# Patient Record
Sex: FEMALE | Race: WHITE | NOT HISPANIC OR LATINO | Employment: OTHER | URBAN - METROPOLITAN AREA
[De-identification: names, ages, dates, MRNs, and addresses within clinical notes are randomized per-mention and may not be internally consistent; named-entity substitution may affect disease eponyms.]

---

## 2017-01-03 ENCOUNTER — TELEPHONE (OUTPATIENT)
Dept: HEMATOLOGY/ONCOLOGY | Facility: CLINIC | Age: 82
End: 2017-01-03

## 2017-01-03 NOTE — TELEPHONE ENCOUNTER
----- Message from Hazel Quezada sent at 1/3/2017  8:58 AM CST -----  Contact: pt   Pt request call from nurse. Pt states that the nurse told her that she has a appt next week and want to verify.     [phone]

## 2017-01-11 ENCOUNTER — TELEPHONE (OUTPATIENT)
Dept: HEMATOLOGY/ONCOLOGY | Facility: CLINIC | Age: 82
End: 2017-01-11

## 2017-01-11 NOTE — TELEPHONE ENCOUNTER
----- Message from Tonya Trevino sent at 1/11/2017 11:01 AM CST -----  Pt at [phone]//states she would like for the nurse to call her to confirm that she has an infusion tomorrow on 1-12-17//please call//thanks/lh

## 2017-01-12 ENCOUNTER — INFUSION (OUTPATIENT)
Dept: INFUSION THERAPY | Facility: HOSPITAL | Age: 82
End: 2017-01-12
Attending: INTERNAL MEDICINE
Payer: MEDICARE

## 2017-01-12 VITALS
RESPIRATION RATE: 18 BRPM | HEART RATE: 75 BPM | DIASTOLIC BLOOD PRESSURE: 65 MMHG | TEMPERATURE: 98 F | SYSTOLIC BLOOD PRESSURE: 138 MMHG

## 2017-01-12 DIAGNOSIS — D50.0 IRON DEFICIENCY ANEMIA DUE TO CHRONIC BLOOD LOSS: Primary | ICD-10-CM

## 2017-01-12 PROCEDURE — 96374 THER/PROPH/DIAG INJ IV PUSH: CPT | Mod: PO

## 2017-01-12 PROCEDURE — 63600175 PHARM REV CODE 636 W HCPCS: Mod: PO | Performed by: INTERNAL MEDICINE

## 2017-01-12 PROCEDURE — 25000003 PHARM REV CODE 250: Mod: PO | Performed by: INTERNAL MEDICINE

## 2017-01-12 RX ORDER — SODIUM CHLORIDE 0.9 % (FLUSH) 0.9 %
10 SYRINGE (ML) INJECTION
Status: DISCONTINUED | OUTPATIENT
Start: 2017-01-12 | End: 2017-01-12 | Stop reason: HOSPADM

## 2017-01-12 RX ADMIN — SODIUM CHLORIDE: 9 INJECTION, SOLUTION INTRAVENOUS at 09:01

## 2017-01-12 RX ADMIN — FERUMOXYTOL 510 MG: 510 INJECTION INTRAVENOUS at 09:01
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
## 2017-01-18 ENCOUNTER — INFUSION (OUTPATIENT)
Dept: INFUSION THERAPY | Facility: HOSPITAL | Age: 82
End: 2017-01-18
Attending: INTERNAL MEDICINE
Payer: MEDICARE

## 2017-01-18 VITALS
SYSTOLIC BLOOD PRESSURE: 175 MMHG | BODY MASS INDEX: 29.13 KG/M2 | RESPIRATION RATE: 20 BRPM | TEMPERATURE: 98 F | OXYGEN SATURATION: 93 % | HEART RATE: 72 BPM | WEIGHT: 158.31 LBS | DIASTOLIC BLOOD PRESSURE: 79 MMHG | HEIGHT: 62 IN

## 2017-01-18 DIAGNOSIS — D50.0 IRON DEFICIENCY ANEMIA DUE TO CHRONIC BLOOD LOSS: Primary | ICD-10-CM

## 2017-01-18 PROCEDURE — 96365 THER/PROPH/DIAG IV INF INIT: CPT | Mod: PO

## 2017-01-18 PROCEDURE — 63600175 PHARM REV CODE 636 W HCPCS: Mod: PO | Performed by: INTERNAL MEDICINE

## 2017-01-18 PROCEDURE — 25000003 PHARM REV CODE 250: Mod: PO | Performed by: INTERNAL MEDICINE

## 2017-01-18 RX ADMIN — FERUMOXYTOL 510 MG: 510 INJECTION INTRAVENOUS at 02:01

## 2017-01-18 RX ADMIN — SODIUM CHLORIDE: 900 INJECTION, SOLUTION INTRAVENOUS at 01:01

## 2017-01-18 NOTE — PATIENT INSTRUCTIONS
[facility]fusion [facility]  [address]  [address] [address]  [phone] phone     [phone] fax  Hours of Operation: Monday- Friday 8:00am- 5:00pm  After hours phone  [phone]  Hematology / Oncology Physicians on call      Dr. Aroldo Alonso    Please call with any concerns regarding your appointment today.FALL PREVENTION   Falls often occur due to slipping, tripping or losing your balance. Here are ways to reduce your risk of falling again.   Was there anything that caused your fall that can be fixed, removed or replaced?   Make your home safe by keeping walkways clear of objects you may trip over.   Use non-slip pads under rugs.   Do not walk in poorly lit areas.   Do not stand on chairs or wobbly ladders.   Use caution when reaching overhead or looking upward. This position can cause a loss of balance.   Be sure your shoes fit properly, have non-slip bottoms and are in good condition.   Be cautious when going up and down stairs, curbs, and when walking on uneven sidewalks.   If your balance is poor, consider using a cane or walker.   If your fall was related to alcohol use, stop or limit alcohol intake.   If your fall was related to use of sleeping medicines, talk to your doctor about this. You may need to reduce your dosage at bedtime if you awaken during the night to go to the bathroom.   To reduce the need for nighttime bathroom trips:   Avoid drinking fluids for several hours before going to bed   Empty your bladder before going to bed   Men can keep a urinal at the bedside   © 8610-7155 [facility], [address] [place] [address], [place] [address]. All rights reserved. This information is not intended as a substitute for professional medical care. Always follow your healthcare professional's instructions.

## 2017-01-18 NOTE — PLAN OF CARE
"Problem: Patient Care Overview  Goal: Plan of Care Review  Outcome: Ongoing (interventions implemented as appropriate)  Pt. Stated,"I'm doing good."      "

## 2017-02-13 DIAGNOSIS — I10 ESSENTIAL HYPERTENSION: ICD-10-CM

## 2017-02-13 RX ORDER — BENAZEPRIL HYDROCHLORIDE 5 MG/1
TABLET ORAL
Qty: 90 TABLET | Refills: 3 | Status: CANCELLED | OUTPATIENT
Start: 2017-02-13

## 2017-02-16 ENCOUNTER — LAB VISIT (OUTPATIENT)
Dept: LAB | Facility: HOSPITAL | Age: 82
End: 2017-02-16
Attending: INTERNAL MEDICINE
Payer: MEDICARE

## 2017-02-16 ENCOUNTER — OFFICE VISIT (OUTPATIENT)
Dept: HEMATOLOGY/ONCOLOGY | Facility: CLINIC | Age: 82
End: 2017-02-16
Payer: MEDICARE

## 2017-02-16 VITALS
OXYGEN SATURATION: 96 % | HEART RATE: 79 BPM | WEIGHT: 155.19 LBS | BODY MASS INDEX: 28.56 KG/M2 | SYSTOLIC BLOOD PRESSURE: 120 MMHG | DIASTOLIC BLOOD PRESSURE: 80 MMHG | RESPIRATION RATE: 18 BRPM | TEMPERATURE: 98 F | HEIGHT: 62 IN

## 2017-02-16 DIAGNOSIS — D63.1 ANEMIA IN CHRONIC KIDNEY DISEASE: ICD-10-CM

## 2017-02-16 DIAGNOSIS — N18.30 CHRONIC RENAL FAILURE, STAGE 3 (MODERATE): ICD-10-CM

## 2017-02-16 DIAGNOSIS — D50.0 IRON DEFICIENCY ANEMIA DUE TO CHRONIC BLOOD LOSS: ICD-10-CM

## 2017-02-16 DIAGNOSIS — D47.2 MONOCLONAL GAMMOPATHY OF UNDETERMINED SIGNIFICANCE: ICD-10-CM

## 2017-02-16 DIAGNOSIS — D63.1 ANEMIA OF CHRONIC RENAL FAILURE, STAGE 3 (MODERATE): ICD-10-CM

## 2017-02-16 DIAGNOSIS — N18.9 ANEMIA IN CHRONIC KIDNEY DISEASE: ICD-10-CM

## 2017-02-16 DIAGNOSIS — D50.0 IRON DEFICIENCY ANEMIA DUE TO CHRONIC BLOOD LOSS: Primary | ICD-10-CM

## 2017-02-16 DIAGNOSIS — N18.30 ANEMIA OF CHRONIC RENAL FAILURE, STAGE 3 (MODERATE): ICD-10-CM

## 2017-02-16 LAB
FERRITIN SERPL-MCNC: 1877 NG/ML
IRON SERPL-MCNC: 36 UG/DL
SATURATED IRON: 12 %
TOTAL IRON BINDING CAPACITY: 296 UG/DL
TRANSFERRIN SERPL-MCNC: 200 MG/DL

## 2017-02-16 PROCEDURE — 99999 PR PBB SHADOW E&M-EST. PATIENT-LVL III: CPT | Mod: PBBFAC,,, | Performed by: INTERNAL MEDICINE

## 2017-02-16 PROCEDURE — 1126F AMNT PAIN NOTED NONE PRSNT: CPT | Mod: S$GLB,,, | Performed by: INTERNAL MEDICINE

## 2017-02-16 PROCEDURE — 99214 OFFICE O/P EST MOD 30 MIN: CPT | Mod: S$GLB,,, | Performed by: INTERNAL MEDICINE

## 2017-02-16 PROCEDURE — 1160F RVW MEDS BY RX/DR IN RCRD: CPT | Mod: S$GLB,,, | Performed by: INTERNAL MEDICINE

## 2017-02-16 PROCEDURE — 99499 UNLISTED E&M SERVICE: CPT | Mod: S$GLB,,, | Performed by: INTERNAL MEDICINE

## 2017-02-16 PROCEDURE — 1157F ADVNC CARE PLAN IN RCRD: CPT | Mod: S$GLB,,, | Performed by: INTERNAL MEDICINE

## 2017-02-16 PROCEDURE — 3079F DIAST BP 80-89 MM HG: CPT | Mod: S$GLB,,, | Performed by: INTERNAL MEDICINE

## 2017-02-16 PROCEDURE — 1159F MED LIST DOCD IN RCRD: CPT | Mod: S$GLB,,, | Performed by: INTERNAL MEDICINE

## 2017-02-16 PROCEDURE — 3074F SYST BP LT 130 MM HG: CPT | Mod: S$GLB,,, | Performed by: INTERNAL MEDICINE

## 2017-02-17 ENCOUNTER — TELEPHONE (OUTPATIENT)
Dept: HEMATOLOGY/ONCOLOGY | Facility: CLINIC | Age: 82
End: 2017-02-17

## 2017-02-17 DIAGNOSIS — D50.0 IRON DEFICIENCY ANEMIA DUE TO CHRONIC BLOOD LOSS: Primary | ICD-10-CM

## 2017-02-17 RX ORDER — SODIUM CHLORIDE 0.9 % (FLUSH) 0.9 %
10 SYRINGE (ML) INJECTION
Status: CANCELLED | OUTPATIENT
Start: 2017-02-17

## 2017-02-17 RX ORDER — HEPARIN 100 UNIT/ML
500 SYRINGE INTRAVENOUS
Status: CANCELLED | OUTPATIENT
Start: 2017-02-17

## 2017-02-17 RX ORDER — HEPARIN 100 UNIT/ML
500 SYRINGE INTRAVENOUS
Status: CANCELLED | OUTPATIENT
Start: 2017-02-25

## 2017-02-17 RX ORDER — SODIUM CHLORIDE 0.9 % (FLUSH) 0.9 %
10 SYRINGE (ML) INJECTION
Status: CANCELLED | OUTPATIENT
Start: 2017-02-25

## 2017-02-17 NOTE — TELEPHONE ENCOUNTER
----- Message from Blas Kendrick MD sent at 2/17/2017  1:07 PM CST -----  She is still iron deficient will need to get some more intravenous iron orders have been written I like to see her on day 1 or 8

## 2017-02-24 ENCOUNTER — INFUSION (OUTPATIENT)
Dept: INFUSION THERAPY | Facility: HOSPITAL | Age: 82
End: 2017-02-24
Attending: INTERNAL MEDICINE
Payer: MEDICARE

## 2017-02-24 ENCOUNTER — OFFICE VISIT (OUTPATIENT)
Dept: HEMATOLOGY/ONCOLOGY | Facility: CLINIC | Age: 82
End: 2017-02-24
Payer: MEDICARE

## 2017-02-24 VITALS
DIASTOLIC BLOOD PRESSURE: 64 MMHG | BODY MASS INDEX: 28.11 KG/M2 | HEART RATE: 77 BPM | HEIGHT: 62 IN | WEIGHT: 152.75 LBS | SYSTOLIC BLOOD PRESSURE: 134 MMHG | OXYGEN SATURATION: 95 % | TEMPERATURE: 98 F

## 2017-02-24 VITALS
WEIGHT: 152.75 LBS | BODY MASS INDEX: 28.11 KG/M2 | HEART RATE: 76 BPM | SYSTOLIC BLOOD PRESSURE: 110 MMHG | HEIGHT: 62 IN | DIASTOLIC BLOOD PRESSURE: 64 MMHG

## 2017-02-24 DIAGNOSIS — N18.30 CHRONIC RENAL FAILURE, STAGE 3 (MODERATE): ICD-10-CM

## 2017-02-24 DIAGNOSIS — D50.0 IRON DEFICIENCY ANEMIA DUE TO CHRONIC BLOOD LOSS: Primary | ICD-10-CM

## 2017-02-24 DIAGNOSIS — N18.30 ANEMIA OF CHRONIC RENAL FAILURE, STAGE 3 (MODERATE): ICD-10-CM

## 2017-02-24 DIAGNOSIS — D63.1 ANEMIA OF CHRONIC RENAL FAILURE, STAGE 3 (MODERATE): ICD-10-CM

## 2017-02-24 PROCEDURE — 99999 PR PBB SHADOW E&M-EST. PATIENT-LVL III: CPT | Mod: PBBFAC,,, | Performed by: INTERNAL MEDICINE

## 2017-02-24 PROCEDURE — 99214 OFFICE O/P EST MOD 30 MIN: CPT | Mod: 25,S$GLB,, | Performed by: INTERNAL MEDICINE

## 2017-02-24 PROCEDURE — 1159F MED LIST DOCD IN RCRD: CPT | Mod: S$GLB,,, | Performed by: INTERNAL MEDICINE

## 2017-02-24 PROCEDURE — 3078F DIAST BP <80 MM HG: CPT | Mod: S$GLB,,, | Performed by: INTERNAL MEDICINE

## 2017-02-24 PROCEDURE — 1157F ADVNC CARE PLAN IN RCRD: CPT | Mod: S$GLB,,, | Performed by: INTERNAL MEDICINE

## 2017-02-24 PROCEDURE — 1160F RVW MEDS BY RX/DR IN RCRD: CPT | Mod: S$GLB,,, | Performed by: INTERNAL MEDICINE

## 2017-02-24 PROCEDURE — 96365 THER/PROPH/DIAG IV INF INIT: CPT | Mod: PO

## 2017-02-24 PROCEDURE — 99499 UNLISTED E&M SERVICE: CPT | Mod: S$GLB,,, | Performed by: INTERNAL MEDICINE

## 2017-02-24 PROCEDURE — 3075F SYST BP GE 130 - 139MM HG: CPT | Mod: S$GLB,,, | Performed by: INTERNAL MEDICINE

## 2017-02-24 PROCEDURE — 25000003 PHARM REV CODE 250: Mod: PO | Performed by: INTERNAL MEDICINE

## 2017-02-24 PROCEDURE — 1126F AMNT PAIN NOTED NONE PRSNT: CPT | Mod: S$GLB,,, | Performed by: INTERNAL MEDICINE

## 2017-02-24 PROCEDURE — 63600175 PHARM REV CODE 636 W HCPCS: Mod: PO | Performed by: INTERNAL MEDICINE

## 2017-02-24 RX ADMIN — SODIUM CHLORIDE: 9 INJECTION, SOLUTION INTRAVENOUS at 09:02

## 2017-02-24 RX ADMIN — FERUMOXYTOL 510 MG: 510 INJECTION INTRAVENOUS at 10:02

## 2017-02-24 NOTE — PROGRESS NOTES
Subjective:       Patient ID: Yovani Pulido is a 84 y.o. female.    Chief Complaint: Results; Anemia; and Chronic Renal Failure    HPI 84-year-old female history of anemia secondary chronic renal failure patient returns patient has recurrent iron deficiency anemia as well with evaluation completed patient recently treated with intravenous iron but still iron depleted    Past Medical History:   Diagnosis Date    Anemia     Arthritis     Rheumatoid    Carotid stenosis     Cataract     COAG (chronic open-angle glaucoma)     Colon polyps     Diabetes mellitus type II     steroid induced    Diverticulosis     GERD (gastroesophageal reflux disease)     hiatal hernia    Glaucoma     Heart murmur     Hyperlipidemia     Hypertension     Hypothyroidism     Stroke     lacunar infarct     Family History   Problem Relation Age of Onset    Cancer Mother      pancreas    Glaucoma Mother     Cancer Father      lung    Cancer Son 61     melanoma metastatic    Heart disease Brother     Diabetes Sister     Stroke Sister     Blindness Sister     Cataracts Sister     Heart disease Brother     Hyperlipidemia Neg Hx     Hypertension Neg Hx     Macular degeneration Neg Hx     Retinal detachment Neg Hx     Strabismus Neg Hx     Thyroid disease Neg Hx     Colon cancer Neg Hx     Stomach cancer Neg Hx      Social History     Social History    Marital status:      Spouse name: N/A    Number of children: 4    Years of education: N/A     Occupational History    Not on file.     Social History Main Topics    Smoking status: Former Smoker     Packs/day: 3.00     Years: 30.00     Quit date: 12/12/1998    Smokeless tobacco: Never Used    Alcohol use No    Drug use: No    Sexual activity: No     Other Topics Concern    Not on file     Social History Narrative    , then remarried.  after 2-3 weeks. They have still been living together for 31 years. He is 95 now. They are no longer  sexually active. Caffeine intake 4 cups coffee daily- though has changed to decaf recently. Does have a living will.      Past Surgical History:   Procedure Laterality Date    anal fissure repair      APPENDECTOMY  1944    BREAST SURGERY  1992 approx    breast biopsies- benign    CAROTID ENDARTERECTOMY  2009    left    CATARACT EXTRACTION      COLONOSCOPY  2012    COLONOSCOPY N/A 12/11/2015    Procedure: COLONOSCOPY;  Surgeon: Gavin Escobedo MD;  Location: [facility];  Service: Endoscopy;  Laterality: N/A;    EYE SURGERY  2004, 2005    bilateral cataracts    HERNIA REPAIR  2001, 2002    abdominal x2, with mesh on second    HYSTERECTOMY  1963    vag hyst    JOINT REPLACEMENT  2008    right knee       Labs:  Lab Results   Component Value Date    WBC 4.55 02/16/2017    HGB 10.7 (L) 02/16/2017    HCT 33.5 (L) 02/16/2017     (H) 02/16/2017     02/16/2017     BMP  Lab Results   Component Value Date     02/16/2017    K 4.6 02/16/2017     02/16/2017    CO2 28 02/16/2017    BUN 22 02/16/2017    CREATININE 1.3 02/16/2017    CALCIUM 9.3 02/16/2017    ANIONGAP 12 02/16/2017    ESTGFRAFRICA 44 (A) 02/16/2017    EGFRNONAA 38 (A) 02/16/2017     Lab Results   Component Value Date    ALT 13 02/16/2017    AST 19 02/16/2017    ALKPHOS 90 02/16/2017    BILITOT 0.4 02/16/2017       Lab Results   Component Value Date    IRON 36 02/16/2017    TIBC 296 02/16/2017    FERRITIN 1877 (H) 02/16/2017     Lab Results   Component Value Date    TKUKZOXZ99 554 09/19/2016     Lab Results   Component Value Date    FOLATE > 40.0 (H) 04/15/2013     Lab Results   Component Value Date    TSH 2.091 06/27/2016         Review of Systems   Constitutional: Positive for activity change and fatigue. Negative for appetite change, chills, diaphoresis, fever and unexpected weight change.   HENT: Negative for congestion, dental problem, drooling, ear discharge, ear pain, facial swelling, hearing loss, mouth sores, nosebleeds,  postnasal drip, rhinorrhea, sinus pressure, sneezing, sore throat, tinnitus, trouble swallowing and voice change.    Eyes: Negative for photophobia, pain, discharge, redness, itching and visual disturbance.   Respiratory: Negative for cough, choking, chest tightness, shortness of breath, wheezing and stridor.    Cardiovascular: Negative for chest pain, palpitations and leg swelling.   Gastrointestinal: Negative for abdominal distention, abdominal pain, anal bleeding, blood in stool, constipation, diarrhea, nausea, rectal pain and vomiting.   Endocrine: Negative for cold intolerance, heat intolerance, polydipsia, polyphagia and polyuria.   Genitourinary: Negative for decreased urine volume, difficulty urinating, dyspareunia, dysuria, enuresis, flank pain, frequency, genital sores, hematuria, menstrual problem, pelvic pain, urgency, vaginal bleeding, vaginal discharge and vaginal pain.   Musculoskeletal: Negative for arthralgias, back pain, gait problem, joint swelling, myalgias, neck pain and neck stiffness.   Skin: Negative for color change, pallor and rash.   Allergic/Immunologic: Negative for environmental allergies, food allergies and immunocompromised state.   Neurological: Positive for weakness. Negative for dizziness, tremors, seizures, syncope, facial asymmetry, speech difficulty, light-headedness, numbness and headaches.   Hematological: Negative for adenopathy. Does not bruise/bleed easily.   Psychiatric/Behavioral: Negative for agitation, behavioral problems, confusion, decreased concentration, dysphoric mood, hallucinations, self-injury, sleep disturbance and suicidal ideas. The patient is not nervous/anxious and is not hyperactive.        Objective:      Physical Exam   Constitutional: She is oriented to person, place, and time. She appears well-developed and well-nourished. No distress.   HENT:   Head: Normocephalic and atraumatic.   Right Ear: External ear normal.   Left Ear: External ear normal.    Nose: Nose normal. Right sinus exhibits no maxillary sinus tenderness and no frontal sinus tenderness. Left sinus exhibits no maxillary sinus tenderness and no frontal sinus tenderness.   Mouth/Throat: Oropharynx is clear and moist. No oropharyngeal exudate.   Eyes: Conjunctivae, EOM and lids are normal. Pupils are equal, round, and reactive to light. Right eye exhibits no discharge. Left eye exhibits no discharge. Right conjunctiva is not injected. Right conjunctiva has no hemorrhage. Left conjunctiva is not injected. Left conjunctiva has no hemorrhage. No scleral icterus.   Neck: Normal range of motion. Neck supple. No JVD present. No tracheal deviation present. No thyromegaly present.   Cardiovascular: Normal rate and regular rhythm.    Pulmonary/Chest: Effort normal. No stridor. No respiratory distress. She exhibits no tenderness.   Abdominal: Soft. She exhibits no distension and no mass. There is no splenomegaly or hepatomegaly. There is no tenderness. There is no rebound.   Musculoskeletal: Normal range of motion. She exhibits no edema or tenderness.   Lymphadenopathy:     She has no cervical adenopathy.     She has no axillary adenopathy.        Right: No supraclavicular adenopathy present.        Left: No supraclavicular adenopathy present.   Neurological: She is alert and oriented to person, place, and time. No cranial nerve deficit. Coordination normal.   Skin: Skin is dry. No rash noted. She is not diaphoretic. No erythema.   Psychiatric: She has a normal mood and affect. Her behavior is normal. Judgment and thought content normal.   Vitals reviewed.          Assessment:       1. Iron deficiency anemia due to chronic blood loss    2. Anemia of chronic renal failure, stage 3 (moderate)    3. Chronic renal failure, stage 3 (moderate)            Plan:     recurrent iron deficiency anemia at this point patient be treated with intravenous iron return in 6-8 weeks with repeat CBC iron status prior to be drawn  at [facility]
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
## 2017-02-24 NOTE — PLAN OF CARE
Problem: Patient Care Overview  Goal: Plan of Care Review  Outcome: Ongoing (interventions implemented as appropriate)  Pt states feeling pretty good today.
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
## 2017-03-03 ENCOUNTER — INFUSION (OUTPATIENT)
Dept: INFUSION THERAPY | Facility: HOSPITAL | Age: 82
End: 2017-03-03
Attending: INTERNAL MEDICINE
Payer: MEDICARE

## 2017-03-03 VITALS
RESPIRATION RATE: 18 BRPM | DIASTOLIC BLOOD PRESSURE: 64 MMHG | WEIGHT: 152 LBS | TEMPERATURE: 96 F | HEIGHT: 62 IN | HEART RATE: 65 BPM | BODY MASS INDEX: 27.97 KG/M2 | SYSTOLIC BLOOD PRESSURE: 132 MMHG

## 2017-03-03 DIAGNOSIS — D50.0 IRON DEFICIENCY ANEMIA DUE TO CHRONIC BLOOD LOSS: Primary | ICD-10-CM

## 2017-03-03 PROCEDURE — 25000003 PHARM REV CODE 250: Mod: PO | Performed by: INTERNAL MEDICINE

## 2017-03-03 PROCEDURE — 63600175 PHARM REV CODE 636 W HCPCS: Mod: PO | Performed by: INTERNAL MEDICINE

## 2017-03-03 PROCEDURE — 96365 THER/PROPH/DIAG IV INF INIT: CPT | Mod: PO

## 2017-03-03 RX ADMIN — FERUMOXYTOL 510 MG: 510 INJECTION INTRAVENOUS at 09:03

## 2017-03-03 RX ADMIN — SODIUM CHLORIDE: 9 INJECTION, SOLUTION INTRAVENOUS at 09:03

## 2017-03-03 NOTE — MR AVS SNAPSHOT
Patient Information     Patient Name Sex     oYvani Pulido Female 1932      Visit Information        Provider Department Dept Phone Center    3/3/2017 10:00 AM [facility] Chemo Infusion [facility] Chemotherapy Infusion [phone] [facility]      Patient Instructions     None      Your Current Medications Are     alcohol swabs PadM    aspirin (ECOTRIN) 81 MG EC tablet    benazepril (LOTENSIN) 5 MG tablet    blood glucose control, high Soln    blood glucose control, normal Soln    blood sugar diagnostic Strp    blood-glucose meter Misc    cyanocobalamin (VITAMIN B-12) 250 MCG tablet    folic acid (FOLVITE) 800 MCG Tab    lancets (LANCETS,THIN) Misc    latanoprost 0.005 % ophthalmic solution    levothyroxine (SYNTHROID) 50 MCG tablet    magnesium 250 mg Tab    metformin (GLUCOPHAGE) 1000 MG tablet    methotrexate 2.5 MG Tab    omeprazole (PRILOSEC) 20 MG capsule    pravastatin (PRAVACHOL) 40 MG tablet    predniSONE (DELTASONE) 1 MG tablet    timolol maleate 0.5% (TIMOPTIC) 0.5 % Drop    tramadol (ULTRAM) 50 mg tablet    trazodone (DESYREL) 100 MG tablet      Facility-Administered Medications     ferumoxytol (FERAHEME) 510 mg in sodium chloride 0.9% 100 mL IVPB    sodium chloride 0.9% 100 mL flush bag      Appointments for Next Year     2017  3:00 PM NON FASTING LAB (10 min.) [facility] [place] [facility]    Arrive at check-in approximately 15 minutes before your scheduled appointment time. Bring all outside medical records and imaging, along with a list of your current medications and insurance card.    2017 10:40 AM ESTABLISHED PATIENT (20 min.) [facility] - Internal Medicine Dorcas Schultz DO    Arrive at check-in approximately 15 minutes before your scheduled appointment time. Bring all outside medical records and imaging, along with a list of your current medications and insurance card.    (off [facility]) 1st floor    2017  1:00 PM ESTABLISHED PATIENT (20 min.) [facility] - Hemotology  Oncology Blas Kendrick MD    Arrive at check-in approximately 15 minutes before your scheduled appointment time. Bring all outside medical records and imaging, along with a list of your current medications and insurance card.    (off [address]) 3rd Floor    5/5/2017 11:20 AM NON FASTING LAB (10 min.) [facility], [place]    Arrive at check-in approximately 15 minutes before your scheduled appointment time. Bring all outside medical records and imaging, along with a list of your current medications and insurance card.    5/10/2017 12:00 PM [facility] (30 min.) [facility]    Arrive at check-in approximately 15 minutes before your scheduled appointment time. Bring all outside medical records and imaging, along with a list of your current medications and insurance card.    (off [address]) 1st floor    5/10/2017  1:15 PM ESTABLISHED PATIENT EXTENDED (15 min.) [facility] - Ophthalmology Neymar Sweeney MD    Arrive at check-in approximately 15 minutes before your scheduled appointment time. Bring all outside medical records and imaging, along with a list of your current medications and insurance card.    (off [address]) 1st floor    5/12/2017 11:20 AM ESTABLISHED PATIENT (20 min.) [facility] Blas Kendrick MD    Arrive at check-in approximately 15 minutes before your scheduled appointment time. Bring all outside medical records and imaging, along with a list of your current medications and insurance card.    (off [address]) 3rd Floor    8/29/2017  1:40 PM NON FASTING LAB (5 min.) [facility]    Arrive at check-in approximately 15 minutes before your scheduled appointment time. Bring all outside medical records and imaging, along with a list of your current medications and insurance card.    (off [address]) 2nd floor    8/29/2017  2:00 PM ESTABLISHED  "PATIENT (30 min.) [facility] - Rheumatology Claudia Chau PA-C    Arrive at check-in approximately 15 minutes before your scheduled appointment time. Bring all outside medical records and imaging, along with a list of your current medications and insurance card.    (off [address]) 2nd Floor         Default Flowsheet Data (last 24 hours)      Amb Complex Vitals Eliezer        03/03/17 1028 03/03/17 0922             Measurements    Weight  68.9 kg (152 lb)       Height  5' 2" (1.575 m)       BSA (Calculated - sq m)  1.74 sq meters       BMI (Calculated)  27.9       /64 (!)  158/77       Temp  (!)  95.7 °F (35.4 °C)       Pulse 65 81       Resp  18       Pain Assessment    Pain Score  Zero               Allergies     Tetanus Vaccines And Toxoid     Other reaction(s): Swelling  Tetanus-horse serum: 30 years ago    Adhes. Band-tape-benzalkonium Rash      Medications You Received from 03/02/2017 1029 to 03/03/2017 1029        Date/Time Order Dose Route Action     03/03/2017 0930 ferumoxytol (FERAHEME) 510 mg in sodium chloride 0.9% 100 mL IVPB 510 mg Intravenous New Bag     03/03/2017 0930 sodium chloride 0.9% 100 mL flush bag   Intravenous New Bag      Current Discharge Medication List     Cannot display discharge medications since this is not an admission.      "

## 2017-03-06 NOTE — TELEPHONE ENCOUNTER
Attempted to call pt to see what medication she needed refilled. No answer, unable to leave message

## 2017-03-06 NOTE — TELEPHONE ENCOUNTER
----- Message from Fannie Murcia sent at 3/6/2017  8:13 AM CST -----  Contact: pt  PT is calling nurse staff regarding a refill medication. Pt call back [phone] thanks    [facility] [phone]

## 2017-03-07 RX ORDER — TRAMADOL HYDROCHLORIDE 50 MG/1
50 TABLET ORAL 3 TIMES DAILY PRN
Qty: 90 TABLET | Refills: 2 | Status: SHIPPED | OUTPATIENT
Start: 2017-03-07 | End: 2017-09-05 | Stop reason: SDUPTHER

## 2017-03-07 NOTE — TELEPHONE ENCOUNTER
----- Message from Che Luis sent at 3/7/2017  3:54 PM CST -----  Contact: patient  Calling concerning RX refill of pain medication. Please call patient ASAP today @ [phone]. Thanks, bill Shrestha [place] [id number] - [place]ENNIFER [place] - [id number] [facility]  [address] [address] [address]  Phone: [phone] Fax: [phone]

## 2017-03-27 ENCOUNTER — PATIENT OUTREACH (OUTPATIENT)
Dept: ADMINISTRATIVE | Facility: HOSPITAL | Age: 82
End: 2017-03-27

## 2017-03-27 NOTE — PROGRESS NOTES
Offered to schedule dexa scan. She declined and said she is just tired of going to drs right now. Encouraged to schedule at a later date.

## 2017-04-03 ENCOUNTER — TELEPHONE (OUTPATIENT)
Dept: INTERNAL MEDICINE | Facility: CLINIC | Age: 82
End: 2017-04-03

## 2017-04-03 NOTE — TELEPHONE ENCOUNTER
----- Message from Rachael Quezada sent at 4/3/2017 11:03 AM CDT -----  Contact: Patient   Patient request a call back at [phone], Regards to her labs.    Thanks  Td

## 2017-04-03 NOTE — TELEPHONE ENCOUNTER
Pt was informed at this time  no lab work is required most recent labs for Dr. Schultz were done on 02/16/17 pt verbalized understanding.

## 2017-04-07 ENCOUNTER — TELEPHONE (OUTPATIENT)
Dept: INTERNAL MEDICINE | Facility: CLINIC | Age: 82
End: 2017-04-07

## 2017-04-07 ENCOUNTER — OFFICE VISIT (OUTPATIENT)
Dept: INTERNAL MEDICINE | Facility: CLINIC | Age: 82
End: 2017-04-07
Payer: MEDICARE

## 2017-04-07 VITALS
DIASTOLIC BLOOD PRESSURE: 76 MMHG | TEMPERATURE: 97 F | SYSTOLIC BLOOD PRESSURE: 102 MMHG | OXYGEN SATURATION: 95 % | HEART RATE: 89 BPM | WEIGHT: 154.13 LBS | BODY MASS INDEX: 28.19 KG/M2

## 2017-04-07 DIAGNOSIS — E78.2 COMBINED HYPERLIPIDEMIA ASSOCIATED WITH TYPE 2 DIABETES MELLITUS: ICD-10-CM

## 2017-04-07 DIAGNOSIS — E11.59 HYPERTENSION ASSOCIATED WITH DIABETES: ICD-10-CM

## 2017-04-07 DIAGNOSIS — N18.30 DIABETES MELLITUS WITH STAGE 3 CHRONIC KIDNEY DISEASE: ICD-10-CM

## 2017-04-07 DIAGNOSIS — E11.69 COMBINED HYPERLIPIDEMIA ASSOCIATED WITH TYPE 2 DIABETES MELLITUS: ICD-10-CM

## 2017-04-07 DIAGNOSIS — I15.2 HYPERTENSION ASSOCIATED WITH DIABETES: ICD-10-CM

## 2017-04-07 DIAGNOSIS — R80.9 DIABETES MELLITUS WITH MICROALBUMINURIA: ICD-10-CM

## 2017-04-07 DIAGNOSIS — E11.29 DIABETES MELLITUS WITH MICROALBUMINURIA: ICD-10-CM

## 2017-04-07 DIAGNOSIS — E11.22 DIABETES MELLITUS WITH STAGE 3 CHRONIC KIDNEY DISEASE: ICD-10-CM

## 2017-04-07 DIAGNOSIS — E03.9 HYPOTHYROIDISM (ACQUIRED): Primary | ICD-10-CM

## 2017-04-07 PROCEDURE — 1160F RVW MEDS BY RX/DR IN RCRD: CPT | Mod: S$GLB,,, | Performed by: INTERNAL MEDICINE

## 2017-04-07 PROCEDURE — 3074F SYST BP LT 130 MM HG: CPT | Mod: S$GLB,,, | Performed by: INTERNAL MEDICINE

## 2017-04-07 PROCEDURE — 99499 UNLISTED E&M SERVICE: CPT | Mod: S$GLB,,, | Performed by: INTERNAL MEDICINE

## 2017-04-07 PROCEDURE — 99999 PR PBB SHADOW E&M-EST. PATIENT-LVL III: CPT | Mod: PBBFAC,,, | Performed by: INTERNAL MEDICINE

## 2017-04-07 PROCEDURE — 1157F ADVNC CARE PLAN IN RCRD: CPT | Mod: S$GLB,,, | Performed by: INTERNAL MEDICINE

## 2017-04-07 PROCEDURE — 1126F AMNT PAIN NOTED NONE PRSNT: CPT | Mod: S$GLB,,, | Performed by: INTERNAL MEDICINE

## 2017-04-07 PROCEDURE — 3078F DIAST BP <80 MM HG: CPT | Mod: S$GLB,,, | Performed by: INTERNAL MEDICINE

## 2017-04-07 PROCEDURE — 99214 OFFICE O/P EST MOD 30 MIN: CPT | Mod: S$GLB,,, | Performed by: INTERNAL MEDICINE

## 2017-04-07 PROCEDURE — 1159F MED LIST DOCD IN RCRD: CPT | Mod: S$GLB,,, | Performed by: INTERNAL MEDICINE

## 2017-04-07 NOTE — MR AVS SNAPSHOT
[facility]  [address] [facility] [address]  Phone: [phone]  Fax: [phone]                  Yovani Pulido   2017 10:40 AM   Office Visit    Description:  Female : 1932   Provider:  Dorcas Schultz DO   Department:  [facility]           Reason for Visit     6mth follow up           Diagnoses this Visit        Comments    Hypothyroidism (acquired)    -  Primary     Diabetes mellitus with microalbuminuria         Diabetes mellitus with stage 3 chronic kidney disease         Essential hypertension         Hyperlipidemia LDL goal <100                To Do List           Future Appointments        Provider Department Dept Phone    5/15/2017 1:00 PM [facility]ELDS, [facility] Ophthalmology [phone]    5/15/2017 1:30 PM Neymar Sweeney MD [facility] Ophthalmology [phone]    5/15/2017 1:45 PM LAB[facility]ATORY, [facility] [phone]    5/15/2017 2:20 PM Blas Kendrick MD [facility] - Hemotology Oncology [phone]    2017 1:40 PM LABORATORY, [facility] [phone]      Goals (5 Years of Data)     None      [facility]     [facility] Nurse Care Line -  Assistance  Unless otherwise directed by your provider, please contact [facility], our nurse care line that is available for  assistance.     Registered nurses in the [facility] provide: appointment scheduling, clinical advisement, health education, and other advisory services.  Call: [phone] (toll free)               Medications           Message regarding Medications     Verify the changes and/or additions to your medication regime listed below are the same as discussed with your clinician today.  If any of these changes or additions are incorrect, please notify your healthcare provider.             Verify that the below list of medications is an accurate representation of the medications  you are currently taking.  If none reported, the list may be blank. If incorrect, please contact your healthcare provider. Carry this list with you in case of emergency.           Current Medications     alcohol swabs PadM Apply 1 each topically 2 (two) times daily.    aspirin (ECOTRIN) 81 MG EC tablet Take 1 tablet (81 mg total) by mouth once daily.    benazepril (LOTENSIN) 5 MG tablet Take 1 tablet (5 mg total) by mouth once daily.    blood glucose control, high Soln by Misc.(Non-Drug; Combo Route) route.    blood glucose control, normal Soln Use as directed.    blood sugar diagnostic Strp Glucose testing daily.    blood-glucose meter Misc Use as directed.    cyanocobalamin (VITAMIN B-12) 250 MCG tablet Take 1 tablet by mouth Daily.    folic acid (FOLVITE) 800 MCG Tab TAKE 1 TABLET TWICE DAILY    lancets (LANCETS,THIN) Misc Twice daily glucose testing.    latanoprost 0.005 % ophthalmic solution INSTILL 1 DROP INTO BOTH EYES EVERY EVENING    levothyroxine (SYNTHROID) 50 MCG tablet TAKE 1 TABLET BEFORE BREAKFAST    magnesium 250 mg Tab Take 1 tablet by mouth Daily.    metformin (GLUCOPHAGE) 1000 MG tablet TAKE 1 TABLET TWICE DAILY WITH MEALS    methotrexate 2.5 MG Tab TAKE 4 TABLETS IN MORNING AND 4 TABLETS AT NIGHT ONE TIME WEEKLY    omeprazole (PRILOSEC) 20 MG capsule TAKE 1 CAPSULE EVERY DAY    pravastatin (PRAVACHOL) 40 MG tablet TAKE 1 TABLET ONE TIME DAILY    predniSONE (DELTASONE) 1 MG tablet TAKE 1 TABLET TWICE DAILY  OR  AS  DIRECTED    timolol maleate 0.5% (TIMOPTIC) 0.5 % Drop INSTILL 1 DROP INTO BOTH EYES EVERY MORNING    tramadol (ULTRAM) 50 mg tablet Take 1 tablet (50 mg total) by mouth 3 (three) times daily as needed.    trazodone (DESYREL) 100 MG tablet Take 1 tablet (100 mg total) by mouth every evening.           Clinical Reference Information           Your Vitals Were     BP Pulse Temp    102/76 (BP Location: Right arm, Patient Position: Sitting, BP Method: Manual) 89 97.3 °F (36.3 °C) (Tympanic)     Weight SpO2 BMI    69.9 kg (154 lb 1.6 oz) 95% 28.19 kg/m2      Blood Pressure          Most Recent Value    BP  102/76      Allergies as of 4/7/2017     Tetanus Vaccines And Toxoid    Adhes. Band-tape-benzalkonium      Immunizations Administered on Date of Encounter - 4/7/2017     None      Orders Placed During Today's Visit     Recurring Lab Work Interval Expires    TSH  Every 6 Months until 6/6/2018 6/6/2018      Language Assistance Services     ATTENTION: Language assistance services are available, free of charge. Please call [phone].      ATENCIÓN: Si habla español, tiene a ricardo disposición servicios gratuitos de asistencia lingüística. Llame al [phone].     CHÚ Ý: N?u b?n nói Ti?ng Vi?t, có các d?ch v? h? tr? ngôn ng? mi?n phí dành cho b?n. G?i s? [phone].         [facility] complies with applicable Federal civil rights laws and does not discriminate on the basis of race, color, national origin, age, disability, or sex.

## 2017-04-07 NOTE — TELEPHONE ENCOUNTER
----- Message from Jessica Gabriel sent at 4/7/2017  1:55 PM CDT -----  Contact: Patient  Patient needs to talk to nurse about her medication, please call her back at [phone]. Thank you

## 2017-04-07 NOTE — PROGRESS NOTES
Subjective:       Patient ID: Yovani Pulido is a 84 y.o. female.    Chief Complaint: 6mth follow up    HPI Comments: Yovani Pulido  84 y.o. White female    Patient presents with:  6mth follow up    HPI: Here today to follow up on chronic conditions.  Hypothyroidism--stable on levothyroxine.                        TSH                      2.091               06/27/2016            Diabetes--stable on metformin. She denies symptoms.                       HGBA1C                   5.4                 02/16/2017            Microalbuminuria--improved. She is not sure if she is taking benazepril. She does not have her medications with her.   CKD III--stable. She denies symptoms.   HTN--stable.   HLD--compliant with pravastatin.                     CHOL                     162                 02/16/2017                HDL                      60                  02/16/2017                 LDLCALC                  79.2                02/16/2017                 TRIG                     114                 02/16/2017                Past Medical History:  Anemia  Carotid stenosis  Cataract  COAG (chronic open-angle glaucoma)  Colon polyps  Diabetes mellitus type II      Comment: steroid induced  Diverticulosis  GERD (gastroesophageal reflux disease)      Comment: hiatal hernia  Glaucoma  Heart murmur  Hyperlipidemia  Hypertension  Hypothyroidism  Rheumatoid arthritis  Stroke      Comment: lacunar infarct    Current Outpatient Prescriptions on File Prior to Visit:  alcohol swabs PadM, Apply 1 each topically 2 (two) times daily., Disp: 200 each, Rfl: 3  aspirin (ECOTRIN) 81 MG EC tablet, Take 1 tablet (81 mg total) by mouth once daily., Disp: 30 tablet, Rfl: 0  benazepril (LOTENSIN) 5 MG tablet, Take 1 tablet (5 mg total) by mouth once daily., Disp: 90 tablet, Rfl: 3  blood glucose control, high Soln, by Misc.(Non-Drug; Combo Route) route., Disp: , Rfl:   blood glucose control, normal Soln, Use as directed., Disp: 1  each, Rfl: 1  blood sugar diagnostic Strp, Glucose testing daily., Disp: 100 strip, Rfl: 3   blood-glucose meter Misc, Use as directed., Disp: 1 each, Rfl: 0  cyanocobalamin (VITAMIN B-12) 250 MCG tablet, Take 1 tablet by mouth Daily., Disp: , Rfl:   folic acid (FOLVITE) 800 MCG Tab, TAKE 1 TABLET TWICE DAILY, Disp: 180 tablet, Rfl: 3  lancets (LANCETS,THIN) Misc, Twice daily glucose testing., Disp: 200 each, Rfl: 3  latanoprost 0.005 % ophthalmic solution, INSTILL 1 DROP INTO BOTH EYES EVERY EVENING, Disp: 3 Bottle, Rfl: 6  levothyroxine (SYNTHROID) 50 MCG tablet, TAKE 1 TABLET BEFORE BREAKFAST, Disp: 90 tablet, Rfl: 1  magnesium 250 mg Tab, Take 1 tablet by mouth Daily., Disp: , Rfl:   metformin (GLUCOPHAGE) 1000 MG tablet, TAKE 1 TABLET TWICE DAILY WITH MEALS, Disp: 180 tablet, Rfl: 1  methotrexate 2.5 MG Tab, TAKE 4 TABLETS IN MORNING AND 4 TABLETS AT NIGHT ONE TIME WEEKLY, Disp: 96 tablet, Rfl: 1  omeprazole (PRILOSEC) 20 MG capsule, TAKE 1 CAPSULE EVERY DAY, Disp: 90 capsule, Rfl: 1  pravastatin (PRAVACHOL) 40 MG tablet, TAKE 1 TABLET ONE TIME DAILY, Disp: 90 tablet, Rfl: 1  predniSONE (DELTASONE) 1 MG tablet, TAKE 1 TABLET TWICE DAILY  OR  AS  DIRECTED, Disp: 180 tablet, Rfl: 3  timolol maleate 0.5% (TIMOPTIC) 0.5 % Drop, INSTILL 1 DROP INTO BOTH EYES EVERY MORNING, Disp: 15 mL, Rfl: 12  tramadol (ULTRAM) 50 mg tablet, Take 1 tablet (50 mg total) by mouth 3 (three) times daily as needed., Disp: 90 tablet, Rfl: 2  trazodone (DESYREL) 100 MG tablet, Take 1 tablet (100 mg total) by mouth every evening., Disp: 90 tablet, Rfl: 1    Allergies:  Review of patient's allergies indicates:   -- Tetanus vaccines and toxoid     --  Other reaction(s): Swelling             Tetanus-horse serum: 30 years ago   -- Adhes. band-tape-benzalkonium -- Rash          Review of Systems   Constitutional: Negative for fever and unexpected weight change.   Respiratory: Negative for shortness of breath.    Cardiovascular: Negative for  chest pain and leg swelling.   Gastrointestinal: Negative for abdominal pain, constipation and diarrhea.   Genitourinary: Negative for dysuria.   Musculoskeletal: Positive for arthralgias.   Neurological: Negative for dizziness and headaches.       Objective:      Physical Exam   Constitutional: She is oriented to person, place, and time. She appears well-developed and well-nourished.   Eyes: Conjunctivae are normal. No scleral icterus.   Neck: No tracheal deviation present. No thyromegaly present.   Cardiovascular: Normal rate and regular rhythm.    Pulmonary/Chest: Effort normal and breath sounds normal. No respiratory distress.   Abdominal: Soft. Bowel sounds are normal.   Lymphadenopathy:     She has no cervical adenopathy.   Neurological: She is alert and oriented to person, place, and time.   Skin: Skin is warm and dry.   Psychiatric: She has a normal mood and affect.   Vitals reviewed.      Assessment:       1. Hypothyroidism (acquired)    2. Diabetes mellitus with microalbuminuria    3. Diabetes mellitus with stage 3 chronic kidney disease    4. Hypertension associated with diabetes    5. Combined hyperlipidemia associated with type 2 diabetes mellitus        Plan:       Yovani was seen today for 6mth follow up.    Diagnoses and all orders for this visit:    Hypothyroidism (acquired)  -     TSH; Standing    Diabetes mellitus with microalbuminuria  -     Continue to monitor  -     She will check her medications at home to see if she is taking benazepril  -     Advised to avoid NSAIDs    Diabetes mellitus with stage 3 chronic kidney disease  -     Stable    Hypertension associated with diabetes  -     Stable    Combined hyperlipidemia associated with type 2 diabetes mellitus  -     Continue pravastatin    RTC annually and as needed

## 2017-04-10 DIAGNOSIS — M06.9 RHEUMATOID ARTHRITIS, INVOLVING UNSPECIFIED SITE, UNSPECIFIED RHEUMATOID FACTOR PRESENCE: ICD-10-CM

## 2017-04-10 RX ORDER — METHOTREXATE 2.5 MG/1
TABLET ORAL
Qty: 96 TABLET | Refills: 1 | Status: SHIPPED | OUTPATIENT
Start: 2017-04-10 | End: 2017-04-13 | Stop reason: SDUPTHER

## 2017-04-12 RX ORDER — OMEPRAZOLE 20 MG/1
CAPSULE, DELAYED RELEASE ORAL
Qty: 90 CAPSULE | Refills: 3 | Status: SHIPPED | OUTPATIENT
Start: 2017-04-12 | End: 2018-04-02 | Stop reason: SDUPTHER

## 2017-04-13 DIAGNOSIS — M06.9 RHEUMATOID ARTHRITIS, INVOLVING UNSPECIFIED SITE, UNSPECIFIED RHEUMATOID FACTOR PRESENCE: ICD-10-CM

## 2017-04-13 DIAGNOSIS — I10 ESSENTIAL HYPERTENSION: ICD-10-CM

## 2017-04-13 RX ORDER — METHOTREXATE 2.5 MG/1
TABLET ORAL
Qty: 96 TABLET | Refills: 1 | Status: SHIPPED | OUTPATIENT
Start: 2017-04-13 | End: 2018-01-13 | Stop reason: SDUPTHER

## 2017-04-13 RX ORDER — METFORMIN HYDROCHLORIDE 1000 MG/1
1000 TABLET ORAL 2 TIMES DAILY WITH MEALS
Qty: 180 TABLET | Refills: 3 | Status: SHIPPED | OUTPATIENT
Start: 2017-04-13 | End: 2018-05-29 | Stop reason: SDUPTHER

## 2017-04-13 RX ORDER — BENAZEPRIL HYDROCHLORIDE 5 MG/1
5 TABLET ORAL DAILY
Qty: 90 TABLET | Refills: 3 | Status: SHIPPED | OUTPATIENT
Start: 2017-04-13 | End: 2018-04-13 | Stop reason: SDUPTHER

## 2017-04-13 NOTE — TELEPHONE ENCOUNTER
----- Message from John Kaufman sent at 4/13/2017  1:01 PM CDT -----  Contact: pt   States she is calling to follow up on req from [facility] on her meds / states to pls fax them to [facility] [phone] and can be reached at [phone]//cristian/yuki     Pt pharm is   [facility] - [place], [place] - [address]  [address]  [facility] [address]  Phone: [phone] Fax: [phone]

## 2017-04-13 NOTE — TELEPHONE ENCOUNTER
----- Message from Lissett Hodge sent at 4/13/2017  1:05 PM CDT -----  Contact: pt  [facility]  Pt needs it ASAP please - she is almost out.  Please call pt at [phone].

## 2017-04-25 ENCOUNTER — PATIENT MESSAGE (OUTPATIENT)
Dept: CARDIOLOGY | Facility: CLINIC | Age: 82
End: 2017-04-25

## 2017-05-15 ENCOUNTER — OFFICE VISIT (OUTPATIENT)
Dept: OPHTHALMOLOGY | Facility: CLINIC | Age: 82
End: 2017-05-15
Payer: MEDICARE

## 2017-05-15 ENCOUNTER — OFFICE VISIT (OUTPATIENT)
Dept: HEMATOLOGY/ONCOLOGY | Facility: CLINIC | Age: 82
End: 2017-05-15
Payer: MEDICARE

## 2017-05-15 ENCOUNTER — LAB VISIT (OUTPATIENT)
Dept: LAB | Facility: HOSPITAL | Age: 82
End: 2017-05-15
Attending: INTERNAL MEDICINE
Payer: MEDICARE

## 2017-05-15 VITALS
OXYGEN SATURATION: 95 % | DIASTOLIC BLOOD PRESSURE: 66 MMHG | WEIGHT: 155.63 LBS | TEMPERATURE: 98 F | SYSTOLIC BLOOD PRESSURE: 133 MMHG | BODY MASS INDEX: 28.47 KG/M2 | HEART RATE: 86 BPM

## 2017-05-15 DIAGNOSIS — H40.1132 PRIMARY OPEN ANGLE GLAUCOMA OF BOTH EYES, MODERATE STAGE: Primary | ICD-10-CM

## 2017-05-15 DIAGNOSIS — N18.30 ANEMIA OF CHRONIC RENAL FAILURE, STAGE 3 (MODERATE): ICD-10-CM

## 2017-05-15 DIAGNOSIS — E03.9 HYPOTHYROIDISM (ACQUIRED): ICD-10-CM

## 2017-05-15 DIAGNOSIS — D47.2 MONOCLONAL GAMMOPATHY OF UNDETERMINED SIGNIFICANCE: ICD-10-CM

## 2017-05-15 DIAGNOSIS — Z96.1 PSEUDOPHAKIA OF BOTH EYES: ICD-10-CM

## 2017-05-15 DIAGNOSIS — N18.30 CHRONIC RENAL FAILURE, STAGE 3 (MODERATE): ICD-10-CM

## 2017-05-15 DIAGNOSIS — D50.0 IRON DEFICIENCY ANEMIA DUE TO CHRONIC BLOOD LOSS: Primary | ICD-10-CM

## 2017-05-15 DIAGNOSIS — D63.1 ANEMIA OF CHRONIC RENAL FAILURE, STAGE 3 (MODERATE): ICD-10-CM

## 2017-05-15 DIAGNOSIS — N18.30 STAGE 3 CHRONIC KIDNEY DISEASE: Chronic | ICD-10-CM

## 2017-05-15 DIAGNOSIS — D50.0 IRON DEFICIENCY ANEMIA DUE TO CHRONIC BLOOD LOSS: ICD-10-CM

## 2017-05-15 LAB
ANION GAP SERPL CALC-SCNC: 9 MMOL/L
BASOPHILS # BLD AUTO: 0.01 K/UL
BASOPHILS NFR BLD: 0.2 %
BUN SERPL-MCNC: 24 MG/DL
CALCIUM SERPL-MCNC: 8.9 MG/DL
CHLORIDE SERPL-SCNC: 100 MMOL/L
CO2 SERPL-SCNC: 28 MMOL/L
CREAT SERPL-MCNC: 1.2 MG/DL
DIFFERENTIAL METHOD: ABNORMAL
EOSINOPHIL # BLD AUTO: 0 K/UL
EOSINOPHIL NFR BLD: 0.6 %
ERYTHROCYTE [DISTWIDTH] IN BLOOD BY AUTOMATED COUNT: 15.4 %
EST. GFR  (AFRICAN AMERICAN): 48 ML/MIN/1.73 M^2
EST. GFR  (NON AFRICAN AMERICAN): 42 ML/MIN/1.73 M^2
GLUCOSE SERPL-MCNC: 110 MG/DL
HCT VFR BLD AUTO: 32.4 %
HGB BLD-MCNC: 10.3 G/DL
LYMPHOCYTES # BLD AUTO: 0.9 K/UL
LYMPHOCYTES NFR BLD: 14.1 %
MCH RBC QN AUTO: 32.4 PG
MCHC RBC AUTO-ENTMCNC: 31.8 %
MCV RBC AUTO: 102 FL
MONOCYTES # BLD AUTO: 0.2 K/UL
MONOCYTES NFR BLD: 3.5 %
NEUTROPHILS # BLD AUTO: 5.1 K/UL
NEUTROPHILS NFR BLD: 81.6 %
PLATELET # BLD AUTO: 261 K/UL
PMV BLD AUTO: 9 FL
POTASSIUM SERPL-SCNC: 4.4 MMOL/L
RBC # BLD AUTO: 3.18 M/UL
SODIUM SERPL-SCNC: 137 MMOL/L
TSH SERPL DL<=0.005 MIU/L-ACNC: 3.93 UIU/ML
WBC # BLD AUTO: 6.24 K/UL

## 2017-05-15 PROCEDURE — 3075F SYST BP GE 130 - 139MM HG: CPT | Mod: S$GLB,,, | Performed by: INTERNAL MEDICINE

## 2017-05-15 PROCEDURE — 1126F AMNT PAIN NOTED NONE PRSNT: CPT | Mod: S$GLB,,, | Performed by: INTERNAL MEDICINE

## 2017-05-15 PROCEDURE — 99999 PR PBB SHADOW E&M-EST. PATIENT-LVL III: CPT | Mod: PBBFAC,,, | Performed by: INTERNAL MEDICINE

## 2017-05-15 PROCEDURE — 86334 IMMUNOFIX E-PHORESIS SERUM: CPT

## 2017-05-15 PROCEDURE — 83520 IMMUNOASSAY QUANT NOS NONAB: CPT

## 2017-05-15 PROCEDURE — 99214 OFFICE O/P EST MOD 30 MIN: CPT | Mod: S$GLB,,, | Performed by: INTERNAL MEDICINE

## 2017-05-15 PROCEDURE — 84443 ASSAY THYROID STIM HORMONE: CPT

## 2017-05-15 PROCEDURE — 92012 INTRM OPH EXAM EST PATIENT: CPT | Mod: S$GLB,,, | Performed by: OPHTHALMOLOGY

## 2017-05-15 PROCEDURE — 3078F DIAST BP <80 MM HG: CPT | Mod: S$GLB,,, | Performed by: INTERNAL MEDICINE

## 2017-05-15 PROCEDURE — 36415 COLL VENOUS BLD VENIPUNCTURE: CPT | Mod: PO

## 2017-05-15 PROCEDURE — 84165 PROTEIN E-PHORESIS SERUM: CPT

## 2017-05-15 PROCEDURE — 83540 ASSAY OF IRON: CPT

## 2017-05-15 PROCEDURE — 82728 ASSAY OF FERRITIN: CPT

## 2017-05-15 PROCEDURE — 99499 UNLISTED E&M SERVICE: CPT | Mod: S$GLB,,, | Performed by: INTERNAL MEDICINE

## 2017-05-15 PROCEDURE — 80048 BASIC METABOLIC PNL TOTAL CA: CPT | Mod: PO

## 2017-05-15 PROCEDURE — 1159F MED LIST DOCD IN RCRD: CPT | Mod: S$GLB,,, | Performed by: INTERNAL MEDICINE

## 2017-05-15 PROCEDURE — 85025 COMPLETE CBC W/AUTO DIFF WBC: CPT | Mod: PO

## 2017-05-15 PROCEDURE — 92083 EXTENDED VISUAL FIELD XM: CPT | Mod: S$GLB,,, | Performed by: OPHTHALMOLOGY

## 2017-05-15 PROCEDURE — 99499 UNLISTED E&M SERVICE: CPT | Mod: S$GLB,,, | Performed by: OPHTHALMOLOGY

## 2017-05-15 PROCEDURE — 84165 PROTEIN E-PHORESIS SERUM: CPT | Mod: 26,,, | Performed by: PATHOLOGY

## 2017-05-15 PROCEDURE — 86334 IMMUNOFIX E-PHORESIS SERUM: CPT | Mod: 26,,, | Performed by: PATHOLOGY

## 2017-05-15 PROCEDURE — 1160F RVW MEDS BY RX/DR IN RCRD: CPT | Mod: S$GLB,,, | Performed by: INTERNAL MEDICINE

## 2017-05-15 PROCEDURE — 99999 PR PBB SHADOW E&M-EST. PATIENT-LVL II: CPT | Mod: PBBFAC,,, | Performed by: OPHTHALMOLOGY

## 2017-05-15 RX ORDER — SODIUM CHLORIDE 0.9 % (FLUSH) 0.9 %
10 SYRINGE (ML) INJECTION
Status: CANCELLED | OUTPATIENT
Start: 2017-05-22

## 2017-05-15 RX ORDER — SODIUM CHLORIDE 0.9 % (FLUSH) 0.9 %
10 SYRINGE (ML) INJECTION
Status: CANCELLED | OUTPATIENT
Start: 2017-05-15

## 2017-05-15 RX ORDER — HEPARIN 100 UNIT/ML
500 SYRINGE INTRAVENOUS
Status: CANCELLED | OUTPATIENT
Start: 2017-05-22

## 2017-05-15 RX ORDER — HEPARIN 100 UNIT/ML
500 SYRINGE INTRAVENOUS
Status: CANCELLED | OUTPATIENT
Start: 2017-05-15

## 2017-05-15 NOTE — PROGRESS NOTES
SUBJECTIVE:   Yovani Pulido is a 84 y.o. female   Uncorrected distance visual acuity was 20/50 +2 in the right eye and 20/50 in the left eye.   Chief Complaint   Patient presents with    Glaucoma        HPI:  HPI     Patient returns for a 7 month iop and  hvf review, patient deferred   dilation today has another appointment. patient denies any vision changes   since last visit.Patient deferred manifest will not wear glasses, but   states some time she misses the timolol and some times she misses the   latanoprost doesn't remember which one she misses on a   weekly basis.          1. Mod COAG (+ Fhx) goal = 19   Splinter Heme OS  Lopez Leader until 2006  2. 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 Yag OS  4. Irregular Astigmatism  5. 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 RA    Timolol qam OU  Latanoprost qhs OU    Prednisone 1 mg po daily               Last edited by LOPEZ Porter on 5/15/2017  1:51 PM.     Assessment /Plan :  1. Primary open angle glaucoma of both eyes, moderate stage Doing well, IOP within acceptable range relative to target IOP and no evidence of progression. Continue current treatment. Reviewed importance of continued compliance with treatment and follow up.     2. Pseudophakia of both eyes stable     Return to clinic in 6 months  or as needed.  With 24-2 HVF, Dilation and SDP's

## 2017-05-15 NOTE — MR AVS SNAPSHOT
[facility]  [address] [facility] [place] [address]  Phone: [phone]  Fax: [phone]                  Yovani Pulido   5/15/2017 1:30 PM   Office Visit    Description:  Female : 1932   Provider:  Neymar Sweeney MD   Department:  [facility] Op[facility]mology           Reason for Visit     Glaucoma           Diagnoses this Visit        Comments    Primary open angle glaucoma of both eyes, moderate stage    -  Primary     Pseudophakia of both eyes                To Do List           Future Appointments        Provider Department Dept Phone    5/15/2017 2:20 PM Blas Kendrick MD [facility] Oncology [phone]    2017 1:40 PM LAB[facility]ATORY, [facility] [phone]    2017 2:00 PM Claudia Chau PA-C [facility] [phone]    2018 10:20 AM Dorcas Schultz DO [facility] [phone]      Goals (5 Years of Data)     None      [facility]     [facility] Nurse Care [facility] -  Assistance  Unless otherwise directed by your provider, please contact [facility], our nurse care line that is available for  assistance.     Registered nurses in the [facility] provide: appointment scheduling, clinical advisement, health education, and other advisory services.  Call: [phone] (toll free)               Medications           Message regarding Medications     Verify the changes and/or additions to your medication regime listed below are the same as discussed with your clinician today.  If any of these changes or additions are incorrect, please notify your healthcare provider.        STOP taking these medications     cyanocobalamin (VITAMIN B-12) 250 MCG tablet Take 1 tablet by mouth Daily.           Verify that the below list of medications is an accurate representation of the medications you are currently taking.  If none reported, the list may be blank. If incorrect, please contact your  healthcare provider. Carry this list with you in case of emergency.           Current Medications     alcohol swabs PadM Apply 1 each topically 2 (two) times daily.    aspirin (ECOTRIN) 81 MG EC tablet Take 1 tablet (81 mg total) by mouth once daily.    benazepril (LOTENSIN) 5 MG tablet Take 1 tablet (5 mg total) by mouth once daily.    blood glucose control, high Soln by Misc.(Non-Drug; Combo Route) route.    blood glucose control, normal Soln Use as directed.    blood sugar diagnostic Strp Glucose testing daily.    blood-glucose meter Misc Use as directed.    folic acid (FOLVITE) 800 MCG Tab TAKE 1 TABLET TWICE DAILY    lancets (LANCETS,THIN) Misc Twice daily glucose testing.    latanoprost 0.005 % ophthalmic solution INSTILL 1 DROP INTO BOTH EYES EVERY EVENING    levothyroxine (SYNTHROID) 50 MCG tablet TAKE 1 TABLET BEFORE BREAKFAST    magnesium 250 mg Tab Take 1 tablet by mouth Daily.    metformin (GLUCOPHAGE) 1000 MG tablet Take 1 tablet (1,000 mg total) by mouth 2 (two) times daily with meals.    methotrexate 2.5 MG Tab TAKE 4 TABLETS IN MORNING AND 4 TABLETS AT NIGHT ONE TIME WEEKLY    omeprazole (PRILOSEC) 20 MG capsule TAKE 1 CAPSULE EVERY DAY    pravastatin (PRAVACHOL) 40 MG tablet TAKE 1 TABLET ONE TIME DAILY    predniSONE (DELTASONE) 1 MG tablet TAKE 1 TABLET TWICE DAILY  OR  AS  DIRECTED    timolol maleate 0.5% (TIMOPTIC) 0.5 % Drop INSTILL 1 DROP INTO BOTH EYES EVERY MORNING    tramadol (ULTRAM) 50 mg tablet Take 1 tablet (50 mg total) by mouth 3 (three) times daily as needed.    trazodone (DESYREL) 100 MG tablet Take 1 tablet (100 mg total) by mouth every evening.           Clinical Reference Information           Allergies as of 5/15/2017     Tetanus Vaccines And Toxoid    Adhes. Band-tape-benzalkonium      Immunizations Administered on Date of Encounter - 5/15/2017     None      Orders Placed During Today's Visit      Normal Orders This Visit    Briggs Visual Field - OU - Extended - Both Eyes        Language Assistance Services     ATTENTION: Language assistance services are available, free of charge. Please call [phone].      ATENCIÓN: Si habla miky, tiene a ricardo disposición servicios gratuitos de asistencia lingüística. Llame al [phone].     CHÚ Ý: N?u b?n nói Ti?ng Vi?t, có các d?ch v? h? tr? ngôn ng? mi?n phí dành cho b?n. G?i s? [phone].         [facility] complies with applicable Federal civil rights laws and does not discriminate on the basis of race, color, national origin, age, disability, or sex.

## 2017-05-15 NOTE — PROGRESS NOTES
Subjective:       Patient ID: Yovani Pulido is a 84 y.o. female.    Chief Complaint: Results and Anemia    HPI 84-year-old female returns for evaluation of anemia secondary to chronic GI blood loss anemia secondary to chronic renal failure returns for review    Past Medical History:   Diagnosis Date    Anemia     Carotid stenosis     Cataract     COAG (chronic open-angle glaucoma)     Colon polyps     Diabetes mellitus type II     steroid induced    Diverticulosis     GERD (gastroesophageal reflux disease)     hiatal hernia    Glaucoma     Heart murmur     Hyperlipidemia     Hypertension     Hypothyroidism     Rheumatoid arthritis     Stroke     lacunar infarct     Family History   Problem Relation Age of Onset    Cancer Mother      pancreas    Glaucoma Mother     Cancer Father      lung    Cancer Son 61     melanoma metastatic    Heart disease Brother     Diabetes Sister     Stroke Sister     Blindness Sister     Cataracts Sister     Heart disease Brother     Hyperlipidemia Neg Hx     Hypertension Neg Hx     Macular degeneration Neg Hx     Retinal detachment Neg Hx     Strabismus Neg Hx     Thyroid disease Neg Hx     Colon cancer Neg Hx     Stomach cancer Neg Hx      Social History     Social History    Marital status:      Spouse name: N/A    Number of children: 4    Years of education: N/A     Occupational History    Not on file.     Social History Main Topics    Smoking status: Former Smoker     Packs/day: 3.00     Years: 30.00     Quit date: 12/12/1998    Smokeless tobacco: Never Used    Alcohol use No    Drug use: No    Sexual activity: No     Other Topics Concern    Not on file     Social History Narrative    , then remarried.  after 2-3 weeks. They have still been living together for 31 years. He is 95 now. They are no longer sexually active. Caffeine intake 4 cups coffee daily- though has changed to decaf recently. Does have a living  will.      Past Surgical History:   Procedure Laterality Date    anal fissure repair      APPENDECTOMY  1944    BREAST SURGERY  1992 approx    breast biopsies- benign    CAROTID ENDARTERECTOMY  2009    left    CATARACT EXTRACTION      COLONOSCOPY  2012    COLONOSCOPY N/A 12/11/2015    Procedure: COLONOSCOPY;  Surgeon: Gavin Escobedo MD;  Location[facility];  Service: Endoscopy;  Laterality: N/A;    EYE SURGERY  2004, 2005    bilateral cataracts    HERNIA REPAIR  2001, 2002    abdominal x2, with mesh on second    HYSTERECTOMY  1963    vag hyst    JOINT REPLACEMENT  2008    right knee       Labs:  Lab Results   Component Value Date    WBC 6.24 05/15/2017    HGB 10.3 (L) 05/15/2017    HCT 32.4 (L) 05/15/2017     (H) 05/15/2017     05/15/2017     BMP  Lab Results   Component Value Date     02/16/2017    K 4.6 02/16/2017     02/16/2017    CO2 28 02/16/2017    BUN 22 02/16/2017    CREATININE 1.3 02/16/2017    CALCIUM 9.3 02/16/2017    ANIONGAP 12 02/16/2017    ESTGFRAFRICA 44 (A) 02/16/2017    EGFRNONAA 38 (A) 02/16/2017     Lab Results   Component Value Date    ALT 13 02/16/2017    AST 19 02/16/2017    ALKPHOS 90 02/16/2017    BILITOT 0.4 02/16/2017       Lab Results   Component Value Date    IRON 36 02/16/2017    TIBC 296 02/16/2017    FERRITIN 1877 (H) 02/16/2017     Lab Results   Component Value Date    RAEMHLOT85 554 09/19/2016     Lab Results   Component Value Date    FOLATE > 40.0 (H) 04/15/2013     Lab Results   Component Value Date    TSH 2.091 06/27/2016         Review of Systems   Constitutional: Positive for fatigue. Negative for activity change, appetite change, chills, diaphoresis, fever and unexpected weight change.   HENT: Positive for dental problem. Negative for congestion, drooling, ear discharge, ear pain, facial swelling, hearing loss, mouth sores, nosebleeds, postnasal drip, rhinorrhea, sinus pressure, sneezing, sore throat, tinnitus, trouble swallowing and voice  change.    Eyes: Negative for photophobia, pain, discharge, redness, itching and visual disturbance.   Respiratory: Negative for cough, choking, chest tightness, shortness of breath, wheezing and stridor.    Cardiovascular: Negative for chest pain, palpitations and leg swelling.   Gastrointestinal: Negative for abdominal distention, abdominal pain, anal bleeding, blood in stool, constipation, diarrhea, nausea, rectal pain and vomiting.   Endocrine: Negative for cold intolerance, heat intolerance, polydipsia, polyphagia and polyuria.   Genitourinary: Negative for decreased urine volume, difficulty urinating, dyspareunia, dysuria, enuresis, flank pain, frequency, genital sores, hematuria, menstrual problem, pelvic pain, urgency, vaginal bleeding, vaginal discharge and vaginal pain.   Musculoskeletal: Positive for gait problem. Negative for arthralgias, back pain, joint swelling, myalgias, neck pain and neck stiffness.   Skin: Negative for color change, pallor and rash.   Allergic/Immunologic: Negative for environmental allergies, food allergies and immunocompromised state.   Neurological: Positive for weakness. Negative for dizziness, tremors, seizures, syncope, facial asymmetry, speech difficulty, light-headedness, numbness and headaches.   Hematological: Negative for adenopathy. Does not bruise/bleed easily.   Psychiatric/Behavioral: Positive for dysphoric mood. Negative for agitation, behavioral problems, confusion, decreased concentration, hallucinations, self-injury, sleep disturbance and suicidal ideas. The patient is nervous/anxious. The patient is not hyperactive.        Objective:      Physical Exam   Constitutional: She is oriented to person, place, and time. She appears well-developed. She has a sickly appearance. She appears ill. She appears distressed.   HENT:   Head: Normocephalic and atraumatic.   Right Ear: External ear normal.   Left Ear: External ear normal.   Nose: Nose normal. Right sinus exhibits  no maxillary sinus tenderness and no frontal sinus tenderness. Left sinus exhibits no maxillary sinus tenderness and no frontal sinus tenderness.   Mouth/Throat: Oropharynx is clear and moist. No oropharyngeal exudate.   Eyes: Conjunctivae, EOM and lids are normal. Pupils are equal, round, and reactive to light. Right eye exhibits no discharge. Left eye exhibits no discharge. Right conjunctiva is not injected. Right conjunctiva has no hemorrhage. Left conjunctiva is not injected. Left conjunctiva has no hemorrhage. No scleral icterus.   Neck: Normal range of motion. Neck supple. No JVD present. No tracheal deviation present. No thyromegaly present.   Cardiovascular: Normal rate, regular rhythm, normal heart sounds and intact distal pulses.    Pulmonary/Chest: Effort normal and breath sounds normal. No stridor. No respiratory distress. She exhibits no tenderness.   Abdominal: Soft. Bowel sounds are normal. She exhibits no distension and no mass. There is no splenomegaly or hepatomegaly. There is no tenderness. There is no rebound.   Musculoskeletal: Normal range of motion. She exhibits no edema or tenderness.   Lymphadenopathy:     She has no cervical adenopathy.     She has no axillary adenopathy.        Right: No supraclavicular adenopathy present.        Left: No supraclavicular adenopathy present.   Neurological: She is alert and oriented to person, place, and time. No cranial nerve deficit. Coordination normal.   Skin: Skin is dry. No rash noted. She is not diaphoretic. No erythema.   Psychiatric: Her behavior is normal. Judgment and thought content normal. Her mood appears anxious. She exhibits a depressed mood.   Vitals reviewed.          Assessment:       1. Iron deficiency anemia due to chronic blood loss    2. Anemia of chronic renal failure, stage 3 (moderate)    3. Stage 3 chronic kidney disease    4. Monoclonal gammopathy of undetermined significance            Plan:     patient's hemoglobin is stable  awaiting results of her remaining labs will contact patient with results to see whether or not additional intravenous iron is warranted return in 3 months CBC iron status

## 2017-05-16 LAB
ALBUMIN SERPL ELPH-MCNC: 3.37 G/DL
ALPHA1 GLOB SERPL ELPH-MCNC: 0.41 G/DL
ALPHA2 GLOB SERPL ELPH-MCNC: 1.1 G/DL
B-GLOBULIN SERPL ELPH-MCNC: 0.78 G/DL
FERRITIN SERPL-MCNC: 2179 NG/ML
GAMMA GLOB SERPL ELPH-MCNC: 1.14 G/DL
IRON SERPL-MCNC: 111 UG/DL
KAPPA LC SER QL IA: 6.37 MG/DL
KAPPA LC/LAMBDA SER IA: 2.07
LAMBDA LC SER QL IA: 3.08 MG/DL
PATHOLOGIST INTERPRETATION SPE: NORMAL
PROT SERPL-MCNC: 6.8 G/DL
SATURATED IRON: 42 %
TOTAL IRON BINDING CAPACITY: 263 UG/DL
TRANSFERRIN SERPL-MCNC: 178 MG/DL

## 2017-05-17 ENCOUNTER — TELEPHONE (OUTPATIENT)
Dept: HEMATOLOGY/ONCOLOGY | Facility: CLINIC | Age: 82
End: 2017-05-17

## 2017-05-17 LAB — INTERPRETATION SERPL IFE-IMP: NORMAL

## 2017-05-17 NOTE — TELEPHONE ENCOUNTER
----- Message from Miguelina Goodson sent at 5/17/2017  4:40 PM CDT -----  5/15 results needed....[phone] ([place])

## 2017-05-18 LAB — PATHOLOGIST INTERPRETATION IFE: NORMAL

## 2017-05-22 ENCOUNTER — TELEPHONE (OUTPATIENT)
Dept: HEMATOLOGY/ONCOLOGY | Facility: CLINIC | Age: 82
End: 2017-05-22

## 2017-05-22 NOTE — TELEPHONE ENCOUNTER
----- Message from Nicol Walter sent at 5/19/2017  2:46 PM CDT -----  Contact: pt  Patient is calling for Test Results. Please call patient at [phone]. Thanks!

## 2017-06-05 ENCOUNTER — OFFICE VISIT (OUTPATIENT)
Dept: URGENT CARE | Facility: CLINIC | Age: 82
End: 2017-06-05
Payer: MEDICARE

## 2017-06-05 VITALS
SYSTOLIC BLOOD PRESSURE: 140 MMHG | WEIGHT: 154.31 LBS | OXYGEN SATURATION: 98 % | TEMPERATURE: 98 F | DIASTOLIC BLOOD PRESSURE: 80 MMHG | HEART RATE: 95 BPM | BODY MASS INDEX: 28.23 KG/M2

## 2017-06-05 DIAGNOSIS — R09.82 PND (POST-NASAL DRIP): Primary | ICD-10-CM

## 2017-06-05 DIAGNOSIS — J02.9 PHARYNGITIS, UNSPECIFIED ETIOLOGY: ICD-10-CM

## 2017-06-05 LAB
CTP QC/QA: YES
S PYO RRNA THROAT QL PROBE: NEGATIVE

## 2017-06-05 PROCEDURE — 99999 PR PBB SHADOW E&M-EST. PATIENT-LVL V: CPT | Mod: PBBFAC,,, | Performed by: NURSE PRACTITIONER

## 2017-06-05 PROCEDURE — 87880 STREP A ASSAY W/OPTIC: CPT | Mod: QW,S$GLB,, | Performed by: NURSE PRACTITIONER

## 2017-06-05 PROCEDURE — 1159F MED LIST DOCD IN RCRD: CPT | Mod: S$GLB,,, | Performed by: NURSE PRACTITIONER

## 2017-06-05 PROCEDURE — 99213 OFFICE O/P EST LOW 20 MIN: CPT | Mod: S$GLB,,, | Performed by: NURSE PRACTITIONER

## 2017-06-05 PROCEDURE — 99499 UNLISTED E&M SERVICE: CPT | Mod: S$GLB,,, | Performed by: NURSE PRACTITIONER

## 2017-06-05 PROCEDURE — 87081 CULTURE SCREEN ONLY: CPT

## 2017-06-05 RX ORDER — FLUTICASONE PROPIONATE 50 MCG
2 SPRAY, SUSPENSION (ML) NASAL DAILY
Qty: 1 BOTTLE | Refills: 0 | Status: SHIPPED | OUTPATIENT
Start: 2017-06-05

## 2017-06-05 RX ORDER — CETIRIZINE HYDROCHLORIDE 10 MG/1
10 TABLET ORAL DAILY
Qty: 30 TABLET | Refills: 0 | Status: SHIPPED | OUTPATIENT
Start: 2017-06-05

## 2017-06-05 NOTE — PROGRESS NOTES
Subjective:       Patient ID: Yovani Pulido is a 84 y.o. female.    Chief Complaint: Sore Throat    Sore Throat    This is a new problem. Episode onset: 3 days. The problem has been gradually worsening. The pain is worse on the right side. There has been no fever. The pain is at a severity of 2/10. The pain is mild. Associated symptoms include congestion and swollen glands. Pertinent negatives include no abdominal pain, coughing, diarrhea, drooling, ear discharge, ear pain, headaches, hoarse voice, plugged ear sensation, neck pain, shortness of breath, stridor, trouble swallowing or vomiting. She has had no exposure to strep or mono. Treatments tried: gargled with warm water mixed with 4 asa, also tried lozenge. The treatment provided mild relief.       BP (!) 140/80 (BP Location: Left arm, Patient Position: Sitting, BP Method: Manual)   Pulse 95   Temp 98 °F (36.7 °C) (Tympanic)   Wt 70 kg (154 lb 5.2 oz)   SpO2 98%   BMI 28.23 kg/m²     Review of Systems   Constitutional: Negative for activity change, appetite change, chills, diaphoresis, fatigue, fever and unexpected weight change.   HENT: Positive for congestion and sore throat. Negative for drooling, ear discharge, ear pain, facial swelling, hearing loss, hoarse voice, mouth sores, nosebleeds, postnasal drip, rhinorrhea, sinus pressure, sneezing, tinnitus, trouble swallowing and voice change.    Respiratory: Negative for cough, shortness of breath and stridor.    Cardiovascular: Negative for chest pain, palpitations and leg swelling.   Gastrointestinal: Negative.  Negative for abdominal pain, diarrhea and vomiting.        Takes omeprazole for gerd, denies reflux or heartburn   Genitourinary: Negative.    Musculoskeletal: Negative.  Negative for neck pain.   Skin: Negative for color change, pallor, rash and wound.   Allergic/Immunologic: Positive for immunocompromised state.   Neurological: Negative.  Negative for dizziness, facial asymmetry and  headaches.   Hematological: Negative for adenopathy. Does not bruise/bleed easily.   Psychiatric/Behavioral: Negative for agitation, behavioral problems and confusion.       Objective:      Physical Exam   Constitutional: She appears well-developed and well-nourished. She is cooperative. No distress.   HENT:   Head: Normocephalic and atraumatic.   Right Ear: Tympanic membrane, external ear and ear canal normal.   Left Ear: Tympanic membrane, external ear and ear canal normal.   Nose: Nose normal. No mucosal edema or rhinorrhea. Right sinus exhibits no maxillary sinus tenderness and no frontal sinus tenderness. Left sinus exhibits no maxillary sinus tenderness and no frontal sinus tenderness.   Mouth/Throat: Uvula is midline and oropharynx is clear and moist. No oropharyngeal exudate, posterior oropharyngeal edema or posterior oropharyngeal erythema.   Frequent throat clearing, cobblestoning to posterior pharynx.    Cardiovascular: Normal rate, regular rhythm and normal heart sounds.    No murmur heard.  Pulmonary/Chest: Effort normal and breath sounds normal. No respiratory distress.   Musculoskeletal: Normal range of motion.   Lymphadenopathy:     She has no cervical adenopathy.   Neurological: She is alert.   Skin: Skin is warm and dry. No rash noted. She is not diaphoretic.   Psychiatric: She has a normal mood and affect. Her behavior is normal. Judgment and thought content normal.   Nursing note and vitals reviewed.      Assessment:       1. PND (post-nasal drip)    2. Pharyngitis, unspecified etiology        Plan:       Yovani was seen today for sore throat.    Diagnoses and all orders for this visit:    PND (post-nasal drip)  -     Strep A culture, throat  -     cetirizine (ZYRTEC) 10 MG tablet; Take 1 tablet (10 mg total) by mouth once daily.  -     fluticasone (FLONASE) 50 mcg/actuation nasal spray; 2 sprays by Each Nare route once daily.  Rest  Fluids  If symptoms worsen or fail to improve with treatment, see  your Primary Care Provider or go to the nearest Emergency Room.    Pharyngitis, unspecified etiology  -     POCT rapid strep A- Negative

## 2017-06-05 NOTE — PATIENT INSTRUCTIONS
Rest  Fluids  Start Flonase Nasal Steroid Spray 2 sprays each nare daily to decrease the sinus inflammation and help dry the post nasal drip.  Start zyrtec daily  Follow up with Primary Care Physician if not improving in 2 weeks

## 2017-06-07 LAB — BACTERIA THROAT CULT: NORMAL

## 2017-06-22 ENCOUNTER — OFFICE VISIT (OUTPATIENT)
Dept: URGENT CARE | Facility: CLINIC | Age: 82
End: 2017-06-22
Payer: MEDICARE

## 2017-06-22 VITALS
OXYGEN SATURATION: 96 % | WEIGHT: 155.88 LBS | BODY MASS INDEX: 27.62 KG/M2 | SYSTOLIC BLOOD PRESSURE: 114 MMHG | DIASTOLIC BLOOD PRESSURE: 66 MMHG | HEART RATE: 89 BPM | TEMPERATURE: 98 F | HEIGHT: 63 IN

## 2017-06-22 DIAGNOSIS — K13.79 ORAL PAIN: Primary | ICD-10-CM

## 2017-06-22 PROCEDURE — 99213 OFFICE O/P EST LOW 20 MIN: CPT | Mod: S$GLB,,, | Performed by: NURSE PRACTITIONER

## 2017-06-22 PROCEDURE — 1126F AMNT PAIN NOTED NONE PRSNT: CPT | Mod: S$GLB,,, | Performed by: NURSE PRACTITIONER

## 2017-06-22 PROCEDURE — 1159F MED LIST DOCD IN RCRD: CPT | Mod: S$GLB,,, | Performed by: NURSE PRACTITIONER

## 2017-06-22 PROCEDURE — 99999 PR PBB SHADOW E&M-EST. PATIENT-LVL V: CPT | Mod: PBBFAC,,, | Performed by: NURSE PRACTITIONER

## 2017-06-22 RX ORDER — AMOXICILLIN 875 MG/1
875 TABLET, FILM COATED ORAL 2 TIMES DAILY
Qty: 20 TABLET | Refills: 0 | Status: SHIPPED | OUTPATIENT
Start: 2017-06-22 | End: 2017-07-02

## 2017-06-23 NOTE — PROGRESS NOTES
Subjective:       Patient ID: Yovani Pulido is a 84 y.o. female.    Chief Complaint: Oral Pain (swollen)    Pt is an 84 year old female to clinic today with complaints of right sided lower jaw pain that began approximately 1 month ago. Pt was seen in  for ST and prescribed flonase and zyrtec with mild improvement. She believes the pain that she thought was ST pain was actually jaw pain. Pt has dentures and has not seen dentist in undetermined amount of time.       Oral Pain    This is a new problem. The current episode started 1 to 4 weeks ago. The problem occurs constantly. The problem has been waxing and waning. The pain is at a severity of 5/10. The pain is mild. Associated symptoms include a fever. Pertinent negatives include no difficulty swallowing, facial pain, oral bleeding, sinus pressure or thermal sensitivity. Treatments tried: aspirin, ultram. The treatment provided mild relief.     Review of Systems   Constitutional: Positive for fever. Negative for chills, diaphoresis and fatigue.   HENT: Negative for congestion, dental problem, ear pain, sinus pressure, sore throat and trouble swallowing.    Eyes: Negative for pain.   Respiratory: Negative for cough, chest tightness, shortness of breath and wheezing.    Cardiovascular: Negative for chest pain and palpitations.   Gastrointestinal: Negative for abdominal pain, diarrhea, nausea and vomiting.   Genitourinary: Negative for dysuria.   Musculoskeletal: Negative for back pain, myalgias and neck pain.   Skin: Negative for rash.   Neurological: Negative for dizziness, light-headedness and headaches.       Objective:      Physical Exam   Constitutional: She is oriented to person, place, and time. She appears well-developed and well-nourished. No distress.   HENT:   Head: Normocephalic.   Right Ear: External ear normal.   Left Ear: External ear normal.   Nose: Nose normal.   Mouth/Throat: Uvula is midline, oropharynx is clear and moist and mucous  membranes are normal.       Pt has small 0.5 cm ulceration of outer gum/lip fold.   Eyes: Pupils are equal, round, and reactive to light.   Neurological: She is alert and oriented to person, place, and time.   Skin: Skin is warm and dry. No rash noted. She is not diaphoretic.   Psychiatric: She has a normal mood and affect. Her speech is normal and behavior is normal.   Nursing note and vitals reviewed.      Assessment:       1. Oral pain        Plan:   Oral pain  -     amoxicillin (AMOXIL) 875 MG tablet; Take 1 tablet (875 mg total) by mouth 2 (two) times daily.  Dispense: 20 tablet; Refill: 0      Recommend f/u dentist/oral sx if pain worsens or fails to improve.    Antibiotic Therapy  Take antibiotics for entire course.  Do not save medications for later, all medications must be taken for full regimen.    Follow prescribed treatment plan as directed.  Stay hydrated and rest.  Report to ER if symptoms worsen.  Follow up with PCP in 2-3 days or sooner if symptoms do not improve.

## 2017-06-23 NOTE — PATIENT INSTRUCTIONS
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 This term describes a group of problems related to the temporomandibular joint (TMJ)and nearby muscles. The TMJ is located where the upper and lower jaws meet. TMD can cause painful and frustrating symptoms. But your health care provider can recommend various pain relief methods as part of your treatment. These may include medicines and certain types of therapy, like massage or gentle exercise.  Using medicines    Medicines may be prescribed to treat TMD. Others may be available over-the-counter. The medicine type and dosage will depend on the problem you have. For your safety, tell your health care provider if you are currently taking any medicines. Also mention any herbs or supplements you are using. Common medicines used to treat TMD include:  · Anti-inflammatories and analgesics. These treat pain, inflammation, osteoarthritis, and rheumatoid arthritis. Anti-inflammatories reduce swelling, heat, redness, and pain. They also help restore function. Analgesics reduce pain. Nonsteroidal anti-inflammatories (NSAIDs) relieve inflammation as well as pain.  · Muscle relaxants. These treat myofascial pain. This is pain that occurs in the soft tissues or muscles around the TMJ. Muscle relaxants help ease muscle tension. This reduces pressure on the TMJ from tight jaw muscles.  · Antidepressants. These can be used to reduce pain or bruxism (teeth grinding). At higher dosages, these medicines are used to treat depression. Given at low dosages, antidepressants help relieve TMD symptoms. They can reduce muscle pain. They also raise the level of serotonin, a body chemical that improves sleep. This in turn can decrease bruxism during the night.  Treating painful muscles  A trigger point is a painful spot in a tight muscle. It is often painful to the touch and may refer pain to other places. Your health care  provider can focus on trigger points using:  · Massage, both inside and outside the mouth. This relaxes muscles and improves circulation.  · Palpation, which is applying pressure to points of the jaw and face with the fingers.  · Cold spray and stretching of the muscles to relax them.  · An anesthetic for pain relief. This may be given as an injection by your dentist.  Treating the joint  Therapy may focus directly on the TMJ. There are different ways to treat the joint:  · A self-care program helps you treat and manage symptoms on your own. Your program may include exercises. It may also include using ice and heat to relieve pain.  · Gentle manipulation reduces pain and restores range of motion. The health care provider uses his or her hands to relax muscles and ligaments around the joint.  · Exercises strengthen muscles in the jaw and face.  · Ultrasound uses sound waves to reduce pain and swelling. It also improves pain and swelling.  Treating inflammation  When the joint is inflamed, movement becomes difficult -- even impossible at times. Your health care provider can help. Treatment may include:  · Rest and gentle exercise to increase range of motion. One common exercise is to apply pressure to the jaw and resist the movement (isometric exercise).  · A gel pack or ice wrapped in a towel applied for 10 to 20 minutes repeated 3 or 4 times a day. This eases swelling and reduces pain.  · Massage and gentle manipulation as described above.  Date Last Reviewed: 7/13/2015  © 0574-5619 [employer]. [address] [place] [address], [place] [address]. All rights reserved. This information is not intended as a substitute for professional medical care. Always follow your healthcare professional's instructions.

## 2017-07-10 ENCOUNTER — TELEPHONE (OUTPATIENT)
Dept: INTERNAL MEDICINE | Facility: CLINIC | Age: 82
End: 2017-07-10

## 2017-07-10 NOTE — TELEPHONE ENCOUNTER
"Pt was scheduled to see Dr. Gibbs today @ 10:40am for "disoriented, confused, and pt fell". S/w daughter in law and she stated that pt has been confused and disoriented since yesterday and she feel this morning. Pt was unable to remember anyone's number to call after she fell. Advised that pt need to go to ER to be evaluated. Daughter in law verbalized understanding.  "

## 2017-07-16 ENCOUNTER — OFFICE VISIT (OUTPATIENT)
Dept: URGENT CARE | Facility: CLINIC | Age: 82
End: 2017-07-16
Payer: MEDICARE

## 2017-07-16 VITALS
HEART RATE: 106 BPM | DIASTOLIC BLOOD PRESSURE: 58 MMHG | HEIGHT: 62 IN | TEMPERATURE: 100 F | WEIGHT: 148 LBS | OXYGEN SATURATION: 96 % | BODY MASS INDEX: 27.23 KG/M2 | SYSTOLIC BLOOD PRESSURE: 106 MMHG

## 2017-07-16 DIAGNOSIS — R50.9 FEVER, UNSPECIFIED FEVER CAUSE: ICD-10-CM

## 2017-07-16 DIAGNOSIS — R30.0 DYSURIA: ICD-10-CM

## 2017-07-16 DIAGNOSIS — R31.9 HEMATURIA: ICD-10-CM

## 2017-07-16 DIAGNOSIS — R10.9 FLANK PAIN: Primary | ICD-10-CM

## 2017-07-16 PROCEDURE — 1159F MED LIST DOCD IN RCRD: CPT | Mod: S$GLB,,, | Performed by: NURSE PRACTITIONER

## 2017-07-16 PROCEDURE — 99999 PR PBB SHADOW E&M-EST. PATIENT-LVL IV: CPT | Mod: PBBFAC,,,

## 2017-07-16 PROCEDURE — 1125F AMNT PAIN NOTED PAIN PRSNT: CPT | Mod: S$GLB,,, | Performed by: NURSE PRACTITIONER

## 2017-07-16 PROCEDURE — 99213 OFFICE O/P EST LOW 20 MIN: CPT | Mod: S$GLB,,, | Performed by: NURSE PRACTITIONER

## 2017-07-18 DIAGNOSIS — E03.9 HYPOTHYROIDISM (ACQUIRED): ICD-10-CM

## 2017-07-19 RX ORDER — LEVOTHYROXINE SODIUM 50 UG/1
TABLET ORAL
Qty: 90 TABLET | Refills: 2 | Status: SHIPPED | OUTPATIENT
Start: 2017-07-19 | End: 2018-04-02 | Stop reason: SDUPTHER

## 2017-07-19 RX ORDER — PRAVASTATIN SODIUM 40 MG/1
TABLET ORAL
Qty: 90 TABLET | Refills: 2 | Status: SHIPPED | OUTPATIENT
Start: 2017-07-19 | End: 2018-04-02 | Stop reason: SDUPTHER

## 2017-07-20 RX ORDER — TIMOLOL MALEATE 5 MG/ML
SOLUTION/ DROPS OPHTHALMIC
Qty: 15 ML | Refills: 12 | Status: SHIPPED | OUTPATIENT
Start: 2017-07-20 | End: 2018-07-24 | Stop reason: SDUPTHER

## 2017-07-21 ENCOUNTER — OFFICE VISIT (OUTPATIENT)
Dept: INTERNAL MEDICINE | Facility: CLINIC | Age: 82
End: 2017-07-21
Payer: MEDICARE

## 2017-07-21 VITALS
BODY MASS INDEX: 27.92 KG/M2 | DIASTOLIC BLOOD PRESSURE: 72 MMHG | WEIGHT: 151.69 LBS | TEMPERATURE: 97 F | HEART RATE: 80 BPM | OXYGEN SATURATION: 97 % | HEIGHT: 62 IN | SYSTOLIC BLOOD PRESSURE: 123 MMHG

## 2017-07-21 DIAGNOSIS — G47.9 DIFFICULTY SLEEPING: ICD-10-CM

## 2017-07-21 DIAGNOSIS — L60.8 TOENAIL DEFORMITY: ICD-10-CM

## 2017-07-21 DIAGNOSIS — I15.2 HYPERTENSION ASSOCIATED WITH DIABETES: Chronic | ICD-10-CM
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
## 2017-07-21 DIAGNOSIS — E11.59 HYPERTENSION ASSOCIATED WITH DIABETES: Chronic | ICD-10-CM

## 2017-07-21 DIAGNOSIS — N18.30 STAGE 3 CHRONIC KIDNEY DISEASE: ICD-10-CM

## 2017-07-21 PROCEDURE — 1126F AMNT PAIN NOTED NONE PRSNT: CPT | Mod: S$GLB,,, | Performed by: INTERNAL MEDICINE

## 2017-07-21 PROCEDURE — 1159F MED LIST DOCD IN RCRD: CPT | Mod: S$GLB,,, | Performed by: INTERNAL MEDICINE

## 2017-07-21 PROCEDURE — 99214 OFFICE O/P EST MOD 30 MIN: CPT | Mod: S$GLB,,, | Performed by: INTERNAL MEDICINE

## 2017-07-21 PROCEDURE — 99999 PR PBB SHADOW E&M-EST. PATIENT-LVL III: CPT | Mod: PBBFAC,,, | Performed by: INTERNAL MEDICINE

## 2017-07-21 PROCEDURE — 99499 UNLISTED E&M SERVICE: CPT | Mod: S$GLB,,, | Performed by: INTERNAL MEDICINE

## 2017-07-24 NOTE — PROGRESS NOTES
Subjective:       Patient ID: Yovani Pulido is a 84 y.o. female.    Chief Complaint: Hospital Follow Up    Yovani Pulido  84 y.o. White female    Patient presents with:  Hospital Follow Up    HPI: Presents to the clinic with her son for a hospital follow up. She was at [facility] twice in the past two weeks. She reports falling. Review of records indicates falls and confusion on her visit on 7/10. She was worked up for a stroke. Her head CT was negative and her echo and carotid ultrasounds were without acute findings. Her aspirin therapy was adjusted. She had been taking it every other day as opposed to every day.   She returned to the hospital on 7/16 and was diagnosed and treated for a UTI. She is doing better.   She denies further falls.   CKD III--stable. GFR on 7/16 was 49 (baseline). She denies symptoms. She is compliant with benazepril.   HTN--stable on benazepril.   Diabetes--stable on metformin.                       HGBA1C                   5.4                 02/16/2017            She has thickened, lengthy toenails and would like to see podiatry to have them trimmed.   She has difficulty sleeping. She has not been taking her trazodone as prescribed.     Past Medical History:  Anemia  Carotid stenosis  Cataract  COAG (chronic open-angle glaucoma)  Colon polyps  Diabetes mellitus type II      Comment: steroid induced  Diverticulosis  GERD (gastroesophageal reflux disease)      Comment: hiatal hernia  Glaucoma  Heart murmur  Hyperlipidemia  Hypertension  Hypothyroidism  Rheumatoid arthritis(714.0)  Stroke      Comment: lacunar infarct    Current Outpatient Prescriptions on File Prior to Visit:  benazepril (LOTENSIN) 5 MG tablet, Take 1 tablet (5 mg total) by mouth once daily., Disp: 90 tablet, Rfl: 3  blood glucose control, high Soln, by Misc.(Non-Drug; Combo Route) route., Disp: , Rfl:   blood glucose control, normal Soln, Use as directed., Disp: 1 each, Rfl: 1  blood sugar diagnostic  Strp, Glucose testing daily., Disp: 100 strip, Rfl: 3   blood-glucose meter Misc, Use as directed., Disp: 1 each, Rfl: 0  fluticasone (FLONASE) 50 mcg/actuation nasal spray, 2 sprays by Each Nare route once daily., Disp: 1 Bottle, Rfl: 0  folic acid (FOLVITE) 800 MCG Tab, TAKE 1 TABLET TWICE DAILY, Disp: 180 tablet, Rfl: 3  lancets (LANCETS,THIN) Misc, Twice daily glucose testing., Disp: 200 each, Rfl: 3  latanoprost 0.005 % ophthalmic solution, INSTILL 1 DROP INTO BOTH EYES EVERY EVENING, Disp: 3 Bottle, Rfl: 6  levothyroxine (SYNTHROID) 50 MCG tablet, TAKE 1 TABLET BEFORE BREAKFAST, Disp: 90 tablet, Rfl: 2  magnesium 250 mg Tab, Take 1 tablet by mouth Daily., Disp: , Rfl:   metformin (GLUCOPHAGE) 1000 MG tablet, Take 1 tablet (1,000 mg total) by mouth 2 (two) times daily with meals., Disp: 180 tablet, Rfl: 3  methotrexate 2.5 MG Tab, TAKE 4 TABLETS IN MORNING AND 4 TABLETS AT NIGHT ONE TIME WEEKLY, Disp: 96 tablet, Rfl: 1  omeprazole (PRILOSEC) 20 MG capsule, TAKE 1 CAPSULE EVERY DAY, Disp: 90 capsule, Rfl: 3  pravastatin (PRAVACHOL) 40 MG tablet, TAKE 1 TABLET ONE TIME DAILY, Disp: 90 tablet, Rfl: 2  predniSONE (DELTASONE) 1 MG tablet, TAKE 1 TABLET TWICE DAILY  OR  AS  DIRECTED, Disp: 180 tablet, Rfl: 3  timolol maleate 0.5% (TIMOPTIC) 0.5 % Drop, INSTILL 1 DROP INTO BOTH EYES EVERY MORNING, Disp: 15 mL, Rfl: 12  tramadol (ULTRAM) 50 mg tablet, Take 1 tablet (50 mg total) by mouth 3 (three) times daily as needed., Disp: 90 tablet, Rfl: 2  trazodone (DESYREL) 100 MG tablet, Take 1 tablet (100 mg total) by mouth every evening., Disp: 90 tablet, Rfl: 1  alcohol swabs PadM, Apply 1 each topically 2 (two) times daily., Disp: 200 each, Rfl: 3  aspirin (ECOTRIN) 81 MG EC tablet, Take 1 tablet (81 mg total) by mouth once daily., Disp: 30 tablet, Rfl: 0  cetirizine (ZYRTEC) 10 MG tablet, Take 1 tablet (10 mg total) by mouth once daily., Disp: 30 tablet, Rfl: 0    Allergies:  Review of patient's allergies indicates:   --  Tetanus vaccines and toxoid     --  Other reaction(s): Swelling             Tetanus-horse serum: 30 years ago   -- Adhes. band-tape-benzalkonium -- Rash        Review of Systems   Constitutional: Negative for fever and unexpected weight change.   Respiratory: Negative for shortness of breath.    Cardiovascular: Negative for chest pain.   Gastrointestinal: Negative for abdominal pain and diarrhea.   Genitourinary: Negative for difficulty urinating and dysuria.   Musculoskeletal: Positive for gait problem.   Neurological: Negative for dizziness and headaches.   Psychiatric/Behavioral: Positive for sleep disturbance. Negative for confusion.       Objective:      Physical Exam   Constitutional: She is oriented to person, place, and time. She appears well-developed and well-nourished. No distress.   Eyes: No scleral icterus.   Neck: No tracheal deviation present.   Cardiovascular: Normal rate, regular rhythm and normal heart sounds.    Pulmonary/Chest: Effort normal and breath sounds normal. No respiratory distress.   Abdominal: Soft. Bowel sounds are normal.   Musculoskeletal: She exhibits no edema.   Neurological: She is alert and oriented to person, place, and time.   Skin: Skin is warm and dry.   Psychiatric: She has a normal mood and affect.   Vitals reviewed.      Assessment:       1. Fall, subsequent encounter    2. Stage 3 chronic kidney disease    3. Hypertension associated with diabetes    4. Toenail deformity    5. Difficulty sleeping        Plan:       Yovani was seen today for hospital follow up.    Diagnoses and all orders for this visit:    Fall, subsequent encounter  -     Fall precautions    Stage 3 chronic kidney disease  -     Stable  -     Continue benazepril  -     Avoid NSAIDs  -     Monitor    Hypertension associated with diabetes  -     Stable on current management    Toenail deformity  -     Ambulatory consult to Podiatry    Difficulty sleeping  -     Advised to resume trazodone    RTC as  previously planned and as needed

## 2017-07-31 NOTE — TELEPHONE ENCOUNTER
----- Message from Edwina Lindsey sent at 7/31/2017  8:16 AM CDT -----  Contact: Pt  REFILLS:   Patient is requesting a medication refill. Pt states she only needs a 10 day supply.   RX name: Folic acid  Strength: 800 mg  Directions: Take one tablet twice a day  Pharmacy name:   [facility] [address] [place], [place] [id number] [place] [address] AT [address] [facility] [address]  [address] [address] [address]  Phone: [phone] Fax: [phone]    Phone number where pt can be reached: [phone] (home)

## 2017-08-01 ENCOUNTER — TELEPHONE (OUTPATIENT)
Dept: RHEUMATOLOGY | Facility: CLINIC | Age: 82
End: 2017-08-01

## 2017-08-01 RX ORDER — FOLIC ACID 0.8 MG
TABLET ORAL
Qty: 180 TABLET | Refills: 3 | Status: SHIPPED | OUTPATIENT
Start: 2017-08-01 | End: 2018-06-15 | Stop reason: SDUPTHER

## 2017-08-01 NOTE — TELEPHONE ENCOUNTER
----- Message from Nicol Walter sent at 8/1/2017 11:56 AM CDT -----  Contact: pt  Please call pt @ [phone] regarding pt Folic Acid 800mg, pt states she call yesterday and left message no one call her back.

## 2017-08-01 NOTE — TELEPHONE ENCOUNTER
Spoke with patient. Folic acid refill was sent to [facility] this morning.

## 2017-08-07 RX ORDER — TRAZODONE HYDROCHLORIDE 100 MG/1
100 TABLET ORAL NIGHTLY
Qty: 90 TABLET | Refills: 1 | Status: SHIPPED | OUTPATIENT
Start: 2017-08-07 | End: 2017-08-08 | Stop reason: SDUPTHER

## 2017-08-08 NOTE — TELEPHONE ENCOUNTER
----- Message from Suzi Jara sent at 8/8/2017  2:23 PM CDT -----  Pt is requesting the nurse contacts the [facility] [phone]  To verify her prescription Trazodone 100 mg.        Please call pt back at [phone]

## 2017-08-10 RX ORDER — TRAZODONE HYDROCHLORIDE 100 MG/1
100 TABLET ORAL NIGHTLY
Qty: 90 TABLET | Refills: 1 | Status: SHIPPED | OUTPATIENT
Start: 2017-08-10 | End: 2018-04-02 | Stop reason: SDUPTHER

## 2017-09-05 ENCOUNTER — LAB VISIT (OUTPATIENT)
Dept: LAB | Facility: HOSPITAL | Age: 82
End: 2017-09-05
Attending: INTERNAL MEDICINE
Payer: MEDICARE

## 2017-09-05 DIAGNOSIS — D50.0 IRON DEFICIENCY ANEMIA DUE TO CHRONIC BLOOD LOSS: ICD-10-CM

## 2017-09-05 LAB
ANION GAP SERPL CALC-SCNC: 13 MMOL/L
BASOPHILS # BLD AUTO: 0.01 K/UL
BASOPHILS NFR BLD: 0.1 %
BUN SERPL-MCNC: 17 MG/DL
CALCIUM SERPL-MCNC: 7.3 MG/DL
CHLORIDE SERPL-SCNC: 104 MMOL/L
CO2 SERPL-SCNC: 25 MMOL/L
CREAT SERPL-MCNC: 1 MG/DL
DIFFERENTIAL METHOD: ABNORMAL
EOSINOPHIL # BLD AUTO: 0.1 K/UL
EOSINOPHIL NFR BLD: 1.4 %
ERYTHROCYTE [DISTWIDTH] IN BLOOD BY AUTOMATED COUNT: 15.7 %
EST. GFR  (AFRICAN AMERICAN): 59.4 ML/MIN/1.73 M^2
EST. GFR  (NON AFRICAN AMERICAN): 51.5 ML/MIN/1.73 M^2
FERRITIN SERPL-MCNC: 1999 NG/ML
GLUCOSE SERPL-MCNC: 174 MG/DL
HCT VFR BLD AUTO: 30.9 %
HGB BLD-MCNC: 9.8 G/DL
LYMPHOCYTES # BLD AUTO: 0.9 K/UL
LYMPHOCYTES NFR BLD: 12.9 %
MCH RBC QN AUTO: 31.6 PG
MCHC RBC AUTO-ENTMCNC: 31.7 G/DL
MCV RBC AUTO: 100 FL
MONOCYTES # BLD AUTO: 0.4 K/UL
MONOCYTES NFR BLD: 6 %
NEUTROPHILS # BLD AUTO: 5.5 K/UL
NEUTROPHILS NFR BLD: 79.3 %
PLATELET # BLD AUTO: 354 K/UL
PMV BLD AUTO: 8.8 FL
POTASSIUM SERPL-SCNC: 3.4 MMOL/L
RBC # BLD AUTO: 3.1 M/UL
SODIUM SERPL-SCNC: 142 MMOL/L
WBC # BLD AUTO: 6.98 K/UL

## 2017-09-05 PROCEDURE — 80048 BASIC METABOLIC PNL TOTAL CA: CPT

## 2017-09-05 PROCEDURE — 82728 ASSAY OF FERRITIN: CPT

## 2017-09-05 PROCEDURE — 85025 COMPLETE CBC W/AUTO DIFF WBC: CPT

## 2017-09-05 PROCEDURE — 83540 ASSAY OF IRON: CPT

## 2017-09-05 PROCEDURE — 36415 COLL VENOUS BLD VENIPUNCTURE: CPT | Mod: PO

## 2017-09-05 RX ORDER — TRAMADOL HYDROCHLORIDE 50 MG/1
TABLET ORAL
Qty: 84 TABLET | Refills: 1 | Status: SHIPPED | OUTPATIENT
Start: 2017-09-05 | End: 2017-09-12

## 2017-09-06 LAB
IRON SERPL-MCNC: 67 UG/DL
SATURATED IRON: 30 %
TOTAL IRON BINDING CAPACITY: 222 UG/DL
TRANSFERRIN SERPL-MCNC: 150 MG/DL

## 2017-09-11 ENCOUNTER — OFFICE VISIT (OUTPATIENT)
Dept: HEMATOLOGY/ONCOLOGY | Facility: CLINIC | Age: 82
End: 2017-09-11
Payer: MEDICARE

## 2017-09-11 VITALS
WEIGHT: 150.38 LBS | HEART RATE: 92 BPM | RESPIRATION RATE: 16 BRPM | BODY MASS INDEX: 27.67 KG/M2 | HEIGHT: 62 IN | SYSTOLIC BLOOD PRESSURE: 122 MMHG | TEMPERATURE: 98 F | OXYGEN SATURATION: 96 % | DIASTOLIC BLOOD PRESSURE: 70 MMHG

## 2017-09-11 DIAGNOSIS — D63.1 ANEMIA OF CHRONIC RENAL FAILURE, STAGE 3 (MODERATE): ICD-10-CM

## 2017-09-11 DIAGNOSIS — N18.30 STAGE 3 CHRONIC KIDNEY DISEASE: Chronic | ICD-10-CM

## 2017-09-11 DIAGNOSIS — D50.0 IRON DEFICIENCY ANEMIA DUE TO CHRONIC BLOOD LOSS: Primary | ICD-10-CM

## 2017-09-11 DIAGNOSIS — N18.30 ANEMIA OF CHRONIC RENAL FAILURE, STAGE 3 (MODERATE): ICD-10-CM

## 2017-09-11 PROCEDURE — 3008F BODY MASS INDEX DOCD: CPT | Mod: S$GLB,,, | Performed by: INTERNAL MEDICINE

## 2017-09-11 PROCEDURE — 99214 OFFICE O/P EST MOD 30 MIN: CPT | Mod: S$GLB,,, | Performed by: INTERNAL MEDICINE

## 2017-09-11 PROCEDURE — 99999 PR PBB SHADOW E&M-EST. PATIENT-LVL III: CPT | Mod: PBBFAC,,, | Performed by: INTERNAL MEDICINE

## 2017-09-11 PROCEDURE — 3074F SYST BP LT 130 MM HG: CPT | Mod: S$GLB,,, | Performed by: INTERNAL MEDICINE

## 2017-09-11 PROCEDURE — 1159F MED LIST DOCD IN RCRD: CPT | Mod: S$GLB,,, | Performed by: INTERNAL MEDICINE

## 2017-09-11 PROCEDURE — 99499 UNLISTED E&M SERVICE: CPT | Mod: S$GLB,,, | Performed by: INTERNAL MEDICINE

## 2017-09-11 PROCEDURE — 1126F AMNT PAIN NOTED NONE PRSNT: CPT | Mod: S$GLB,,, | Performed by: INTERNAL MEDICINE

## 2017-09-11 PROCEDURE — 3078F DIAST BP <80 MM HG: CPT | Mod: S$GLB,,, | Performed by: INTERNAL MEDICINE

## 2017-09-11 NOTE — PROGRESS NOTES
Subjective:       Patient ID: Yovani Pulido is a 85 y.o. female.    Chief Complaint: Anemia and Results    HPI 85-year-old female history of recurrent iron deficiency anemia as well as anemia secondary renal insufficiency returns for evaluation.  Patient is currently displaced from home after cleaning 3 times and living with son    Past Medical History:   Diagnosis Date    Anemia     Carotid stenosis     Cataract     COAG (chronic open-angle glaucoma)     Colon polyps     Diabetes mellitus type II     steroid induced    Diverticulosis     GERD (gastroesophageal reflux disease)     hiatal hernia    Glaucoma     Heart murmur     Hyperlipidemia     Hypertension     Hypothyroidism     Rheumatoid arthritis(714.0)     Stroke     lacunar infarct     Family History   Problem Relation Age of Onset    Cancer Mother      pancreas    Glaucoma Mother     Cancer Father      lung    Cancer Son 61     melanoma metastatic    Heart disease Brother     Diabetes Sister     Stroke Sister     Blindness Sister     Cataracts Sister     Heart disease Brother     Hyperlipidemia Neg Hx     Hypertension Neg Hx     Macular degeneration Neg Hx     Retinal detachment Neg Hx     Strabismus Neg Hx     Thyroid disease Neg Hx     Colon cancer Neg Hx     Stomach cancer Neg Hx      Social History     Social History    Marital status:      Spouse name: N/A    Number of children: 4    Years of education: N/A     Occupational History    Not on file.     Social History Main Topics    Smoking status: Former Smoker     Packs/day: 3.00     Years: 30.00     Quit date: 12/12/1998    Smokeless tobacco: Former User    Alcohol use No    Drug use: No    Sexual activity: No     Other Topics Concern    Not on file     Social History Narrative    , then remarried.  after 2-3 weeks. They have still been living together for 31 years. He is 95 now. They are no longer sexually active. Caffeine intake 4  cups coffee daily- though has changed to decaf recently. Does have a living will.      Past Surgical History:   Procedure Laterality Date    anal fissure repair      APPENDECTOMY  1944    BREAST SURGERY  1992 approx    breast biopsies- benign    CAROTID ENDARTERECTOMY  2009    left    CATARACT EXTRACTION      COLONOSCOPY  2012    COLONOSCOPY N/A 12/11/2015    Procedure: COLONOSCOPY;  Surgeon: Gavin Escobedo MD;  Location: [facility];  Service: Endoscopy;  Laterality: N/A;    EYE SURGERY  2004, 2005    bilateral cataracts    HERNIA REPAIR  2001, 2002    abdominal x2, with mesh on second    HYSTERECTOMY  1963    vag hyst    JOINT REPLACEMENT  2008    right knee       Labs:  Lab Results   Component Value Date    WBC 6.98 09/05/2017    HGB 9.8 (L) 09/05/2017    HCT 30.9 (L) 09/05/2017     (H) 09/05/2017     (H) 09/05/2017     BMP  Lab Results   Component Value Date     09/05/2017    K 3.4 (L) 09/05/2017     09/05/2017    CO2 25 09/05/2017    BUN 17 09/05/2017    CREATININE 1.0 09/05/2017    CALCIUM 7.3 (L) 09/05/2017    ANIONGAP 13 09/05/2017    ESTGFRAFRICA 59.4 (A) 09/05/2017    EGFRNONAA 51.5 (A) 09/05/2017     Lab Results   Component Value Date    ALT 13 02/16/2017    AST 19 02/16/2017    ALKPHOS 90 02/16/2017    BILITOT 0.4 02/16/2017       Lab Results   Component Value Date    IRON 67 09/05/2017    TIBC 222 (L) 09/05/2017    FERRITIN 1,999 (H) 09/05/2017     Lab Results   Component Value Date    LQQHXRPD92 554 09/19/2016     Lab Results   Component Value Date    FOLATE > 40.0 (H) 04/15/2013     Lab Results   Component Value Date    TSH 3.933 05/15/2017         Review of Systems   Constitutional: Negative for activity change, appetite change, chills, diaphoresis, fatigue, fever and unexpected weight change.   HENT: Negative for congestion, dental problem, drooling, ear discharge, ear pain, facial swelling, hearing loss, mouth sores, nosebleeds, postnasal drip, rhinorrhea, sinus  pressure, sneezing, sore throat, tinnitus, trouble swallowing and voice change.    Eyes: Negative for photophobia, pain, discharge, redness, itching and visual disturbance.   Respiratory: Negative for cough, choking, chest tightness, shortness of breath, wheezing and stridor.    Cardiovascular: Negative for chest pain, palpitations and leg swelling.   Gastrointestinal: Negative for abdominal distention, abdominal pain, anal bleeding, blood in stool, constipation, diarrhea, nausea, rectal pain and vomiting.   Endocrine: Negative for cold intolerance, heat intolerance, polydipsia, polyphagia and polyuria.   Genitourinary: Negative for decreased urine volume, difficulty urinating, dyspareunia, dysuria, enuresis, flank pain, frequency, genital sores, hematuria, menstrual problem, pelvic pain, urgency, vaginal bleeding, vaginal discharge and vaginal pain.   Musculoskeletal: Negative for arthralgias, back pain, gait problem, joint swelling, myalgias, neck pain and neck stiffness.   Skin: Negative for color change, pallor and rash.   Allergic/Immunologic: Negative for environmental allergies, food allergies and immunocompromised state.   Neurological: Negative for dizziness, tremors, seizures, syncope, facial asymmetry, speech difficulty, weakness, light-headedness, numbness and headaches.   Hematological: Negative for adenopathy. Does not bruise/bleed easily.   Psychiatric/Behavioral: Positive for dysphoric mood. Negative for agitation, behavioral problems, confusion, decreased concentration, hallucinations, self-injury, sleep disturbance and suicidal ideas. The patient is nervous/anxious. The patient is not hyperactive.        Objective:      Physical Exam   Constitutional: She is oriented to person, place, and time. She appears well-developed and well-nourished. She appears distressed.   HENT:   Head: Normocephalic and atraumatic.   Right Ear: External ear normal.   Left Ear: External ear normal.   Nose: Nose normal.  Right sinus exhibits no maxillary sinus tenderness and no frontal sinus tenderness. Left sinus exhibits no maxillary sinus tenderness and no frontal sinus tenderness.   Mouth/Throat: Oropharynx is clear and moist. No oropharyngeal exudate.   Eyes: Conjunctivae, EOM and lids are normal. Pupils are equal, round, and reactive to light. Right eye exhibits no discharge. Left eye exhibits no discharge. Right conjunctiva is not injected. Right conjunctiva has no hemorrhage. Left conjunctiva is not injected. Left conjunctiva has no hemorrhage. No scleral icterus.   Neck: Normal range of motion. Neck supple. No JVD present. No tracheal deviation present. No thyromegaly present.   Cardiovascular: Normal rate, regular rhythm, normal heart sounds and intact distal pulses.    Pulmonary/Chest: Effort normal and breath sounds normal. No stridor. No respiratory distress. She exhibits no tenderness.   Abdominal: Soft. Bowel sounds are normal. She exhibits no distension and no mass. There is no splenomegaly or hepatomegaly. There is no tenderness. There is no rebound.   Musculoskeletal: Normal range of motion. She exhibits no edema or tenderness.   Lymphadenopathy:     She has no cervical adenopathy.     She has no axillary adenopathy.        Right: No supraclavicular adenopathy present.        Left: No supraclavicular adenopathy present.   Neurological: She is alert and oriented to person, place, and time. No cranial nerve deficit. Coordination normal.   Skin: Skin is dry. No rash noted. She is not diaphoretic. No erythema.   Psychiatric: She has a normal mood and affect. Her behavior is normal. Judgment and thought content normal.   Vitals reviewed.          Assessment:      1. Iron deficiency anemia due to chronic blood loss    2. Stage 3 chronic kidney disease    3. Anemia of chronic renal failure, stage 3 (moderate)           Plan:   Low-grade anemia stable iron repleted renal insufficiency at this point will continue follow-up  repeat CBC in 4 months the patient has increasing fatigue and weakness please contact sooner

## 2017-09-12 ENCOUNTER — OUTPATIENT CASE MANAGEMENT (OUTPATIENT)
Dept: ADMINISTRATIVE | Facility: OTHER | Age: 82
End: 2017-09-12

## 2017-09-12 NOTE — PROGRESS NOTES
Thank you for the referral.  Patient has been assigned to [facility]  for low risk screening for Outpatient Case Management.     Reason for referral: Low risk    Iron deficiency anemia due to chronic blood loss  Stage 3 chronic kidney disease  2 admits to ER at [facility]    Thank you,    Carolee Coley, SSC

## 2017-09-13 ENCOUNTER — OUTPATIENT CASE MANAGEMENT (OUTPATIENT)
Dept: ADMINISTRATIVE | Facility: OTHER | Age: 82
End: 2017-09-13

## 2017-09-13 NOTE — PROGRESS NOTES
This CSW contacted patient regarding referral. Patient lives with son and daughter-in-law. Patient reports she uses a walker to ambulate from time to time. Patient reports son provides transportation to Dr. Trejo. Patient reports [employer] transportation was provided last year, however not available this year. Patient declined [place]  [employer] [facility] resources. Patient denied [employer] benefit information . Patient denied needing assistance with food, shelter, medicine or medical care.   Patient/Caregiver stated there were no needs at this time.

## 2017-09-25 DIAGNOSIS — E11.9 TYPE 2 DIABETES MELLITUS WITHOUT COMPLICATION: ICD-10-CM

## 2017-09-29 ENCOUNTER — TELEPHONE (OUTPATIENT)
Dept: RHEUMATOLOGY | Facility: CLINIC | Age: 82
End: 2017-09-29

## 2017-09-29 NOTE — TELEPHONE ENCOUNTER
Spoke with pt and scheduled apt with Claudia Chau PA-C for 10.2.17 at 12.30 for labs and 1 pm with Claudia. Pt verbalized understanding.

## 2017-09-29 NOTE — TELEPHONE ENCOUNTER
----- Message from Chey Kevin sent at 9/29/2017 10:36 AM CDT -----  Patient cancelled her appointment in August because she was sick.  She would like to reschedule on a Monday because her son brings her and that is his day off.  The next available Monday was November 22 and she wants to know if you can work her in on a Monday sooner than that?   Call her at [phone].                                             [email or web address]

## 2017-10-02 ENCOUNTER — OFFICE VISIT (OUTPATIENT)
Dept: RHEUMATOLOGY | Facility: CLINIC | Age: 82
End: 2017-10-02
Payer: MEDICARE

## 2017-10-02 ENCOUNTER — LAB VISIT (OUTPATIENT)
Dept: LAB | Facility: HOSPITAL | Age: 82
End: 2017-10-02
Attending: INTERNAL MEDICINE
Payer: MEDICARE

## 2017-10-02 VITALS
HEART RATE: 84 BPM | HEIGHT: 63 IN | WEIGHT: 146.19 LBS | BODY MASS INDEX: 25.9 KG/M2 | SYSTOLIC BLOOD PRESSURE: 148 MMHG | DIASTOLIC BLOOD PRESSURE: 71 MMHG

## 2017-10-02 DIAGNOSIS — N18.30 STAGE 3 CHRONIC KIDNEY DISEASE: Chronic | ICD-10-CM

## 2017-10-02 DIAGNOSIS — Z79.52 LONG TERM CURRENT USE OF SYSTEMIC STEROIDS: Chronic | ICD-10-CM

## 2017-10-02 DIAGNOSIS — M05.79 RHEUMATOID ARTHRITIS INVOLVING MULTIPLE SITES WITH POSITIVE RHEUMATOID FACTOR: Primary | ICD-10-CM

## 2017-10-02 DIAGNOSIS — D50.0 IRON DEFICIENCY ANEMIA DUE TO CHRONIC BLOOD LOSS: ICD-10-CM

## 2017-10-02 DIAGNOSIS — D84.9 IMMUNOCOMPROMISED: ICD-10-CM

## 2017-10-02 DIAGNOSIS — M05.79 RHEUMATOID ARTHRITIS INVOLVING MULTIPLE SITES WITH POSITIVE RHEUMATOID FACTOR: ICD-10-CM

## 2017-10-02 DIAGNOSIS — Z51.81 MEDICATION MONITORING ENCOUNTER: ICD-10-CM

## 2017-10-02 LAB
ALBUMIN SERPL BCP-MCNC: 3 G/DL
ALP SERPL-CCNC: 88 U/L
ALT SERPL W/O P-5'-P-CCNC: 9 U/L
ANION GAP SERPL CALC-SCNC: 13 MMOL/L
AST SERPL-CCNC: 15 U/L
BASOPHILS # BLD AUTO: 0.01 K/UL
BASOPHILS NFR BLD: 0.1 %
BILIRUB SERPL-MCNC: 0.4 MG/DL
BUN SERPL-MCNC: 19 MG/DL
CALCIUM SERPL-MCNC: 7.8 MG/DL
CHLORIDE SERPL-SCNC: 103 MMOL/L
CO2 SERPL-SCNC: 27 MMOL/L
CREAT SERPL-MCNC: 1.2 MG/DL
CRP SERPL-MCNC: 23.8 MG/L
DIFFERENTIAL METHOD: ABNORMAL
EOSINOPHIL # BLD AUTO: 0.1 K/UL
EOSINOPHIL NFR BLD: 0.8 %
ERYTHROCYTE [DISTWIDTH] IN BLOOD BY AUTOMATED COUNT: 15.6 %
ERYTHROCYTE [SEDIMENTATION RATE] IN BLOOD BY WESTERGREN METHOD: 40 MM/HR
EST. GFR  (AFRICAN AMERICAN): 48 ML/MIN/1.73 M^2
EST. GFR  (NON AFRICAN AMERICAN): 41 ML/MIN/1.73 M^2
GLUCOSE SERPL-MCNC: 114 MG/DL
HCT VFR BLD AUTO: 33.5 %
HGB BLD-MCNC: 10.6 G/DL
LYMPHOCYTES # BLD AUTO: 1 K/UL
LYMPHOCYTES NFR BLD: 12.4 %
MCH RBC QN AUTO: 32 PG
MCHC RBC AUTO-ENTMCNC: 31.6 G/DL
MCV RBC AUTO: 101 FL
MONOCYTES # BLD AUTO: 0.3 K/UL
MONOCYTES NFR BLD: 3.9 %
NEUTROPHILS # BLD AUTO: 6.3 K/UL
NEUTROPHILS NFR BLD: 82.8 %
PLATELET # BLD AUTO: 311 K/UL
PMV BLD AUTO: 9.1 FL
POTASSIUM SERPL-SCNC: 4.2 MMOL/L
PROT SERPL-MCNC: 6.8 G/DL
RBC # BLD AUTO: 3.31 M/UL
SODIUM SERPL-SCNC: 143 MMOL/L
WBC # BLD AUTO: 7.65 K/UL

## 2017-10-02 PROCEDURE — 86140 C-REACTIVE PROTEIN: CPT

## 2017-10-02 PROCEDURE — 80053 COMPREHEN METABOLIC PANEL: CPT | Mod: PO

## 2017-10-02 PROCEDURE — 99214 OFFICE O/P EST MOD 30 MIN: CPT | Mod: 25,S$GLB,, | Performed by: PHYSICIAN ASSISTANT

## 2017-10-02 PROCEDURE — 99499 UNLISTED E&M SERVICE: CPT | Mod: S$GLB,,, | Performed by: PHYSICIAN ASSISTANT

## 2017-10-02 PROCEDURE — 36415 COLL VENOUS BLD VENIPUNCTURE: CPT | Mod: PO

## 2017-10-02 PROCEDURE — 90662 IIV NO PRSV INCREASED AG IM: CPT | Mod: S$GLB,,, | Performed by: PHYSICIAN ASSISTANT

## 2017-10-02 PROCEDURE — 90471 IMMUNIZATION ADMIN: CPT | Mod: 59,S$GLB,, | Performed by: PHYSICIAN ASSISTANT

## 2017-10-02 PROCEDURE — 85025 COMPLETE CBC W/AUTO DIFF WBC: CPT | Mod: PO

## 2017-10-02 PROCEDURE — 85651 RBC SED RATE NONAUTOMATED: CPT | Mod: PO

## 2017-10-02 PROCEDURE — G0008 ADMIN INFLUENZA VIRUS VAC: HCPCS | Mod: 59,S$GLB,, | Performed by: PHYSICIAN ASSISTANT

## 2017-10-02 PROCEDURE — 99999 PR PBB SHADOW E&M-EST. PATIENT-LVL IV: CPT | Mod: PBBFAC,,, | Performed by: PHYSICIAN ASSISTANT

## 2017-10-02 ASSESSMENT — CLINICAL DISEASE ACTIVITY INDEX (CDAI)
PATIENT_ASSESSMENT: 0
SWOLLEN_JOINTS_COUNT: 0
TENDER_JOINTS_COUNT: 0
PHYSICIAN_ASSESSMENT: 0
TOTAL_SCORE: 0

## 2017-10-02 ASSESSMENT — ROUTINE ASSESSMENT OF PATIENT INDEX DATA (RAPID3): MDHAQ FUNCTION SCORE: .4

## 2017-10-02 NOTE — PROGRESS NOTES
"Subjective:       Patient ID: Yovani Pulido is a 85 y.o. female.    Chief Complaint: Rheumatoid Arthritis and chronic steroids      Yovani is back today for rheumatology follow-up.  She has rheumatoid arthritis. treatment is methotrexate 20 mg once weekly (splitting dose 4 tabs am and 4 tabs pm) and prednisone 2 mg daily.  She has been feeling good lately. Pain today 7/10 leg pain. Comes and goes. Some issues sleeping at night. Uses  Trazodone at night which helps. Will need her  flu vaccine. No infections or fevers. Uses tramadol sparingly not daily.     Chronic anemia. Recently had IV iron. Counts better today.           Review of Systems   Constitutional: Negative for activity change, appetite change, chills, fatigue, fever and unexpected weight change.   HENT: Negative.  Negative for mouth sores and trouble swallowing.         No dry mouth   Eyes: Negative.  Negative for photophobia, pain and redness.        No swollen or red eyes, no dry eye     Respiratory: Negative.  Negative for chest tightness, shortness of breath, wheezing and stridor.    Cardiovascular: Negative.  Negative for chest pain.   Gastrointestinal: Negative.  Negative for abdominal pain, blood in stool, diarrhea, nausea and vomiting.   Genitourinary: Negative.  Negative for dysuria, frequency, hematuria and urgency.   Musculoskeletal: Positive for myalgias. Negative for arthralgias, back pain, gait problem, joint swelling, neck pain and neck stiffness.   Skin: Negative.  Negative for color change, pallor and rash.   Neurological: Negative.  Negative for weakness.   Hematological: Negative for adenopathy.   Psychiatric/Behavioral: Negative for suicidal ideas.           Objective:     BP (!) 148/71   Pulse 84   Ht 5' 3" (1.6 m)   Wt 66.3 kg (146 lb 2.6 oz)   BMI 25.89 kg/m²      Physical Exam   Constitutional: She is oriented to person, place, and time and well-developed, well-nourished, and in no distress. No distress.   HENT:   Head: " Normocephalic and atraumatic.   Right Ear: External ear normal.   Left Ear: External ear normal.   Mouth/Throat: No oropharyngeal exudate.   Eyes: Conjunctivae and EOM are normal. Pupils are equal, round, and reactive to light. No scleral icterus.   Neck: Normal range of motion. Neck supple. No thyromegaly present.   Cardiovascular: Normal rate, regular rhythm and normal heart sounds.    No murmur heard.  Pulmonary/Chest: Effort normal and breath sounds normal. She exhibits no tenderness.   Abdominal: Soft. Bowel sounds are normal.       Right Side Rheumatological Exam     Examination finds the shoulder, elbow, wrist, knee, 1st PIP, 1st MCP, 2nd PIP, 2nd MCP, 3rd PIP, 3rd MCP, 4th PIP, 4th MCP, 5th PIP and 5th MCP normal.    Left Side Rheumatological Exam     Examination finds the shoulder, elbow, wrist, knee, 1st PIP, 1st MCP, 2nd PIP, 2nd MCP, 3rd PIP, 3rd MCP, 4th PIP, 4th MCP, 5th PIP and 5th MCP normal.      Lymphadenopathy:     She has no cervical adenopathy.   Neurological: She is alert and oriented to person, place, and time. She displays normal reflexes. No cranial nerve deficit. She exhibits normal muscle tone. Gait normal.   Skin: Skin is warm and dry. No rash noted.     Musculoskeletal: Normal range of motion. She exhibits no edema or tenderness.                  Recent Results (from the past 168 hour(s))   CBC auto differential    Collection Time: 10/02/17 11:43 AM   Result Value Ref Range    WBC 7.65 3.90 - 12.70 K/uL    RBC 3.31 (L) 4.00 - 5.40 M/uL    Hemoglobin 10.6 (L) 12.0 - 16.0 g/dL    Hematocrit 33.5 (L) 37.0 - 48.5 %     (H) 82 - 98 fL    MCH 32.0 (H) 27.0 - 31.0 pg    MCHC 31.6 (L) 32.0 - 36.0 g/dL    RDW 15.6 (H) 11.5 - 14.5 %    Platelets 311 150 - 350 K/uL    MPV 9.1 (L) 9.2 - 12.9 fL    Gran # 6.3 1.8 - 7.7 K/uL    Lymph # 1.0 1.0 - 4.8 K/uL    Mono # 0.3 0.3 - 1.0 K/uL    Eos # 0.1 0.0 - 0.5 K/uL    Baso # 0.01 0.00 - 0.20 K/uL    Gran% 82.8 (H) 38.0 - 73.0 %    Lymph% 12.4 (L)  18.0 - 48.0 %    Mono% 3.9 (L) 4.0 - 15.0 %    Eosinophil% 0.8 0.0 - 8.0 %    Basophil% 0.1 0.0 - 1.9 %    Differential Method Automated    Comprehensive metabolic panel    Collection Time: 10/02/17 11:43 AM   Result Value Ref Range    Sodium 143 136 - 145 mmol/L    Potassium 4.2 3.5 - 5.1 mmol/L    Chloride 103 95 - 110 mmol/L    CO2 27 23 - 29 mmol/L    Glucose 114 (H) 70 - 110 mg/dL    BUN, Bld 19 8 - 23 mg/dL    Creatinine 1.2 0.5 - 1.4 mg/dL    Calcium 7.8 (L) 8.7 - 10.5 mg/dL    Total Protein 6.8 6.0 - 8.4 g/dL    Albumin 3.0 (L) 3.5 - 5.2 g/dL    Total Bilirubin 0.4 0.1 - 1.0 mg/dL    Alkaline Phosphatase 88 55 - 135 U/L    AST 15 10 - 40 U/L    ALT 9 (L) 10 - 44 U/L    Anion Gap 13 8 - 16 mmol/L    eGFR if African American 48 (A) >60 mL/min/1.73 m^2    eGFR if non African American 41 (A) >60 mL/min/1.73 m^2         dexa 2013 normal     Assessment:       1. Rheumatoid arthritis involving multiple sites with positive rheumatoid factor    2. Immunocompromised    3. Long term current use of systemic steroids    4. Stage 3 chronic kidney disease    5. Iron deficiency anemia due to chronic blood loss          1. RA doing well on  mtx 20 mg once weekly 0 tender/ 0 swollen CDAI- 0, PADDY 0.4    2. Iron def anemia better with recent iron infusion- monitored by heme/onc     3. Long term steroid use dexa 2013 normal    4. Med monitoring and immunocompromised    5. Vaccines up to date except shingles- pt declining zoster vaccine- also needs flu      6. dexa- normal 2013- due for repeat - long term systemic steroid use     7. Insomnia stable on trazodone      Plan:       1. HD flu today, skip mtx this week and next week will cut her dose back to 15 mg weekly  2. continue folic acid   3. Refill her trazodone and continue  4. She needs her dexa scan done next visit   5. tramadol only prn for acute pain, not daily  6. Avoid nsaids with her blood loss and anemia  7. Follow up with dr callejas regarding her anemia, cbc is better   Post IV infusion   8. rtc in 4 month intervals with labs  9. Call with any questions, changes or concerns

## 2017-10-23 ENCOUNTER — OFFICE VISIT (OUTPATIENT)
Dept: OPHTHALMOLOGY | Facility: CLINIC | Age: 82
End: 2017-10-23
Payer: MEDICARE

## 2017-10-23 DIAGNOSIS — Z96.1 PSEUDOPHAKIA: ICD-10-CM

## 2017-10-23 DIAGNOSIS — H40.1132 PRIMARY OPEN ANGLE GLAUCOMA OF BOTH EYES, MODERATE STAGE: Primary | ICD-10-CM

## 2017-10-23 DIAGNOSIS — H57.03 PERSISTENT MIOSIS: ICD-10-CM

## 2017-10-23 PROCEDURE — 66821 AFTER CATARACT LASER SURGERY: CPT | Mod: RT,S$GLB,, | Performed by: OPHTHALMOLOGY

## 2017-10-23 PROCEDURE — 99499 UNLISTED E&M SERVICE: CPT | Mod: S$GLB,,, | Performed by: OPHTHALMOLOGY

## 2017-10-23 PROCEDURE — 99999 PR PBB SHADOW E&M-EST. PATIENT-LVL II: CPT | Mod: PBBFAC,,, | Performed by: OPHTHALMOLOGY

## 2017-10-23 PROCEDURE — 92014 COMPRE OPH EXAM EST PT 1/>: CPT | Mod: 24,57,S$GLB, | Performed by: OPHTHALMOLOGY

## 2017-10-23 PROCEDURE — 92250 FUNDUS PHOTOGRAPHY W/I&R: CPT | Mod: S$GLB,,, | Performed by: OPHTHALMOLOGY

## 2017-10-23 RX ORDER — PREDNISOLONE ACETATE 10 MG/ML
1 SUSPENSION/ DROPS OPHTHALMIC 4 TIMES DAILY
Qty: 1 BOTTLE | Refills: 2 | Status: SHIPPED | OUTPATIENT
Start: 2017-10-23 | End: 2017-10-30

## 2017-10-23 NOTE — PROGRESS NOTES
SUBJECTIVE:   Yovani Pulido is a 85 y.o. female   Uncorrected distance visual acuity was 20/50 in the right eye and 20/40 in the left eye.   Chief Complaint   Patient presents with    Glaucoma     6 mth dilation, sdp. does not want glasses rx        HPI:  HPI     Glaucoma    Additional comments: 6 mth dilation, sdp. does not want glasses rx           Comments   1. Mod COAG (+ Fhx) goal = 19   Splinter Heme OS  Lopez Leader until 2006  2. 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 Yag OS  4. Irregular Astigmatism  5. 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 RA    Timolol qam OU  Latanoprost qhs OU       Last edited by Dodie Esteban MA on 10/23/2017 10:19 AM. (History)        Assessment /Plan :  1. Primary open angle glaucoma of both eyes, moderate stage Doing well, IOP within acceptable range relative to target IOP and no evidence of progression. Continue current treatment. Reviewed importance of continued compliance with treatment and follow up.     2. Pseudophakia stable   3. Persistent miosis Yag Capsulotomy Procedure:    85 y.o. year old patient experiencing a symptomatic decrease in vision OS with inability to perform activities of daily living including reading.    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 Including risks of retinal detachment (1-3%), macular edema, dislocated implant, pain, inflammation elevated intraocular pressure and vision loss. Consent signed.    Medications given:  Apraclonidine gtt  Tetracaine gtt    Laser energy settings:  3.6  mJ / burst  156 bursts    IMPRESSION:  Yag Capsulotomy OS well tolerated of the anterior capsular phimosis    PLAN:  1. Prednisolone 1% QID over 1 week  2. Postoperative precautions discussed  3. RTC 2-3 weeks or prn

## 2017-11-01 ENCOUNTER — TELEPHONE (OUTPATIENT)
Dept: OPHTHALMOLOGY | Facility: CLINIC | Age: 82
End: 2017-11-01

## 2017-11-01 NOTE — TELEPHONE ENCOUNTER
I spoke to the patient and she had a YAG procedure done on her left eye on 10/23/17. She finished the Pred Acetate eye drop yesterday. She states that she is not having any problems seeing, but her left eye is red and occasionally she has a foreign body sensation. Per Dr. Sweeney, the patient can use artificial tears 4 to 6 times a day to see if that helps her symptoms. The patient will call us if her condition gets worse or if this treatment does not help. The patient is not having any eye pain nor is she having any light sensitivity.

## 2017-11-06 ENCOUNTER — OFFICE VISIT (OUTPATIENT)
Dept: OPHTHALMOLOGY | Facility: CLINIC | Age: 82
End: 2017-11-06
Payer: MEDICARE

## 2017-11-06 DIAGNOSIS — H40.1132 PRIMARY OPEN ANGLE GLAUCOMA OF BOTH EYES, MODERATE STAGE: ICD-10-CM

## 2017-11-06 DIAGNOSIS — Z96.1 PSEUDOPHAKIA OF BOTH EYES: Primary | ICD-10-CM

## 2017-11-06 PROCEDURE — 99999 PR PBB SHADOW E&M-EST. PATIENT-LVL II: CPT | Mod: PBBFAC,,, | Performed by: OPHTHALMOLOGY

## 2017-11-06 PROCEDURE — 99024 POSTOP FOLLOW-UP VISIT: CPT | Mod: S$GLB,,, | Performed by: OPHTHALMOLOGY

## 2017-11-06 PROCEDURE — 99499 UNLISTED E&M SERVICE: CPT | Mod: S$GLB,,, | Performed by: OPHTHALMOLOGY

## 2017-11-06 NOTE — PROGRESS NOTES
SUBJECTIVE:   Yovani Pulido is a 85 y.o. female   Uncorrected distance visual acuity was not recorded in the right eye and 20/30 -2 in the left eye.   Chief Complaint   Patient presents with    Post-op Evaluation     2-3 wk s/p yag OS. doing well. mild irritation and redness, using tears        HPI:  HPI     Post-op Evaluation    Additional comments: 2-3 wk s/p yag OS. doing well. mild irritation and   redness, using tears           Comments   Pt states she has been non-compliant with glaucoma drops due to using pred   acetate    1. Mod COAG (+ Fhx) goal = 19   Splinter Heme OS  Lopez Del Valle until 2006  2. PCIOL OD Lopez Del Valle  PCIOL OS CPG 10/06 (w phacomorphic changes)  3. Yag OS 10/23/2017  4. Irregular Astigmatism  5. 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 RA    Timolol qam OU  Latanoprost qhs OU    Prednisone 1 mg po daily       Last edited by Dodie Esteban MA on 11/6/2017 11:06 AM. (History)        Assessment /Plan :  1. Pseudophakia of both eyes S/P yag OS doing well    2. Primary open angle glaucoma of both eyes, moderate stage Doing well, IOP within acceptable range relative to target IOP and no evidence of progression. Continue current treatment. Reviewed importance of continued compliance with treatment and follow up.       Return to clinic in 3-4 months  or as needed.  With IOP Check and GOCT

## 2017-11-21 LAB — LDH SERPL L TO P-CCNC: 127 IU/L (ref 100–194)

## 2017-12-11 ENCOUNTER — OFFICE VISIT (OUTPATIENT)
Dept: UROLOGY | Facility: CLINIC | Age: 82
End: 2017-12-11
Payer: MEDICARE

## 2017-12-11 ENCOUNTER — APPOINTMENT (OUTPATIENT)
Dept: LAB | Facility: HOSPITAL | Age: 82
End: 2017-12-11
Attending: NURSE PRACTITIONER
Payer: MEDICARE

## 2017-12-11 VITALS
TEMPERATURE: 98 F | HEIGHT: 62 IN | SYSTOLIC BLOOD PRESSURE: 122 MMHG | BODY MASS INDEX: 27.67 KG/M2 | WEIGHT: 150.38 LBS | DIASTOLIC BLOOD PRESSURE: 66 MMHG | RESPIRATION RATE: 18 BRPM | HEART RATE: 66 BPM

## 2017-12-11 DIAGNOSIS — R35.0 URINARY FREQUENCY: ICD-10-CM

## 2017-12-11 DIAGNOSIS — R35.1 NOCTURIA: ICD-10-CM

## 2017-12-11 DIAGNOSIS — R32 URINARY INCONTINENCE, UNSPECIFIED TYPE: Primary | ICD-10-CM

## 2017-12-11 LAB
BACTERIA #/AREA URNS HPF: NORMAL /HPF
BILIRUB SERPL-MCNC: ABNORMAL MG/DL
BLOOD URINE, POC: ABNORMAL
COLOR, POC UA: YELLOW
GLUCOSE UR QL STRIP: ABNORMAL
KETONES UR QL STRIP: ABNORMAL
LEUKOCYTE ESTERASE URINE, POC: ABNORMAL
MICROSCOPIC COMMENT: NORMAL
NITRITE, POC UA: ABNORMAL
PH, POC UA: 5
PROTEIN, POC: ABNORMAL
SPECIFIC GRAVITY, POC UA: 1.01
SQUAMOUS #/AREA URNS HPF: 1 /HPF
UROBILINOGEN, POC UA: ABNORMAL
WBC #/AREA URNS HPF: 1 /HPF (ref 0–5)

## 2017-12-11 PROCEDURE — 51798 US URINE CAPACITY MEASURE: CPT | Mod: S$GLB,,, | Performed by: NURSE PRACTITIONER

## 2017-12-11 PROCEDURE — 99999 PR PBB SHADOW E&M-EST. PATIENT-LVL V: CPT | Mod: PBBFAC,,, | Performed by: NURSE PRACTITIONER

## 2017-12-11 PROCEDURE — 81000 URINALYSIS NONAUTO W/SCOPE: CPT

## 2017-12-11 PROCEDURE — 81001 URINALYSIS AUTO W/SCOPE: CPT | Mod: S$GLB,,, | Performed by: NURSE PRACTITIONER

## 2017-12-11 PROCEDURE — 99214 OFFICE O/P EST MOD 30 MIN: CPT | Mod: 25,S$GLB,, | Performed by: NURSE PRACTITIONER

## 2017-12-11 NOTE — PATIENT INSTRUCTIONS
Urinary Incontinence, Female (Adult)  Urinary incontinence means loss of control of the bladder. This problem affects many women, especially as they get older. If you have incontinence, you may be embarrassed to ask for help. But know that this problem can be treated.     Types of Incontinence  There are different types of incontinence. Two of the main types are described here. You can have more than one type.  Stress incontinence: With this type, urine leaks when pressure (stress) is put on the bladder. This may happen when you cough, sneeze, or laugh. Stress incontinence most often occurs because the pelvic floor muscles that support the bladder and urethra are weak. This can happen after pregnancy and vaginal childbirth or a hysterectomy. It can also be due to excess body weight or hormone changes.  Urge incontinence (also called overactive bladder): With this type, a sudden urge to urinate is felt often. This may happen even though there may not be much urine in the bladder. The need to urinate often during the night is common. Urge incontinence most often occurs because of bladder spasms. This may be due to bladder irritation or infection. Damage to bladder nerves or pelvic muscles, constipation, and certain medicines can also lead to urge incontinence.  Treatment of urinary incontinence depends on the cause. Further evaluation is needed to find the type you have. This will likely include an exam and certain tests. Based on the results, you and your healthcare provider can then plan treatment. Until a diagnosis is made, the home care tips below can help relieve symptoms.  Home care  · Do pelvic floor muscle (Kegel) exercises, if they are prescribed. The pelvic floor muscles help support the bladder and urethra. Many women find that their symptoms improve when doing special exercises that strengthen these muscles. To do the exercises:  ¨ Contract the muscles you would use to stop your stream of urine, but do  this when youre not urinating. Hold for 10 seconds, then relax. Repeat 10 to 20 times in a row, at least 3 times a day. Your provider may give you other instructions for how to do the exercises and how often.  · Keep a bladder diary. This helps track how often and how much you urinate over a set period of time. Bring this diary with you to your next visit with the provider. The information can help your provider learn more about your bladder problem.  · Lose weight, if advised to by your provider. Excess weight puts pressure on the bladder. Your provider can help you create a weight-loss plan thats right for you. This may include exercising more and making certain diet changes.  · Avoid foods and drinks that may irritate the bladder. These can include alcohol and caffeinated drinks.  · Quit smoking. Smoking and other tobacco use can lead to chronic cough that strains the pelvic floor muscles. Smoking may also damage the bladder and urethra. Talk with your provider about treatments or methods you can use to quit smoking.  · If drinking large amounts of fluid causes you to have symptoms, you may be advised to limit your fluid intake. You may also be advised to drink most of your fluids during the day and to limit fluids at night.  · If youre worried about urine leakage or accidents, you may wear absorbent pads to catch urine. Change the pads often. This helps reduce discomfort. It may also reduce the risk of skin or bladder infections.  Follow-up care  Follow up with your healthcare provider as directed. If testing was done, youll be told the results as soon as they are ready. It may take some to find the right treatment for your problem. Work closely with your provider to ensure you get the best care for your needs. Your treatment plan may include special therapies or medicines. Certain procedures or surgery may also be options. Be sure to discuss any questions you have with your provider.  When to seek medical  advice  Call the healthcare provider right away if any of these occur:  · Fever of 100.4°F (38°C) or higher, or as directed by your provider  · Bladder pain or fullness  · Abdominal swelling  · Nausea or vomiting  · Back pain  · Weakness, dizziness or fainting  Date Last Reviewed: 7/26/2015 © 2000-2017 [employer]. [address] [place] [address], [place] [address]. All rights reserved. This information is not intended as a substitute for professional medical care. Always follow your healthcare professional's instructions.        Mirabegron extended-release tablets  What is this medicine?  MIRABEGRON (KENNA a BEG rosario) is used to treat overactive bladder. This medicine reduces the amount of bathroom visits. It may also help to control wetting accidents.  How should I use this medicine?  Take this medicine by mouth with a glass of water. Follow the directions on the prescription label. Do not cut, crush or chew this medicine. You can take it with or without food. If it upsets your stomach, take it with food. Take your medicine at regular intervals. Do not take it more often than directed. Do not stop taking except on your doctor's advice.  Talk to your pediatrician regarding the use of this medicine in children. Special care may be needed.  What side effects may I notice from receiving this medicine?  Side effects that you should report to your doctor or health care professional as soon as possible:  · allergic reactions like skin rash, itching or hives, swelling of the face, lips, or tongue  · chest pain or palpitations  · severe or sudden headache  · high blood pressure  · fast, irregular heartbeat  · redness, blistering, peeling or loosening of the skin, including inside the mouth  · signs of infection like fever or chills; cough; sore throat; pain or difficulty passing urine  · trouble passing urine or change in the amount of urine  Side effects that usually do not require medical attention (Report these to  your doctor or health care professional if they continue or are bothersome.):  · constipation  · diarrhea  · dizziness  · dry eyes  · joint pain  · mild headache  · nausea  · runny nose  What may interact with this medicine?  · certain medicines for bladder problems like fesoterodine, oxybutynin, solifenacin, tolterodine  · desipramine  · digoxin  · flecainide  · ketoconazole  · MAOIs like Carbex, Eldepryl, Marplan, Nardil, and Parnate  · metoprolol  · propafenone  · thioridazine  · warfarin  What if I miss a dose?  If you miss a dose, take it as soon as you can. If it is almost time for your next dose, take only that dose. Do not take double or extra doses.  Where should I keep my medicine?  Keep out of the reach of children.  Store at room temperature between 15 and 30 degrees C (59 and 86 degrees F). Throw away any unused medicine after the expiration date.  What should I tell my health care provider before I take this medicine?  They need to know if you have any of these conditions:  · difficulty passing urine  · high blood pressure  · kidney disease  · liver disease  · an unusual or allergic reaction to mirabegron, other medicines, foods, dyes, or preservatives  · pregnant or trying to get pregnant  · breast-feeding  What should I watch for while using this medicine?  It may take 8 weeks to notice the full benefit from this medicine.  You may need to limit your intake tea, coffee, caffeinated sodas, and alcohol. These drinks may make your symptoms worse.  Visit your doctor or health care professional for regular checks on your progress. Check your blood pressure as directed. Ask your doctor or health care professional what your blood pressure should be and when you should contact him or her.  NOTE:This sheet is a summary. It may not cover all possible information. If you have questions about this medicine, talk to your doctor, pharmacist, or health care provider. Copyright© 2017 [employer]

## 2017-12-11 NOTE — PROGRESS NOTES
"[facility] Note  Staff: ASHELY Cummings    Referring provider and please cc:   PCP: Dorcas Schultz    Chief Complaint: Urinary Incontinence    Subjective:        HPI: Yovani Pulido is a 85 y.o. female new patient to Urology clinic presents with new onset urinary frequency, nocturia x three months.  Pt is accompanied today by her Daughter-in law-Mayda.    Pt states today for the last 3 months she has had increased urgency, frequency and leakage (especially during the night).  And in the morning she will "sometimes" get out of bed from sleeping and the urine will just come out without warning.    Medical history includes multiple comorbidities including Rheumatoid arthritis (she takes a daily regimen of steroids), diabetes (she claims under control), crohn's disease, and glaucoma.      Diagnosed with "possible" Crohn's disease-3 years ago with colonoscopy.  Her GI physician at this time is wanting her to repeat a colonoscopy to confirm but she still refuses another scope after three years.    Questions asked pt during office visit today:  DTF: Sometimes, NTF: 3x night  Pt feels that she completely empties her bladder after each urination.  Urgency:Yes, incontinence with urgency? Yes; bothersome Yes;   Incontinence with laughing or straining: Yes;   If yes, how many pads/day? Wears depends 24/7  Feel a bulge?No  G4, P 3, vaginal   Gross Hematuria:  None  History of UTI:  Only recurrent when she was sexually active with her  many years ago.  She is no longer sexually active at this time.    REVIEW OF SYSTEMS:  Review of Systems   Constitutional: Negative for chills, diaphoresis, fever and weight loss.   HENT: Negative for congestion, hearing loss, nosebleeds and sore throat.    Eyes: Negative for blurred vision and pain.        +Glaucoma   Respiratory: Negative for cough and wheezing.         Questionable crohn's disease   Cardiovascular: Negative for chest pain, palpitations " and leg swelling.   Gastrointestinal: Negative for abdominal pain, heartburn, nausea and vomiting.   Genitourinary: Positive for frequency and urgency. Negative for dysuria, flank pain and hematuria.        +Nocturia  +Urinary leakage   Musculoskeletal: Negative for back pain, joint pain, myalgias and neck pain.        Rheumatoid arthritis   Skin: Negative for itching and rash.   Neurological: Negative for dizziness, tremors, sensory change, seizures, loss of consciousness, weakness and headaches.   Endo/Heme/Allergies: Does not bruise/bleed easily.   Psychiatric/Behavioral: Negative for depression and suicidal ideas. The patient is not nervous/anxious.      PMHx:  Past Medical History:   Diagnosis Date    Anemia     Carotid stenosis     Cataract     COAG (chronic open-angle glaucoma)     Colon polyps     Diabetes mellitus type II     steroid induced    Diverticulosis     GERD (gastroesophageal reflux disease)     hiatal hernia    Glaucoma     Heart murmur     Hyperlipidemia     Hypertension     Hypothyroidism     Rheumatoid arthritis(714.0)     Stroke     lacunar infarct     Kidney stones: No    PSHx:  Past Surgical History:   Procedure Laterality Date    anal fissure repair      APPENDECTOMY  1944    BREAST SURGERY  1992 approx    breast biopsies- benign    CAROTID ENDARTERECTOMY  2009    left    CATARACT EXTRACTION      COLONOSCOPY  2012    COLONOSCOPY N/A 12/11/2015    Procedure: COLONOSCOPY;  Surgeon: Gavin Escobedo MD;  Location: [facility];  Service: Endoscopy;  Laterality: N/A;    EYE SURGERY  2004, 2005    bilateral cataracts    HERNIA REPAIR  2001, 2002    abdominal x2, with mesh on second    HYSTERECTOMY  1963    vag hyst    JOINT REPLACEMENT  2008    right knee    yag Left      Fam Hx:   malignancies: No , gyn malignancies: No   kidney stones: No     Social History     Social History    Marital status:      Spouse name: N/A    Number of children: 4    Years of  education: N/A     Social History Main Topics    Smoking status: Former Smoker     Packs/day: 3.00     Years: 30.00     Quit date: 12/12/1998    Smokeless tobacco: Former User    Alcohol use No    Drug use: No    Sexual activity: No     Other Topics Concern    None     Social History Narrative    , then remarried.  after 2-3 weeks. They have still been living together for 31 years. He is 95 now. They are no longer sexually active. Caffeine intake 4 cups coffee daily- though has changed to decaf recently. Does have a living will.      Allergies:  Tetanus vaccines and toxoid and Adhes. band-tape-benzalkonium    Medications: reviewed   Anticoagulation: Yes - Aspirin 81 mg one tablet daily    Objective:     Vitals:    12/11/17 1355   BP: 122/66   Pulse: 66   Resp: 18   Temp: 98 °F (36.7 °C)     Physical Exam   Vitals reviewed.  Constitutional: She is oriented to person, place, and time. She appears well-developed and well-nourished.   HENT:   Head: Normocephalic and atraumatic.   Eyes: Conjunctivae and EOM are normal. Pupils are equal, round, and reactive to light.   Neck: Normal range of motion. Neck supple.   Cardiovascular: Normal rate, regular rhythm, normal heart sounds and intact distal pulses.    Pulmonary/Chest: Effort normal and breath sounds normal.   Abdominal: Soft. Bowel sounds are normal.   Musculoskeletal: Normal range of motion.   Neurological: She is alert and oriented to person, place, and time. She has normal reflexes.   Skin: Skin is warm and dry.     Psychiatric: She has a normal mood and affect. Her behavior is normal. Judgment and thought content normal.     Pelvic exam  External Genitalia: normal hair distribution, no lesions  Urethral meatus: normal without prolapse or caruncle  Urethra: without tenderness or mass  Bladder: without fullness or tenderness  Vagina: normal appearing. No discharge or lesions.  Anus and perineum: appear normal    PVR by bladder scan performed by  me today: 0 mL*    LABS REVIEW:  UA today:   Color:Clear, Yellow  Spec. Grav.  1.010  PH  5.0  Trace of Protein    Cr:   Lab Results   Component Value Date    CREATININE 1.2 10/02/2017     Assessment:       1. Urinary incontinence, unspecified type          Plan:     Myrbetriq 25 mg one tablet daily prescribed to pt today as a trial dose for OAB vs. Urinary incontinence of unknown origin.    In the future pt may need UDS for further testing.    F/u:  Pt and family are requesting to follow-up with [facility] located in [place].  Pt is moving back to [place] on 12/19/17.  (I have forwarded info to nurse to make arrangements for pt at the end of today's office visit)    [facility]: Active    Brittney Hardin, JANEYP-C

## 2017-12-21 ENCOUNTER — TELEPHONE (OUTPATIENT)
Dept: RHEUMATOLOGY | Facility: CLINIC | Age: 82
End: 2017-12-21

## 2017-12-21 DIAGNOSIS — M05.79 RHEUMATOID ARTHRITIS INVOLVING MULTIPLE SITES WITH POSITIVE RHEUMATOID FACTOR: Primary | ICD-10-CM

## 2017-12-21 RX ORDER — TRAMADOL HYDROCHLORIDE 50 MG/1
50 TABLET ORAL EVERY 12 HOURS PRN
Qty: 60 TABLET | Refills: 1 | Status: SHIPPED | OUTPATIENT
Start: 2017-12-21 | End: 2017-12-31

## 2017-12-21 NOTE — TELEPHONE ENCOUNTER
----- Message from Jemima Miguel sent at 12/21/2017  1:23 PM CST -----  Contact: Pt  Please give pt a call at ..[phone] (home) regarding a refill on her pain medication.               [facility] [address] [place] [address] [place] [address] [address]  [address] [address] [address]  Phone: [phone] Fax: [phone]

## 2017-12-21 NOTE — TELEPHONE ENCOUNTER
Patient is requesting  a refill of Tramadol . This is not on her med list but is mention as taking in her last note. Please send to Jessica

## 2017-12-27 RX ORDER — LATANOPROST 50 UG/ML
SOLUTION/ DROPS OPHTHALMIC
Qty: 3 BOTTLE | Refills: 6 | Status: SHIPPED | OUTPATIENT
Start: 2017-12-27

## 2018-01-08 ENCOUNTER — TELEPHONE (OUTPATIENT)
Dept: PULMONOLOGY | Facility: CLINIC | Age: 83
End: 2018-01-08

## 2018-01-08 ENCOUNTER — OFFICE VISIT (OUTPATIENT)
Dept: PULMONOLOGY | Facility: CLINIC | Age: 83
End: 2018-01-08
Payer: MEDICARE

## 2018-01-08 ENCOUNTER — HOSPITAL ENCOUNTER (OUTPATIENT)
Dept: RADIOLOGY | Facility: HOSPITAL | Age: 83
Discharge: HOME | End: 2018-01-08
Attending: NURSE PRACTITIONER
Payer: MEDICARE

## 2018-01-08 ENCOUNTER — OFFICE VISIT (OUTPATIENT)
Dept: HEMATOLOGY/ONCOLOGY | Facility: CLINIC | Age: 83
End: 2018-01-08
Payer: MEDICARE

## 2018-01-08 VITALS
TEMPERATURE: 98 F | DIASTOLIC BLOOD PRESSURE: 72 MMHG | HEIGHT: 62 IN | RESPIRATION RATE: 16 BRPM | HEART RATE: 96 BPM | OXYGEN SATURATION: 92 % | BODY MASS INDEX: 26.69 KG/M2 | SYSTOLIC BLOOD PRESSURE: 150 MMHG | WEIGHT: 145.06 LBS

## 2018-01-08 VITALS
HEIGHT: 62 IN | OXYGEN SATURATION: 90 % | HEART RATE: 90 BPM | DIASTOLIC BLOOD PRESSURE: 66 MMHG | SYSTOLIC BLOOD PRESSURE: 124 MMHG | WEIGHT: 144.63 LBS | BODY MASS INDEX: 26.61 KG/M2 | RESPIRATION RATE: 17 BRPM

## 2018-01-08 DIAGNOSIS — J90 PLEURAL EFFUSION: Primary | ICD-10-CM

## 2018-01-08 DIAGNOSIS — J90 PLEURAL EFFUSION: ICD-10-CM

## 2018-01-08 DIAGNOSIS — R06.02 SOB (SHORTNESS OF BREATH): Primary | ICD-10-CM

## 2018-01-08 DIAGNOSIS — D50.0 IRON DEFICIENCY ANEMIA DUE TO CHRONIC BLOOD LOSS: Primary | ICD-10-CM

## 2018-01-08 DIAGNOSIS — D63.1 ANEMIA OF CHRONIC RENAL FAILURE, STAGE 3 (MODERATE): ICD-10-CM

## 2018-01-08 DIAGNOSIS — N18.30 ANEMIA OF CHRONIC RENAL FAILURE, STAGE 3 (MODERATE): ICD-10-CM

## 2018-01-08 DIAGNOSIS — M05.79 RHEUMATOID ARTHRITIS INVOLVING MULTIPLE SITES WITH POSITIVE RHEUMATOID FACTOR: ICD-10-CM

## 2018-01-08 PROCEDURE — 71046 X-RAY EXAM CHEST 2 VIEWS: CPT | Mod: TC,FY,PO

## 2018-01-08 PROCEDURE — 99214 OFFICE O/P EST MOD 30 MIN: CPT | Mod: S$GLB,,, | Performed by: INTERNAL MEDICINE

## 2018-01-08 PROCEDURE — 71046 X-RAY EXAM CHEST 2 VIEWS: CPT | Mod: 26,,, | Performed by: RADIOLOGY

## 2018-01-08 PROCEDURE — 99999 PR PBB SHADOW E&M-EST. PATIENT-LVL III: CPT | Mod: PBBFAC,,, | Performed by: INTERNAL MEDICINE

## 2018-01-08 PROCEDURE — 99499 UNLISTED E&M SERVICE: CPT | Mod: S$GLB,,, | Performed by: INTERNAL MEDICINE

## 2018-01-08 PROCEDURE — 99204 OFFICE O/P NEW MOD 45 MIN: CPT | Mod: S$GLB,,, | Performed by: NURSE PRACTITIONER

## 2018-01-08 PROCEDURE — 99999 PR PBB SHADOW E&M-EST. PATIENT-LVL IV: CPT | Mod: PBBFAC,,, | Performed by: NURSE PRACTITIONER

## 2018-01-08 NOTE — PROGRESS NOTES
Subjective:      Patient ID: Yovani Pulido is a 85 y.o. female.    Chief Complaint: Hospital Follow Up    Patient presents to the office today after hospital follow-up in November at [facility].  Patient had bilateral pleural effusions noted on CT scan-minimal on right, but moderate to large on left side.  She had thoracentesis with more than 1 L of exudative fluid removed.  She was seen by cardiology and pulmonary.  Cytology was still pending.  Pleural effusions thought to be related to her rheumatoid arthritis or cardiac.  Her breathing has improved.  Her son states she is now walking without her walker.  She feels stronger.    Patient states approximately 7-9 years ago she was hospitalized for CHF exacerbation.  At that time she was told that she has COPD and was discharged with oxygen.  She states upon further evaluation, she was told that she did not have COPD and her oxygen was discontinued.  She is a past smoker.  She quit smoking in 1998.  She smoked up to 3 packs a day.  Recent CT scan of chest without suspicious findings.  Echocardiogram with severe mitral annular calcification with estimated PA systolic pressure 50 mmHg.    Patient Active Problem List:     Gastroesophageal reflux disease with hiatal hernia     Iron deficiency anemia     Long term current use of systemic steroids     Rheumatoid arthritis involving multiple sites with positive rheumatoid factor     Weight loss, non-intentional     Moderate stage chronic open angle glaucoma     Diabetes mellitus type 2 without retinopathy     Pseudophakia     Immunocompromised     Bilateral carotid artery stenosis     History of CEA (carotid endarterectomy)     Hypertension associated with diabetes     Hyperlipidemia LDL goal <100     Monoclonal gammopathy of undetermined significance     Primary open angle glaucoma of both eyes, moderate stage     Stage 3 chronic kidney disease     Anemia of chronic renal failure, stage 3 (moderate)      "Hypothyroidism (acquired)     Diabetes mellitus with microalbuminuria     Persistent miosis            /66   Pulse 90   Resp 17   Ht 5' 2" (1.575 m)   Wt 65.6 kg (144 lb 10 oz)   SpO2 (!) 90%   BMI 26.45 kg/m²   Body mass index is 26.45 kg/m².    Review of Systems   Constitutional: Negative.    HENT: Negative.    Respiratory: Positive for dyspnea on extertion.    Cardiovascular: Negative.    Musculoskeletal: Positive for arthralgias.   Gastrointestinal: Negative.    Neurological: Negative.    Psychiatric/Behavioral: Negative.      Objective:      Physical Exam   Constitutional: She is oriented to person, place, and time. She appears well-developed and well-nourished.   HENT:   Head: Normocephalic and atraumatic.   Neck: Normal range of motion. Neck supple.   Cardiovascular: Normal rate and regular rhythm.    Pulmonary/Chest: Effort normal.   Absent BS to left base   Abdominal: Soft. Bowel sounds are normal.   Musculoskeletal: Normal range of motion. She exhibits no edema.   Neurological: She is alert and oriented to person, place, and time.   Skin: Skin is warm and dry.   Psychiatric: She has a normal mood and affect.     Personal Diagnostic Review  Review of hospital records.        Assessment:       1. SOB (shortness of breath)    2. Pleural effusion        Outpatient Encounter Prescriptions as of 1/8/2018   Medication Sig Dispense Refill    alcohol swabs PadM Apply 1 each topically 2 (two) times daily. 200 each 3    aspirin (ECOTRIN) 81 MG EC tablet Take 1 tablet (81 mg total) by mouth once daily. 30 tablet 0    benazepril (LOTENSIN) 5 MG tablet Take 1 tablet (5 mg total) by mouth once daily. 90 tablet 3    blood glucose control, high Soln by Misc.(Non-Drug; Combo Route) route.      blood glucose control, normal Soln Use as directed. 1 each 1    blood sugar diagnostic Strp Glucose testing daily. 100 strip 3    blood-glucose meter Misc Use as directed. 1 each 0    cetirizine (ZYRTEC) 10 MG " tablet Take 1 tablet (10 mg total) by mouth once daily. 30 tablet 0    fluticasone (FLONASE) 50 mcg/actuation nasal spray 2 sprays by Each Nare route once daily. 1 Bottle 0    folic acid (FOLVITE) 800 MCG Tab TAKE 1 TABLET TWICE DAILY 180 tablet 3    lancets (LANCETS,THIN) Misc Twice daily glucose testing. 200 each 3    latanoprost 0.005 % ophthalmic solution INSTILL 1 DROP INTO BOTH EYES EVERY EVENING 3 Bottle 6    levothyroxine (SYNTHROID) 50 MCG tablet TAKE 1 TABLET BEFORE BREAKFAST 90 tablet 2    magnesium 250 mg Tab Take 1 tablet by mouth Daily.      metformin (GLUCOPHAGE) 1000 MG tablet Take 1 tablet (1,000 mg total) by mouth 2 (two) times daily with meals. 180 tablet 3    methotrexate 2.5 MG Tab TAKE 4 TABLETS IN MORNING AND 4 TABLETS AT NIGHT ONE TIME WEEKLY 96 tablet 1    omeprazole (PRILOSEC) 20 MG capsule TAKE 1 CAPSULE EVERY DAY 90 capsule 3    pravastatin (PRAVACHOL) 40 MG tablet TAKE 1 TABLET ONE TIME DAILY 90 tablet 2    predniSONE (DELTASONE) 1 MG tablet TAKE 1 TABLET TWICE DAILY  OR  AS  DIRECTED 180 tablet 3    timolol maleate 0.5% (TIMOPTIC) 0.5 % Drop INSTILL 1 DROP INTO BOTH EYES EVERY MORNING 15 mL 12    trazodone (DESYREL) 100 MG tablet Take 1 tablet (100 mg total) by mouth every evening. 90 tablet 1     No facility-administered encounter medications on file as of 1/8/2018.      Orders Placed This Encounter   Procedures    Spirometry with/without bronchodilator     Standing Status:   Future     Standing Expiration Date:   1/8/2019    Stress test, pulmonary     Standing Status:   Future     Standing Expiration Date:   1/8/2019     Plan:      CXR. Spirometry, walk.   Patient's symptoms improved since hospital discharge and feels stronger now walking without her walker.   Obtain cytology thoracentesis results.

## 2018-01-08 NOTE — PROGRESS NOTES
Subjective:       Patient ID: Yovani Pulido is a 85 y.o. female.    Chief Complaint: Follow-up; Results; Anemia; and Chronic Renal Failure    HPI 85-year-old female with recurrent iron deficiency anemia patient recently hospitalized at [facility] with idiopathic left pleural effusion cytologically negative felt to be secondary to rheumatoid arthritis is scheduled to see pulmonary in follow-up today    Past Medical History:   Diagnosis Date    Anemia     Carotid stenosis     Cataract     COAG (chronic open-angle glaucoma)     Colon polyps     Diabetes mellitus type II     steroid induced    Diverticulosis     GERD (gastroesophageal reflux disease)     hiatal hernia    Glaucoma     Heart murmur     Hyperlipidemia     Hypertension     Hypothyroidism     Rheumatoid arthritis(714.0)     Stroke     lacunar infarct     Family History   Problem Relation Age of Onset    Cancer Mother      pancreas    Glaucoma Mother     Cancer Father      lung    Cancer Son 61     melanoma metastatic    Heart disease Brother     Diabetes Sister     Stroke Sister     Blindness Sister     Cataracts Sister     Heart disease Brother     Hyperlipidemia Neg Hx     Hypertension Neg Hx     Macular degeneration Neg Hx     Retinal detachment Neg Hx     Strabismus Neg Hx     Thyroid disease Neg Hx     Colon cancer Neg Hx     Stomach cancer Neg Hx      Social History     Social History    Marital status:      Spouse name: N/A    Number of children: 4    Years of education: N/A     Occupational History    Not on file.     Social History Main Topics    Smoking status: Former Smoker     Packs/day: 3.00     Years: 30.00     Quit date: 12/12/1998    Smokeless tobacco: Former User    Alcohol use No    Drug use: No    Sexual activity: No     Other Topics Concern    Not on file     Social History Narrative    , then remarried.  after 2-3 weeks. They have still been living together for  31 years. He is 95 now. They are no longer sexually active. Caffeine intake 4 cups coffee daily- though has changed to decaf recently. Does have a living will.      Past Surgical History:   Procedure Laterality Date    anal fissure repair      APPENDECTOMY  1944    BREAST SURGERY  1992 approx    breast biopsies- benign    CAROTID ENDARTERECTOMY  2009    left    CATARACT EXTRACTION      COLONOSCOPY  2012    COLONOSCOPY N/A 12/11/2015    Procedure: COLONOSCOPY;  Surgeon: Gavin Escobedo MD;  Location: [facility];  Service: Endoscopy;  Laterality: N/A;    EYE SURGERY  2004, 2005    bilateral cataracts    HERNIA REPAIR  2001, 2002    abdominal x2, with mesh on second    HYSTERECTOMY  1963    vag hyst    JOINT REPLACEMENT  2008    right knee    yag Left        Labs:  Lab Results   Component Value Date    WBC 10.53 01/08/2018    HGB 10.5 (L) 01/08/2018    HCT 33.3 (L) 01/08/2018     (H) 01/08/2018     (H) 01/08/2018     BMP  Lab Results   Component Value Date     01/08/2018    K 4.3 01/08/2018     01/08/2018    CO2 29 01/08/2018    BUN 19 01/08/2018    CREATININE 1.0 01/08/2018    CALCIUM 8.1 (L) 01/08/2018    ANIONGAP 9 01/08/2018    ESTGFRAFRICA 59 (A) 01/08/2018    EGFRNONAA 51 (A) 01/08/2018     Lab Results   Component Value Date    ALT 9 (L) 10/02/2017    AST 15 10/02/2017    ALKPHOS 88 10/02/2017    BILITOT 0.4 10/02/2017       Lab Results   Component Value Date    IRON 67 09/05/2017    TIBC 222 (L) 09/05/2017    FERRITIN 1,999 (H) 09/05/2017     Lab Results   Component Value Date    JGOHVXSC88 554 09/19/2016     Lab Results   Component Value Date    FOLATE > 40.0 (H) 04/15/2013     Lab Results   Component Value Date    TSH 3.933 05/15/2017         Review of Systems   Constitutional: Positive for activity change, appetite change, fatigue and unexpected weight change. Negative for chills, diaphoresis and fever.   HENT: Negative for congestion, dental problem, drooling, ear  discharge, ear pain, facial swelling, hearing loss, mouth sores, nosebleeds, postnasal drip, rhinorrhea, sinus pressure, sneezing, sore throat, tinnitus, trouble swallowing and voice change.    Eyes: Negative for photophobia, pain, discharge, redness, itching and visual disturbance.   Respiratory: Negative for cough, choking, chest tightness, shortness of breath, wheezing and stridor.    Cardiovascular: Negative for chest pain, palpitations and leg swelling.   Gastrointestinal: Negative for abdominal distention, abdominal pain, anal bleeding, blood in stool, constipation, diarrhea, nausea, rectal pain and vomiting.   Endocrine: Negative for cold intolerance, heat intolerance, polydipsia, polyphagia and polyuria.   Genitourinary: Negative for decreased urine volume, difficulty urinating, dyspareunia, dysuria, enuresis, flank pain, frequency, genital sores, hematuria, menstrual problem, pelvic pain, urgency, vaginal bleeding, vaginal discharge and vaginal pain.   Musculoskeletal: Negative for arthralgias, back pain, gait problem, joint swelling, myalgias, neck pain and neck stiffness.   Skin: Negative for color change, pallor and rash.   Allergic/Immunologic: Negative for environmental allergies, food allergies and immunocompromised state.   Neurological: Negative for dizziness, tremors, seizures, syncope, facial asymmetry, speech difficulty, weakness, light-headedness, numbness and headaches.   Hematological: Negative for adenopathy. Does not bruise/bleed easily.   Psychiatric/Behavioral: Positive for dysphoric mood. Negative for agitation, behavioral problems, confusion, decreased concentration, hallucinations, self-injury, sleep disturbance and suicidal ideas. The patient is nervous/anxious. The patient is not hyperactive.        Objective:      Physical Exam   Constitutional: She is oriented to person, place, and time. She has a sickly appearance. She appears ill. She appears distressed.   HENT:   Head:  Normocephalic and atraumatic.   Right Ear: External ear normal.   Left Ear: External ear normal.   Nose: Nose normal. Right sinus exhibits no maxillary sinus tenderness and no frontal sinus tenderness. Left sinus exhibits no maxillary sinus tenderness and no frontal sinus tenderness.   Mouth/Throat: Oropharynx is clear and moist. No oropharyngeal exudate.   Eyes: Conjunctivae, EOM and lids are normal. Pupils are equal, round, and reactive to light. Right eye exhibits no discharge. Left eye exhibits no discharge. Right conjunctiva is not injected. Right conjunctiva has no hemorrhage. Left conjunctiva is not injected. Left conjunctiva has no hemorrhage. No scleral icterus.   Neck: Normal range of motion. Neck supple. No JVD present. No tracheal deviation present. No thyromegaly present.   Cardiovascular: Normal rate and regular rhythm.    Pulmonary/Chest: Effort normal. No stridor. No respiratory distress. She exhibits no tenderness.   Decreased breath sounds left base   Abdominal: Soft. Bowel sounds are normal. She exhibits no distension and no mass. There is no splenomegaly or hepatomegaly. There is no tenderness. There is no rebound.   Musculoskeletal: Normal range of motion. She exhibits no edema or tenderness.   Lymphadenopathy:     She has no cervical adenopathy.     She has no axillary adenopathy.        Right: No supraclavicular adenopathy present.        Left: No supraclavicular adenopathy present.   Neurological: She is alert and oriented to person, place, and time. No cranial nerve deficit. Coordination normal.   Skin: Skin is dry. No rash noted. She is not diaphoretic. No erythema.   Psychiatric: Her behavior is normal. Judgment and thought content normal. Her mood appears anxious. She exhibits a depressed mood.   Vitals reviewed.          Assessment:      1. Iron deficiency anemia due to chronic blood loss    2. Anemia of chronic renal failure, stage 3 (moderate)    3. Rheumatoid arthritis involving  multiple sites with positive rheumatoid factor           Plan:   Results of hemoglobin 10.5 iron status pending creatinine clearance unchanged at this point follow-up in 2 months terms of pleural effusion or views of the information demonstrating the pleural effusion cytologically negative for malignancy patient is scheduled to pulmonary for further evaluation see back in 2 months

## 2018-01-13 DIAGNOSIS — M06.9 RHEUMATOID ARTHRITIS, INVOLVING UNSPECIFIED SITE, UNSPECIFIED RHEUMATOID FACTOR PRESENCE: ICD-10-CM

## 2018-01-15 RX ORDER — METHOTREXATE 2.5 MG/1
TABLET ORAL
Qty: 96 TABLET | Refills: 1 | Status: SHIPPED | OUTPATIENT
Start: 2018-01-15 | End: 2018-05-23 | Stop reason: SDUPTHER

## 2018-01-29 ENCOUNTER — PROCEDURE VISIT (OUTPATIENT)
Dept: PULMONOLOGY | Facility: CLINIC | Age: 83
End: 2018-01-29
Payer: MEDICARE

## 2018-01-29 ENCOUNTER — OFFICE VISIT (OUTPATIENT)
Dept: PULMONOLOGY | Facility: CLINIC | Age: 83
End: 2018-01-29
Payer: MEDICARE

## 2018-01-29 VITALS
BODY MASS INDEX: 25.19 KG/M2 | SYSTOLIC BLOOD PRESSURE: 159 MMHG | OXYGEN SATURATION: 94 % | RESPIRATION RATE: 18 BRPM | BODY MASS INDEX: 25.21 KG/M2 | HEART RATE: 102 BPM | HEIGHT: 62 IN | DIASTOLIC BLOOD PRESSURE: 78 MMHG | WEIGHT: 137 LBS | HEIGHT: 62 IN | WEIGHT: 136.88 LBS

## 2018-01-29 DIAGNOSIS — R06.02 SOB (SHORTNESS OF BREATH): ICD-10-CM

## 2018-01-29 DIAGNOSIS — J90 PLEURAL EFFUSION: Primary | ICD-10-CM

## 2018-01-29 DIAGNOSIS — J44.9 CHRONIC OBSTRUCTIVE PULMONARY DISEASE, UNSPECIFIED COPD TYPE: ICD-10-CM

## 2018-01-29 DIAGNOSIS — M05.79 RHEUMATOID ARTHRITIS INVOLVING MULTIPLE SITES WITH POSITIVE RHEUMATOID FACTOR: ICD-10-CM

## 2018-01-29 LAB
POST FEF 25 75: 0.55 L/S (ref 0.45–1.6)
POST FET 100: 11.5 S
POST FEV1 FVC: 67 %
POST FEV1: 1.08 L (ref 1.29–1.83)
POST FIF 50: 1.32 L/S
POST FVC: 1.61 L (ref 1.8–2.45)
POST PEF: 3.47 L/S (ref 3.16–4.76)
PRE FEF 25 75: 0.56 L/S (ref 0.45–1.6)
PRE FET 100: 10.5 S
PRE FEV1 FVC: 67 %
PRE FEV1: 1.04 L (ref 1.29–1.83)
PRE FIF 50: 1.99 L/S
PRE FVC: 1.56 L (ref 1.8–2.45)
PRE PEF: 2.94 L/S (ref 3.16–4.76)
PREDICTED FEV1 FVC: 72.75 % (ref 67.85–77.64)
PREDICTED FEV1: 1.56 L (ref 1.29–1.83)
PREDICTED FVC: 2.13 L (ref 1.8–2.45)
PROVOCATION PROTOCOL: ABNORMAL

## 2018-01-29 PROCEDURE — 94618 PULMONARY STRESS TESTING: CPT | Mod: S$GLB,,, | Performed by: INTERNAL MEDICINE

## 2018-01-29 PROCEDURE — 99214 OFFICE O/P EST MOD 30 MIN: CPT | Mod: 25,S$GLB,, | Performed by: NURSE PRACTITIONER

## 2018-01-29 PROCEDURE — 94060 EVALUATION OF WHEEZING: CPT | Mod: 59,S$GLB,, | Performed by: INTERNAL MEDICINE

## 2018-01-29 PROCEDURE — 99499 UNLISTED E&M SERVICE: CPT | Mod: S$GLB,,, | Performed by: NURSE PRACTITIONER

## 2018-01-29 PROCEDURE — 99999 PR PBB SHADOW E&M-EST. PATIENT-LVL V: CPT | Mod: PBBFAC,,, | Performed by: NURSE PRACTITIONER

## 2018-01-29 NOTE — PROGRESS NOTES
"Subjective:      Patient ID: Yovani Pulido is a 85 y.o. female.    Chief Complaint: Follow-up    Patient presents to the office today for follow up.  Hospital in November at [facility].  Patient had bilateral pleural effusions noted on CT scan-minimal on right, but moderate to large on left side.  She had thoracentesis with more than 1 L of exudative fluid removed.  She was seen by cardiology and pulmonary thought to be related to her rheumatoid arthritis or cardiac.  Her breathing has improved.     She is a past smoker.  She quit smoking in 1998.  She smoked up to 3 packs a day.  Recent CT scan of chest without suspicious findings.  Echocardiogram with severe mitral annular calcification with estimated PA systolic pressure 50 mmHg.       CXR on last visit with small amt of fluid and atelectasis/consolidation.  No cough. No dyspnea.  Followed by Dr. Hallman 2012 for COPD      Patient Active Problem List:     Gastroesophageal reflux disease with hiatal hernia     Iron deficiency anemia     Long term current use of systemic steroids     Rheumatoid arthritis involving multiple sites with positive rheumatoid factor     Weight loss, non-intentional     Moderate stage chronic open angle glaucoma     Diabetes mellitus type 2 without retinopathy     Pseudophakia     Immunocompromised     Bilateral carotid artery stenosis     History of CEA (carotid endarterectomy)     Hypertension associated with diabetes     Hyperlipidemia LDL goal <100     Monoclonal gammopathy of undetermined significance     Primary open angle glaucoma of both eyes, moderate stage     Stage 3 chronic kidney disease     Anemia of chronic renal failure, stage 3 (moderate)     Hypothyroidism (acquired)     Diabetes mellitus with microalbuminuria     Persistent miosis            BP (!) 159/78 (BP Location: Right arm, Patient Position: Sitting, BP Method: Large (Manual))   Pulse 102   Resp 18   Ht 5' 2" (1.575 m)   Wt 62.1 kg (136 lb " 14.5 oz)   SpO2 (!) 94%   BMI 25.04 kg/m²   Body mass index is 25.04 kg/m².    Review of Systems   Constitutional: Negative.    HENT: Negative.    Respiratory: Negative.    Cardiovascular: Negative.    Musculoskeletal: Negative.    Gastrointestinal: Negative.    Neurological: Negative.    Psychiatric/Behavioral: Negative.      Objective:      Physical Exam   Constitutional: She is oriented to person, place, and time. She appears well-developed and well-nourished.   HENT:   Head: Normocephalic and atraumatic.   Neck: Normal range of motion. Neck supple.   Cardiovascular: Normal rate and regular rhythm.    Pulmonary/Chest: Effort normal.   Decreased BS to left base   Abdominal: Soft.   Musculoskeletal: Normal range of motion. She exhibits no edema.   Neurological: She is alert and oriented to person, place, and time.   Skin: Skin is warm and dry.   Psychiatric: She has a normal mood and affect.     Personal Diagnostic Review      Results for orders placed during the hospital encounter of 01/08/18   X-Ray Chest PA And Lateral    Narrative Chest two views.    Findings: Cardiac silhouette is enlarged.  Aorta is mildly tortuous and calcified.  Dense consolidation or atelectasis opacifies the left lung base.  Blunting of the costophrenic sulci bilaterally consistent with small pleural effusions.  Surgical clips and apparent vascular stents overlie the upper chest bilaterally.  Thoracic spondylosis.    Impression  As above.      Electronically signed by: NENA PENA MD  Date:     01/08/18  Time:    10:37    Unable to review film from hospital    Spirometry reviewed. FEV1 69 % of predicted. Improved from 2012    6 minute walk  · Oxygen saturation on room air at rest: 96 %  · Oxygen saturation on room air with exertion: 94 %  · %of predicted distance walked: 98%      Assessment:       1. Pleural effusion    2. Rheumatoid arthritis involving multiple sites with positive rheumatoid factor    3. Chronic obstructive  pulmonary disease, unspecified COPD type        Outpatient Encounter Prescriptions as of 1/29/2018   Medication Sig Dispense Refill    alcohol swabs PadM Apply 1 each topically 2 (two) times daily. 200 each 3    aspirin (ECOTRIN) 81 MG EC tablet Take 1 tablet (81 mg total) by mouth once daily. 30 tablet 0    benazepril (LOTENSIN) 5 MG tablet Take 1 tablet (5 mg total) by mouth once daily. 90 tablet 3    blood glucose control, high Soln by Misc.(Non-Drug; Combo Route) route.      blood glucose control, normal Soln Use as directed. 1 each 1    blood sugar diagnostic Strp Glucose testing daily. 100 strip 3    blood-glucose meter Misc Use as directed. 1 each 0    cetirizine (ZYRTEC) 10 MG tablet Take 1 tablet (10 mg total) by mouth once daily. 30 tablet 0    fluticasone (FLONASE) 50 mcg/actuation nasal spray 2 sprays by Each Nare route once daily. 1 Bottle 0    folic acid (FOLVITE) 800 MCG Tab TAKE 1 TABLET TWICE DAILY 180 tablet 3    lancets (LANCETS,THIN) Misc Twice daily glucose testing. 200 each 3    latanoprost 0.005 % ophthalmic solution INSTILL 1 DROP INTO BOTH EYES EVERY EVENING 3 Bottle 6    levothyroxine (SYNTHROID) 50 MCG tablet TAKE 1 TABLET BEFORE BREAKFAST 90 tablet 2    magnesium 250 mg Tab Take 1 tablet by mouth Daily.      metformin (GLUCOPHAGE) 1000 MG tablet Take 1 tablet (1,000 mg total) by mouth 2 (two) times daily with meals. 180 tablet 3    methotrexate 2.5 MG Tab TAKE 4 TABLETS IN MORNING AND 4 TABLETS AT NIGHT ONE TIME WEEKLY 96 tablet 1    omeprazole (PRILOSEC) 20 MG capsule TAKE 1 CAPSULE EVERY DAY 90 capsule 3    pravastatin (PRAVACHOL) 40 MG tablet TAKE 1 TABLET ONE TIME DAILY 90 tablet 2    predniSONE (DELTASONE) 1 MG tablet TAKE 1 TABLET TWICE DAILY  OR  AS  DIRECTED 180 tablet 3    timolol maleate 0.5% (TIMOPTIC) 0.5 % Drop INSTILL 1 DROP INTO BOTH EYES EVERY MORNING 15 mL 12    trazodone (DESYREL) 100 MG tablet Take 1 tablet (100 mg total) by mouth every evening. 90  tablet 1     No facility-administered encounter medications on file as of 1/29/2018.      Orders Placed This Encounter   Procedures    X-Ray Chest PA And Lateral     Standing Status:   Future     Standing Expiration Date:   1/29/2019     Order Specific Question:   May the Radiologist modify the order per protocol to meet the clinical needs of the patient?     Answer:   Yes    X-Ray Chest Lateral Decubitus Left     Standing Status:   Future     Standing Expiration Date:   1/29/2019     Order Specific Question:   May the Radiologist modify the order per protocol to meet the clinical needs of the patient?     Answer:   Yes     Plan:   Denies SOB. Use incentive spirometer.   Follow up 2 months with cxr and left lateral decubitis.

## 2018-01-29 NOTE — PROCEDURES
"[facility]  Six Minute Walk     SUMMARY     Ordering Provider: Elizabeth Lejeune   Interpreting Provider: Dr Bellamy  Performing nurse/tech/RT: LESLEE Thompson  Diagnosis: Shortness of Breath  Height: 5' 2" (157.5 cm)  Weight: 62.1 kg (137 lb)  BMI (Calculated): 25.1   Patient Race:             Phase Oxygen Assessment Supplemental O2 Heart   Rate Blood Pressure Marissa Dyspnea Scale Rating   Resting 96 % Room Air 94 bpm 159/78 1   Exercise        Minute        1 95 % Room Air 113 bpm     2 97 % Room Air 119 bpm     3 96 % Room Air 124 bpm     4 95 % Room Air 124 bpm     5 95 % Room Air 125 bpm     6  94 % Room Air 130 bpm 161/80 3   Recovery        Minute        1 96 % Room Air 110 bpm     2 97 % Room Air 105 bpm     3 97 % Room Air 104 bpm     4 97 % Room Air 100 bpm 143/83 0     Six Minute Walk Summary  6MWT Status: completed without stopping  Patient Reported: Leg pain     Interpretation:  Did the patient stop or pause?: No         Total Time Walked (Calculated): 360 seconds        Predicted Distance Meters (Calculated): 365.72 meters           Has The Patient Had a Previous Six Minute Walk Test?: No       Previous 6MWT Results  Has The Patient Had a Previous Six Minute Walk Test?: No            Interpretation:  Total distance walked in six minutes is mildly reduced indicating a reduction in overall  functional capacity. There was mild exercise induced hypoxemia to 94%     Oxygen desaturation did not meet criteria for supplemental oxygen prescription.    Clinical correlation suggested.    Alex Hallman MD    Mild exercise-induced hypoxemia described as an arterial oxygen saturation of 93-95% (or 3-4% less than at rest), moderate exercise-induced hypoxemia as 89-93%, and severe exercise induced hypoxemia as < 89% O2 saturation.  Medicare Criteria Comments:   When arterial oxygen saturation is at or below 88% during exercise (severe exercise induced hypoxemia) then the patient falls under Medicare " Group 1 criteria for supplemental oxygen.  Details about Medicare Group Criteria coverage can be found at http://www.cms.hhs.gov/manuals/downloads/

## 2018-03-19 ENCOUNTER — OFFICE VISIT (OUTPATIENT)
Dept: PULMONOLOGY | Facility: CLINIC | Age: 83
End: 2018-03-19
Payer: MEDICARE

## 2018-03-19 ENCOUNTER — HOSPITAL ENCOUNTER (OUTPATIENT)
Dept: RADIOLOGY | Facility: HOSPITAL | Age: 83
Discharge: HOME | End: 2018-03-19
Attending: NURSE PRACTITIONER
Payer: MEDICARE

## 2018-03-19 VITALS
RESPIRATION RATE: 26 BRPM | SYSTOLIC BLOOD PRESSURE: 144 MMHG | DIASTOLIC BLOOD PRESSURE: 60 MMHG | HEIGHT: 61 IN | HEART RATE: 93 BPM | WEIGHT: 135.38 LBS | BODY MASS INDEX: 25.56 KG/M2 | OXYGEN SATURATION: 94 %

## 2018-03-19 DIAGNOSIS — R06.02 SHORTNESS OF BREATH: ICD-10-CM

## 2018-03-19 DIAGNOSIS — J90 PLEURAL EFFUSION ON LEFT: Primary | ICD-10-CM

## 2018-03-19 DIAGNOSIS — J90 PLEURAL EFFUSION: ICD-10-CM

## 2018-03-19 DIAGNOSIS — I50.9 ACUTE DECOMPENSATED HEART FAILURE: ICD-10-CM

## 2018-03-19 DIAGNOSIS — I50.9 CONGESTIVE HEART FAILURE, UNSPECIFIED CONGESTIVE HEART FAILURE CHRONICITY, UNSPECIFIED CONGESTIVE HEART FAILURE TYPE: ICD-10-CM

## 2018-03-19 PROCEDURE — 3078F DIAST BP <80 MM HG: CPT | Mod: CPTII,S$GLB,, | Performed by: INTERNAL MEDICINE

## 2018-03-19 PROCEDURE — 71046 X-RAY EXAM CHEST 2 VIEWS: CPT | Mod: TC,FY,PO

## 2018-03-19 PROCEDURE — 99499 UNLISTED E&M SERVICE: CPT | Mod: S$GLB,,, | Performed by: INTERNAL MEDICINE

## 2018-03-19 PROCEDURE — 3077F SYST BP >= 140 MM HG: CPT | Mod: CPTII,S$GLB,, | Performed by: INTERNAL MEDICINE

## 2018-03-19 PROCEDURE — 99999 PR PBB SHADOW E&M-EST. PATIENT-LVL V: CPT | Mod: PBBFAC,,, | Performed by: INTERNAL MEDICINE

## 2018-03-19 PROCEDURE — 99215 OFFICE O/P EST HI 40 MIN: CPT | Mod: S$GLB,,, | Performed by: INTERNAL MEDICINE

## 2018-03-19 PROCEDURE — 71046 X-RAY EXAM CHEST 2 VIEWS: CPT | Mod: 26,,, | Performed by: RADIOLOGY

## 2018-03-19 NOTE — PROGRESS NOTES
Subjective:       Patient ID: Yovani Pulido is a 85 y.o. female.    Chief Complaint: She       Pleural Effusion    HPI     She   presents for evaluation and treatment of pleural effusion  after being discharged from the hospital  4  months ago. Patient had thoracentesis performed at [facility] in 11/2017 - results not known.  Since discharge symptoms have unchanged course since that time. Patient denies fever or chills: no cough  Or sputum. Symptoms are aggravated by  activity. Symptoms improve with rest.  Respiratory: positive for dyspnea on exertion; Cardiovascular: no chest pain or palpitations.  Patient currently is not on home oxygen therapy..    history of weight loss and Chron's disease      NO MEDICAL RECORDS FROM THE HOSPITAL ARE AVAILABLE    Patient presents to the office today for follow up.  Hospital in November at [facility].  Patient had bilateral pleural effusions noted on CT scan-minimal on right, but moderate to large on left side.  She had thoracentesis with more than 1 L of exudative fluid removed.  She was seen by cardiology and pulmonary thought to be related to her rheumatoid arthritis or cardiac.  Her breathing has improved.      She is a past smoker.  She quit smoking in 1998.  She smoked up to 3 packs a day.  Recent CT scan of chest without suspicious findings.  Echocardiogram with severe mitral annular calcification with estimated PA systolic pressure 50 mmHg.     CXR on last visit with small amt of fluid and atelectasis/consolidation.  No cough. No dyspnea.  Followed by Dr. Hallman 2012 for COPD    Past Medical History:   Diagnosis Date    Anemia     Carotid stenosis     Cataract     COAG (chronic open-angle glaucoma)     Colon polyps     Diabetes mellitus type II     steroid induced    Diverticulosis     GERD (gastroesophageal reflux disease)     hiatal hernia    Glaucoma     Heart murmur     Hyperlipidemia     Hypertension     Hypothyroidism      Rheumatoid arthritis(714.0)     Stroke     lacunar infarct     Past Surgical History:   Procedure Laterality Date    anal fissure repair      APPENDECTOMY  1944    BREAST SURGERY  1992 approx    breast biopsies- benign    CAROTID ENDARTERECTOMY  2009    left    CATARACT EXTRACTION      COLONOSCOPY  2012    COLONOSCOPY N/A 12/11/2015    Procedure: COLONOSCOPY;  Surgeon: Gavin Escobedo MD;  Location: [facility] ENDO;  Service: Endoscopy;  Laterality: N/A;    EYE SURGERY  2004, 2005    bilateral cataracts    HERNIA REPAIR  2001, 2002    abdominal x2, with mesh on second    HYSTERECTOMY  1963    vag hyst    JOINT REPLACEMENT  2008    right knee    yag Left      Social History     Social History    Marital status:      Spouse name: N/A    Number of children: 4    Years of education: N/A     Occupational History    Not on file.     Social History Main Topics    Smoking status: Former Smoker     Packs/day: 3.00     Years: 50.00     Start date: 1/1/1948     Quit date: 12/12/1998    Smokeless tobacco: Former User    Alcohol use No    Drug use: No    Sexual activity: No     Other Topics Concern    Not on file     Social History Narrative    , then remarried.  after 2-3 weeks. They have still been living together for 31 years. He is 95 now. They are no longer sexually active. Caffeine intake 4 cups coffee daily- though has changed to decaf recently. Does have a living will.      Review of Systems   Constitutional: Positive for fatigue and weakness.   Respiratory: Positive for shortness of breath, dyspnea on extertion and Paroxysmal Nocturnal Dyspnea.    Cardiovascular: Positive for leg swelling.   Musculoskeletal: Positive for arthralgias.       Objective:      Physical Exam   Constitutional: She is oriented to person, place, and time. She appears well-developed and well-nourished.   HENT:   Head: Normocephalic and atraumatic.   Eyes: Conjunctivae are normal. Pupils are equal, round,  and reactive to light.   Neck: Neck supple. No JVD present. No tracheal deviation present. No thyromegaly present.   Cardiovascular: Normal rate.  An irregularly irregular rhythm present.   Murmur heard.   Systolic murmur is present with a grade of 2/6   Pulmonary/Chest: Effort normal. No respiratory distress. She has decreased breath sounds in the left middle field and the left lower field. She has no wheezes. She has rales in the left lower field. She exhibits no tenderness.   Abdominal: Soft. Bowel sounds are normal.   Musculoskeletal: Normal range of motion. She exhibits edema.   Lymphadenopathy:     She has no cervical adenopathy.   Neurological: She is alert and oriented to person, place, and time.   Skin: Skin is warm and dry.   Nursing note and vitals reviewed.    Personal Diagnostic Review  Chest x-ray: left pleural effusion , cardiomegaly    No flowsheet data found.      Assessment:       1. Pleural effusion on left    2. Acute decompensated heart failure    3. Shortness of breath    4. Congestive heart failure, unspecified congestive heart failure chronicity, unspecified congestive heart failure type        Outpatient Encounter Prescriptions as of 3/19/2018   Medication Sig Dispense Refill    alcohol swabs PadM Apply 1 each topically 2 (two) times daily. 200 each 3    benazepril (LOTENSIN) 5 MG tablet Take 1 tablet (5 mg total) by mouth once daily. 90 tablet 3    blood glucose control, high Soln by Misc.(Non-Drug; Combo Route) route.      blood glucose control, normal Soln Use as directed. 1 each 1    blood sugar diagnostic Strp Glucose testing daily. 100 strip 3    blood-glucose meter Misc Use as directed. 1 each 0    cetirizine (ZYRTEC) 10 MG tablet Take 1 tablet (10 mg total) by mouth once daily. 30 tablet 0    fluticasone (FLONASE) 50 mcg/actuation nasal spray 2 sprays by Each Nare route once daily. 1 Bottle 0    folic acid (FOLVITE) 800 MCG Tab TAKE 1 TABLET TWICE DAILY 180 tablet 3     lancets (LANCETS,THIN) Misc Twice daily glucose testing. 200 each 3    latanoprost 0.005 % ophthalmic solution INSTILL 1 DROP INTO BOTH EYES EVERY EVENING 3 Bottle 6    levothyroxine (SYNTHROID) 50 MCG tablet TAKE 1 TABLET BEFORE BREAKFAST 90 tablet 2    magnesium 250 mg Tab Take 1 tablet by mouth Daily.      metformin (GLUCOPHAGE) 1000 MG tablet Take 1 tablet (1,000 mg total) by mouth 2 (two) times daily with meals. 180 tablet 3    methotrexate 2.5 MG Tab TAKE 4 TABLETS IN MORNING AND 4 TABLETS AT NIGHT ONE TIME WEEKLY 96 tablet 1    omeprazole (PRILOSEC) 20 MG capsule TAKE 1 CAPSULE EVERY DAY 90 capsule 3    pravastatin (PRAVACHOL) 40 MG tablet TAKE 1 TABLET ONE TIME DAILY 90 tablet 2    predniSONE (DELTASONE) 1 MG tablet TAKE 1 TABLET TWICE DAILY  OR  AS  DIRECTED 180 tablet 3    timolol maleate 0.5% (TIMOPTIC) 0.5 % Drop INSTILL 1 DROP INTO BOTH EYES EVERY MORNING 15 mL 12    trazodone (DESYREL) 100 MG tablet Take 1 tablet (100 mg total) by mouth every evening. 90 tablet 1    [DISCONTINUED] aspirin (ECOTRIN) 81 MG EC tablet Take 1 tablet (81 mg total) by mouth once daily. 30 tablet 0     No facility-administered encounter medications on file as of 3/19/2018.      Orders Placed This Encounter   Procedures    CT Chest Without Contrast     Standing Status:   Future     Standing Expiration Date:   3/19/2019     Order Specific Question:   May the Radiologist modify the order per protocol to meet the clinical needs of the patient?     Answer:   Yes    2D echo with color flow doppler     Use contrast for PAH     Standing Status:   Future     Standing Expiration Date:   9/19/2019    Case request GI: THORACENTESIS - Outpatient     Order Specific Question:   Pre-op Diagnosis     Answer:   Pleural effusion [839128]     Order Specific Question:   CPT Code:     Answer:   [place] THORACEN W/IMAG GUIDANCE [84950]     Order Specific Question:   Add on case?     Answer:   Yes [1]     Order Specific Question:   Requested  Time     Answer:   1:00 PM     Order Specific Question:   Case Referring Provider     Answer:   TANNER BANERJEE [[id number]]     Order Specific Question:   Medical Necessity:     Answer:   Medically Urgent [101]     Plan:       Requested Prescriptions      No prescriptions requested or ordered in this encounter     Pleural effusion on left  -     CT Chest Without Contrast; Future; Expected date: 03/19/2018  -     Case request GI: THORACENTESIS - Outpatient    Acute decompensated heart failure    Shortness of breath    Congestive heart failure, unspecified congestive heart failure chronicity, unspecified congestive heart failure type  -     2D echo with color flow doppler; Future; Expected date: 03/20/2018           Follow-up in about 11 days (around 3/30/2018).    MEDICAL DECISION MAKING: Moderate to high complexity.  Overall, the multiple problems listed are of moderate to high severity that may impact quality of life and activities of daily living. Side effects of medications, treatment plan as well as options and alternatives reviewed and discussed with patient. There was counseling of patient concerning these issues.    Total time spent in face to face counseling and coordination of care - 60  minutes over 50% of time was used in discussion of prognosis, risks, benefits of treatment, instructions and compliance with regimen . Discussion with other physicians or health care providers (DME, NP, pharmacy, respiratory therapy) occurred.

## 2018-03-19 NOTE — PATIENT INSTRUCTIONS
PRE-THORACENTESIS INSTRUCTIONS   *Arrive at  11:00  am / pm (2 hours before your scheduled procedure time). This arrival time will allow us time to prepare you for your procedure   on (day) Friday at(time) 1:00 pm on(date) 3/23/2018. Check in at Endoscopy Lab desk on the 5th Floor of [facility] [address] ([id number] [place] [address] , [place] [address]). You DO NOT check in on the first floor for this procedure.   Please read the following instructions carefully before your appointment.   ALL PATIENTS:   Plan to be at the hospital for approximately 4-6 hours.   You must have a responsible person who can drive you to the hospital, stay while the procedure is being done, assume responsibility for your care at discharge, and drive you home. If not, your exam will be canceled.   Please leave all valuables at home, including jewelry. You will need to bring your s license and medical insurance card.   Also, you will be responsible for any co-payment at time of registration.   You will be sedated for the procedure. Due to the sedation you will not be able to operate a vehicle or sign any legal documentation for 24 hours after exam.   Wear loose and comfortable clothing and wear socks if you are cold natured.   Bring all medications (in original containers) that you are currently taking, or a complete list.   To prepare for this test, you MAY NOT have anything to eat or drink after midnight, not even water, unless you take any necessary medications as listed in # 1 below and then a sip of water with those medications is allowed.   1 - If you take BLOOD PRESSURE, HEART, SEIZURE or PSYCHIATRIC MEDICATIONS in the morning, please take them the morning of your procedure.   Please take these medications as soon as you awaken with a small sip of water ... please make sure they are taken before you leave to come to the hospital for your  procedure.   On the day before your procedure, take all of your regular scheduled medications except for the blood thinning medications discussed below.   2 - If you are DIABETIC:   Check blood sugar levels on the morning of the exam and/or as needed if you feel hypoglycemic (low blood sugar).   DO NOT TAKE any diabetic medications (including insulin) the morning of the exam.   If your blood sugar goes below 70, you may drink 4 ounces of juice, soda or eat a piece of hard candy. Wait 15 minutes then recheck your blood sugar. If it isn't going up, you may drink another 4 ounces and contact your Primary Care doctor or our office. Please do not have any liquids within 2 hours of your arrival time.   3 - STOP BLOOD THINNING MEDICATION, Aspirin, Ibuprofen, Naproxen, etc as listed below:   Coumadin, Plavix, Aspirin, NSAIDs (nonsteroidal anti-inflammatory drugs)  and fish oil.   If on Coumadin or Plavix, notify the prescribing physician that it is being temporarily discontinued for procedure.   Coumadin must be stopped 3 days prior to exam.   Plavix, Aspirin and NSAIDs (see above) must be stopped 7 DAYS PRIOR TO EXAM.   If you are on a blood thinner not listed, please contact your physician about stop times. Such as Aggrenox, Brilinta, Effient, Pradaxa, Xaralto, etc.   Avoid Smoking for 24 hours prior to the test!   Endoscopy Pre-Procedure Nursing Call Line [phone]   For Insurance or Financial obligations, call [phone]   [facility] Endoscopy Unit Nursing Line [phone]             Discharge Instructions for Thoracentesis  Thoracentesis is a procedure that removes extra fluid (pleural effusion) from the pleural space. This space is between the outside surface of the lungs (pleura) and the chest wall. The procedure may be done to take a sample of the fluid for testing to help find the cause. Or it may be done to drain the extra fluid if you are having trouble breathing.  Home care  · You may have some pain after  the procedure. Your doctor can prescribe or recommend pain medicine for you to take at home, if needed. Take these exactly as directed. If you stopped taking other medicines before the procedure, ask your doctor when you can start them again.  · Take it easy for 48 hours after the procedure. Don't do anything active until your doctor says its OK.  · Don't do strenuous activities, such as lifting, until your doctor says its OK.  · You will have a small bandage over the puncture site. You may remove the bandage in 24 hours.  · Check the puncture site for the signs of infection listed below.  Follow-up  Make a follow-up appointment with your doctor as directed. During your follow-up visit, your doctor will check your healing. Be sure to let your doctor know how you are feeling.  When to call your doctor  Call your doctor right away if you have any of the following:  · Coughing up blood  · Chest pain. If chest pain suddenly gets worse, it may be an emergency.  · Shortness of breath  · Fever of 100.4°F (38°C) or higher, or as directed by your healthcare provider  · Pain that doesn't get better after taking pain medicine  · Signs of infection at the puncture site. These include increased pain, redness, swelling, or warmth.  · Fluid draining from the puncture site   Date Last Reviewed: 10/1/2016  © 4711-2581 [employer]. [address] [place] [address]. All rights reserved. This information is not intended as a substitute for professional medical care. Always follow your healthcare professional's instructions.        Percutaneous Diagnosis of Chest, Lung Problems  Youve been told you need a percutaneous procedure to diagnose a problem in your chest or lung. This procedure lets the doctor to remove tissue or fluid (biopsy) from the chest or lung. For these procedures, the skin of the chest is numbed. Then a needle is passed through the skin.     With FNA, a thin needle is used to take a tissue  sample from a mass.         Thoracentesis is used to remove fluid from the pleural space.      Fine needle aspiration  Fine needle aspiration (FNA) is a procedure used for taking a tissue sample from a mass. First, a CT scan is done. This helps the doctor locate the mass. Then, a thin needle is inserted through the skin of the chest into the mass. Another CT scan is taken to ensure the needle is placed properly. Once the needle is in place, a small amount of tissue is drawn (aspirated) into the needle. The tissue sample is then sent for testing.   Thoracentesis  Thoracentesis is used to drain abnormal buildup of fluid between the lungs and chest wall (the pleural space). For the procedure, a needle is put through the skin of the chest into the pleural space. Fluid is then drawn into the needle and later tested for cancer and other problems.   Preparing for the procedure  Before your procedure, do the following:  · Follow your doctors instructions about eating and drinking.  · Tell your doctor about the medicines or herbal supplements you take. You may need to stop taking certain medicines before the procedure, especially aspirin, warfarin, or other blood thinners.  · Talk with your doctor about any allergies and health problems you may have.  · Tell your doctor if you are or could be pregnant.  During the procedure  You receive local anesthesia (numbing medicine) to keep you from feeling pain. The area where the needle goes in is numbed. If your doctor uses ultrasound to guide the needle, you will also have cool gel placed on your skin in the area to help the ultrasound probe move around. Medicine to help you relax (sedation) may also be given through an intravenous (IV) line.  After the procedure  You may have some pain after the procedure. You will be given medicine to help ease any pain. You can go home after you recover from anesthesia, usually the same day as the procedure. If you received sedation, an adult  family member or friend will need to drive you home. If a tube was placed in your chest to drain fluid and was left in, you will stay in the hospital for at least 1 day or more. Your doctor will tell you more.   Risks and complications  · Bleeding  · Infection  · Injury to other structures in the chest  · Collapsed lung       When to call your healthcare provider  · Coughing up blood  · Shortness of breath  · Chest pain  · Fever of 100.4ºF or higher   Date Last Reviewed: 2/1/2017  © 1206-9283 [employer]. [address] [place] [address], [place] [address]. All rights reserved. This information is not intended as a substitute for professional medical care. Always follow your healthcare professional's instructions.

## 2018-03-23 ENCOUNTER — HOSPITAL ENCOUNTER (OUTPATIENT)
Facility: HOSPITAL | Age: 83
Discharge: HOME | End: 2018-03-23
Attending: INTERNAL MEDICINE | Admitting: INTERNAL MEDICINE
Payer: MEDICARE

## 2018-03-23 ENCOUNTER — SURGERY (OUTPATIENT)
Age: 83
End: 2018-03-23

## 2018-03-23 VITALS
SYSTOLIC BLOOD PRESSURE: 151 MMHG | HEART RATE: 82 BPM | WEIGHT: 131 LBS | OXYGEN SATURATION: 97 % | HEIGHT: 61 IN | DIASTOLIC BLOOD PRESSURE: 72 MMHG | RESPIRATION RATE: 18 BRPM | BODY MASS INDEX: 24.73 KG/M2 | TEMPERATURE: 98 F

## 2018-03-23 DIAGNOSIS — J90 PLEURAL EFFUSION ON LEFT: ICD-10-CM

## 2018-03-23 LAB
APPEARANCE FLD: NORMAL
BODY FLD TYPE: NORMAL
COLOR FLD: NORMAL
EOSINOPHIL NFR FLD MANUAL: 2 %
GLUCOSE FLD-MCNC: 97 MG/DL
LYMPHOCYTES NFR FLD MANUAL: 50 %
MONOS+MACROS NFR FLD MANUAL: 10 %
NEUTROPHILS NFR FLD MANUAL: 38 %
PROT FLD-MCNC: 3.1 G/DL
SPECIMEN SOURCE: NORMAL
SPECIMEN SOURCE: NORMAL
WBC # FLD: 315 /CU MM

## 2018-03-23 PROCEDURE — 63600175 PHARM REV CODE 636 W HCPCS: Performed by: INTERNAL MEDICINE

## 2018-03-23 PROCEDURE — 32555 ASPIRATE PLEURA W/ IMAGING: CPT | Performed by: INTERNAL MEDICINE

## 2018-03-23 PROCEDURE — 25000003 PHARM REV CODE 250: Performed by: INTERNAL MEDICINE

## 2018-03-23 PROCEDURE — 87015 SPECIMEN INFECT AGNT CONCNTJ: CPT

## 2018-03-23 PROCEDURE — 87116 MYCOBACTERIA CULTURE: CPT

## 2018-03-23 PROCEDURE — 82945 GLUCOSE OTHER FLUID: CPT

## 2018-03-23 PROCEDURE — 84157 ASSAY OF PROTEIN OTHER: CPT

## 2018-03-23 PROCEDURE — 32555 ASPIRATE PLEURA W/ IMAGING: CPT | Mod: LT,,, | Performed by: INTERNAL MEDICINE

## 2018-03-23 PROCEDURE — 88305 TISSUE EXAM BY PATHOLOGIST: CPT | Performed by: PATHOLOGY

## 2018-03-23 PROCEDURE — 88112 CYTOPATH CELL ENHANCE TECH: CPT | Mod: 26,,, | Performed by: PATHOLOGY

## 2018-03-23 PROCEDURE — 89051 BODY FLUID CELL COUNT: CPT

## 2018-03-23 PROCEDURE — 87102 FUNGUS ISOLATION CULTURE: CPT

## 2018-03-23 PROCEDURE — 87205 SMEAR GRAM STAIN: CPT

## 2018-03-23 PROCEDURE — 88305 TISSUE EXAM BY PATHOLOGIST: CPT | Mod: 26,,, | Performed by: PATHOLOGY

## 2018-03-23 PROCEDURE — 87070 CULTURE OTHR SPECIMN AEROBIC: CPT

## 2018-03-23 PROCEDURE — 87206 SMEAR FLUORESCENT/ACID STAI: CPT

## 2018-03-23 RX ORDER — NAPROXEN SODIUM 220 MG/1
81 TABLET, FILM COATED ORAL DAILY
Status: DISCONTINUED | OUTPATIENT
Start: 2018-03-23 | End: 2018-03-23 | Stop reason: HOSPADM

## 2018-03-23 RX ORDER — MIDAZOLAM HYDROCHLORIDE 1 MG/ML
INJECTION INTRAMUSCULAR; INTRAVENOUS
Status: COMPLETED | OUTPATIENT
Start: 2018-03-23 | End: 2018-03-23

## 2018-03-23 RX ADMIN — MIDAZOLAM HYDROCHLORIDE 1 MG: 1 INJECTION, SOLUTION INTRAMUSCULAR; INTRAVENOUS at 01:03

## 2018-03-23 RX ADMIN — ASPIRIN 81 MG CHEWABLE TABLET 81 MG: 81 TABLET CHEWABLE at 02:03

## 2018-03-23 NOTE — H&P (VIEW-ONLY)
Subjective:       Patient ID: Yovani Pulido is a 85 y.o. female.    Chief Complaint: She       Pleural Effusion    HPI     She   presents for evaluation and treatment of pleural effusion  after being discharged from the hospital  4  months ago. Patient had thoracentesis performed at [facility] in 11/2017 - results not known.  Since discharge symptoms have unchanged course since that time. Patient denies fever or chills: no cough  Or sputum. Symptoms are aggravated by  activity. Symptoms improve with rest.  Respiratory: positive for dyspnea on exertion; Cardiovascular: no chest pain or palpitations.  Patient currently is not on home oxygen therapy..    history of weight loss and Chron's disease      NO MEDICAL RECORDS FROM THE HOSPITAL ARE AVAILABLE    Patient presents to the office today for follow up.  Hospital in November at [facility].  Patient had bilateral pleural effusions noted on CT scan-minimal on right, but moderate to large on left side.  She had thoracentesis with more than 1 L of exudative fluid removed.  She was seen by cardiology and pulmonary thought to be related to her rheumatoid arthritis or cardiac.  Her breathing has improved.      She is a past smoker.  She quit smoking in 1998.  She smoked up to 3 packs a day.  Recent CT scan of chest without suspicious findings.  Echocardiogram with severe mitral annular calcification with estimated PA systolic pressure 50 mmHg.     CXR on last visit with small amt of fluid and atelectasis/consolidation.  No cough. No dyspnea.  Followed by Dr. Hallman 2012 for COPD    Past Medical History:   Diagnosis Date    Anemia     Carotid stenosis     Cataract     COAG (chronic open-angle glaucoma)     Colon polyps     Diabetes mellitus type II     steroid induced    Diverticulosis     GERD (gastroesophageal reflux disease)     hiatal hernia    Glaucoma     Heart murmur     Hyperlipidemia     Hypertension     Hypothyroidism      Rheumatoid arthritis(714.0)     Stroke     lacunar infarct     Past Surgical History:   Procedure Laterality Date    anal fissure repair      APPENDECTOMY  1944    BREAST SURGERY  1992 approx    breast biopsies- benign    CAROTID ENDARTERECTOMY  2009    left    CATARACT EXTRACTION      COLONOSCOPY  2012    COLONOSCOPY N/A 12/11/2015    Procedure: COLONOSCOPY;  Surgeon: Gavin Escobedo MD;  [facility] ENDO;  Service: Endoscopy;  Laterality: N/A;    EYE SURGERY  2004, 2005    bilateral cataracts    HERNIA REPAIR  2001, 2002    abdominal x2, with mesh on second    HYSTERECTOMY  1963    vag hyst    JOINT REPLACEMENT  2008    right knee    yag Left      Social History     Social History    Marital status:      Spouse name: N/A    Number of children: 4    Years of education: N/A     Occupational History    Not on file.     Social History Main Topics    Smoking status: Former Smoker     Packs/day: 3.00     Years: 50.00     Start date: 1/1/1948     Quit date: 12/12/1998    Smokeless tobacco: Former User    Alcohol use No    Drug use: No    Sexual activity: No     Other Topics Concern    Not on file     Social History Narrative    , then remarried.  after 2-3 weeks. They have still been living together for 31 years. He is 95 now. They are no longer sexually active. Caffeine intake 4 cups coffee daily- though has changed to decaf recently. Does have a living will.      Review of Systems   Constitutional: Positive for fatigue and weakness.   Respiratory: Positive for shortness of breath, dyspnea on extertion and Paroxysmal Nocturnal Dyspnea.    Cardiovascular: Positive for leg swelling.   Musculoskeletal: Positive for arthralgias.       Objective:      Physical Exam   Constitutional: She is oriented to person, place, and time. She appears well-developed and well-nourished.   HENT:   Head: Normocephalic and atraumatic.   Eyes: Conjunctivae are normal. Pupils are equal, round,  and reactive to light.   Neck: Neck supple. No JVD present. No tracheal deviation present. No thyromegaly present.   Cardiovascular: Normal rate.  An irregularly irregular rhythm present.   Murmur heard.   Systolic murmur is present with a grade of 2/6   Pulmonary/Chest: Effort normal. No respiratory distress. She has decreased breath sounds in the left middle field and the left lower field. She has no wheezes. She has rales in the left lower field. She exhibits no tenderness.   Abdominal: Soft. Bowel sounds are normal.   Musculoskeletal: Normal range of motion. She exhibits edema.   Lymphadenopathy:     She has no cervical adenopathy.   Neurological: She is alert and oriented to person, place, and time.   Skin: Skin is warm and dry.   Nursing note and vitals reviewed.    Personal Diagnostic Review  Chest x-ray: left pleural effusion , cardiomegaly    No flowsheet data found.      Assessment:       1. Pleural effusion on left    2. Acute decompensated heart failure    3. Shortness of breath    4. Congestive heart failure, unspecified congestive heart failure chronicity, unspecified congestive heart failure type        Outpatient Encounter Prescriptions as of 3/19/2018   Medication Sig Dispense Refill    alcohol swabs PadM Apply 1 each topically 2 (two) times daily. 200 each 3    benazepril (LOTENSIN) 5 MG tablet Take 1 tablet (5 mg total) by mouth once daily. 90 tablet 3    blood glucose control, high Soln by Misc.(Non-Drug; Combo Route) route.      blood glucose control, normal Soln Use as directed. 1 each 1    blood sugar diagnostic Strp Glucose testing daily. 100 strip 3    blood-glucose meter Misc Use as directed. 1 each 0    cetirizine (ZYRTEC) 10 MG tablet Take 1 tablet (10 mg total) by mouth once daily. 30 tablet 0    fluticasone (FLONASE) 50 mcg/actuation nasal spray 2 sprays by Each Nare route once daily. 1 Bottle 0    folic acid (FOLVITE) 800 MCG Tab TAKE 1 TABLET TWICE DAILY 180 tablet 3     lancets (LANCETS,THIN) Misc Twice daily glucose testing. 200 each 3    latanoprost 0.005 % ophthalmic solution INSTILL 1 DROP INTO BOTH EYES EVERY EVENING 3 Bottle 6    levothyroxine (SYNTHROID) 50 MCG tablet TAKE 1 TABLET BEFORE BREAKFAST 90 tablet 2    magnesium 250 mg Tab Take 1 tablet by mouth Daily.      metformin (GLUCOPHAGE) 1000 MG tablet Take 1 tablet (1,000 mg total) by mouth 2 (two) times daily with meals. 180 tablet 3    methotrexate 2.5 MG Tab TAKE 4 TABLETS IN MORNING AND 4 TABLETS AT NIGHT ONE TIME WEEKLY 96 tablet 1    omeprazole (PRILOSEC) 20 MG capsule TAKE 1 CAPSULE EVERY DAY 90 capsule 3    pravastatin (PRAVACHOL) 40 MG tablet TAKE 1 TABLET ONE TIME DAILY 90 tablet 2    predniSONE (DELTASONE) 1 MG tablet TAKE 1 TABLET TWICE DAILY  OR  AS  DIRECTED 180 tablet 3    timolol maleate 0.5% (TIMOPTIC) 0.5 % Drop INSTILL 1 DROP INTO BOTH EYES EVERY MORNING 15 mL 12    trazodone (DESYREL) 100 MG tablet Take 1 tablet (100 mg total) by mouth every evening. 90 tablet 1    [DISCONTINUED] aspirin (ECOTRIN) 81 MG EC tablet Take 1 tablet (81 mg total) by mouth once daily. 30 tablet 0     No facility-administered encounter medications on file as of 3/19/2018.      Orders Placed This Encounter   Procedures    CT Chest Without Contrast     Standing Status:   Future     Standing Expiration Date:   3/19/2019     Order Specific Question:   May the Radiologist modify the order per protocol to meet the clinical needs of the patient?     Answer:   Yes    2D echo with color flow doppler     Use contrast for PAH     Standing Status:   Future     Standing Expiration Date:   9/19/2019    Case request GI: THORACENTESIS - Outpatient     Order Specific Question:   Pre-op Diagnosis     Answer:   Pleural effusion [450742]     Order Specific Question:   CPT Code:     Answer:   SD THORACEN W/IMAG GUIDANCE [72538]     Order Specific Question:   Add on case?     Answer:   Yes [1]     Order Specific Question:   Requested  Time     Answer:   1:00 PM     Order Specific Question:   Case Referring Provider     Answer:   TANNER BANERJEE [[id number]]     Order Specific Question:   Medical Necessity:     Answer:   Medically Urgent [101]     Plan:       Requested Prescriptions      No prescriptions requested or ordered in this encounter     Pleural effusion on left  -     CT Chest Without Contrast; Future; Expected date: 03/19/2018  -     Case request GI: THORACENTESIS - Outpatient    Acute decompensated heart failure    Shortness of breath    Congestive heart failure, unspecified congestive heart failure chronicity, unspecified congestive heart failure type  -     2D echo with color flow doppler; Future; Expected date: 03/20/2018           Follow-up in about 11 days (around 3/30/2018).    MEDICAL DECISION MAKING: Moderate to high complexity.  Overall, the multiple problems listed are of moderate to high severity that may impact quality of life and activities of daily living. Side effects of medications, treatment plan as well as options and alternatives reviewed and discussed with patient. There was counseling of patient concerning these issues.    Total time spent in face to face counseling and coordination of care - 60  minutes over 50% of time was used in discussion of prognosis, risks, benefits of treatment, instructions and compliance with regimen . Discussion with other physicians or health care providers (DME, NP, pharmacy, respiratory therapy) occurred.

## 2018-03-23 NOTE — PROGRESS NOTES
Progress Note  History reviewed.  Questions concerning procedure asked and answered.  Patient examined .  Radiographic images reviewed  Plan for anesthesia reviewed with anesthesia staff.  Risks, benefits of bronchoscopy discussed in detail with patient. Possible complications of procedure including but not limited to collapse of lung, bleeding into lung, respiratory failure and side effects of anesthesia discussed in detail. Alternatives to procedure discussed. Questions asked and answered.  Consent form signed.     I certify that I was physically present in the Endoscopy unit awaiting start of procedure and assembly of team members at this time.      Alex Hallman MD  Pulmonary / Critical Care Medicine

## 2018-03-23 NOTE — INTERVAL H&P NOTE
The patient has been examined and the H&P has been reviewed:    I concur with the findings and no changes have occurred since H&P was written.    Anesthesia/Surgery risks, benefits and alternative options discussed and understood by patient/family.          Active Hospital Problems    Diagnosis  POA    Pleural effusion on left [J90]  Yes      Resolved Hospital Problems    Diagnosis Date Resolved POA   No resolved problems to display.

## 2018-03-23 NOTE — PROCEDURES
"Yovani Pulido is a 85 y.o. female patient.    Temp: 98.2 °F (36.8 °C) (18 1207)  Pulse: 90 (18 1346)  Resp: 18 (18 134)  BP: (!) 158/72 (18 1346)  SpO2: 95 % (18 1346)  Weight: 59.4 kg (131 lb) (18 1207)  Height: 5' 1" (154.9 cm) (18 1207)  Mallampati Scale: Class II  ASA Classification: Class 2    Thoracentesis  Date/Time: 3/23/2018 2:03 PM  Location procedure was performed: [facility] ENDO INTRA-OP  Performed by: TANNER BANERJEE  Authorized by: TANNER BANERJEE   Assisting provider: TANNER BANERJEE  Pre-operative diagnosis: Persistent left pleural effusion  Post-operative diagnosis: same  Consent Done: Yes  Consent: Written consent obtained.  Risks and benefits: risks, benefits and alternatives were discussed  Consent given by: patient  Patient understanding: patient states understanding of the procedure being performed  Patient consent: the patient's understanding of the procedure matches consent given  Procedure consent: procedure consent matches procedure scheduled  Relevant documents: relevant documents present and verified  Test results: test results available and properly labeled  Site marked: the operative site was marked  Imaging studies: imaging studies available  Required items: required blood products, implants, devices, and special equipment available  Patient identity confirmed: , verbally with patient and name  Time out: Immediately prior to procedure a "time out" was called to verify the correct patient, procedure, equipment, support staff and site/side marked as required.  Procedure purpose: diagnostic  Indications: pleural effusion  Preparation: Patient was prepped and draped in the usual sterile fashion.  Local anesthesia used: yes  Anesthesia: local infiltration    Anesthesia:  Local anesthesia used: yes  Local Anesthetic: lidocaine 1% without epinephrine  Anesthetic total: 9 mL  Patient sedated: yes  Sedation type: anxiolysis  (See MAR for exact " dosages of medications).  Sedatives: midazolam  Sedation start date/time: 3/23/2018 1:30 PM  Sedation end date/time: 3/23/2018 2:04 PM  Vitals: Vital signs were monitored during sedation.  Description of findings: 300 cc of blood tinged fluid removed   Preparation: skin prepped with ChloraPrep  Patient position: sitting  Ultrasound guidance: yes  Location: left posterior  Intercostal space: 9th  Puncture method: over-the-needle catheter  Needle size: 18  Catheter size: 18 gauge  Number of attempts: 1  Drainage amount: 300 ml  Drainage characteristics: serosanguinous  Patient tolerance: Patient tolerated the procedure well with no immediate complications  Chest x-ray performed: yes  Chest x-ray interpreted by me.  Chest x-ray findings: pleural effusion  Technical procedures used: ultrasound  Significant surgical tasks conducted by the assistant(s): none  Complications: No  Estimated blood loss (mL): 2  Specimens: Yes  Implants: No  Comments: While at the patient's bedside ultrasound was used to localize the greatest extent of pleural fluid. The pleural fluid characteristic were ckear. The pleural fluid was 1.5 cm from the skin surface and the depth of the pleural fluid was measured -estimated volume of pleural fluid is 300 cc .  The site was marked with a washable marker.             Alex Hallman  3/23/2018

## 2018-03-23 NOTE — DISCHARGE SUMMARY
[facility] -   Pulmonology  Discharge Summary      Patient Name: Yovani Pulido  MRN: [id number]  Admission Date: 3/23/2018  Hospital Length of Stay: 0 days  Discharge Date and Time:  03/23/2018 2:14 PM  Attending Physician: Alex Hallman MD   Discharging Provider: Alex Hallman MD  Primary Care Provider: Dorcas Schultz DO    HPI: 84 y/o with chronic left pleural effusion    Procedure(s) (LRB):  THORACENTESIS - Outpatient (Right)    Indwelling Lines/Drains at Time of Discharge:   Lines/Drains/Airways          No matching active lines, drains, or airways          Hospital Course: 300 cc blood tinged fluid removed from the left chest        Significant Labs:  All pertinent labs within the past 24 hours have been reviewed.    Significant Imaging:  I have reviewed all pertinent imaging results/findings within the past 24 hours.    Pending Diagnostic Studies:     Procedure Component Value Units Date/Time    Glucose, Peritoneal, Pleural Fluid or CRISTOFER Drainage, In-House Thoracentesis Fluid [[id number]] Collected:  03/23/18 1320    Order Status:  Sent Lab Status:  In process Updated:  03/23/18 1320    Specimen:  Body Fluid     Protein, Peritoneal, Pleural Fluid or CRISTOFER Drainage, In-House Thoracentesis Fluid [[id number]] Collected:  03/23/18 1144    Order Status:  Sent Lab Status:  In process Updated:  03/23/18 1144    Specimen:  Body Fluid     US Chest Mediastinum [[id number]]     Order Status:  Sent Lab Status:  No result     X-Ray Chest AP Portable [[id number]]     Order Status:  Sent Lab Status:  No result     X-Ray Chest AP Portable [[id number]]     Order Status:  Sent Lab Status:  No result         Final Active Diagnoses:    Diagnosis Date Noted POA    Pleural effusion on left [J90] 03/19/2018 Yes      Problems Resolved During this Admission:    Diagnosis Date Noted Date Resolved POA       Discharged Condition: good    Disposition:     Follow Up:  Follow-up Information     Alex Hallman MD In 1 week.     Specialty:  Pulmonary Disease  Why:  review results of thoracnetesis  Contact information:  [address]  [place] [address] [phone]                 Patient Instructions:   No discharge procedures on file.  Medications:  Reconciled Home Medications:   Current Discharge Medication List      CONTINUE these medications which have NOT CHANGED    Details   benazepril (LOTENSIN) 5 MG tablet Take 1 tablet (5 mg total) by mouth once daily.  Qty: 90 tablet, Refills: 3    Associated Diagnoses: Essential hypertension      folic acid (FOLVITE) 800 MCG Tab TAKE 1 TABLET TWICE DAILY  Qty: 180 tablet, Refills: 3      levothyroxine (SYNTHROID) 50 MCG tablet TAKE 1 TABLET BEFORE BREAKFAST  Qty: 90 tablet, Refills: 2    Associated Diagnoses: Hypothyroidism (acquired)      magnesium 250 mg Tab Take 1 tablet by mouth Daily.      metformin (GLUCOPHAGE) 1000 MG tablet Take 1 tablet (1,000 mg total) by mouth 2 (two) times daily with meals.  Qty: 180 tablet, Refills: 3      methotrexate 2.5 MG Tab TAKE 4 TABLETS IN MORNING AND 4 TABLETS AT NIGHT ONE TIME WEEKLY  Qty: 96 tablet, Refills: 1    Associated Diagnoses: Rheumatoid arthritis, involving unspecified site, unspecified rheumatoid factor presence      omeprazole (PRILOSEC) 20 MG capsule TAKE 1 CAPSULE EVERY DAY  Qty: 90 capsule, Refills: 3      pravastatin (PRAVACHOL) 40 MG tablet TAKE 1 TABLET ONE TIME DAILY  Qty: 90 tablet, Refills: 2      predniSONE (DELTASONE) 1 MG tablet TAKE 1 TABLET TWICE DAILY  OR  AS  DIRECTED  Qty: 180 tablet, Refills: 3      trazodone (DESYREL) 100 MG tablet Take 1 tablet (100 mg total) by mouth every evening.  Qty: 90 tablet, Refills: 1      alcohol swabs PadM Apply 1 each topically 2 (two) times daily.  Qty: 200 each, Refills: 3      blood glucose control, high Soln by Misc.(Non-Drug; Combo Route) route.      blood glucose control, normal Soln Use as directed.  Qty: 1 each, Refills: 1      blood sugar diagnostic Strp Glucose testing  daily.  Qty: 100 strip, Refills: 3    Associated Diagnoses: Diabetes mellitus with microalbuminuria      blood-glucose meter Misc Use as directed.  Qty: 1 each, Refills: 0    Associated Diagnoses: Diabetes mellitus with microalbuminuria      cetirizine (ZYRTEC) 10 MG tablet Take 1 tablet (10 mg total) by mouth once daily.  Qty: 30 tablet, Refills: 0    Associated Diagnoses: PND (post-nasal drip)      fluticasone (FLONASE) 50 mcg/actuation nasal spray 2 sprays by Each Nare route once daily.  Qty: 1 Bottle, Refills: 0    Associated Diagnoses: PND (post-nasal drip)      lancets (LANCETS,THIN) Misc Twice daily glucose testing.  Qty: 200 each, Refills: 3      latanoprost 0.005 % ophthalmic solution INSTILL 1 DROP INTO BOTH EYES EVERY EVENING  Qty: 3 Bottle, Refills: 6      timolol maleate 0.5% (TIMOPTIC) 0.5 % Drop INSTILL 1 DROP INTO BOTH EYES EVERY MORNING  Qty: 15 mL, Refills: 12             Alex Hallman MD  Pulmonology  [facility] -

## 2018-03-26 LAB — PATH INTERP FLD-IMP: NORMAL

## 2018-03-27 LAB
BACTERIA FLD AEROBE CULT: NO GROWTH
GRAM STN SPEC: NORMAL
GRAM STN SPEC: NORMAL

## 2018-04-02 ENCOUNTER — OFFICE VISIT (OUTPATIENT)
Dept: PULMONOLOGY | Facility: CLINIC | Age: 83
End: 2018-04-02
Payer: MEDICARE

## 2018-04-02 VITALS
WEIGHT: 135.38 LBS | BODY MASS INDEX: 25.56 KG/M2 | HEIGHT: 61 IN | DIASTOLIC BLOOD PRESSURE: 62 MMHG | SYSTOLIC BLOOD PRESSURE: 120 MMHG | HEART RATE: 85 BPM | OXYGEN SATURATION: 96 % | RESPIRATION RATE: 18 BRPM

## 2018-04-02 DIAGNOSIS — I50.32 DIASTOLIC CHF, CHRONIC: ICD-10-CM

## 2018-04-02 DIAGNOSIS — E03.9 HYPOTHYROIDISM (ACQUIRED): ICD-10-CM

## 2018-04-02 DIAGNOSIS — R60.9 EDEMA, UNSPECIFIED TYPE: ICD-10-CM

## 2018-04-02 DIAGNOSIS — J90 PLEURAL EFFUSION ON LEFT: Primary | ICD-10-CM

## 2018-04-02 PROCEDURE — 99215 OFFICE O/P EST HI 40 MIN: CPT | Mod: S$GLB,,, | Performed by: INTERNAL MEDICINE

## 2018-04-02 PROCEDURE — 99499 UNLISTED E&M SERVICE: CPT | Mod: S$GLB,,, | Performed by: INTERNAL MEDICINE

## 2018-04-02 PROCEDURE — 3074F SYST BP LT 130 MM HG: CPT | Mod: CPTII,S$GLB,, | Performed by: INTERNAL MEDICINE

## 2018-04-02 PROCEDURE — 3078F DIAST BP <80 MM HG: CPT | Mod: CPTII,S$GLB,, | Performed by: INTERNAL MEDICINE

## 2018-04-02 PROCEDURE — 99999 PR PBB SHADOW E&M-EST. PATIENT-LVL IV: CPT | Mod: PBBFAC,,, | Performed by: INTERNAL MEDICINE

## 2018-04-02 RX ORDER — FUROSEMIDE 20 MG/1
20 TABLET ORAL
Qty: 36 TABLET | Refills: 3 | Status: SHIPPED | OUTPATIENT
Start: 2018-04-02 | End: 2019-04-02

## 2018-04-02 NOTE — PROGRESS NOTES
Subjective:       Patient ID: Yovani Pulido is a 85 y.o. female.    Chief Complaint:   She   presents for evaluation and treatment of pleural effusion with thoracentesis after being discharged from the hospital  1  week ago. Since discharge symptoms have gradually improving course since that time. Patient denies SOB. Symptoms are aggravated by activity. Symptoms improve with rewst.  Respiratory: positive for dyspnea on exertion; Cardiovascular: no chest pain or palpitations.  Patient currently is not on home oxygen therapy..  MEDICAL RECORDS FROM THE HOSPITAL REVIEWED:    Pleural Effusion and Shortness of Breath    HPI  Past Medical History:   Diagnosis Date    Anemia     Carotid stenosis     Cataract     COAG (chronic open-angle glaucoma)     Colon polyps     Diabetes mellitus type II     steroid induced    Diverticulosis     GERD (gastroesophageal reflux disease)     hiatal hernia    Glaucoma     Heart murmur     Hyperlipidemia     Hypertension     Hypothyroidism     Rheumatoid arthritis(714.0)     Stroke     lacunar infarct     Past Surgical History:   Procedure Laterality Date    anal fissure repair      APPENDECTOMY  1944    BREAST SURGERY  1992 approx    breast biopsies- benign    CAROTID ENDARTERECTOMY  2009    left    CATARACT EXTRACTION      COLONOSCOPY  2012    COLONOSCOPY N/A 12/11/2015    Procedure: COLONOSCOPY;  Surgeon: Gavin Escobedo MD;  Location: [facility];  Service: Endoscopy;  Laterality: N/A;    EYE SURGERY  2004, 2005    bilateral cataracts    HERNIA REPAIR  2001, 2002    abdominal x2, with mesh on second    HYSTERECTOMY  1963    vag hyst    JOINT REPLACEMENT  2008    right knee    yag Left      Social History     Social History    Marital status:      Spouse name: N/A    Number of children: 4    Years of education: N/A     Occupational History    Not on file.     Social History Main Topics    Smoking status: Former Smoker     Packs/day: 3.00      Years: 50.00     Start date: 1/1/1948     Quit date: 12/12/1998    Smokeless tobacco: Former User    Alcohol use No    Drug use: No    Sexual activity: No     Other Topics Concern    Not on file     Social History Narrative    , then remarried.  after 2-3 weeks. They have still been living together for 31 years. He is 95 now. They are no longer sexually active. Caffeine intake 4 cups coffee daily- though has changed to decaf recently. Does have a living will.      Review of Systems   Constitutional: Positive for fatigue. Negative for fever.   HENT: Positive for postnasal drip, rhinorrhea and congestion.    Eyes: Negative for redness and itching.   Respiratory: Positive for cough, sputum production, shortness of breath, dyspnea on extertion, use of rescue inhaler and Paroxysmal Nocturnal Dyspnea.    Cardiovascular: Negative for chest pain, palpitations and leg swelling.   Genitourinary: Negative for difficulty urinating and hematuria.   Endocrine: Negative for cold intolerance and heat intolerance.    Skin: Negative for rash.   Gastrointestinal: Negative for nausea and abdominal pain.   Neurological: Negative for dizziness, syncope, weakness and light-headedness.   Hematological: Negative for adenopathy. Does not bruise/bleed easily.   Psychiatric/Behavioral: Negative for sleep disturbance. The patient is not nervous/anxious.        Objective:      Physical Exam   Constitutional: She is oriented to person, place, and time. She appears well-developed and well-nourished.   HENT:   Head: Normocephalic and atraumatic.   Mouth/Throat: No oropharyngeal exudate.   Eyes: Conjunctivae are normal. Pupils are equal, round, and reactive to light.   Neck: Neck supple. No JVD present. No tracheal deviation present. No thyromegaly present.   Cardiovascular: Normal rate.  An irregularly irregular rhythm present.   Murmur heard.   Systolic murmur is present with a grade of 2/6   Pulmonary/Chest: Effort normal. No  respiratory distress. She has decreased breath sounds. She has no wheezes. She has no rhonchi. She has rales in the right lower field and the left lower field. She exhibits no tenderness.   Abdominal: Soft. Bowel sounds are normal.   Musculoskeletal: Normal range of motion. She exhibits no edema.   Lymphadenopathy:     She has no cervical adenopathy.   Neurological: She is alert and oriented to person, place, and time.   Skin: Skin is warm and dry.   Nursing note and vitals reviewed.    Personal Diagnostic Review  Chest x-ray: small left pleural effusion     Review of results of thoracentesis and pleural fluid analysis:  Cytology reports, microbiology reports and histopathology reports reviewed with patient. Results of gram stains, AFB stains and fungal stains and cultures discussed.  Differential diagnosis and implications of reports reviewed with patient and family members. Further diagnostic studies outlined and treatment plan reviewed.      .[email or web address]  No flowsheet data found.        Assessment:       1. Pleural effusion on left - chronic    2. Diastolic CHF, chronic    3. Edema, unspecified type        Outpatient Encounter Prescriptions as of 4/2/2018   Medication Sig Dispense Refill    alcohol swabs PadM Apply 1 each topically 2 (two) times daily. 200 each 3    benazepril (LOTENSIN) 5 MG tablet Take 1 tablet (5 mg total) by mouth once daily. 90 tablet 3    blood glucose control, high Soln by Misc.(Non-Drug; Combo Route) route.      blood glucose control, normal Soln Use as directed. 1 each 1    blood sugar diagnostic Strp Glucose testing daily. 100 strip 3    blood-glucose meter Misc Use as directed. 1 each 0    cetirizine (ZYRTEC) 10 MG tablet Take 1 tablet (10 mg total) by mouth once daily. 30 tablet 0    fluticasone (FLONASE) 50 mcg/actuation nasal spray 2 sprays by Each Nare route once daily. 1 Bottle 0    folic acid (FOLVITE) 800 MCG Tab TAKE 1 TABLET TWICE DAILY 180 tablet 3    lancets  (LANCETS,THIN) Misc Twice daily glucose testing. 200 each 3    latanoprost 0.005 % ophthalmic solution INSTILL 1 DROP INTO BOTH EYES EVERY EVENING 3 Bottle 6    levothyroxine (SYNTHROID) 50 MCG tablet TAKE 1 TABLET BEFORE BREAKFAST 90 tablet 2    magnesium 250 mg Tab Take 1 tablet by mouth Daily.      metformin (GLUCOPHAGE) 1000 MG tablet Take 1 tablet (1,000 mg total) by mouth 2 (two) times daily with meals. 180 tablet 3    methotrexate 2.5 MG Tab TAKE 4 TABLETS IN MORNING AND 4 TABLETS AT NIGHT ONE TIME WEEKLY 96 tablet 1    omeprazole (PRILOSEC) 20 MG capsule TAKE 1 CAPSULE EVERY DAY 90 capsule 3    pravastatin (PRAVACHOL) 40 MG tablet TAKE 1 TABLET ONE TIME DAILY 90 tablet 2    predniSONE (DELTASONE) 1 MG tablet TAKE 1 TABLET TWICE DAILY  OR  AS  DIRECTED 180 tablet 3    timolol maleate 0.5% (TIMOPTIC) 0.5 % Drop INSTILL 1 DROP INTO BOTH EYES EVERY MORNING 15 mL 12    trazodone (DESYREL) 100 MG tablet Take 1 tablet (100 mg total) by mouth every evening. 90 tablet 1    furosemide (LASIX) 20 MG tablet Take 1 tablet (20 mg total) by mouth every Mon, Wed, Fri. 36 tablet 3     No facility-administered encounter medications on file as of 4/2/2018.      No orders of the defined types were placed in this encounter.    Plan:       Requested Prescriptions     Signed Prescriptions Disp Refills    furosemide (LASIX) 20 MG tablet 36 tablet 3     Sig: Take 1 tablet (20 mg total) by mouth every Mon, Wed, Fri.     Pleural effusion on left - chronic    Diastolic CHF, chronic    Edema, unspecified type  -     furosemide (LASIX) 20 MG tablet; Take 1 tablet (20 mg total) by mouth every Mon, Wed, Fri.  Dispense: 36 tablet; Refill: 3           Follow-up if symptoms worsen or fail to improve.    Review of medical records from hospital. Medical records from hospital were reviewed during office visit - these included but were not limited to review of radiographic studies and /or radiologists reports, laboratory studies,  discharge summaries, procedure notes, consultations and other transcribed notes.    MEDICAL DECISION MAKING: Moderate to high complexity.  Overall, the multiple problems listed are of moderate to high severity that may impact quality of life and activities of daily living. Side effects of medications, treatment plan as well as options and alternatives reviewed and discussed with patient. There was counseling of patient concerning these issues.    Total time spent in face to face counseling and coordination of care - 40 minutes over 50% of time was used in discussion of prognosis, risks, benefits of treatment, instructions and compliance with regimen . Discussion with other physicians or health care providers (homehealth, durable medical equipment providers).

## 2018-04-02 NOTE — LETTER
April 2, 2018      Dorcas Schultz DO  [address] [place] [address] [address] [id number]           [facility]  [address] [place] [address] [address] [id number]  Phone: [phone]  Fax: [phone]          Patient: Yovani Pulido   MR Number: [id number]   YOB: 1932   Date of Visit: 4/2/2018       Dear No ref. provider found:    Thank you for referring Yovani Pulido to me for evaluation. Attached you will find relevant portions of my assessment and plan of care.    If you have questions, please do not hesitate to call me. I look forward to following Yovani Pulido along with you.    Sincerely,    Alex Hallman MD    Enclosure  CC:  No Recipients    IIf you would like to receive this communication electronically, please contact [email or web address] or [phone] to request [employer] Link access.    [employer] Link is a tool which provides read-only access to select patient information with whom you have a relationship. Its easy to use and provides real time access to review your patients record including encounter summaries, notes, results, and demographic information.    If you feel you have received this communication in error or would no longer like to receive these types of communications, please e-mail [email or web address]

## 2018-04-02 NOTE — PATIENT INSTRUCTIONS
Sleep with right side up or flat  Furosemide tablets  What is this medicine?  FUROSEMIDE (xenia [place] se mide) is a diuretic. It helps you make more urine and to lose salt and excess water from your body. This medicine is used to treat high blood pressure, and edema or swelling from heart, kidney, or liver disease.  How should I use this medicine?  Take this medicine by mouth with a glass of water. Follow the directions on the prescription label. You may take this medicine with or without food. If it upsets your stomach, take it with food or milk. Do not take your medicine more often than directed. Remember that you will need to pass more urine after taking this medicine. Do not take your medicine at a time of day that will cause you problems. Do not take at bedtime.  Talk to your pediatrician regarding the use of this medicine in children. While this drug may be prescribed for selected conditions, precautions do apply.  What side effects may I notice from receiving this medicine?  Side effects that you should report to your doctor or health care professional as soon as possible:  · blood in urine or stools  · dry mouth  · fever or chills  · hearing loss or ringing in the ears  · irregular heartbeat  · muscle pain or weakness, cramps  · skin rash  · stomach upset, pain, or nausea  · tingling or numbness in the hands or feet  · unusually weak or tired  · vomiting or diarrhea  · yellowing of the eyes or skin  Side effects that usually do not require medical attention (report to your doctor or health care professional if they continue or are bothersome):  · headache  · loss of appetite  · unusual bleeding or bruising  What may interact with this medicine?  · aspirin and aspirin-like medicines  · certain antibiotics  · chloral hydrate  · cisplatin  · cyclosporine  · digoxin  · diuretics  · laxatives  · lithium  · medicines for blood pressure  · medicines that relax muscles for surgery  · methotrexate  · NSAIDs, medicines  for pain and inflammation like ibuprofen, naproxen, or indomethacin  · phenytoin  · steroid medicines like prednisone or cortisone  · sucralfate  · thyroid hormones  What if I miss a dose?  If you miss a dose, take it as soon as you can. If it is almost time for your next dose, take only that dose. Do not take double or extra doses.  Where should I keep my medicine?  Keep out of the reach of children.  Store at room temperature between 15 and 30 degrees C (59 and 86 degrees F). Protect from light. Throw away any unused medicine after the expiration date.  What should I tell my health care provider before I take this medicine?  They need to know if you have any of these conditions:  · abnormal blood electrolytes  · diarrhea or vomiting  · gout  · heart disease  · kidney disease, small amounts of urine, or difficulty passing urine  · liver disease  · thyroid disease  · an unusual or allergic reaction to furosemide, sulfa drugs, other medicines, foods, dyes, or preservatives  · pregnant or trying to get pregnant  · breast-feeding  What should I watch for while using this medicine?  Visit your doctor or health care professional for regular checks on your progress. Check your blood pressure regularly. Ask your doctor or health care professional what your blood pressure should be, and when you should contact him or her. If you are a diabetic, check your blood sugar as directed.  You may need to be on a special diet while taking this medicine. Check with your doctor. Also, ask how many glasses of fluid you need to drink a day. You must not get dehydrated.  You may get drowsy or dizzy. Do not drive, use machinery, or do anything that needs mental alertness until you know how this drug affects you. Do not stand or sit up quickly, especially if you are an older patient. This reduces the risk of dizzy or fainting spells. Alcohol can make you more drowsy and dizzy. Avoid alcoholic drinks.  This medicine can make you more  sensitive to the sun. Keep out of the sun. If you cannot avoid being in the sun, wear protective clothing and use sunscreen. Do not use sun lamps or tanning beds/booths.  NOTE:This sheet is a summary. It may not cover all possible information. If you have questions about this medicine, talk to your doctor, pharmacist, or health care provider. Copyright© 2017 [employer]

## 2018-04-03 RX ORDER — LEVOTHYROXINE SODIUM 50 UG/1
TABLET ORAL
Qty: 90 TABLET | Refills: 0 | Status: SHIPPED | OUTPATIENT
Start: 2018-04-03 | End: 2018-07-31 | Stop reason: SDUPTHER

## 2018-04-03 RX ORDER — PRAVASTATIN SODIUM 40 MG/1
TABLET ORAL
Qty: 90 TABLET | Refills: 1 | Status: SHIPPED | OUTPATIENT
Start: 2018-04-03 | End: 2018-08-10 | Stop reason: SDUPTHER

## 2018-04-03 RX ORDER — TRAZODONE HYDROCHLORIDE 100 MG/1
TABLET ORAL
Qty: 90 TABLET | Refills: 1 | Status: SHIPPED | OUTPATIENT
Start: 2018-04-03 | End: 2018-08-10 | Stop reason: SDUPTHER

## 2018-04-03 RX ORDER — OMEPRAZOLE 20 MG/1
CAPSULE, DELAYED RELEASE ORAL
Qty: 90 CAPSULE | Refills: 1 | Status: SHIPPED | OUTPATIENT
Start: 2018-04-03 | End: 2018-10-12 | Stop reason: SDUPTHER

## 2018-04-10 ENCOUNTER — TELEPHONE (OUTPATIENT)
Dept: INTERNAL MEDICINE | Facility: CLINIC | Age: 83
End: 2018-04-10

## 2018-04-10 NOTE — TELEPHONE ENCOUNTER
----- Message from Dorcas Schultz DO sent at 4/10/2018 11:58 AM CDT -----  Contact: pt  I need to know why patient needs a urine test. Always give me a reason...  ----- Message -----  From: Mirlande Crump MA  Sent: 4/9/2018   2:02 PM  To: Dorcas Schultz DO        ----- Message -----  From: Maria Antonia Hannon  Sent: 4/9/2018  12:23 PM  To: Antoine Toscano Staff    States she has lab work on Friday and she would like to do a urine specimen. Please call pt at [phone]. Thank you

## 2018-04-10 NOTE — TELEPHONE ENCOUNTER
I spoke with patient, she said she just wants it done because she haven't had it done in a while, you might want it done and she doesn't want to come back to do it. She also stated that the doctors want you to have it done once a year.

## 2018-04-13 ENCOUNTER — LAB VISIT (OUTPATIENT)
Dept: LAB | Facility: HOSPITAL | Age: 83
End: 2018-04-13
Attending: INTERNAL MEDICINE
Payer: MEDICARE

## 2018-04-13 ENCOUNTER — OFFICE VISIT (OUTPATIENT)
Dept: INTERNAL MEDICINE | Facility: CLINIC | Age: 83
End: 2018-04-13
Payer: MEDICARE

## 2018-04-13 VITALS
OXYGEN SATURATION: 98 % | TEMPERATURE: 98 F | SYSTOLIC BLOOD PRESSURE: 130 MMHG | WEIGHT: 132.94 LBS | HEART RATE: 87 BPM | HEIGHT: 61 IN | BODY MASS INDEX: 25.1 KG/M2 | DIASTOLIC BLOOD PRESSURE: 52 MMHG

## 2018-04-13 DIAGNOSIS — M05.79 RHEUMATOID ARTHRITIS INVOLVING MULTIPLE SITES WITH POSITIVE RHEUMATOID FACTOR: ICD-10-CM

## 2018-04-13 DIAGNOSIS — I15.2 HYPERTENSION ASSOCIATED WITH DIABETES: Primary | ICD-10-CM

## 2018-04-13 DIAGNOSIS — E03.9 HYPOTHYROIDISM (ACQUIRED): ICD-10-CM

## 2018-04-13 DIAGNOSIS — E78.2 COMBINED HYPERLIPIDEMIA ASSOCIATED WITH TYPE 2 DIABETES MELLITUS: ICD-10-CM

## 2018-04-13 DIAGNOSIS — E11.22 CONTROLLED TYPE 2 DIABETES MELLITUS WITH STAGE 3 CHRONIC KIDNEY DISEASE, WITHOUT LONG-TERM CURRENT USE OF INSULIN: ICD-10-CM

## 2018-04-13 DIAGNOSIS — N18.30 CONTROLLED TYPE 2 DIABETES MELLITUS WITH STAGE 3 CHRONIC KIDNEY DISEASE, WITHOUT LONG-TERM CURRENT USE OF INSULIN: ICD-10-CM

## 2018-04-13 DIAGNOSIS — I51.89 DIASTOLIC DYSFUNCTION: ICD-10-CM

## 2018-04-13 DIAGNOSIS — N18.30 ANEMIA OF CHRONIC RENAL FAILURE, STAGE 3 (MODERATE): ICD-10-CM

## 2018-04-13 DIAGNOSIS — D50.0 IRON DEFICIENCY ANEMIA DUE TO CHRONIC BLOOD LOSS: ICD-10-CM

## 2018-04-13 DIAGNOSIS — D63.1 ANEMIA OF CHRONIC RENAL FAILURE, STAGE 3 (MODERATE): ICD-10-CM

## 2018-04-13 DIAGNOSIS — E11.59 HYPERTENSION ASSOCIATED WITH DIABETES: ICD-10-CM

## 2018-04-13 DIAGNOSIS — E11.59 HYPERTENSION ASSOCIATED WITH DIABETES: Primary | ICD-10-CM

## 2018-04-13 DIAGNOSIS — I15.2 HYPERTENSION ASSOCIATED WITH DIABETES: ICD-10-CM

## 2018-04-13 DIAGNOSIS — E11.69 COMBINED HYPERLIPIDEMIA ASSOCIATED WITH TYPE 2 DIABETES MELLITUS: ICD-10-CM

## 2018-04-13 LAB
ALBUMIN SERPL BCP-MCNC: 3.2 G/DL
ALBUMIN SERPL BCP-MCNC: 3.2 G/DL
ALP SERPL-CCNC: 88 U/L
ALP SERPL-CCNC: 88 U/L
ALT SERPL W/O P-5'-P-CCNC: 10 U/L
ALT SERPL W/O P-5'-P-CCNC: 10 U/L
ANION GAP SERPL CALC-SCNC: 11 MMOL/L
ANION GAP SERPL CALC-SCNC: 12 MMOL/L
AST SERPL-CCNC: 15 U/L
AST SERPL-CCNC: 15 U/L
BASOPHILS # BLD AUTO: 0.02 K/UL
BASOPHILS NFR BLD: 0.3 %
BILIRUB DIRECT SERPL-MCNC: 0.2 MG/DL
BILIRUB SERPL-MCNC: 0.3 MG/DL
BILIRUB SERPL-MCNC: 0.3 MG/DL
BUN SERPL-MCNC: 27 MG/DL
BUN SERPL-MCNC: 28 MG/DL
CALCIUM SERPL-MCNC: 8.5 MG/DL
CALCIUM SERPL-MCNC: 8.6 MG/DL
CHLORIDE SERPL-SCNC: 101 MMOL/L
CHLORIDE SERPL-SCNC: 102 MMOL/L
CO2 SERPL-SCNC: 29 MMOL/L
CO2 SERPL-SCNC: 31 MMOL/L
CREAT SERPL-MCNC: 1.2 MG/DL
CREAT SERPL-MCNC: 1.2 MG/DL
DIFFERENTIAL METHOD: ABNORMAL
EOSINOPHIL # BLD AUTO: 0.1 K/UL
EOSINOPHIL NFR BLD: 1.2 %
ERYTHROCYTE [DISTWIDTH] IN BLOOD BY AUTOMATED COUNT: 16.5 %
EST. GFR  (AFRICAN AMERICAN): 48 ML/MIN/1.73 M^2
EST. GFR  (AFRICAN AMERICAN): 48 ML/MIN/1.73 M^2
EST. GFR  (NON AFRICAN AMERICAN): 41 ML/MIN/1.73 M^2
EST. GFR  (NON AFRICAN AMERICAN): 41 ML/MIN/1.73 M^2
ESTIMATED AVG GLUCOSE: 100 MG/DL
FERRITIN SERPL-MCNC: 2225 NG/ML
GLUCOSE SERPL-MCNC: 122 MG/DL
GLUCOSE SERPL-MCNC: 122 MG/DL
HBA1C MFR BLD HPLC: 5.1 %
HCT VFR BLD AUTO: 34.4 %
HGB BLD-MCNC: 10.7 G/DL
IRON SERPL-MCNC: 32 UG/DL
LYMPHOCYTES # BLD AUTO: 1 K/UL
LYMPHOCYTES NFR BLD: 12.9 %
MCH RBC QN AUTO: 31.4 PG
MCHC RBC AUTO-ENTMCNC: 31.1 G/DL
MCV RBC AUTO: 101 FL
MONOCYTES # BLD AUTO: 0.9 K/UL
MONOCYTES NFR BLD: 11.5 %
NEUTROPHILS # BLD AUTO: 5.8 K/UL
NEUTROPHILS NFR BLD: 74.1 %
PLATELET # BLD AUTO: 347 K/UL
PMV BLD AUTO: 8.5 FL
POTASSIUM SERPL-SCNC: 4.6 MMOL/L
POTASSIUM SERPL-SCNC: 4.6 MMOL/L
PROT SERPL-MCNC: 7.4 G/DL
PROT SERPL-MCNC: 7.4 G/DL
RBC # BLD AUTO: 3.41 M/UL
SATURATED IRON: 11 %
SODIUM SERPL-SCNC: 143 MMOL/L
SODIUM SERPL-SCNC: 143 MMOL/L
TOTAL IRON BINDING CAPACITY: 281 UG/DL
TRANSFERRIN SERPL-MCNC: 190 MG/DL
TSH SERPL DL<=0.005 MIU/L-ACNC: 3.82 UIU/ML
WBC # BLD AUTO: 7.77 K/UL

## 2018-04-13 PROCEDURE — 99999 PR PBB SHADOW E&M-EST. PATIENT-LVL III: CPT | Mod: PBBFAC,,, | Performed by: INTERNAL MEDICINE

## 2018-04-13 PROCEDURE — 80061 LIPID PANEL: CPT

## 2018-04-13 PROCEDURE — 36415 COLL VENOUS BLD VENIPUNCTURE: CPT

## 2018-04-13 PROCEDURE — 3075F SYST BP GE 130 - 139MM HG: CPT | Mod: CPTII,S$GLB,, | Performed by: INTERNAL MEDICINE

## 2018-04-13 PROCEDURE — 80048 BASIC METABOLIC PNL TOTAL CA: CPT

## 2018-04-13 PROCEDURE — 83540 ASSAY OF IRON: CPT

## 2018-04-13 PROCEDURE — 85025 COMPLETE CBC W/AUTO DIFF WBC: CPT

## 2018-04-13 PROCEDURE — 82728 ASSAY OF FERRITIN: CPT

## 2018-04-13 PROCEDURE — 80053 COMPREHEN METABOLIC PANEL: CPT

## 2018-04-13 PROCEDURE — 3078F DIAST BP <80 MM HG: CPT | Mod: CPTII,S$GLB,, | Performed by: INTERNAL MEDICINE

## 2018-04-13 PROCEDURE — 84443 ASSAY THYROID STIM HORMONE: CPT

## 2018-04-13 PROCEDURE — 99499 UNLISTED E&M SERVICE: CPT | Mod: S$GLB,,, | Performed by: INTERNAL MEDICINE

## 2018-04-13 PROCEDURE — 99214 OFFICE O/P EST MOD 30 MIN: CPT | Mod: S$GLB,,, | Performed by: INTERNAL MEDICINE

## 2018-04-13 PROCEDURE — 83036 HEMOGLOBIN GLYCOSYLATED A1C: CPT

## 2018-04-13 RX ORDER — BENAZEPRIL HYDROCHLORIDE 5 MG/1
5 TABLET ORAL DAILY
Qty: 90 TABLET | Refills: 3 | Status: SHIPPED | OUTPATIENT
Start: 2018-04-13

## 2018-04-13 NOTE — PROGRESS NOTES
Subjective:       Patient ID: Yovani Pulido is a 85 y.o. female.    Chief Complaint: Annual Exam    Yovani Pulido  85 y.o. White female     Patient presents with:  Annual Exam    HPI: Here today for an annual physical.   HTN--stable on benazepril. She needs refills.  Diabetes--compliant with metformin. She denies symptoms.                  HGBA1C                   5.4                 02/16/2017            CKD III--asymptomatic. She is compliant with benazepril.   HLD--compliant with pravastatin.                      CHOL                     162                 02/16/2017                 HDL                      60                  02/16/2017                 LDLCALC                  79.2                02/16/2017                 TRIG                     114                 02/16/2017            Diastolic dysfunction--she recently had a pleural effusion, which was drained. She is now taking furosemide. She denies symptoms.   Hypothyroidism--compliant with levothyroxine. She denies symptoms.                        TSH                      3.933               05/15/2017                  Foot Exam due on 10/24/2017  Hemoglobin A1c due on 01/11/2018  Lipid Panel due on 07/11/2018  Eye Exam due on 10/23/2018  DEXA SCAN due on 04/07/2020  TETANUS VACCINE due on 10/24/2026  Zoster Vaccine Completed  Pneumococcal (65+) Completed  Influenza Vaccine Completed  Colonoscopy Completed    Past Medical History:  Anemia  Carotid stenosis  Cataract  COAG (chronic open-angle glaucoma)  Colon polyps  Diabetes mellitus type II      Comment: steroid induced  Diverticulosis  GERD (gastroesophageal reflux disease)      Comment: hiatal hernia  Glaucoma  Heart murmur  Hyperlipidemia  Hypertension  Hypothyroidism  Rheumatoid arthritis(714.0)  Stroke      Comment: lacunar infarct    Past Surgical History:  Anal fissure repair  1944: APPENDECTOMY  1992 approx: BREAST SURGERY      Comment: breast biopsies- benign  2009: CAROTID  ENDARTERECTOMY      Comment: left  CATARACT EXTRACTION  2012: COLONOSCOPY  12/11/2015: COLONOSCOPY N/A      Comment: Procedure: COLONOSCOPY;  Surgeon: Gavin Escobedo MD;  Location: [facility];  Service:                Endoscopy;  Laterality: N/A;  2004, 2005: EYE SURGERY      Comment: bilateral cataracts  2001, 2002: HERNIA REPAIR      Comment: abdominal x2, with mesh on second  1963: HYSTERECTOMY      Comment: vag hyst  2008: JOINT REPLACEMENT      Comment: right knee  yag Left    Review of patient's family history indicates:    Cancer                         Mother                      Comment: pancreas    Glaucoma                       Mother                    Cancer                         Father                      Comment: lung    Cancer                         Son                         Comment: melanoma metastatic    Heart disease                  Brother                   Diabetes                       Sister                    Stroke                         Sister                    Blindness                      Sister                    Cataracts                      Sister                    Heart disease                  Brother                   Hyperlipidemia                 Neg Hx                    Hypertension                   Neg Hx                    Macular degeneration           Neg Hx                    Retinal detachment             Neg Hx                    Strabismus                     Neg Hx                    Thyroid disease                Neg Hx                    Colon cancer                   Neg Hx                    Stomach cancer                 Neg Hx                      Current Outpatient Prescriptions on File Prior to Visit:  alcohol swabs PadM, Apply 1 each topically 2 (two) times daily., Disp: 200 each, Rfl: 3  blood glucose control, high Soln, by Misc.(Non-Drug; Combo Route) route., Disp: , Rfl:   blood glucose control, normal Soln, Use as directed., Disp:  1 each, Rfl: 1  blood sugar diagnostic Strp, Glucose testing daily., Disp: 100 strip, Rfl: 3   blood-glucose meter Misc, Use as directed., Disp: 1 each, Rfl: 0  cetirizine (ZYRTEC) 10 MG tablet, Take 1 tablet (10 mg total) by mouth once daily., Disp: 30 tablet, Rfl: 0  fluticasone (FLONASE) 50 mcg/actuation nasal spray, 2 sprays by Each Nare route once daily., Disp: 1 Bottle, Rfl: 0  folic acid (FOLVITE) 800 MCG Tab, TAKE 1 TABLET TWICE DAILY, Disp: 180 tablet, Rfl: 3  furosemide (LASIX) 20 MG tablet, Take 1 tablet (20 mg total) by mouth every Mon, Wed, Fri., Disp: 36 tablet, Rfl: 3  lancets (LANCETS,THIN) Misc, Twice daily glucose testing., Disp: 200 each, Rfl: 3  latanoprost 0.005 % ophthalmic solution, INSTILL 1 DROP INTO BOTH EYES EVERY EVENING, Disp: 3 Bottle, Rfl: 6  levothyroxine (SYNTHROID) 50 MCG tablet, TAKE 1 TABLET BEFORE BREAKFAST, Disp: 90 tablet, Rfl: 0  magnesium 250 mg Tab, Take 1 tablet by mouth Daily., Disp: , Rfl:   metformin (GLUCOPHAGE) 1000 MG tablet, Take 1 tablet (1,000 mg total) by mouth 2 (two) times daily with meals., Disp: 180 tablet, Rfl: 3  methotrexate 2.5 MG Tab, TAKE 4 TABLETS IN MORNING AND 4 TABLETS AT NIGHT ONE TIME WEEKLY, Disp: 96 tablet, Rfl: 1  omeprazole (PRILOSEC) 20 MG capsule, TAKE 1 CAPSULE EVERY DAY, Disp: 90 capsule, Rfl: 1  pravastatin (PRAVACHOL) 40 MG tablet, TAKE 1 TABLET EVERY DAY, Disp: 90 tablet, Rfl: 1  predniSONE (DELTASONE) 1 MG tablet, TAKE 1 TABLET TWICE DAILY  OR  AS  DIRECTED, Disp: 180 tablet, Rfl: 3  timolol maleate 0.5% (TIMOPTIC) 0.5 % Drop, INSTILL 1 DROP INTO BOTH EYES EVERY MORNING, Disp: 15 mL, Rfl: 12  traZODone (DESYREL) 100 MG tablet, TAKE 1 TABLET EVERY EVENING, Disp: 90 tablet, Rfl: 1  benazepril (LOTENSIN) 5 MG tablet, Take 1 tablet (5 mg total) by mouth once daily., Disp: 90 tablet, Rfl: 3    Allergies:  Review of patient's allergies indicates:   -- Tetanus vaccines and toxoid     --  Other reaction(s): Swelling             Tetanus-horse  serum: 30 years ago   -- Adhes. band-tape-benzalkonium -- Rash                Review of Systems   Constitutional: Negative for fever and unexpected weight change.   Respiratory: Negative for cough and shortness of breath.    Cardiovascular: Negative for chest pain and leg swelling.   Gastrointestinal: Negative for abdominal pain.   Genitourinary: Negative for difficulty urinating.   Neurological: Negative for dizziness, numbness and headaches.       Objective:      Physical Exam   Constitutional: She is oriented to person, place, and time. She appears well-developed and well-nourished. No distress.   Eyes: No scleral icterus.   Cardiovascular: Normal rate, regular rhythm, normal heart sounds and intact distal pulses.    Pulses:       Dorsalis pedis pulses are 2+ on the right side, and 2+ on the left side.   Pulmonary/Chest: Effort normal and breath sounds normal. No respiratory distress. She has no wheezes. She has no rales.   Abdominal: Soft. Bowel sounds are normal.   Musculoskeletal: She exhibits no edema.        Right foot: There is no deformity.        Left foot: There is no deformity.   Feet:   Right Foot:   Protective Sensation: 4 sites tested. 4 sites sensed.   Skin Integrity: Positive for callus. Negative for ulcer.   Left Foot:   Protective Sensation: 4 sites tested. 4 sites sensed.   Skin Integrity: Positive for callus.   Neurological: She is alert and oriented to person, place, and time.   Skin: Skin is warm and dry.   Psychiatric: She has a normal mood and affect.   Vitals reviewed.      Assessment:       1. Hypertension associated with diabetes    2. Controlled type 2 diabetes mellitus with stage 3 chronic kidney disease, without long-term current use of insulin    3. Combined hyperlipidemia associated with type 2 diabetes mellitus    4. Diastolic dysfunction    5. Hypothyroidism (acquired)        Plan:       Yovani was seen today for annual exam.    Diagnoses and all orders for this  visit:    Hypertension associated with diabetes  -     Comprehensive metabolic panel; Standing  -     Hemoglobin A1c; Standing  -     Lipid panel; Standing  -     benazepril (LOTENSIN) 5 MG tablet; Take 1 tablet (5 mg total) by mouth once daily.    Controlled type 2 diabetes mellitus with stage 3 chronic kidney disease, without long-term current use of insulin  -     Comprehensive metabolic panel; Standing  -     Hemoglobin A1c; Standing  -     Lipid panel; Standing  -     benazepril (LOTENSIN) 5 MG tablet; Take 1 tablet (5 mg total) by mouth once daily.    Combined hyperlipidemia associated with type 2 diabetes mellitus  -     Comprehensive metabolic panel; Standing  -     Hemoglobin A1c; Standing  -     Lipid panel; Standing  -     Hepatic function panel; Future    Diastolic dysfunction  -     Continue furosemide    Hypothyroidism (acquired)  -     TSH; Standing    Schedule labs.     RTC annually and as needed.

## 2018-04-14 DIAGNOSIS — D50.0 IRON DEFICIENCY ANEMIA DUE TO CHRONIC BLOOD LOSS: Primary | ICD-10-CM

## 2018-04-16 ENCOUNTER — LAB VISIT (OUTPATIENT)
Dept: LAB | Facility: HOSPITAL | Age: 83
End: 2018-04-16
Attending: INTERNAL MEDICINE
Payer: MEDICARE

## 2018-04-16 DIAGNOSIS — E11.22 CONTROLLED TYPE 2 DIABETES MELLITUS WITH STAGE 3 CHRONIC KIDNEY DISEASE, WITHOUT LONG-TERM CURRENT USE OF INSULIN: ICD-10-CM

## 2018-04-16 DIAGNOSIS — N18.30 CONTROLLED TYPE 2 DIABETES MELLITUS WITH STAGE 3 CHRONIC KIDNEY DISEASE, WITHOUT LONG-TERM CURRENT USE OF INSULIN: ICD-10-CM

## 2018-04-16 DIAGNOSIS — E03.9 HYPOTHYROIDISM (ACQUIRED): ICD-10-CM

## 2018-04-16 DIAGNOSIS — E11.59 HYPERTENSION ASSOCIATED WITH DIABETES: ICD-10-CM

## 2018-04-16 DIAGNOSIS — E11.69 COMBINED HYPERLIPIDEMIA ASSOCIATED WITH TYPE 2 DIABETES MELLITUS: ICD-10-CM

## 2018-04-16 DIAGNOSIS — I15.2 HYPERTENSION ASSOCIATED WITH DIABETES: ICD-10-CM

## 2018-04-16 DIAGNOSIS — E78.2 COMBINED HYPERLIPIDEMIA ASSOCIATED WITH TYPE 2 DIABETES MELLITUS: ICD-10-CM

## 2018-04-16 LAB
ALBUMIN SERPL BCP-MCNC: 2.9 G/DL
ALP SERPL-CCNC: 91 U/L
ALT SERPL W/O P-5'-P-CCNC: 12 U/L
ANION GAP SERPL CALC-SCNC: 14 MMOL/L
AST SERPL-CCNC: 18 U/L
BILIRUB SERPL-MCNC: 0.4 MG/DL
BUN SERPL-MCNC: 28 MG/DL
CALCIUM SERPL-MCNC: 8.2 MG/DL
CHLORIDE SERPL-SCNC: 104 MMOL/L
CHOLEST SERPL-MCNC: 128 MG/DL
CHOLEST/HDLC SERPL: 2.3 {RATIO}
CO2 SERPL-SCNC: 27 MMOL/L
CREAT SERPL-MCNC: 1.2 MG/DL
EST. GFR  (AFRICAN AMERICAN): 47.6 ML/MIN/1.73 M^2
EST. GFR  (NON AFRICAN AMERICAN): 41.3 ML/MIN/1.73 M^2
GLUCOSE SERPL-MCNC: 134 MG/DL
HDLC SERPL-MCNC: 56 MG/DL
HDLC SERPL: 43.8 %
LDLC SERPL CALC-MCNC: 51.4 MG/DL
NONHDLC SERPL-MCNC: 72 MG/DL
POTASSIUM SERPL-SCNC: 4.4 MMOL/L
PROT SERPL-MCNC: 7.1 G/DL
SODIUM SERPL-SCNC: 145 MMOL/L
TRIGL SERPL-MCNC: 103 MG/DL
TSH SERPL DL<=0.005 MIU/L-ACNC: 3.19 UIU/ML

## 2018-04-16 PROCEDURE — 80053 COMPREHEN METABOLIC PANEL: CPT

## 2018-04-16 PROCEDURE — 36415 COLL VENOUS BLD VENIPUNCTURE: CPT | Mod: PO

## 2018-04-16 PROCEDURE — 84443 ASSAY THYROID STIM HORMONE: CPT

## 2018-04-16 PROCEDURE — 83036 HEMOGLOBIN GLYCOSYLATED A1C: CPT

## 2018-04-16 PROCEDURE — 80061 LIPID PANEL: CPT

## 2018-04-17 LAB
ESTIMATED AVG GLUCOSE: 108 MG/DL
HBA1C MFR BLD HPLC: 5.4 %

## 2018-04-23 ENCOUNTER — LAB VISIT (OUTPATIENT)
Dept: LAB | Facility: HOSPITAL | Age: 83
End: 2018-04-23
Attending: INTERNAL MEDICINE
Payer: MEDICARE

## 2018-04-23 DIAGNOSIS — D50.0 IRON DEFICIENCY ANEMIA DUE TO CHRONIC BLOOD LOSS: ICD-10-CM

## 2018-04-23 LAB
BASOPHILS # BLD AUTO: 0.01 K/UL
BASOPHILS NFR BLD: 0.2 %
DIFFERENTIAL METHOD: ABNORMAL
EOSINOPHIL # BLD AUTO: 0.1 K/UL
EOSINOPHIL NFR BLD: 1.1 %
ERYTHROCYTE [DISTWIDTH] IN BLOOD BY AUTOMATED COUNT: 16.6 %
FERRITIN SERPL-MCNC: 1831 NG/ML
HCT VFR BLD AUTO: 33 %
HGB BLD-MCNC: 9.9 G/DL
IMM GRANULOCYTES # BLD AUTO: 0.01 K/UL
IMM GRANULOCYTES NFR BLD AUTO: 0.2 %
IRON SERPL-MCNC: 75 UG/DL
LYMPHOCYTES # BLD AUTO: 0.7 K/UL
LYMPHOCYTES NFR BLD: 10.5 %
MCH RBC QN AUTO: 31.3 PG
MCHC RBC AUTO-ENTMCNC: 30 G/DL
MCV RBC AUTO: 104 FL
MONOCYTES # BLD AUTO: 0.3 K/UL
MONOCYTES NFR BLD: 4 %
NEUTROPHILS # BLD AUTO: 5.5 K/UL
NEUTROPHILS NFR BLD: 84 %
NRBC BLD-RTO: 0 /100 WBC
PLATELET # BLD AUTO: 398 K/UL
PMV BLD AUTO: 9.3 FL
RBC # BLD AUTO: 3.16 M/UL
SATURATED IRON: 29 %
TOTAL IRON BINDING CAPACITY: 256 UG/DL
TRANSFERRIN SERPL-MCNC: 173 MG/DL
WBC # BLD AUTO: 6.57 K/UL

## 2018-04-23 PROCEDURE — 36415 COLL VENOUS BLD VENIPUNCTURE: CPT | Mod: PO

## 2018-04-23 PROCEDURE — 82728 ASSAY OF FERRITIN: CPT

## 2018-04-23 PROCEDURE — 85025 COMPLETE CBC W/AUTO DIFF WBC: CPT

## 2018-04-23 PROCEDURE — 83540 ASSAY OF IRON: CPT

## 2018-04-24 LAB — FUNGUS SPEC CULT: NORMAL

## 2018-04-30 ENCOUNTER — OFFICE VISIT (OUTPATIENT)
Dept: HEMATOLOGY/ONCOLOGY | Facility: CLINIC | Age: 83
End: 2018-04-30
Payer: MEDICARE

## 2018-04-30 ENCOUNTER — CLINICAL SUPPORT (OUTPATIENT)
Dept: CARDIOLOGY | Facility: CLINIC | Age: 83
End: 2018-04-30
Attending: INTERNAL MEDICINE
Payer: MEDICARE

## 2018-04-30 VITALS
SYSTOLIC BLOOD PRESSURE: 124 MMHG | BODY MASS INDEX: 24.92 KG/M2 | HEART RATE: 74 BPM | TEMPERATURE: 98 F | RESPIRATION RATE: 18 BRPM | WEIGHT: 132 LBS | DIASTOLIC BLOOD PRESSURE: 78 MMHG | HEIGHT: 61 IN | OXYGEN SATURATION: 98 %

## 2018-04-30 DIAGNOSIS — D47.2 MONOCLONAL GAMMOPATHY OF UNDETERMINED SIGNIFICANCE: ICD-10-CM

## 2018-04-30 DIAGNOSIS — D63.1 ANEMIA OF CHRONIC RENAL FAILURE, STAGE 3 (MODERATE): Primary | ICD-10-CM

## 2018-04-30 DIAGNOSIS — N18.30 ANEMIA OF CHRONIC RENAL FAILURE, STAGE 3 (MODERATE): Primary | ICD-10-CM

## 2018-04-30 DIAGNOSIS — N18.30 STAGE 3 CHRONIC KIDNEY DISEASE: ICD-10-CM

## 2018-04-30 DIAGNOSIS — I50.9 CONGESTIVE HEART FAILURE: ICD-10-CM

## 2018-04-30 DIAGNOSIS — D50.0 IRON DEFICIENCY ANEMIA DUE TO CHRONIC BLOOD LOSS: ICD-10-CM

## 2018-04-30 LAB
ESTIMATED PA SYSTOLIC PRESSURE: 34.81
RETIRED EF AND QEF - SEE NOTES: 60 (ref 55–65)
TRICUSPID VALVE REGURGITATION: NORMAL

## 2018-04-30 PROCEDURE — 99999 PR PBB SHADOW E&M-EST. PATIENT-LVL III: CPT | Mod: PBBFAC,,, | Performed by: INTERNAL MEDICINE

## 2018-04-30 PROCEDURE — 93306 TTE W/DOPPLER COMPLETE: CPT | Mod: S$GLB,,, | Performed by: NUCLEAR MEDICINE

## 2018-04-30 PROCEDURE — 3078F DIAST BP <80 MM HG: CPT | Mod: CPTII,S$GLB,, | Performed by: INTERNAL MEDICINE

## 2018-04-30 PROCEDURE — 99499 UNLISTED E&M SERVICE: CPT | Mod: S$GLB,,, | Performed by: INTERNAL MEDICINE

## 2018-04-30 PROCEDURE — 99214 OFFICE O/P EST MOD 30 MIN: CPT | Mod: S$GLB,,, | Performed by: INTERNAL MEDICINE

## 2018-04-30 PROCEDURE — 3074F SYST BP LT 130 MM HG: CPT | Mod: CPTII,S$GLB,, | Performed by: INTERNAL MEDICINE

## 2018-04-30 NOTE — PROGRESS NOTES
Subjective:       Patient ID: Yovani Pulido is a 85 y.o. female.    Chief Complaint: Follow-up; Results; Chronic Renal Failure; and Anemia    HPI 85-year-old female history of anemia secondary to chronic renal failure as well as recurrent iron deficiency anemia returns for review    Past Medical History:   Diagnosis Date    Anemia     Carotid stenosis     Cataract     COAG (chronic open-angle glaucoma)     Colon polyps     Diabetes mellitus type II     steroid induced    Diverticulosis     GERD (gastroesophageal reflux disease)     hiatal hernia    Glaucoma     Heart murmur     Hyperlipidemia     Hypertension     Hypothyroidism     Rheumatoid arthritis(714.0)     Stroke     lacunar infarct     Family History   Problem Relation Age of Onset    Cancer Mother      pancreas    Glaucoma Mother     Cancer Father      lung    Cancer Son 61     melanoma metastatic    Heart disease Brother     Diabetes Sister     Stroke Sister     Blindness Sister     Cataracts Sister     Heart disease Brother     Hyperlipidemia Neg Hx     Hypertension Neg Hx     Macular degeneration Neg Hx     Retinal detachment Neg Hx     Strabismus Neg Hx     Thyroid disease Neg Hx     Colon cancer Neg Hx     Stomach cancer Neg Hx      Social History     Social History    Marital status:      Spouse name: N/A    Number of children: 4    Years of education: N/A     Occupational History    Not on file.     Social History Main Topics    Smoking status: Former Smoker     Packs/day: 3.00     Years: 50.00     Start date: 1/1/1948     Quit date: 12/12/1998    Smokeless tobacco: Former User    Alcohol use No    Drug use: No    Sexual activity: No     Other Topics Concern    Not on file     Social History Narrative    , then remarried.  after 2-3 weeks. They have still been living together for 31 years. He is 95 now. They are no longer sexually active. Caffeine intake 4 cups coffee daily-  though has changed to decaf recently. Does have a living will.      Past Surgical History:   Procedure Laterality Date    anal fissure repair      APPENDECTOMY  1944    BREAST SURGERY  1992 approx    breast biopsies- benign    CAROTID ENDARTERECTOMY  2009    left    CATARACT EXTRACTION      COLONOSCOPY  2012    COLONOSCOPY N/A 12/11/2015    Procedure: COLONOSCOPY;  Surgeon: Gavin Escobedo MD;  Location: [facility];  Service: Endoscopy;  Laterality: N/A;    EYE SURGERY  2004, 2005    bilateral cataracts    HERNIA REPAIR  2001, 2002    abdominal x2, with mesh on second    HYSTERECTOMY  1963    vag hyst    JOINT REPLACEMENT  2008    right knee    yag Left        Labs:  Lab Results   Component Value Date    WBC 6.57 04/23/2018    HGB 9.9 (L) 04/23/2018    HCT 33.0 (L) 04/23/2018     (H) 04/23/2018     (H) 04/23/2018     BMP  Lab Results   Component Value Date     04/16/2018    K 4.4 04/16/2018     04/16/2018    CO2 27 04/16/2018    BUN 28 (H) 04/16/2018    CREATININE 1.2 04/16/2018    CALCIUM 8.2 (L) 04/16/2018    ANIONGAP 14 04/16/2018    ESTGFRAFRICA 47.6 (A) 04/16/2018    EGFRNONAA 41.3 (A) 04/16/2018     Lab Results   Component Value Date    ALT 12 04/16/2018    AST 18 04/16/2018    ALKPHOS 91 04/16/2018    BILITOT 0.4 04/16/2018       Lab Results   Component Value Date    IRON 75 04/23/2018    TIBC 256 04/23/2018    FERRITIN 1,831 (H) 04/23/2018     Lab Results   Component Value Date    BILUNNPK27 554 09/19/2016     Lab Results   Component Value Date    FOLATE > 40.0 (H) 04/15/2013     Lab Results   Component Value Date    TSH 3.187 04/16/2018         Review of Systems   Constitutional: Positive for activity change. Negative for appetite change, chills, diaphoresis, fatigue, fever and unexpected weight change.   HENT: Negative for congestion, dental problem, drooling, ear discharge, ear pain, facial swelling, hearing loss, mouth sores, nosebleeds, postnasal drip, rhinorrhea,  sinus pressure, sneezing, sore throat, tinnitus, trouble swallowing and voice change.    Eyes: Negative for photophobia, pain, discharge, redness, itching and visual disturbance.   Respiratory: Negative for cough, choking, chest tightness, shortness of breath, wheezing and stridor.    Cardiovascular: Negative for chest pain, palpitations and leg swelling.   Gastrointestinal: Negative for abdominal distention, abdominal pain, anal bleeding, blood in stool, constipation, diarrhea, nausea, rectal pain and vomiting.   Endocrine: Negative for cold intolerance, heat intolerance, polydipsia, polyphagia and polyuria.   Genitourinary: Negative for decreased urine volume, difficulty urinating, dyspareunia, dysuria, enuresis, flank pain, frequency, genital sores, hematuria, menstrual problem, pelvic pain, urgency, vaginal bleeding, vaginal discharge and vaginal pain.   Musculoskeletal: Negative for arthralgias, back pain, gait problem, joint swelling, myalgias, neck pain and neck stiffness.   Skin: Negative for color change, pallor and rash.   Allergic/Immunologic: Negative for environmental allergies, food allergies and immunocompromised state.   Neurological: Negative for dizziness, tremors, seizures, syncope, facial asymmetry, speech difficulty, weakness, light-headedness, numbness and headaches.   Hematological: Negative for adenopathy. Does not bruise/bleed easily.   Psychiatric/Behavioral: Positive for dysphoric mood. Negative for agitation, behavioral problems, confusion, decreased concentration, hallucinations, self-injury, sleep disturbance and suicidal ideas. The patient is nervous/anxious. The patient is not hyperactive.        Objective:      Physical Exam   Constitutional: She is oriented to person, place, and time. She appears well-developed and well-nourished. She appears distressed.   HENT:   Head: Normocephalic and atraumatic.   Right Ear: External ear normal.   Left Ear: External ear normal.   Nose: Nose  normal. Right sinus exhibits no maxillary sinus tenderness and no frontal sinus tenderness. Left sinus exhibits no maxillary sinus tenderness and no frontal sinus tenderness.   Mouth/Throat: Oropharynx is clear and moist. No oropharyngeal exudate.   Eyes: Conjunctivae, EOM and lids are normal. Pupils are equal, round, and reactive to light. Right eye exhibits no discharge. Left eye exhibits no discharge. Right conjunctiva is not injected. Right conjunctiva has no hemorrhage. Left conjunctiva is not injected. Left conjunctiva has no hemorrhage. No scleral icterus.   Neck: Normal range of motion. Neck supple. No JVD present. No tracheal deviation present. No thyromegaly present.   Cardiovascular: Normal rate and regular rhythm.    Pulmonary/Chest: Effort normal. No stridor. No respiratory distress. She exhibits no tenderness.   Abdominal: Soft. She exhibits no distension and no mass. There is no splenomegaly or hepatomegaly. There is no tenderness. There is no rebound.   Musculoskeletal: Normal range of motion. She exhibits no edema or tenderness.   Lymphadenopathy:     She has no cervical adenopathy.     She has no axillary adenopathy.        Right: No supraclavicular adenopathy present.        Left: No supraclavicular adenopathy present.   Neurological: She is alert and oriented to person, place, and time. No cranial nerve deficit. Coordination normal.   Skin: Skin is dry. No rash noted. She is not diaphoretic. No erythema.   Psychiatric: She has a normal mood and affect. Her behavior is normal. Judgment and thought content normal.   Vitals reviewed.          Assessment:      1. Anemia of chronic renal failure, stage 3 (moderate)    2. Iron deficiency anemia due to chronic blood loss    3. Stage 3 chronic kidney disease    4. Monoclonal gammopathy of undetermined significance           Plan:   Patient's hemoglobin is relatively stable creatinine clearances stable over 3 years iron repleted recommend patient be seen  by renal medicine to establish stability of renal function may be of benefit for erythropoietin at some point in the future

## 2018-05-04 ENCOUNTER — TELEPHONE (OUTPATIENT)
Dept: PULMONOLOGY | Facility: CLINIC | Age: 83
End: 2018-05-04

## 2018-05-04 NOTE — TELEPHONE ENCOUNTER
----- Message from Alex Hallman MD sent at 5/3/2018 10:12 PM CDT -----  Please call patient and schedule a clinic appointment to discuss test results

## 2018-05-08 ENCOUNTER — TELEPHONE (OUTPATIENT)
Dept: NEPHROLOGY | Facility: CLINIC | Age: 83
End: 2018-05-08

## 2018-05-08 NOTE — TELEPHONE ENCOUNTER
----- Message from Sandrine Frey LPN sent at 5/8/2018 11:04 AM CDT -----  This pt has a referral from 4-30-18. Can someone please call the pt to schedule with one of the staff doctors?  Thank you  ----- Message -----  From: Tonya Trevino  Sent: 5/8/2018  10:21 AM  To: Arold[facility]ARR[facility] Staff    Pt at [phone]//states she saw Dr last Monday//he suggested she see a kidney dr//has questions//please call//thanks/ll

## 2018-05-14 ENCOUNTER — PROCEDURE VISIT (OUTPATIENT)
Dept: PULMONOLOGY | Facility: CLINIC | Age: 83
End: 2018-05-14
Payer: MEDICARE

## 2018-05-14 ENCOUNTER — OFFICE VISIT (OUTPATIENT)
Dept: PULMONOLOGY | Facility: CLINIC | Age: 83
End: 2018-05-14
Payer: MEDICARE

## 2018-05-14 VITALS
HEIGHT: 61 IN | HEART RATE: 90 BPM | RESPIRATION RATE: 17 BRPM | SYSTOLIC BLOOD PRESSURE: 120 MMHG | WEIGHT: 136 LBS | DIASTOLIC BLOOD PRESSURE: 70 MMHG | OXYGEN SATURATION: 95 % | BODY MASS INDEX: 25.68 KG/M2

## 2018-05-14 DIAGNOSIS — G47.34 NOCTURNAL HYPOXEMIA: Primary | ICD-10-CM

## 2018-05-14 DIAGNOSIS — M05.79 RHEUMATOID ARTHRITIS INVOLVING MULTIPLE SITES WITH POSITIVE RHEUMATOID FACTOR: ICD-10-CM

## 2018-05-14 DIAGNOSIS — I27.21 PULMONARY ARTERY HYPERTENSION ASSOCIATED WITH CONNECTIVE TISSUE DISEASE: ICD-10-CM

## 2018-05-14 DIAGNOSIS — R63.4 WEIGHT LOSS, NON-INTENTIONAL: ICD-10-CM

## 2018-05-14 DIAGNOSIS — M35.9 PULMONARY ARTERY HYPERTENSION ASSOCIATED WITH CONNECTIVE TISSUE DISEASE: ICD-10-CM

## 2018-05-14 DIAGNOSIS — G47.34 NOCTURNAL HYPOXEMIA: ICD-10-CM

## 2018-05-14 DIAGNOSIS — R06.02 SOB (SHORTNESS OF BREATH): ICD-10-CM

## 2018-05-14 DIAGNOSIS — J44.9 CHRONIC OBSTRUCTIVE PULMONARY DISEASE, UNSPECIFIED COPD TYPE: ICD-10-CM

## 2018-05-14 DIAGNOSIS — J90 PLEURAL EFFUSION ON LEFT: ICD-10-CM

## 2018-05-14 PROCEDURE — 99499 UNLISTED E&M SERVICE: CPT | Mod: S$GLB,,, | Performed by: INTERNAL MEDICINE

## 2018-05-14 PROCEDURE — 99215 OFFICE O/P EST HI 40 MIN: CPT | Mod: 25,S$GLB,, | Performed by: INTERNAL MEDICINE

## 2018-05-14 PROCEDURE — 3074F SYST BP LT 130 MM HG: CPT | Mod: CPTII,S$GLB,, | Performed by: INTERNAL MEDICINE

## 2018-05-14 PROCEDURE — 3078F DIAST BP <80 MM HG: CPT | Mod: CPTII,S$GLB,, | Performed by: INTERNAL MEDICINE

## 2018-05-14 PROCEDURE — 94762 N-INVAS EAR/PLS OXIMTRY CONT: CPT | Mod: S$GLB,,, | Performed by: INTERNAL MEDICINE

## 2018-05-14 PROCEDURE — 99999 PR PBB SHADOW E&M-EST. PATIENT-LVL V: CPT | Mod: PBBFAC,,, | Performed by: INTERNAL MEDICINE

## 2018-05-14 NOTE — PROGRESS NOTES
Subjective:       Patient ID: Yovani Pulido is a 85 y.o. female.     Chief Complaint: She       pleural effusion on left    HPI     No energy, feels weak    Sleep Apnea  She presents for a sleep evaluation. She complains of tossing and turning, decreased memory, decreased concentration, excessive daytime sleepiness, falling asleep while reading, watching television, awakening in the middle of the night because of leg cramps, urination, difficulty falling asleep once awakened, feels sleepy during the day.  Symptoms began 10 years ago, gradually worsening since that time.  She goes to sleep at 9 weekdays and  weekends. She awakens 4:30 weekdays and  weekends. She falls asleep in 10 minutes.  Collar size na. She denies snorting. Previous evaluation and treatment has included none.    Takes sleeping pill at night - awakens multiple times  Aound 4:30 , broken sleep throughout night        Past Medical History:   Diagnosis Date    Anemia     Carotid stenosis     Cataract     COAG (chronic open-angle glaucoma)     Colon polyps     Diabetes mellitus type II     steroid induced    Diverticulosis     GERD (gastroesophageal reflux disease)     hiatal hernia    Glaucoma     Heart murmur     Hyperlipidemia     Hypertension     Hypothyroidism     Rheumatoid arthritis(714.0)     Stroke     lacunar infarct     Past Surgical History:   Procedure Laterality Date    anal fissure repair      APPENDECTOMY  1944    BREAST SURGERY  1992 approx    breast biopsies- benign    CAROTID ENDARTERECTOMY  2009    left    CATARACT EXTRACTION      COLONOSCOPY  2012    COLONOSCOPY N/A 12/11/2015    Procedure: COLONOSCOPY;  Surgeon: Gavin Escobedo MD;  Location: [facility];  Service: Endoscopy;  Laterality: N/A;    EYE SURGERY  2004, 2005    bilateral cataracts    HERNIA REPAIR  2001, 2002    abdominal x2, with mesh on second    HYSTERECTOMY  1963    vag hyst    JOINT REPLACEMENT  2008    right knee    yag Left       Social History     Social History    Marital status:      Spouse name: N/A    Number of children: 4    Years of education: N/A     Occupational History    Not on file.     Social History Main Topics    Smoking status: Former Smoker     Packs/day: 3.00     Years: 50.00     Start date: 1/1/1948     Quit date: 12/12/1998    Smokeless tobacco: Former User    Alcohol use No    Drug use: No    Sexual activity: No     Other Topics Concern    Not on file     Social History Narrative    , then remarried.  after 2-3 weeks. They have still been living together for 31 years. He is 89 now. They are no longer sexually active. Caffeine intake 4 cups coffee daily- though has changed to decaf recently. Does have a living will.      Review of Systems   Constitutional: Positive for fatigue.   Respiratory: Positive for apnea, shortness of breath, orthopnea and asthma nighttime symptoms.    Psychiatric/Behavioral: Positive for sleep disturbance.   All other systems reviewed and are negative.      Objective:      Physical Exam   Constitutional: She is oriented to person, place, and time. She appears well-developed and well-nourished.   HENT:   Head: Normocephalic and atraumatic.   Eyes: Conjunctivae are normal. Pupils are equal, round, and reactive to light.   Neck: Neck supple. No JVD present. No tracheal deviation present. No thyromegaly present.   Cardiovascular: Normal rate, regular rhythm and normal heart sounds.    Pulmonary/Chest: Effort normal. No respiratory distress. She has decreased breath sounds. She has no wheezes. She has rales. She exhibits no tenderness.   Abdominal: Soft. Bowel sounds are normal.   Musculoskeletal: Normal range of motion. She exhibits no edema.   Lymphadenopathy:     She has no cervical adenopathy.   Neurological: She is alert and oriented to person, place, and time.   Skin: Skin is warm and dry.   Nursing note and vitals reviewed.    Personal Diagnostic Review  Chest  x-ray: pleural effusion on the left is smaller  Office Spirometry Results:zNA      CONCLUSIONS ECHOCARDIOGRAPHY    1 - Moderate left atrial enlargement.     2 - Concentric hypertrophy.     3 - No wall motion abnormalities.     4 - Normal left ventricular systolic function (EF 60-65%).     5 - Normal right ventricular systolic function .     6 - The estimated PA systolic pressure is 35 mmHg.     7 - Mild tricuspid regurgitation.   This document has been electronically    SIGNED BY: Serge Barrios MD On: 04/30/2018 14:26    OVERNIGHT O2 SAT:  INTERPRETATION:  Conditions of Test: Room air stable outpatient.  Interpretation of results of overnight oxygen saturation study. This was a technically  adequate study. Overnight oxygen saturation study is abnormal with O2 saturation less than 88%.   Time with SpO2<88: 0:05:36, 0.9% of the study period. Lowest oxygen saturation recorded was 83%.  Clinical correlation suggested.  Alex Hallman MD        No flowsheet data found.  Pulmonary Studies Review 5/14/2018   SpO2 95   Ordering Provider -   Interpreting Provider -   Performing nurse/tech/RT -   Diagnosis -   Height 61.000   Weight 2176.38   BMI (Calculated) 25.8   Predicted Distance 221.46   Patient Race -   6MWT Status -   Patient Reported -   Was O2 used? -   6MW Distance walked (feet) -   Distance walked (meters) -   Did patient stop? -   Type of assistive device(s) used? -   Is extra documentation required for this patient? -   Oxygen Saturation -   Supplemental Oxygen -   Heart Rate -   Blood Pressure -   Marissa Dyspnea Rating  -   Oxygen Saturation -   Supplemental Oxygen -   Heart Rate -   Blood Pressure -   Marissa Dyspnea Rating  -   Recovery Time (seconds) -   Oxygen Saturation -   Supplemental Oxygen -   Heart Rate -   Blood Pressure -   Marissa Dyspnea Rating  -   Is procedure ready for interpretation? -   Did the patient stop or pause? -   Total Time Walked (Calculated) -   Predicted Distance Meters (Calculated)  361.25   Has The Patient Had a Previous Six Minute Walk Test? -   Oxygen Qualification? -         Assessment:       1. Nocturnal hypoxemia    2. Pleural effusion on left    3. Rheumatoid arthritis involving multiple sites with positive rheumatoid factor    4. Pulmonary artery hypertension associated with connective tissue disease    5. Weight loss, non-intentional    6. SOB (shortness of breath)    7. Chronic obstructive pulmonary disease, unspecified COPD type        Outpatient Encounter Prescriptions as of 5/14/2018   Medication Sig Dispense Refill    alcohol swabs PadM Apply 1 each topically 2 (two) times daily. 200 each 3    benazepril (LOTENSIN) 5 MG tablet Take 1 tablet (5 mg total) by mouth once daily. 90 tablet 3    blood glucose control, high Soln by Misc.(Non-Drug; Combo Route) route.      blood glucose control, normal Soln Use as directed. 1 each 1    blood sugar diagnostic Strp Glucose testing daily. 100 strip 3    blood-glucose meter Misc Use as directed. 1 each 0    cetirizine (ZYRTEC) 10 MG tablet Take 1 tablet (10 mg total) by mouth once daily. 30 tablet 0    fluticasone (FLONASE) 50 mcg/actuation nasal spray 2 sprays by Each Nare route once daily. 1 Bottle 0    folic acid (FOLVITE) 800 MCG Tab TAKE 1 TABLET TWICE DAILY 180 tablet 3    furosemide (LASIX) 20 MG tablet Take 1 tablet (20 mg total) by mouth every Mon, Wed, Fri. 36 tablet 3    lancets (LANCETS,THIN) Misc Twice daily glucose testing. 200 each 3    latanoprost 0.005 % ophthalmic solution INSTILL 1 DROP INTO BOTH EYES EVERY EVENING 3 Bottle 6    levothyroxine (SYNTHROID) 50 MCG tablet TAKE 1 TABLET BEFORE BREAKFAST 90 tablet 0    magnesium 250 mg Tab Take 1 tablet by mouth Daily.      metformin (GLUCOPHAGE) 1000 MG tablet Take 1 tablet (1,000 mg total) by mouth 2 (two) times daily with meals. 180 tablet 3    methotrexate 2.5 MG Tab TAKE 4 TABLETS IN MORNING AND 4 TABLETS AT NIGHT ONE TIME WEEKLY 96 tablet 1    omeprazole  "(PRILOSEC) 20 MG capsule TAKE 1 CAPSULE EVERY DAY 90 capsule 1    pravastatin (PRAVACHOL) 40 MG tablet TAKE 1 TABLET EVERY DAY 90 tablet 1    predniSONE (DELTASONE) 1 MG tablet TAKE 1 TABLET TWICE DAILY  OR  AS  DIRECTED 180 tablet 3    timolol maleate 0.5% (TIMOPTIC) 0.5 % Drop INSTILL 1 DROP INTO BOTH EYES EVERY MORNING 15 mL 12    traZODone (DESYREL) 100 MG tablet TAKE 1 TABLET EVERY EVENING 90 tablet 1     No facility-administered encounter medications on file as of 5/14/2018.      Orders Placed This Encounter   Procedures    OXYGEN FOR HOME USE     Order Specific Question:   Liter Flow     Answer:   2     Order Specific Question:   Duration     Answer:   With sleep     Order Specific Question:   Qualifying SpO2:     Answer:   83%     Order Specific Question:   Testing done at:     Answer:   Sleep     Order Specific Question:   Route     Answer:   nasal cannula     Order Specific Question:   Portable mode:     Answer:   pulse dose acceptable     Order Specific Question:   Device     Answer:   home concentrator     Order Specific Question:   Length of need (in months):     Answer:   99 mos     Order Specific Question:   Patient condition with qualifying saturation     Answer:   COPD     Order Specific Question:   Height:     Answer:   5' 1" (1.549 m)     Order Specific Question:   Weight:     Answer:   61.7 kg (136 lb 0.4 oz)     Order Specific Question:   Alternative treatment measures have been tried or considered and deemed clinically ineffective.     Answer:   Yes    X-Ray Chest 4 Or More View     Standing Status:   Future     Standing Expiration Date:   5/15/2019     Order Specific Question:   Reason for Exam:     Answer:   asbestosis    Complete PFT with bronchodilator     Standing Status:   Future     Standing Expiration Date:   11/14/2019    PULSE OXIMETRY OVERNIGHT     Standing Status:   Future     Number of Occurrences:   1     Standing Expiration Date:   5/14/2019     Order Specific Question:   " Reason for Test?     Answer:   Daytime Drowsiness     Comments:   Room Air     Order Specific Question:   Symptoms:     Answer:   Daytime Fatigue     Comments:   Nocturnal hypoxemia     Plan:       Requested Prescriptions      No prescriptions requested or ordered in this encounter     Nocturnal hypoxemia  -     PULSE OXIMETRY OVERNIGHT; Future  -     X-Ray Chest 4 Or More View; Future; Expected date: 05/14/2018  -     Complete PFT with bronchodilator; Future; Expected date: 05/14/2018  -     OXYGEN FOR HOME USE    Pleural effusion on left    Rheumatoid arthritis involving multiple sites with positive rheumatoid factor    Pulmonary artery hypertension associated with connective tissue disease  -     OXYGEN FOR HOME USE    Weight loss, non-intentional    SOB (shortness of breath)  -     X-Ray Chest 4 Or More View; Future; Expected date: 05/14/2018  -     Complete PFT with bronchodilator; Future; Expected date: 05/14/2018    Chronic obstructive pulmonary disease, unspecified COPD type  -     OXYGEN FOR HOME USE           Follow-up in about 1 year (around 5/14/2019) for Review PFT and CXR, Overnight O2 Sat ASAP.    MEDICAL DECISION MAKING: Moderate to high complexity.  Overall, the multiple problems listed are of moderate to high severity that may impact quality of life and activities of daily living. Side effects of medications, treatment plan as well as options and alternatives reviewed and discussed with patient. There was counseling of patient concerning these issues.    Total time spent in face to face counseling and coordination of care - 40  minutes over 50% of time was used in discussion of prognosis, risks, benefits of treatment, instructions and compliance with regimen . Discussion with other physicians or health care providers (DME, NP, pharmacy, respiratory therapy) occurred.

## 2018-05-15 NOTE — PROCEDURES
[facility]  [address] * [place] [address]  Telephone: [phone]  Test date: 18 Start: 18 20:23:53 Yovani Pulido  Doctor: Dr Hallman End: 05/15/18 07:03:29 [id number]  Oximetry: Summary Report  Comments: Room air  Recording time: 10:39:36 Highest pulse: 111 Highest SpO2: 98%  Excluded samplin:11:04 Lowest pulse: 75 Lowest SpO2: 83%  Total valid sampling: 10:28:32 Mean pulse: 86 Mean SpO2: 92.2%  1 S.D.: 4.4 1 S.D.: 1.9  Time with SpO2<90: 0:38:24, 6.1%  Time with SpO2<80: 0:00:00, 0.0%  Time with SpO2<70: 0:00:00, 0.0%  Time with SpO2<60: 0:00:00, 0.0%  Time with SpO2<88: 0:05:36, 0.9%  Time with SpO2 =>90: 9:50:08, 93.9%  Time with SpO2=>80 & <90: 0:38:24, 6.1%  Time with SpO2=>70 & <80: 0:00:00, 0.0%  Time with SpO2=>60 & <70: 0:00:00, 0.0%  The longest continuous time with saturation <=88 was 00:01:36, which started at  05/15/18 03:56:53.  A desaturation event was defined as a decrease of saturation by 4 or more.  One event was excluded due to artifact.  There were 8 desaturation events over 3 minutes duration.  There were 24 desaturation events of less than 3 minutes duration during which:  The mean high was 93.1%. The mean low was 88.4%.  The number of these events that were:  > 0 & <10 seconds: 3 > 0 seconds: 24  =>10 & <20 seconds: 2 =>10 seconds: 21  =>20 & <30 seconds: 1 =>20 seconds: 19  =>30 & <40 seconds: 1 =>30 seconds: 18  =>40 & <50 seconds: 6 =>40 seconds: 17  =>50 & <60 seconds: 0 =>50 seconds: 11  =>60 seconds: 11 =>60 seconds: 11  The mean length of desaturation events that were >=10 sec & <=3 mins was: 72.2 sec.  Desaturation event index (events >=10 sec per sampled hour): 2.0  Desaturation event index (events >= 0 sec per sampled hour): 2.3  © 2379-3586 [employer] Oximetry version Standard [id number].  Oximeter: [employer] 920M Plus memory, 4 second resolution.    Overnight Oxygen Saturation Study:  NTERPRETATION:  Conditions of Test:  Room air stable outpatient.  Interpretation of results of overnight oxygen saturation study. This was a technically  adequate study. Overnight oxygen saturation study is abnormal with O2 saturation less than 88%.   Time with SpO2<88: 0:05:36, 0.9% of the study period. Lowest oxygen saturation recorded was 83%.  Clinical correlation suggested.  Alex Hallman MD      [employer] Criteria Comments:   Oximetry test results suggest the patient does  fall under Medicare Group 1 Criteria.   (Arterial oxygen saturation at or below 88% for at least 5 minutes taken during sleep)    Details about Medicare Group Criteria coverage can be found at http://www.cms.hhs.gov/manuals/downloads/

## 2018-05-18 ENCOUNTER — OFFICE VISIT (OUTPATIENT)
Dept: NEPHROLOGY | Facility: CLINIC | Age: 83
End: 2018-05-18
Payer: MEDICARE

## 2018-05-18 VITALS
WEIGHT: 137.13 LBS | SYSTOLIC BLOOD PRESSURE: 134 MMHG | HEART RATE: 76 BPM | DIASTOLIC BLOOD PRESSURE: 64 MMHG | HEIGHT: 61 IN | BODY MASS INDEX: 25.89 KG/M2

## 2018-05-18 DIAGNOSIS — N18.30 ANEMIA OF CHRONIC RENAL FAILURE, STAGE 3 (MODERATE): ICD-10-CM

## 2018-05-18 DIAGNOSIS — N18.31 CHRONIC KIDNEY DISEASE (CKD) STAGE G3A/A1, MODERATELY DECREASED GLOMERULAR FILTRATION RATE (GFR) BETWEEN 45-59 ML/MIN/1.73 SQUARE METER AND ALBUMINURIA CREATININE RATIO LESS THAN 30 MG/G: Primary | ICD-10-CM

## 2018-05-18 DIAGNOSIS — D63.1 ANEMIA OF CHRONIC RENAL FAILURE, STAGE 3 (MODERATE): ICD-10-CM

## 2018-05-18 PROCEDURE — 3078F DIAST BP <80 MM HG: CPT | Mod: CPTII,S$GLB,, | Performed by: INTERNAL MEDICINE

## 2018-05-18 PROCEDURE — 99999 PR PBB SHADOW E&M-EST. PATIENT-LVL IV: CPT | Mod: PBBFAC,,, | Performed by: INTERNAL MEDICINE

## 2018-05-18 PROCEDURE — 3075F SYST BP GE 130 - 139MM HG: CPT | Mod: CPTII,S$GLB,, | Performed by: INTERNAL MEDICINE

## 2018-05-18 PROCEDURE — 99499 UNLISTED E&M SERVICE: CPT | Mod: S$GLB,,, | Performed by: INTERNAL MEDICINE

## 2018-05-18 PROCEDURE — 99203 OFFICE O/P NEW LOW 30 MIN: CPT | Mod: S$GLB,,, | Performed by: INTERNAL MEDICINE

## 2018-05-18 NOTE — PATIENT INSTRUCTIONS
one.  Chronic kidney disease stage 3:  Differential diagnosis include hypertension, type 2 diabetes, amyloidosis from rheumatoid arthritis, and dizziness property.  Since the patient has no recent history of proteinuria.  Do not need kidney biopsy at this time.  Check cystatin C for accurate GFR.  Screen for hyperparathyroidism and vitamin-D deficiency.  Patient continues to follow with Dr. Kendrick in the Hematology Division for anemia.  We will screen for renal artery stenosis.  Patient already had serum protein electrophoresis with immunofixation checked by Dr. Kendrick.    A void any nonsteroidals and IV dye.  Continue current medications.  Her hemoglobin A1c is running less than 5.5.  Consider stopping or lowering metformin at this time since she has > 15 ib weight loss in last one year ( Crohn's ?)   But the decision would be per primary care physician at this point.    Avoid nonsteroidals; no Advil or Motrin; okay to take Tylenol for pain; if needs stronger pain medications please let us know

## 2018-05-18 NOTE — PROGRESS NOTES
Subjective:       Patient ID: Yovani Pulido is a 85 y.o. White female who presents for new evaluation of Chronic Kidney Disease and Anemia    HPI     Patient is an 85-year-old female with longstanding history of rheumatoid arthritis with multiple medications.  Patient has had complications of anemia under care of Dr. Kendrick.  Patient has been followed by Rheumatology for her rheumatoid arthritis.  Patient has had chronic kidney disease stage 3 for many years.  Her creatinine has been fluctuating between 1.2 and 1.5 in the last 10 years.  Never had any proteinuria.    Patient's past medical history is also significant for type 2 diabetes maintained on metformin, IV with arthritis requiring steroids and methotrexate.  Also has history of carotid artery stenosis status post surgery causing  Old lacunar infarct: s/p graft ( necklace) to restore circulation in the right arm ( Dr. Blevins)     May 2018 patient seen for consultation for chronic kidney disease  Review of Systems   Constitutional: Positive for activity change, fatigue and unexpected weight change. Negative for appetite change, chills, diaphoresis and fever.   HENT: Negative for congestion, dental problem, drooling, postnasal drip, rhinorrhea and voice change.    Eyes: Negative for discharge.   Respiratory: Negative for apnea, cough, choking, chest tightness, shortness of breath, wheezing and stridor.    Cardiovascular: Negative for chest pain, palpitations and leg swelling.   Gastrointestinal: Negative for abdominal distention, blood in stool, constipation, diarrhea, nausea, rectal pain and vomiting.   Endocrine: Negative for cold intolerance, heat intolerance, polydipsia and polyuria.   Genitourinary: Negative for decreased urine volume, difficulty urinating, dysuria, enuresis, flank pain, frequency, hematuria and urgency.   Musculoskeletal: Negative for arthralgias, back pain, gait problem and joint swelling.   Skin: Negative for rash.  "  Allergic/Immunologic: Negative for food allergies and immunocompromised state.   Neurological: Negative for dizziness, tremors, syncope, numbness and headaches.   Hematological: Does not bruise/bleed easily.   Psychiatric/Behavioral: Negative for agitation, behavioral problems and self-injury. The patient is not nervous/anxious and is not hyperactive.    All other systems reviewed and are negative.      Objective:   /64   Pulse 76   Ht 5' 1" (1.549 m)   Wt 62.2 kg (137 lb 2 oz)   BMI 25.91 kg/m²      Physical Exam   Constitutional: She is oriented to person, place, and time. No distress.   HENT:   Head: Normocephalic and atraumatic.   Nose: Nose normal.   Eyes: Conjunctivae and EOM are normal. Pupils are equal, round, and reactive to light.   Neck: Normal range of motion. No JVD present. No tracheal deviation present. No thyromegaly present.   Cardiovascular: Normal rate, regular rhythm, normal heart sounds and intact distal pulses.  Exam reveals no gallop and no friction rub.    No murmur heard.  Necklace graft +B/T   Pulmonary/Chest: Effort normal and breath sounds normal. No respiratory distress. She has no wheezes. She has no rales. She exhibits no tenderness.   Left lung with low BS    Abdominal: Soft. Bowel sounds are normal. She exhibits no distension and no mass. There is no tenderness. No hernia.   Musculoskeletal: Normal range of motion. She exhibits no edema, tenderness or deformity.   Neurological: She is alert and oriented to person, place, and time. She has normal reflexes. She displays normal reflexes. No cranial nerve deficit. She exhibits normal muscle tone. Coordination normal.   Skin: Skin is warm. Capillary refill takes more than 3 seconds. She is not diaphoretic. No erythema. There is pallor.   Psychiatric: She has a normal mood and affect. Her behavior is normal. Judgment and thought content normal.   Nursing note and vitals reviewed.        Lab Results   Component Value Date    " CREATININE 1.2 04/16/2018    BUN 28 (H) 04/16/2018     04/16/2018    K 4.4 04/16/2018     04/16/2018    CO2 27 04/16/2018     Lab Results   Component Value Date    WBC 6.57 04/23/2018    HGB 9.9 (L) 04/23/2018    HCT 33.0 (L) 04/23/2018     (H) 04/23/2018     (H) 04/23/2018     Lab Results   Component Value Date    PTH 49 11/17/2008    CALCIUM 8.2 (L) 04/16/2018    PHOS 4.0 03/23/2012     @RESUFAST(URICACID)    Assessment:    )    1. Chronic kidney disease (CKD) stage G3a/A1, moderately decreased glomerular filtration rate (GFR) between 45-59 mL/min/1.73 square meter and albuminuria creatinine ratio less than 30 mg/g    2. Anemia of chronic renal failure, stage 3 (moderate)        Plan:         one.  Chronic kidney disease stage 3:  Differential diagnosis include hypertension, type 2 diabetes, amyloidosis from rheumatoid arthritis, and dizziness property.  Since the patient has no recent history of proteinuria.  Do not need kidney biopsy at this time.  Check cystatin C for accurate GFR.  Screen for hyperparathyroidism and vitamin-D deficiency.  Patient continues to follow with Dr. Kendrick in the Hematology Division for anemia.  We will screen for renal artery stenosis.  Patient already had serum protein electrophoresis with immunofixation checked by Dr. Kendrick.    A void any nonsteroidals and IV dye.  Continue current medications.  Her hemoglobin A1c is running less than 5.5.  Consider stopping or lowering metformin at this time since she has > 15 ib weight loss in last one year ( Crohn's ?)   But the decision would be per primary care physician at this point.      Follow-up 3 months

## 2018-05-18 NOTE — LETTER
May 18, 2018      Blas Kendrick MD  [address] [place] [address]           [facility] - Nephrology  [address] [place] [address]  Phone: [phone]  Fax: [phone]          Patient: Yovani Pulido   MR Number: [id number]   YOB: 1932   Date of Visit: 5/18/2018       Dear Dr. Blas Kendrick:    Thank you for referring Yovani Pulido to me for evaluation. Attached you will find relevant portions of my assessment and plan of care.    If you have questions, please do not hesitate to call me. I look forward to following Yovani Pulido along with you.    Sincerely,    Ann Marie Meyer MD    Enclosure  CC:  No Recipients    If you would like to receive this communication electronically, please contact [email or web address] or [phone] to request more information on [employer] Link access.    For providers and/or their staff who would like to refer a patient to [facility], please contact us through our one-stop-shop provider referral line, [facility] [place], [facility], at [phone].    If you feel you have received this communication in error or would no longer like to receive these types of communications, please e-mail [email or web address]

## 2018-05-23 DIAGNOSIS — M06.9 RHEUMATOID ARTHRITIS, INVOLVING UNSPECIFIED SITE, UNSPECIFIED RHEUMATOID FACTOR PRESENCE: ICD-10-CM

## 2018-05-24 RX ORDER — METHOTREXATE 2.5 MG/1
TABLET ORAL
Qty: 96 TABLET | Refills: 1 | Status: SHIPPED | OUTPATIENT
Start: 2018-05-24 | End: 2018-10-03 | Stop reason: SDUPTHER

## 2018-05-25 ENCOUNTER — TELEPHONE (OUTPATIENT)
Dept: PULMONOLOGY | Facility: CLINIC | Age: 83
End: 2018-05-25

## 2018-05-25 NOTE — TELEPHONE ENCOUNTER
Patient recently prescribed Oxygen she complains of nose bleed and wants to stop using oxygen. Patient informed that is a normal response to beginning oxygen therapy. Patient states that she will try again but if she continues to have nose bleeds she will stop therapy

## 2018-05-26 LAB
ACID FAST MOD KINY STN SPEC: NORMAL
MYCOBACTERIUM SPEC QL CULT: NORMAL

## 2018-05-30 RX ORDER — METFORMIN HYDROCHLORIDE 1000 MG/1
TABLET ORAL
Qty: 180 TABLET | Refills: 3 | Status: SHIPPED | OUTPATIENT
Start: 2018-05-30

## 2018-06-05 RX ORDER — PREDNISONE 1 MG/1
TABLET ORAL
Qty: 180 TABLET | Refills: 3 | Status: ON HOLD | OUTPATIENT
Start: 2018-06-05 | End: 2019-04-04 | Stop reason: HOSPADM

## 2018-06-05 NOTE — TELEPHONE ENCOUNTER
Patient requesting refill on Tramadol not on current medication list. Last office visit 10/2/2017 patient instructed to take tramadol prn.

## 2018-06-05 NOTE — TELEPHONE ENCOUNTER
----- Message from Jessica Mchugh sent at 6/5/2018  1:29 PM CDT -----  Contact: Mich Medellin  Checking on status of refill request for Tramadol and Prednisone, please call her back at [phone]. Thank you

## 2018-06-06 RX ORDER — TRAMADOL HYDROCHLORIDE 50 MG/1
50 TABLET ORAL EVERY 12 HOURS PRN
Qty: 60 TABLET | Refills: 1 | Status: SHIPPED | OUTPATIENT
Start: 2018-06-06 | End: 2018-06-16

## 2018-06-06 NOTE — TELEPHONE ENCOUNTER
----- Message from Annabel Schultz sent at 6/6/2018 10:20 AM CDT -----  Contact: Pt   Pt request call back from nurse. Pt states she has a question. .[phone] (home)

## 2018-06-06 NOTE — TELEPHONE ENCOUNTER
Pt requesting refill on tramadol   RX sent 12/21/2017 for 60 tabs with one refill-fell off due to end date

## 2018-06-19 RX ORDER — FOLIC ACID 0.8 MG
TABLET ORAL
Qty: 180 TABLET | Refills: 3 | Status: SHIPPED | OUTPATIENT
Start: 2018-06-19

## 2018-06-20 ENCOUNTER — TELEPHONE (OUTPATIENT)
Dept: PULMONOLOGY | Facility: CLINIC | Age: 83
End: 2018-06-20

## 2018-06-20 DIAGNOSIS — G47.34 NOCTURNAL HYPOXEMIA: Primary | ICD-10-CM

## 2018-06-20 NOTE — TELEPHONE ENCOUNTER
Call back patient and tell her she still needs oxygen at night.I would recommend that she continue it

## 2018-06-20 NOTE — TELEPHONE ENCOUNTER
----- Message from Humble Ware sent at 6/20/2018 10:39 AM CDT -----  Contact: Pt  Pt stated she's requesting a callback rg an oxygent tank that is currently in her home, will elaborate...[phone].

## 2018-06-20 NOTE — LETTER
June 20, 2018    Yovani Pulido  [address]  [place] [address]       [facility]  [address]  [place] [address]  Phone: [phone]  Fax: [phone] Dear Ms. Pulido:    Rx to discontinue oxygen  printed , signed , faxed to [facility] and mailed to patient. For information call   [facility] for CPAP/Oxygen/Nebulizer supplies.  Customer Service: [phone]  Call: [phone]  Fax: [phone]  Billing Inquiries: [phone] or [phone]    If you have any questions or concerns, please don't hesitate to call.    Sincerely,        Alex Hallman MD

## 2018-06-20 NOTE — TELEPHONE ENCOUNTER
Pt contacted and informed that order to discontinue oxygen dugan been submitted.    Pt verbalized understanding.

## 2018-07-24 RX ORDER — TIMOLOL MALEATE 5 MG/ML
SOLUTION/ DROPS OPHTHALMIC
Qty: 15 ML | Refills: 12 | Status: SHIPPED | OUTPATIENT
Start: 2018-07-24

## 2018-07-27 ENCOUNTER — TELEPHONE (OUTPATIENT)
Dept: RADIOLOGY | Facility: HOSPITAL | Age: 83
End: 2018-07-27

## 2018-07-30 ENCOUNTER — HOSPITAL ENCOUNTER (OUTPATIENT)
Dept: RADIOLOGY | Facility: HOSPITAL | Age: 83
Discharge: HOME | End: 2018-07-30
Attending: INTERNAL MEDICINE
Payer: MEDICARE

## 2018-07-30 DIAGNOSIS — N18.31 CHRONIC KIDNEY DISEASE (CKD) STAGE G3A/A1, MODERATELY DECREASED GLOMERULAR FILTRATION RATE (GFR) BETWEEN 45-59 ML/MIN/1.73 SQUARE METER AND ALBUMINURIA CREATININE RATIO LESS THAN 30 MG/G: ICD-10-CM

## 2018-07-30 DIAGNOSIS — N18.30 ANEMIA OF CHRONIC RENAL FAILURE, STAGE 3 (MODERATE): ICD-10-CM

## 2018-07-30 DIAGNOSIS — D63.1 ANEMIA OF CHRONIC RENAL FAILURE, STAGE 3 (MODERATE): ICD-10-CM

## 2018-07-30 PROCEDURE — 93975 VASCULAR STUDY: CPT | Mod: 26,,, | Performed by: RADIOLOGY

## 2018-07-30 PROCEDURE — 76770 US EXAM ABDO BACK WALL COMP: CPT | Mod: TC,59,PO

## 2018-07-30 PROCEDURE — 76770 US EXAM ABDO BACK WALL COMP: CPT | Mod: 26,XS,, | Performed by: RADIOLOGY

## 2018-07-31 DIAGNOSIS — E03.9 HYPOTHYROIDISM (ACQUIRED): ICD-10-CM

## 2018-08-02 RX ORDER — LEVOTHYROXINE SODIUM 50 UG/1
TABLET ORAL
Qty: 90 TABLET | Refills: 2 | Status: SHIPPED | OUTPATIENT
Start: 2018-08-02

## 2018-08-06 ENCOUNTER — OFFICE VISIT (OUTPATIENT)
Dept: NEPHROLOGY | Facility: CLINIC | Age: 83
End: 2018-08-06
Payer: MEDICARE

## 2018-08-06 VITALS
SYSTOLIC BLOOD PRESSURE: 128 MMHG | DIASTOLIC BLOOD PRESSURE: 68 MMHG | WEIGHT: 135.81 LBS | BODY MASS INDEX: 24.99 KG/M2 | HEART RATE: 82 BPM | HEIGHT: 62 IN

## 2018-08-06 DIAGNOSIS — D63.1 ANEMIA OF CHRONIC RENAL FAILURE, STAGE 3 (MODERATE): ICD-10-CM

## 2018-08-06 DIAGNOSIS — N18.30 ANEMIA OF CHRONIC RENAL FAILURE, STAGE 3 (MODERATE): ICD-10-CM

## 2018-08-06 DIAGNOSIS — N18.31 CHRONIC KIDNEY DISEASE (CKD) STAGE G3A/A1, MODERATELY DECREASED GLOMERULAR FILTRATION RATE (GFR) BETWEEN 45-59 ML/MIN/1.73 SQUARE METER AND ALBUMINURIA CREATININE RATIO LESS THAN 30 MG/G: Primary | ICD-10-CM

## 2018-08-06 PROCEDURE — 99999 PR PBB SHADOW E&M-EST. PATIENT-LVL III: CPT | Mod: PBBFAC,,, | Performed by: INTERNAL MEDICINE

## 2018-08-06 PROCEDURE — 99213 OFFICE O/P EST LOW 20 MIN: CPT | Mod: S$GLB,,, | Performed by: INTERNAL MEDICINE

## 2018-08-06 PROCEDURE — 3078F DIAST BP <80 MM HG: CPT | Mod: CPTII,S$GLB,, | Performed by: INTERNAL MEDICINE

## 2018-08-06 PROCEDURE — 3074F SYST BP LT 130 MM HG: CPT | Mod: CPTII,S$GLB,, | Performed by: INTERNAL MEDICINE

## 2018-08-06 NOTE — PATIENT INSTRUCTIONS
Chronic kidney disease stage 3: with  hypertension, type 2 diabetes,  rheumatoid arthritis,  .  Since the patient has no recent history of proteinuria.  Do not need kidney biopsy at this time.    A void any nonsteroidals and IV dye.  Continue current medications.  Her hemoglobin A1c is running less than 5.5.  Cystatin C shows GFR 31%.  Kidney function has been stable in the last 1 year.  No heavy proteinuria.  Vitamin D level is normal at 30.  Anemia is stable followed by Dr. Kendrick in Hematology.  Excellent prognosis long-term.      Follow-up 6 months

## 2018-08-06 NOTE — PROGRESS NOTES
Subjective:       Patient ID: Yovani Pulido is a 85 y.o. White female who presents for new evaluation of Chronic Kidney Disease and Follow-up    HPI     Patient is an 85-year-old female with longstanding history of rheumatoid arthritis with multiple medications.  Patient has had complications of anemia under care of Dr. Kendrick.  Patient has been followed by Rheumatology for her rheumatoid arthritis.  Patient has had chronic kidney disease stage 3 for many years.  Her creatinine has been fluctuating between 1.2 and 1.5 in the last 10 years.  Never had any proteinuria.    Patient's past medical history is also significant for type 2 diabetes maintained on metformin, IV with arthritis requiring steroids and methotrexate.  Also has history of carotid artery stenosis status post surgery causing  Old lacunar infarct: s/p graft ( necklace) to restore circulation in the right arm ( Dr. Blevins)     May 2018 patient seen for consultation for chronic kidney disease  Review of Systems   Constitutional: Positive for activity change, fatigue and unexpected weight change. Negative for appetite change, chills, diaphoresis and fever.   HENT: Negative for congestion, dental problem, drooling, postnasal drip, rhinorrhea and voice change.    Eyes: Negative for discharge.   Respiratory: Negative for apnea, cough, choking, chest tightness, shortness of breath, wheezing and stridor.    Cardiovascular: Negative for chest pain, palpitations and leg swelling.   Gastrointestinal: Negative for abdominal distention, blood in stool, constipation, diarrhea, nausea, rectal pain and vomiting.   Endocrine: Negative for cold intolerance, heat intolerance, polydipsia and polyuria.   Genitourinary: Negative for decreased urine volume, difficulty urinating, dysuria, enuresis, flank pain, frequency, hematuria and urgency.   Musculoskeletal: Negative for arthralgias, back pain, gait problem and joint swelling.   Skin: Negative for rash.  "  Allergic/Immunologic: Negative for food allergies and immunocompromised state.   Neurological: Negative for dizziness, tremors, syncope, numbness and headaches.   Hematological: Does not bruise/bleed easily.   Psychiatric/Behavioral: Negative for agitation, behavioral problems and self-injury. The patient is not nervous/anxious and is not hyperactive.    All other systems reviewed and are negative.      Objective:   /68   Pulse 82   Ht 5' 2" (1.575 m)   Wt 61.6 kg (135 lb 12.9 oz)   BMI 24.84 kg/m²      Physical Exam   Constitutional: She is oriented to person, place, and time. No distress.   HENT:   Head: Normocephalic and atraumatic.   Nose: Nose normal.   Eyes: Conjunctivae and EOM are normal. Pupils are equal, round, and reactive to light.   Neck: Normal range of motion. No JVD present. No tracheal deviation present. No thyromegaly present.   Cardiovascular: Normal rate, regular rhythm and intact distal pulses.  Exam reveals no gallop and no friction rub.    Murmur heard.  Necklace graft +B/T  ESM    Pulmonary/Chest: Effort normal and breath sounds normal. No respiratory distress. She has no wheezes. She has no rales. She exhibits no tenderness.   Left lung with low BS    Abdominal: Soft. Bowel sounds are normal. She exhibits no distension and no mass. There is no tenderness. No hernia.   Musculoskeletal: Normal range of motion. She exhibits no edema, tenderness or deformity.   Neurological: She is alert and oriented to person, place, and time. She has normal reflexes. She displays normal reflexes. No cranial nerve deficit. She exhibits normal muscle tone. Coordination normal.   Skin: Skin is warm. Capillary refill takes more than 3 seconds. She is not diaphoretic. No erythema. There is pallor.   Psychiatric: She has a normal mood and affect. Her behavior is normal. Judgment and thought content normal.   Nursing note and vitals reviewed.        Lab Results   Component Value Date    CREATININE 1.2 " 07/30/2018    BUN 22 07/30/2018     07/30/2018    K 4.2 07/30/2018     07/30/2018    CO2 27 07/30/2018     Lab Results   Component Value Date    WBC 7.81 07/30/2018    HGB 9.9 (L) 07/30/2018    HCT 32.7 (L) 07/30/2018     (H) 07/30/2018     (H) 07/30/2018     Lab Results   Component Value Date    PTH 49 11/17/2008    CALCIUM 8.7 07/30/2018    PHOS 4.4 07/30/2018     @RESUFAST(URICACID)    Assessment:    )    1. Chronic kidney disease (CKD) stage G3a/A1, moderately decreased glomerular filtration rate (GFR) between 45-59 mL/min/1.73 square meter and albuminuria creatinine ratio less than 30 mg/g    2. Anemia of chronic renal failure, stage 3 (moderate)        Plan:         Chronic kidney disease stage 3: with  hypertension, type 2 diabetes,  rheumatoid arthritis,  .  Since the patient has no recent history of proteinuria.  Do not need kidney biopsy at this time.    A void any nonsteroidals and IV dye.  Continue current medications.  Her hemoglobin A1c is running less than 5.5.  Cystatin C shows GFR 31%.  Kidney function has been stable in the last 1 year.  No heavy proteinuria.  Vitamin D level is normal at 30.  Anemia is stable followed by Dr. Kendrick in Hematology.  Excellent prognosis long-term.      Follow-up 6 months

## 2018-08-10 ENCOUNTER — OFFICE VISIT (OUTPATIENT)
Dept: OPHTHALMOLOGY | Facility: CLINIC | Age: 83
End: 2018-08-10
Payer: MEDICARE

## 2018-08-10 DIAGNOSIS — H40.1132 PRIMARY OPEN ANGLE GLAUCOMA OF BOTH EYES, MODERATE STAGE: ICD-10-CM

## 2018-08-10 DIAGNOSIS — Z96.1 PSEUDOPHAKIA OF BOTH EYES: Primary | ICD-10-CM

## 2018-08-10 PROCEDURE — 92133 CPTRZD OPH DX IMG PST SGM ON: CPT | Mod: S$GLB,,, | Performed by: OPHTHALMOLOGY

## 2018-08-10 PROCEDURE — 92012 INTRM OPH EXAM EST PATIENT: CPT | Mod: S$GLB,,, | Performed by: OPHTHALMOLOGY

## 2018-08-10 PROCEDURE — 99499 UNLISTED E&M SERVICE: CPT | Mod: HCNC,S$GLB,, | Performed by: OPHTHALMOLOGY

## 2018-08-10 PROCEDURE — 99999 PR PBB SHADOW E&M-EST. PATIENT-LVL II: CPT | Mod: PBBFAC,,, | Performed by: OPHTHALMOLOGY

## 2018-08-10 RX ORDER — TRAZODONE HYDROCHLORIDE 100 MG/1
TABLET ORAL
Qty: 90 TABLET | Refills: 1 | Status: SHIPPED | OUTPATIENT
Start: 2018-08-10 | End: 2019-02-25 | Stop reason: SDUPTHER

## 2018-08-10 RX ORDER — PRAVASTATIN SODIUM 40 MG/1
TABLET ORAL
Qty: 90 TABLET | Refills: 1 | Status: SHIPPED | OUTPATIENT
Start: 2018-08-10

## 2018-08-10 NOTE — PROGRESS NOTES
SUBJECTIVE:   Yovani Pulido is a 85 y.o. female   Uncorrected distance visual acuity was 20/50 in the right eye and 20/40 in the left eye.   Chief Complaint   Patient presents with    Glaucoma     here for 4 month iop check with GOCT  no complaints         HPI:  HPI     Glaucoma    Additional comments: here for 4 month iop check with GOCT  no complaints              Comments   1. Mod COAG (+ Fhx) goal = 19   Splinter Heme OS  Lopez Leader until 2006  2. 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 Yag OS 10/23/2017  4. Irregular Astigmatism  5. 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 RA    Timolol qam OU  Latanoprost qhs OU    Prednisone 1 mg po daily       Last edited by Nayely Schultz MA on 8/10/2018 10:24 AM. (History)        Assessment /Plan :  1. Pseudophakia of both eyes    2. Primary open angle glaucoma of both eyes, moderate stage Doing well, IOP within acceptable range relative to target IOP and no evidence of progression. Continue current treatment. Reviewed importance of continued compliance with treatment and follow up.  Return to clinic in 3 months  or as needed.  With IOP Check, 24-2 HVF, Dilation and SDP's

## 2018-08-13 ENCOUNTER — OFFICE VISIT (OUTPATIENT)
Dept: RHEUMATOLOGY | Facility: CLINIC | Age: 83
End: 2018-08-13
Payer: MEDICARE

## 2018-08-13 ENCOUNTER — TELEPHONE (OUTPATIENT)
Dept: RHEUMATOLOGY | Facility: CLINIC | Age: 83
End: 2018-08-13

## 2018-08-13 VITALS
WEIGHT: 136 LBS | DIASTOLIC BLOOD PRESSURE: 59 MMHG | HEART RATE: 77 BPM | BODY MASS INDEX: 25.03 KG/M2 | HEIGHT: 62 IN | SYSTOLIC BLOOD PRESSURE: 104 MMHG

## 2018-08-13 DIAGNOSIS — D84.9 IMMUNOCOMPROMISED: ICD-10-CM

## 2018-08-13 DIAGNOSIS — N18.30 STAGE 3 CHRONIC KIDNEY DISEASE: Chronic | ICD-10-CM

## 2018-08-13 DIAGNOSIS — M05.79 RHEUMATOID ARTHRITIS INVOLVING MULTIPLE SITES WITH POSITIVE RHEUMATOID FACTOR: Primary | ICD-10-CM

## 2018-08-13 DIAGNOSIS — Z79.52 LONG TERM CURRENT USE OF SYSTEMIC STEROIDS: Chronic | ICD-10-CM

## 2018-08-13 PROCEDURE — 99214 OFFICE O/P EST MOD 30 MIN: CPT | Mod: S$GLB,,, | Performed by: PHYSICIAN ASSISTANT

## 2018-08-13 PROCEDURE — 3074F SYST BP LT 130 MM HG: CPT | Mod: CPTII,S$GLB,, | Performed by: PHYSICIAN ASSISTANT

## 2018-08-13 PROCEDURE — 99999 PR PBB SHADOW E&M-EST. PATIENT-LVL V: CPT | Mod: PBBFAC,,, | Performed by: PHYSICIAN ASSISTANT

## 2018-08-13 PROCEDURE — 99499 UNLISTED E&M SERVICE: CPT | Mod: S$GLB,,, | Performed by: PHYSICIAN ASSISTANT

## 2018-08-13 PROCEDURE — 3078F DIAST BP <80 MM HG: CPT | Mod: CPTII,S$GLB,, | Performed by: PHYSICIAN ASSISTANT

## 2018-08-13 RX ORDER — TRAMADOL HYDROCHLORIDE 50 MG/1
50 TABLET ORAL EVERY 12 HOURS PRN
Qty: 45 TABLET | Refills: 0 | Status: SHIPPED | OUTPATIENT
Start: 2018-08-13 | End: 2018-08-23

## 2018-08-13 ASSESSMENT — CLINICAL DISEASE ACTIVITY INDEX (CDAI)
PATIENT_ASSESSMENT: 0
TOTAL_SCORE: 0
SWOLLEN_JOINTS_COUNT: 0
PHYSICIAN_ASSESSMENT: 0
TENDER_JOINTS_COUNT: 0

## 2018-08-13 ASSESSMENT — ROUTINE ASSESSMENT OF PATIENT INDEX DATA (RAPID3): MDHAQ FUNCTION SCORE: .4

## 2018-08-13 NOTE — PROGRESS NOTES
"Subjective:       Patient ID: Yovani Pulido is a 85 y.o. female.    Chief Complaint: Rheumatoid Arthritis      Yovani is back today for rheumatology follow-up.  She has rheumatoid arthritis. treatment is methotrexate 15 mg once weekly (splitting dose 3 tabs am and 3 tabs pm) and prednisone 2 mg daily.  Folic acid is 2 mg/d.  She has been feeling good lately. Pain today 0/10 no RA exacerbations. At time her left knee gives her trouble. Will use topical cream, use her cane for a few days. Uses trazodone at night which helps. Will need her  flu vaccine in the fall. No infections or fevers. Uses tramadol sparingly not daily. Needs refill. Cannot take nsaids due to ckd.     Chronic anemia. Recently had IV iron. Counts better today.           Review of Systems   Constitutional: Negative for activity change, appetite change, chills, fatigue, fever and unexpected weight change.   HENT: Negative.  Negative for mouth sores and trouble swallowing.         No dry mouth   Eyes: Negative.  Negative for photophobia, pain and redness.        No swollen or red eyes, no dry eye     Respiratory: Negative.  Negative for chest tightness, shortness of breath, wheezing and stridor.    Cardiovascular: Negative.  Negative for chest pain.   Gastrointestinal: Negative.  Negative for abdominal pain, blood in stool, diarrhea, nausea and vomiting.   Genitourinary: Negative.  Negative for dysuria, frequency, hematuria and urgency.   Musculoskeletal: Negative for arthralgias, back pain, gait problem, joint swelling, myalgias, neck pain and neck stiffness.   Skin: Negative.  Negative for color change, pallor and rash.   Neurological: Negative.  Negative for weakness.   Hematological: Negative for adenopathy.   Psychiatric/Behavioral: Negative for suicidal ideas.           Objective:     BP (!) 104/59   Pulse 77   Ht 5' 2" (1.575 m)   Wt 61.7 kg (136 lb 0.4 oz)   BMI 24.88 kg/m²      Physical Exam   Constitutional: She is oriented to person, " place, and time and well-developed, well-nourished, and in no distress. No distress.   HENT:   Head: Normocephalic and atraumatic.   Right Ear: External ear normal.   Left Ear: External ear normal.   Mouth/Throat: No oropharyngeal exudate.   Eyes: Conjunctivae and EOM are normal. Pupils are equal, round, and reactive to light. No scleral icterus.   Neck: Normal range of motion. Neck supple. No thyromegaly present.   Cardiovascular: Normal rate, regular rhythm and normal heart sounds.    No murmur heard.  Pulmonary/Chest: Effort normal and breath sounds normal. She exhibits no tenderness.   Abdominal: Soft. Bowel sounds are normal.       Right Side Rheumatological Exam     Examination finds the shoulder, elbow, wrist, knee, 1st PIP, 1st MCP, 2nd PIP, 2nd MCP, 3rd PIP, 3rd MCP, 4th PIP, 4th MCP, 5th PIP and 5th MCP normal.    Left Side Rheumatological Exam     Examination finds the shoulder, elbow, wrist, knee, 1st PIP, 1st MCP, 2nd PIP, 2nd MCP, 3rd PIP, 3rd MCP, 4th PIP, 4th MCP, 5th PIP and 5th MCP normal.      Lymphadenopathy:     She has no cervical adenopathy.   Neurological: She is alert and oriented to person, place, and time. She displays normal reflexes. No cranial nerve deficit. She exhibits normal muscle tone. Gait normal.   Skin: Skin is warm and dry. No rash noted.     Musculoskeletal: Normal range of motion. She exhibits no edema or tenderness.                    Results for MARIA R DHILLON (MRN [id number]) as of 8/13/2018 09:24   Ref. Range 7/30/2018 09:44   WBC Latest Ref Range: 3.90 - 12.70 K/uL 7.81   RBC Latest Ref Range: 4.00 - 5.40 M/uL 3.16 (L)   Hemoglobin Latest Ref Range: 12.0 - 16.0 g/dL 9.9 (L)   Hematocrit Latest Ref Range: 37.0 - 48.5 % 32.7 (L)   MCV Latest Ref Range: 82 - 98 fL 104 (H)   MCH Latest Ref Range: 27.0 - 31.0 pg 31.3 (H)   MCHC Latest Ref Range: 32.0 - 36.0 g/dL 30.3 (L)   RDW Latest Ref Range: 11.5 - 14.5 % 15.7 (H)   Platelets Latest Ref Range: 150 - 350 K/uL 401 (H)    MPV Latest Ref Range: 9.2 - 12.9 fL 9.3   Gran% Latest Ref Range: 38.0 - 73.0 % 79.4 (H)   Gran # (ANC) Latest Ref Range: 1.8 - 7.7 K/uL 6.2   Immature Granulocytes Latest Ref Range: 0.0 - 0.5 % 0.4   Immature Grans (Abs) Latest Ref Range: 0.00 - 0.04 K/uL 0.03   Lymph% Latest Ref Range: 18.0 - 48.0 % 12.8 (L)   Lymph # Latest Ref Range: 1.0 - 4.8 K/uL 1.0   Mono% Latest Ref Range: 4.0 - 15.0 % 5.4   Mono # Latest Ref Range: 0.3 - 1.0 K/uL 0.4   Eosinophil% Latest Ref Range: 0.0 - 8.0 % 1.7   Eos # Latest Ref Range: 0.0 - 0.5 K/uL 0.1   Basophil% Latest Ref Range: 0.0 - 1.9 % 0.3   Baso # Latest Ref Range: 0.00 - 0.20 K/uL 0.02   nRBC Latest Ref Range: 0 /100 WBC 0   Sodium Latest Ref Range: 136 - 145 mmol/L 144   Potassium Latest Ref Range: 3.5 - 5.1 mmol/L 4.2   Chloride Latest Ref Range: 95 - 110 mmol/L 102   CO2 Latest Ref Range: 23 - 29 mmol/L 27   Anion Gap Latest Ref Range: 8 - 16 mmol/L 15   BUN, Bld Latest Ref Range: 8 - 23 mg/dL 22   Creatinine Latest Ref Range: 0.5 - 1.4 mg/dL 1.2   eGFR if non African American Latest Ref Range: >60 mL/min/1.73 m^2 41.3 (A)   eGFR if African American Latest Ref Range: >60 mL/min/1.73 m^2 47.6 (A)   Glucose Latest Ref Range: 70 - 110 mg/dL 103   Calcium Latest Ref Range: 8.7 - 10.5 mg/dL 8.7   Phosphorus Latest Ref Range: 2.7 - 4.5 mg/dL 4.4   Albumin Latest Ref Range: 3.5 - 5.2 g/dL 2.9 (L)   Vit D, 25-Hydroxy Latest Ref Range: 30 - 96 ng/mL 30         dexa 2013 normal     Assessment:       1. Rheumatoid arthritis involving multiple sites with positive rheumatoid factor    2. Long term current use of systemic steroids    3. Stage 3 chronic kidney disease    4. Immunocompromised          1. RA doing well on  mtx 15 mg once weekly 0 tender/ 0 swollen CDAI- 0, PADDY 0.4    2. Iron def anemia better with prior  iron infusion- monitored by heme/onc     3. Long term steroid use dexa 2013 normal- needs repeat dexa     4. Med monitoring and immunocompromised    5. Vaccines up to  date except shingles- pt declining zoster vaccine- flu  Vaccine in the fall     6. dexa- normal 2013- due for repeat - long term systemic steroid use     7. Insomnia stable on trazodone      Plan:       1. Keep her mtx at 15 mg weekly (split dose), will try to wean down to prednisone 1 mg/d and see how she does, at next visit will try off if she does ok  2. continue folic acid 2 mg/d  3. Refill her trazodone and continue  4. She needs her dexa scan, will set up with  next visit   5. tramadol only prn for acute pain, not daily, refilled today   6. Avoid nsaids with her blood loss and anemia- ckd also   7. Follow up with dr callejas regarding her anemia, and nephrology for CKD monitoring  8. rtc in 4 month intervals with labs (reg 4)  9. Call with any questions, changes or concerns

## 2018-08-13 NOTE — TELEPHONE ENCOUNTER
Spoke with Ok Ashok regarding her RX for Tramadol, I spoke with the pharmacist at [facility] he stated that the rx is ready.

## 2018-08-13 NOTE — TELEPHONE ENCOUNTER
----- Message from Ariane Barahona sent at 8/13/2018  1:49 PM CDT -----  Contact: pt  She's calling in regards to Rx being filled ,pls call pt back at [phone] (home)

## 2018-08-24 ENCOUNTER — PES CALL (OUTPATIENT)
Dept: ADMINISTRATIVE | Facility: CLINIC | Age: 83
End: 2018-08-24

## 2018-08-24 ENCOUNTER — LAB VISIT (OUTPATIENT)
Dept: LAB | Facility: HOSPITAL | Age: 83
End: 2018-08-24
Attending: INTERNAL MEDICINE
Payer: MEDICARE

## 2018-08-24 DIAGNOSIS — D63.1 ANEMIA OF CHRONIC RENAL FAILURE, STAGE 3 (MODERATE): ICD-10-CM

## 2018-08-24 DIAGNOSIS — D50.0 IRON DEFICIENCY ANEMIA DUE TO CHRONIC BLOOD LOSS: ICD-10-CM

## 2018-08-24 DIAGNOSIS — D47.2 MONOCLONAL GAMMOPATHY OF UNDETERMINED SIGNIFICANCE: ICD-10-CM

## 2018-08-24 DIAGNOSIS — N18.30 ANEMIA OF CHRONIC RENAL FAILURE, STAGE 3 (MODERATE): ICD-10-CM

## 2018-08-24 DIAGNOSIS — N18.30 STAGE 3 CHRONIC KIDNEY DISEASE: ICD-10-CM

## 2018-08-24 LAB
ANION GAP SERPL CALC-SCNC: 11 MMOL/L
BASOPHILS # BLD AUTO: 0.02 K/UL
BASOPHILS NFR BLD: 0.1 %
BUN SERPL-MCNC: 26 MG/DL
CALCIUM SERPL-MCNC: 8.3 MG/DL
CHLORIDE SERPL-SCNC: 98 MMOL/L
CO2 SERPL-SCNC: 28 MMOL/L
CREAT SERPL-MCNC: 1.5 MG/DL
DIFFERENTIAL METHOD: ABNORMAL
EOSINOPHIL # BLD AUTO: 0.1 K/UL
EOSINOPHIL NFR BLD: 0.4 %
ERYTHROCYTE [DISTWIDTH] IN BLOOD BY AUTOMATED COUNT: 15.6 %
EST. GFR  (AFRICAN AMERICAN): 36.4 ML/MIN/1.73 M^2
EST. GFR  (NON AFRICAN AMERICAN): 31.5 ML/MIN/1.73 M^2
FERRITIN SERPL-MCNC: 2313 NG/ML
GLUCOSE SERPL-MCNC: 94 MG/DL
HCT VFR BLD AUTO: 33.8 %
HGB BLD-MCNC: 10.2 G/DL
IMM GRANULOCYTES # BLD AUTO: 0.07 K/UL
IMM GRANULOCYTES NFR BLD AUTO: 0.5 %
IRON SERPL-MCNC: 23 UG/DL
LYMPHOCYTES # BLD AUTO: 1.4 K/UL
LYMPHOCYTES NFR BLD: 9.8 %
MCH RBC QN AUTO: 31.5 PG
MCHC RBC AUTO-ENTMCNC: 30.2 G/DL
MCV RBC AUTO: 104 FL
MONOCYTES # BLD AUTO: 0.9 K/UL
MONOCYTES NFR BLD: 6.7 %
NEUTROPHILS # BLD AUTO: 11.5 K/UL
NEUTROPHILS NFR BLD: 82.5 %
NRBC BLD-RTO: 0 /100 WBC
PLATELET # BLD AUTO: 494 K/UL
PMV BLD AUTO: 9.5 FL
POTASSIUM SERPL-SCNC: 4.5 MMOL/L
RBC # BLD AUTO: 3.24 M/UL
SATURATED IRON: 10 %
SODIUM SERPL-SCNC: 137 MMOL/L
TOTAL IRON BINDING CAPACITY: 240 UG/DL
TRANSFERRIN SERPL-MCNC: 162 MG/DL
WBC # BLD AUTO: 13.89 K/UL

## 2018-08-24 PROCEDURE — 80048 BASIC METABOLIC PNL TOTAL CA: CPT

## 2018-08-24 PROCEDURE — 84165 PROTEIN E-PHORESIS SERUM: CPT | Mod: 26,,, | Performed by: PATHOLOGY

## 2018-08-24 PROCEDURE — 82728 ASSAY OF FERRITIN: CPT

## 2018-08-24 PROCEDURE — 84165 PROTEIN E-PHORESIS SERUM: CPT

## 2018-08-24 PROCEDURE — 36415 COLL VENOUS BLD VENIPUNCTURE: CPT | Mod: PO

## 2018-08-24 PROCEDURE — 83520 IMMUNOASSAY QUANT NOS NONAB: CPT

## 2018-08-24 PROCEDURE — 85025 COMPLETE CBC W/AUTO DIFF WBC: CPT

## 2018-08-24 PROCEDURE — 83540 ASSAY OF IRON: CPT

## 2018-08-27 LAB
ALBUMIN SERPL ELPH-MCNC: 2.85 G/DL
ALPHA1 GLOB SERPL ELPH-MCNC: 0.48 G/DL
ALPHA2 GLOB SERPL ELPH-MCNC: 1.19 G/DL
B-GLOBULIN SERPL ELPH-MCNC: 0.81 G/DL
GAMMA GLOB SERPL ELPH-MCNC: 1.27 G/DL
KAPPA LC SER QL IA: 9.75 MG/DL
KAPPA LC/LAMBDA SER IA: 2.58
LAMBDA LC SER QL IA: 3.78 MG/DL
PATHOLOGIST INTERPRETATION SPE: NORMAL
PROT SERPL-MCNC: 6.6 G/DL

## 2018-09-06 ENCOUNTER — TELEPHONE (OUTPATIENT)
Dept: HEMATOLOGY/ONCOLOGY | Facility: CLINIC | Age: 83
End: 2018-09-06

## 2018-09-06 ENCOUNTER — OFFICE VISIT (OUTPATIENT)
Dept: HEMATOLOGY/ONCOLOGY | Facility: CLINIC | Age: 83
End: 2018-09-06
Payer: MEDICARE

## 2018-09-06 VITALS
HEIGHT: 63 IN | OXYGEN SATURATION: 95 % | SYSTOLIC BLOOD PRESSURE: 120 MMHG | BODY MASS INDEX: 24.22 KG/M2 | TEMPERATURE: 98 F | WEIGHT: 136.69 LBS | RESPIRATION RATE: 18 BRPM | DIASTOLIC BLOOD PRESSURE: 70 MMHG | HEART RATE: 66 BPM

## 2018-09-06 DIAGNOSIS — D50.0 IRON DEFICIENCY ANEMIA DUE TO CHRONIC BLOOD LOSS: Primary | ICD-10-CM

## 2018-09-06 PROCEDURE — 1101F PT FALLS ASSESS-DOCD LE1/YR: CPT | Mod: CPTII,,, | Performed by: INTERNAL MEDICINE

## 2018-09-06 PROCEDURE — 99999 PR PBB SHADOW E&M-EST. PATIENT-LVL III: CPT | Mod: PBBFAC,,, | Performed by: INTERNAL MEDICINE

## 2018-09-06 PROCEDURE — 99214 OFFICE O/P EST MOD 30 MIN: CPT | Mod: S$PBB,,, | Performed by: INTERNAL MEDICINE

## 2018-09-06 PROCEDURE — 99213 OFFICE O/P EST LOW 20 MIN: CPT | Mod: PBBFAC,PO | Performed by: INTERNAL MEDICINE

## 2018-09-06 RX ORDER — HEPARIN 100 UNIT/ML
500 SYRINGE INTRAVENOUS
Status: CANCELLED | OUTPATIENT
Start: 2018-09-18

## 2018-09-06 RX ORDER — SODIUM CHLORIDE 0.9 % (FLUSH) 0.9 %
10 SYRINGE (ML) INJECTION
Status: CANCELLED | OUTPATIENT
Start: 2018-09-06

## 2018-09-06 RX ORDER — HEPARIN 100 UNIT/ML
500 SYRINGE INTRAVENOUS
Status: CANCELLED | OUTPATIENT
Start: 2018-09-06

## 2018-09-06 RX ORDER — SODIUM CHLORIDE 0.9 % (FLUSH) 0.9 %
10 SYRINGE (ML) INJECTION
Status: CANCELLED | OUTPATIENT
Start: 2018-09-18

## 2018-09-06 NOTE — TELEPHONE ENCOUNTER
----- Message from Blas Kendrick MD sent at 9/5/2018  5:14 PM CDT -----  Contact: Pt  Iron def worse need to see  ----- Message -----  From: Akua Moseley LPN  Sent: 9/5/2018   4:38 PM  To: Blas Kendrick MD        ----- Message -----  From: Magdalena Zuniga  Sent: 9/5/2018   4:26 PM  To: Aroldo BARRON Staff    Pt called and stated she was calling to get her blood test results. She can be reached at [phone].    Thanks,  TF

## 2018-09-06 NOTE — PROGRESS NOTES
Subjective:       Patient ID: Yovani Pulido is a 86 y.o. female.    Chief Complaint: Follow-up; Results; Anemia; and Chronic Renal Failure    HPI  86-year-old female history of anemia secondary to  Chronic GI blood loss extensive imaging dating back to 2015 including video capsule endoscopy.  Recurrent episodes.  MGUS noted as well follow-up with me for decreased saturated iron    Past Medical History:   Diagnosis Date    Anemia     Carotid stenosis     Cataract     COAG (chronic open-angle glaucoma)     Colon polyps     Diabetes mellitus type II     steroid induced    Diverticulosis     GERD (gastroesophageal reflux disease)     hiatal hernia    Glaucoma     Heart murmur     Hyperlipidemia     Hypertension     Hypothyroidism     Rheumatoid arthritis(714.0)     Stroke     lacunar infarct     Family History   Problem Relation Age of Onset    Cancer Mother         pancreas    Glaucoma Mother     Cancer Father         lung    Cancer Son 61        melanoma metastatic    Heart disease Brother     Diabetes Sister     Stroke Sister     Blindness Sister     Cataracts Sister     Heart disease Brother     Hyperlipidemia Neg Hx     Hypertension Neg Hx     Macular degeneration Neg Hx     Retinal detachment Neg Hx     Strabismus Neg Hx     Thyroid disease Neg Hx     Colon cancer Neg Hx     Stomach cancer Neg Hx      Social History     Socioeconomic History    Marital status:      Spouse name: Not on file    Number of children: 4    Years of education: Not on file    Highest education level: Not on file   Social Needs    Financial resource strain: Not on file    Food insecurity - worry: Not on file    Food insecurity - inability: Not on file    Transportation needs - medical: Not on file    Transportation needs - non-medical: Not on file   Occupational History    Not on file   Tobacco Use    Smoking status: Former Smoker     Packs/day: 3.00     Years: 50.00     Pack years:  150.00     Start date: 1948     Last attempt to quit: 1998     Years since quittin.7    Smokeless tobacco: Former User   Substance and Sexual Activity    Alcohol use: No     Alcohol/week: 0.0 oz    Drug use: No    Sexual activity: No   Other Topics Concern    Not on file   Social History Narrative    , then remarried.  after 2-3 weeks. They have still been living together for 31 years. He is 95 now. They are no longer sexually active. Caffeine intake 4 cups coffee daily- though has changed to decaf recently. Does have a living will.      Past Surgical History:   Procedure Laterality Date    anal fissure repair      APPENDECTOMY      BREAST SURGERY   approx    breast biopsies- benign    CAROTID ENDARTERECTOMY  2009    left    CATARACT EXTRACTION      COLONOSCOPY  2012    EYE SURGERY  ,     bilateral cataracts    HERNIA REPAIR  ,     abdominal x2, with mesh on second    HYSTERECTOMY      vag hyst    JOINT REPLACEMENT  2008    right knee    yag Left        Labs:  Lab Results   Component Value Date    WBC 13.89 (H) 2018    HGB 10.2 (L) 2018    HCT 33.8 (L) 2018     (H) 2018     (H) 2018     BMP  Lab Results   Component Value Date     2018    K 4.5 2018    CL 98 2018    CO2 28 2018    BUN 26 (H) 2018    CREATININE 1.5 (H) 2018    CALCIUM 8.3 (L) 2018    ANIONGAP 11 2018    ESTGFRAFRICA 36.4 (A) 2018    EGFRNONAA 31.5 (A) 2018     Lab Results   Component Value Date    ALT 12 2018    AST 18 2018    ALKPHOS 91 2018    BILITOT 0.4 2018       Lab Results   Component Value Date    IRON 23 (L) 2018    TIBC 240 (L) 2018    FERRITIN 2,313 (H) 2018     Lab Results   Component Value Date    [id number] 554 2016     Lab Results   Component Value Date    FOLATE > 40.0 (H) 04/15/2013     Lab Results    Component Value Date    TSH 3.187 04/16/2018         Review of Systems   Constitutional: Positive for activity change, appetite change and fatigue. Negative for chills, diaphoresis, fever and unexpected weight change.   HENT: Negative for congestion, dental problem, drooling, ear discharge, ear pain, facial swelling, hearing loss, mouth sores, nosebleeds, postnasal drip, rhinorrhea, sinus pressure, sneezing, sore throat, tinnitus, trouble swallowing and voice change.    Eyes: Negative for photophobia, pain, discharge, redness, itching and visual disturbance.   Respiratory: Negative for cough, choking, chest tightness, shortness of breath, wheezing and stridor.    Cardiovascular: Negative for chest pain, palpitations and leg swelling.   Gastrointestinal: Negative for abdominal distention, abdominal pain, anal bleeding, blood in stool, constipation, diarrhea, nausea, rectal pain and vomiting.   Endocrine: Negative for cold intolerance, heat intolerance, polydipsia, polyphagia and polyuria.   Genitourinary: Negative for decreased urine volume, difficulty urinating, dyspareunia, dysuria, enuresis, flank pain, frequency, genital sores, hematuria, menstrual problem, pelvic pain, urgency, vaginal bleeding, vaginal discharge and vaginal pain.   Musculoskeletal: Positive for arthralgias, gait problem and myalgias. Negative for back pain, joint swelling, neck pain and neck stiffness.   Skin: Negative for color change, pallor and rash.   Allergic/Immunologic: Negative for environmental allergies, food allergies and immunocompromised state.   Neurological: Positive for weakness. Negative for dizziness, tremors, seizures, syncope, facial asymmetry, speech difficulty, light-headedness, numbness and headaches.   Hematological: Negative for adenopathy. Does not bruise/bleed easily.   Psychiatric/Behavioral: Positive for dysphoric mood. Negative for agitation, behavioral problems, confusion, decreased concentration,  hallucinations, self-injury, sleep disturbance and suicidal ideas. The patient is nervous/anxious. The patient is not hyperactive.        Objective:      Physical Exam   Constitutional: She is oriented to person, place, and time. She appears well-developed and well-nourished. She appears distressed.   HENT:   Head: Normocephalic and atraumatic.   Right Ear: External ear normal.   Left Ear: External ear normal.   Nose: Nose normal. Right sinus exhibits no maxillary sinus tenderness and no frontal sinus tenderness. Left sinus exhibits no maxillary sinus tenderness and no frontal sinus tenderness.   Mouth/Throat: Oropharynx is clear and moist. No oropharyngeal exudate.   Eyes: Conjunctivae, EOM and lids are normal. Pupils are equal, round, and reactive to light. Right eye exhibits no discharge. Left eye exhibits no discharge. Right conjunctiva is not injected. Right conjunctiva has no hemorrhage. Left conjunctiva is not injected. Left conjunctiva has no hemorrhage. No scleral icterus.   Neck: Normal range of motion. Neck supple. No JVD present. No tracheal deviation present. No thyromegaly present.   Cardiovascular: Normal rate and regular rhythm.   Pulmonary/Chest: Effort normal. No stridor. No respiratory distress. She exhibits no tenderness.   Abdominal: Soft. She exhibits no distension and no mass. There is no splenomegaly or hepatomegaly. There is no tenderness. There is no rebound.   Musculoskeletal: Normal range of motion. She exhibits no edema or tenderness.   Lymphadenopathy:     She has no cervical adenopathy.     She has no axillary adenopathy.        Right: No supraclavicular adenopathy present.        Left: No supraclavicular adenopathy present.   Neurological: She is alert and oriented to person, place, and time. No cranial nerve deficit. Coordination normal.   Skin: Skin is dry. No rash noted. She is not diaphoretic. No erythema.   Psychiatric: She has a normal mood and affect. Her behavior is normal.  Judgment and thought content normal.   Vitals reviewed.          Assessment:      1. Iron deficiency anemia due to chronic blood loss           Plan:      recurrent iron deficiency anemia documented iron deficiency treat with intravenous iron return 4 months CBC iron status prior results of MGUS stable discussed implications with her and her granddaughter who is expecting her 1st child in December 2018.        Blas Kendrick Jr, MD FACP

## 2018-09-06 NOTE — TELEPHONE ENCOUNTER
Spoke with patient, offered to come in to see Dr. Kendrick this afternoon for 100 to discuss lab results. Patient said she would call back that she needed to check on a ride.

## 2018-09-06 NOTE — TELEPHONE ENCOUNTER
----- Message from John Kaufman sent at 9/6/2018  9:09 AM CDT -----  Contact: Pt   States she's calling to let the nurse missy that she will be here today at 1 with  and can be reached at [phone]//thanks/dbw

## 2018-09-17 ENCOUNTER — NURSE TRIAGE (OUTPATIENT)
Dept: ADMINISTRATIVE | Facility: CLINIC | Age: 83
End: 2018-09-17

## 2018-09-17 ENCOUNTER — INFUSION (OUTPATIENT)
Dept: INFUSION THERAPY | Facility: HOSPITAL | Age: 83
End: 2018-09-17
Attending: INTERNAL MEDICINE
Payer: MEDICARE

## 2018-09-17 VITALS
DIASTOLIC BLOOD PRESSURE: 58 MMHG | TEMPERATURE: 98 F | OXYGEN SATURATION: 94 % | SYSTOLIC BLOOD PRESSURE: 115 MMHG | RESPIRATION RATE: 18 BRPM | HEART RATE: 82 BPM

## 2018-09-17 DIAGNOSIS — D50.0 IRON DEFICIENCY ANEMIA DUE TO CHRONIC BLOOD LOSS: Primary | ICD-10-CM

## 2018-09-17 PROCEDURE — 25000003 PHARM REV CODE 250: Mod: PO | Performed by: INTERNAL MEDICINE

## 2018-09-17 PROCEDURE — 63600175 PHARM REV CODE 636 W HCPCS: Mod: JG,PO | Performed by: INTERNAL MEDICINE

## 2018-09-17 PROCEDURE — 96365 THER/PROPH/DIAG IV INF INIT: CPT | Mod: PO

## 2018-09-17 RX ADMIN — FERUMOXYTOL 510 MG: 510 INJECTION INTRAVENOUS at 01:09

## 2018-09-17 NOTE — PLAN OF CARE
Problem: Anemia (Adult)  Intervention: Facilitate Safe Activity  Assisted pt with ambulation and reviewed medication side effects

## 2018-09-17 NOTE — PLAN OF CARE
"Problem: Patient Care Overview  Goal: Plan of Care Review  Outcome: Ongoing (interventions implemented as appropriate)  Pt states, " I feel tired and weak"      "

## 2018-09-17 NOTE — TELEPHONE ENCOUNTER
Reason for Disposition   [1] SEVERE abdominal pain AND [2] age > 60    Protocols used:  DIARRHEA-A-AH    Pt states that since being home from her iron infusion she has had diarrhea and felt weak. Pt reports some abdominal pain as well. Currently rating pain 8.10. Advised ER   done

## 2018-09-17 NOTE — PATIENT INSTRUCTIONS
.  [facility]  [address]  [address] [address]  [phone] phone     [phone] fax  Hours of Operation: Monday- Friday 8:00am- 5:00pm  After hours phone  [phone]  Hematology / Oncology Physicians on call      Dr. Aroldo Medina                        Please call with any concerns regarding your appointment today.      .FALL PREVENTION   Falls often occur due to slipping, tripping or losing your balance. Here are ways to reduce your risk of falling again.   Was there anything that caused your fall that can be fixed, removed or replaced?   Make your home safe by keeping walkways clear of objects you may trip over.   Use non-slip pads under rugs.   Do not walk in poorly lit areas.   Do not stand on chairs or wobbly ladders.   Use caution when reaching overhead or looking upward. This position can cause a loss of balance.   Be sure your shoes fit properly, have non-slip bottoms and are in good condition.   Be cautious when going up and down stairs, curbs, and when walking on uneven sidewalks.   If your balance is poor, consider using a cane or walker.   If your fall was related to alcohol use, stop or limit alcohol intake.   If your fall was related to use of sleeping medicines, talk to your doctor about this. You may need to reduce your dosage at bedtime if you awaken during the night to go to the bathroom.   To reduce the need for nighttime bathroom trips:   Avoid drinking fluids for several hours before going to bed   Empty your bladder before going to bed   Men can keep a urinal at the bedside   © 0186-2462 [employer], [address] [place] [address]. All rights reserved. This information is not intended as a substitute for professional medical care. Always follow your healthcare professional's instructions.

## 2018-09-18 ENCOUNTER — TELEPHONE (OUTPATIENT)
Dept: INFUSION THERAPY | Facility: HOSPITAL | Age: 83
End: 2018-09-18

## 2018-09-18 NOTE — TELEPHONE ENCOUNTER
Patient had feraheme yesterday.  She said she has had IV iron in the past.  When she went home yesterday she had fever 101, chills, diarrhea and vomiting.  She does not have any symptoms today.  She is due for her #2 feraheme on Monday.  Do you think these symptoms were caused by her infusion?

## 2018-09-24 ENCOUNTER — OFFICE VISIT (OUTPATIENT)
Dept: HEMATOLOGY/ONCOLOGY | Facility: CLINIC | Age: 83
End: 2018-09-24
Payer: MEDICARE

## 2018-09-24 ENCOUNTER — INFUSION (OUTPATIENT)
Dept: INFUSION THERAPY | Facility: HOSPITAL | Age: 83
End: 2018-09-24
Attending: INTERNAL MEDICINE
Payer: MEDICARE

## 2018-09-24 VITALS
HEIGHT: 62 IN | SYSTOLIC BLOOD PRESSURE: 104 MMHG | WEIGHT: 137.38 LBS | RESPIRATION RATE: 18 BRPM | OXYGEN SATURATION: 90 % | HEART RATE: 87 BPM | TEMPERATURE: 98 F | BODY MASS INDEX: 25.28 KG/M2 | DIASTOLIC BLOOD PRESSURE: 80 MMHG

## 2018-09-24 VITALS
HEART RATE: 81 BPM | RESPIRATION RATE: 18 BRPM | SYSTOLIC BLOOD PRESSURE: 117 MMHG | DIASTOLIC BLOOD PRESSURE: 59 MMHG | OXYGEN SATURATION: 93 %

## 2018-09-24 DIAGNOSIS — D50.0 IRON DEFICIENCY ANEMIA DUE TO CHRONIC BLOOD LOSS: Primary | ICD-10-CM

## 2018-09-24 PROCEDURE — 99999 PR PBB SHADOW E&M-EST. PATIENT-LVL III: CPT | Mod: PBBFAC,,, | Performed by: INTERNAL MEDICINE

## 2018-09-24 PROCEDURE — 63600175 PHARM REV CODE 636 W HCPCS: Mod: JG,PO | Performed by: INTERNAL MEDICINE

## 2018-09-24 PROCEDURE — 99213 OFFICE O/P EST LOW 20 MIN: CPT | Mod: PBBFAC,PO,25 | Performed by: INTERNAL MEDICINE

## 2018-09-24 PROCEDURE — 1101F PT FALLS ASSESS-DOCD LE1/YR: CPT | Mod: CPTII,,, | Performed by: INTERNAL MEDICINE

## 2018-09-24 PROCEDURE — 99213 OFFICE O/P EST LOW 20 MIN: CPT | Mod: S$PBB,,, | Performed by: INTERNAL MEDICINE

## 2018-09-24 PROCEDURE — 96365 THER/PROPH/DIAG IV INF INIT: CPT | Mod: PO

## 2018-09-24 PROCEDURE — 25000003 PHARM REV CODE 250: Mod: PO | Performed by: INTERNAL MEDICINE

## 2018-09-24 RX ADMIN — FERUMOXYTOL 510 MG: 510 INJECTION INTRAVENOUS at 10:09

## 2018-09-24 NOTE — PLAN OF CARE
"Problem: Patient Care Overview  Goal: Plan of Care Review  Outcome: Ongoing (interventions implemented as appropriate)  Pt states, " I feel tired and weak and didn't sleep much last night"      "

## 2018-09-24 NOTE — PLAN OF CARE
Problem: Anemia (Adult)  Intervention: Facilitate Safe Activity  Pt uses walker to assist in ambulation

## 2018-09-24 NOTE — PATIENT INSTRUCTIONS
.  [facility]  [address]  [address] [address]  [phone] phone     [phone] fax  Hours of Operation: Monday- Friday 8:00am- 5:00pm  After hours phone  [phone]  Hematology / Oncology Physicians on call      Dr. Aroldo Medina                        Please call with any concerns regarding your appointment today.      .FALL PREVENTION   Falls often occur due to slipping, tripping or losing your balance. Here are ways to reduce your risk of falling again.   Was there anything that caused your fall that can be fixed, removed or replaced?   Make your home safe by keeping walkways clear of objects you may trip over.   Use non-slip pads under rugs.   Do not walk in poorly lit areas.   Do not stand on chairs or wobbly ladders.   Use caution when reaching overhead or looking upward. This position can cause a loss of balance.   Be sure your shoes fit properly, have non-slip bottoms and are in good condition.   Be cautious when going up and down stairs, curbs, and when walking on uneven sidewalks.   If your balance is poor, consider using a cane or walker.   If your fall was related to alcohol use, stop or limit alcohol intake.   If your fall was related to use of sleeping medicines, talk to your doctor about this. You may need to reduce your dosage at bedtime if you awaken during the night to go to the bathroom.   To reduce the need for nighttime bathroom trips:   Avoid drinking fluids for several hours before going to bed   Empty your bladder before going to bed   Men can keep a urinal at the bedside   © 3342-1142 Evonne Jaquez, [address] [place] [address]. All rights reserved. This information is not intended as a substitute for professional medical care. Always follow your healthcare professional's instructions.

## 2018-09-24 NOTE — PROGRESS NOTES
Subjective:       Patient ID: Yovani Pulido is a 86 y.o. female.    Chief Complaint: Follow-up; Results; and Anemia    HPI 86-year-old female history of recurrent iron deficiency anemia was scheduled for intravenous Feraheme states that she had a reaction which she left 8 at a fast [employer] chain developed abdominal pain nausea and fever patient reports back today to see whether not related Feraheme    Past Medical History:   Diagnosis Date    Anemia     Carotid stenosis     Cataract     COAG (chronic open-angle glaucoma)     Colon polyps     Diabetes mellitus type II     steroid induced    Diverticulosis     GERD (gastroesophageal reflux disease)     hiatal hernia    Glaucoma     Heart murmur     Hyperlipidemia     Hypertension     Hypothyroidism     Rheumatoid arthritis(714.0)     Stroke     lacunar infarct     Family History   Problem Relation Age of Onset    Cancer Mother         pancreas    Glaucoma Mother     Cancer Father         lung    Cancer Son 61        melanoma metastatic    Heart disease Brother     Diabetes Sister     Stroke Sister     Blindness Sister     Cataracts Sister     Heart disease Brother     Hyperlipidemia Neg Hx     Hypertension Neg Hx     Macular degeneration Neg Hx     Retinal detachment Neg Hx     Strabismus Neg Hx     Thyroid disease Neg Hx     Colon cancer Neg Hx     Stomach cancer Neg Hx      Social History     Socioeconomic History    Marital status:      Spouse name: Not on file    Number of children: 4    Years of education: Not on file    Highest education level: Not on file   Social Needs    Financial resource strain: Not on file    Food insecurity - worry: Not on file    Food insecurity - inability: Not on file    Transportation needs - medical: Not on file    Transportation needs - non-medical: Not on file   Occupational History    Not on file   Tobacco Use    Smoking status: Former Smoker     Packs/day: 3.00     Years:  50.00     Pack years: 150.00     Start date: 1948     Last attempt to quit: 1998     Years since quittin.7    Smokeless tobacco: Former User   Substance and Sexual Activity    Alcohol use: No     Alcohol/week: 0.0 oz    Drug use: No    Sexual activity: No   Other Topics Concern    Not on file   Social History Narrative    , then remarried.  after 2-3 weeks. They have still been living together for 31 years. He is 95 now. They are no longer sexually active. Caffeine intake 4 cups coffee daily- though has changed to decaf recently. Does have a living will.      Past Surgical History:   Procedure Laterality Date    anal fissure repair      APPENDECTOMY      BREAST SURGERY   approx    breast biopsies- benign    CAROTID ENDARTERECTOMY  2009    left    CATARACT EXTRACTION      COLONOSCOPY      COLONOSCOPY N/A 2015    Procedure: COLONOSCOPY;  Surgeon: Gavin Escobedo MD;  Location: [facility];  Service: Endoscopy;  Laterality: N/A;    COLONOSCOPY N/A 2015    [facility]    ESOPHAGOGASTRODUODENOSCOPY (EGD) N/A 2015    [facility] at [facility]    EYE SURGERY  ,     bilateral cataracts    HERNIA REPAIR  ,     abdominal x2, with mesh on second    HYSTERECTOMY      vag hyst    JOINT REPLACEMENT      right knee    THORACENTESIS - Outpatient Right 3/23/2018    Performed by Alex Hallman MD at [facility] ENDO    yag Left        Labs:  Lab Results   Component Value Date    WBC 13.89 (H) 2018    HGB 10.2 (L) 2018    HCT 33.8 (L) 2018     (H) 2018     (H) 2018     BMP  Lab Results   Component Value Date     2018    K 4.5 2018    CL 98 2018    CO2 28 2018    BUN 26 (H) 2018    CREATININE 1.5 (H) 2018    CALCIUM 8.3 (L) 2018    ANIONGAP 11 2018    ESTGFRAFRICA 36.4 (A) 2018    EGFRNONAA  31.5 (A) 08/24/2018     Lab Results   Component Value Date    ALT 12 04/16/2018    AST 18 04/16/2018    ALKPHOS 91 04/16/2018    BILITOT 0.4 04/16/2018       Lab Results   Component Value Date    IRON 23 (L) 08/24/2018    TIBC 240 (L) 08/24/2018    FERRITIN 2,313 (H) 08/24/2018     Lab Results   Component Value Date    [id number] 554 09/19/2016     Lab Results   Component Value Date    FOLATE > 40.0 (H) 04/15/2013     Lab Results   Component Value Date    TSH 3.187 04/16/2018         Review of Systems   Constitutional: Negative for activity change, appetite change, chills, diaphoresis, fatigue, fever and unexpected weight change.   HENT: Negative for congestion, dental problem, drooling, ear discharge, ear pain, facial swelling, hearing loss, mouth sores, nosebleeds, postnasal drip, rhinorrhea, sinus pressure, sneezing, sore throat, tinnitus, trouble swallowing and voice change.    Eyes: Negative for photophobia, pain, discharge, redness, itching and visual disturbance.   Respiratory: Negative for cough, choking, chest tightness, shortness of breath, wheezing and stridor.    Cardiovascular: Negative for chest pain, palpitations and leg swelling.   Gastrointestinal: Negative for abdominal distention, abdominal pain, anal bleeding, blood in stool, constipation, diarrhea, nausea, rectal pain and vomiting.   Endocrine: Negative for cold intolerance, heat intolerance, polydipsia, polyphagia and polyuria.   Genitourinary: Negative for decreased urine volume, difficulty urinating, dyspareunia, dysuria, enuresis, flank pain, frequency, genital sores, hematuria, menstrual problem, pelvic pain, urgency, vaginal bleeding, vaginal discharge and vaginal pain.   Musculoskeletal: Negative for arthralgias, back pain, gait problem, joint swelling, myalgias, neck pain and neck stiffness.   Skin: Negative for color change, pallor and rash.   Allergic/Immunologic: Negative for environmental allergies, food allergies and  immunocompromised state.   Neurological: Negative for dizziness, tremors, seizures, syncope, facial asymmetry, speech difficulty, weakness, light-headedness, numbness and headaches.   Hematological: Negative for adenopathy. Does not bruise/bleed easily.   Psychiatric/Behavioral: Positive for dysphoric mood. Negative for agitation, behavioral problems, confusion, decreased concentration, hallucinations, self-injury, sleep disturbance and suicidal ideas. The patient is nervous/anxious. The patient is not hyperactive.        Objective:      Physical Exam   Constitutional: She is oriented to person, place, and time. She appears well-developed and well-nourished. She has a sickly appearance. She appears ill. She appears distressed.   HENT:   Head: Normocephalic and atraumatic.   Right Ear: External ear normal.   Left Ear: External ear normal.   Nose: Nose normal. Right sinus exhibits no maxillary sinus tenderness and no frontal sinus tenderness. Left sinus exhibits no maxillary sinus tenderness and no frontal sinus tenderness.   Mouth/Throat: Oropharynx is clear and moist. No oropharyngeal exudate.   Eyes: Conjunctivae, EOM and lids are normal. Pupils are equal, round, and reactive to light. Right eye exhibits no discharge. Left eye exhibits no discharge. Right conjunctiva is not injected. Right conjunctiva has no hemorrhage. Left conjunctiva is not injected. Left conjunctiva has no hemorrhage. No scleral icterus.   Neck: Normal range of motion. Neck supple. No JVD present. No tracheal deviation present. No thyromegaly present.   Cardiovascular: Normal rate and regular rhythm.   Pulmonary/Chest: Effort normal. No stridor. No respiratory distress. She exhibits no tenderness.   Abdominal: Soft. She exhibits no distension and no mass. There is no splenomegaly or hepatomegaly. There is no tenderness. There is no rebound.   Musculoskeletal: Normal range of motion. She exhibits no edema or tenderness.   Lymphadenopathy:      She has no cervical adenopathy.     She has no axillary adenopathy.        Right: No supraclavicular adenopathy present.        Left: No supraclavicular adenopathy present.   Neurological: She is alert and oriented to person, place, and time. No cranial nerve deficit. Coordination normal.   Skin: Skin is dry. No rash noted. She is not diaphoretic. No erythema.   Psychiatric: Her behavior is normal. Judgment and thought content normal. Her mood appears anxious. She exhibits a depressed mood.   Vitals reviewed.          Assessment:      Recurrent iron deficiency anemia      Plan:     Reviewed history with her at this point do not feel this is related to allergic reaction ovarian proceed with intravenous Feraheme today and follow-up as detailed        Blas Kendrick Jr, MD FACP

## 2018-10-03 DIAGNOSIS — M06.9 RHEUMATOID ARTHRITIS, INVOLVING UNSPECIFIED SITE, UNSPECIFIED RHEUMATOID FACTOR PRESENCE: ICD-10-CM

## 2018-10-03 RX ORDER — METHOTREXATE 2.5 MG/1
TABLET ORAL
Qty: 96 TABLET | Refills: 1 | Status: SHIPPED | OUTPATIENT
Start: 2018-10-03 | End: 2019-02-08 | Stop reason: SDUPTHER

## 2018-10-15 RX ORDER — OMEPRAZOLE 20 MG/1
20 CAPSULE, DELAYED RELEASE ORAL DAILY
Qty: 90 CAPSULE | Refills: 1 | Status: SHIPPED | OUTPATIENT
Start: 2018-10-15

## 2018-11-05 ENCOUNTER — OFFICE VISIT (OUTPATIENT)
Dept: URGENT CARE | Facility: CLINIC | Age: 83
End: 2018-11-05
Payer: MEDICARE

## 2018-11-05 ENCOUNTER — HOSPITAL ENCOUNTER (OUTPATIENT)
Dept: RADIOLOGY | Facility: HOSPITAL | Age: 83
Discharge: HOME | End: 2018-11-05
Attending: NURSE PRACTITIONER
Payer: MEDICARE

## 2018-11-05 VITALS
DIASTOLIC BLOOD PRESSURE: 60 MMHG | HEART RATE: 92 BPM | OXYGEN SATURATION: 96 % | WEIGHT: 134.94 LBS | BODY MASS INDEX: 24.68 KG/M2 | TEMPERATURE: 98 F | SYSTOLIC BLOOD PRESSURE: 110 MMHG

## 2018-11-05 DIAGNOSIS — J22 BACTERIAL LOWER RESPIRATORY INFECTION: Primary | ICD-10-CM

## 2018-11-05 DIAGNOSIS — R05.9 COUGH: ICD-10-CM

## 2018-11-05 DIAGNOSIS — B96.89 BACTERIAL LOWER RESPIRATORY INFECTION: Primary | ICD-10-CM

## 2018-11-05 PROCEDURE — 71046 X-RAY EXAM CHEST 2 VIEWS: CPT | Mod: TC,FY,HCNC,PO

## 2018-11-05 PROCEDURE — 1101F PT FALLS ASSESS-DOCD LE1/YR: CPT | Mod: CPTII,HCNC,S$GLB, | Performed by: NURSE PRACTITIONER

## 2018-11-05 PROCEDURE — 99214 OFFICE O/P EST MOD 30 MIN: CPT | Mod: HCNC,S$GLB,, | Performed by: NURSE PRACTITIONER

## 2018-11-05 PROCEDURE — 99999 PR PBB SHADOW E&M-EST. PATIENT-LVL V: CPT | Mod: PBBFAC,HCNC,, | Performed by: NURSE PRACTITIONER

## 2018-11-05 PROCEDURE — 71046 X-RAY EXAM CHEST 2 VIEWS: CPT | Mod: 26,HCNC,, | Performed by: RADIOLOGY

## 2018-11-05 RX ORDER — DOXYCYCLINE 100 MG/1
100 CAPSULE ORAL EVERY 12 HOURS
Qty: 20 CAPSULE | Refills: 0 | Status: SHIPPED | OUTPATIENT
Start: 2018-11-05 | End: 2018-11-15

## 2018-11-05 RX ORDER — CODEINE PHOSPHATE AND GUAIFENESIN 10; 100 MG/5ML; MG/5ML
5 SOLUTION ORAL EVERY 6 HOURS PRN
Qty: 180 ML | Refills: 0 | Status: SHIPPED | OUTPATIENT
Start: 2018-11-05 | End: 2018-11-15

## 2018-11-05 NOTE — PROGRESS NOTES
Subjective:      Patient ID: Yovani Pulido is a 86 y.o. female.    Chief Complaint: Cough    Cough   This is a new problem. The current episode started 1 to 4 weeks ago (9 days). The problem has been gradually worsening. The problem occurs constantly. The cough is productive of sputum. Associated symptoms include chills, a fever (x 4 days had subjective fever) and sweats (2 nights ago woke up with night sweats). Pertinent negatives include no chest pain, nasal congestion, postnasal drip, sore throat, shortness of breath or wheezing. The symptoms are aggravated by lying down. Risk factors for lung disease include smoking/tobacco exposure (former smoker). She has tried rest for the symptoms. The treatment provided no relief.     Review of Systems   Constitutional: Positive for chills, fatigue and fever (x 4 days had subjective fever).   HENT: Negative.  Negative for postnasal drip and sore throat.    Respiratory: Positive for cough. Negative for shortness of breath and wheezing.    Cardiovascular: Negative.  Negative for chest pain.   Gastrointestinal: Negative.    Musculoskeletal: Negative.    Skin: Negative.    Neurological: Negative.    Hematological: Negative.        Objective:   /60 (BP Location: Left arm, Patient Position: Sitting, BP Method: Small (Manual))   Pulse 92   Temp 97.5 °F (36.4 °C) (Tympanic)   Wt 61.2 kg (134 lb 14.7 oz)   SpO2 96%   BMI 24.68 kg/m²   Physical Exam   Constitutional: She is oriented to person, place, and time. She appears well-developed and well-nourished. No distress.   HENT:   Head: Normocephalic and atraumatic.   Nose: Nose normal.   Mouth/Throat: Oropharynx is clear and moist.   Eyes: Conjunctivae are normal.   Neck: Normal range of motion. Neck supple.   Cardiovascular: Normal rate and regular rhythm.   Murmur heard.  Pulmonary/Chest: Effort normal. No accessory muscle usage. No tachypnea. No respiratory distress. She has decreased breath sounds in the left  lower field. She has no wheezes. She has no rhonchi. She has no rales.   Lymphadenopathy:     She has no cervical adenopathy.   Neurological: She is alert and oriented to person, place, and time.   Skin: Skin is warm and dry. She is not diaphoretic.   Nursing note and vitals reviewed.    Assessment:      1. Bacterial lower respiratory infection       Plan:   Bacterial lower respiratory infection  -     X-Ray Chest PA And Lateral; Future; Expected date: 11/05/2018  -     doxycycline (VIBRAMYCIN) 100 MG Cap; Take 1 capsule (100 mg total) by mouth every 12 (twelve) hours. for 10 days  Dispense: 20 capsule; Refill: 0  -     guaifenesin-codeine 100-10 mg/5 ml (TUSSI-ORGANIDIN NR)  mg/5 mL syrup; Take 5 mLs by mouth every 6 (six) hours as needed for Cough.  Dispense: 180 mL; Refill: 0    No obvious pneumonia on chest X-ray, however, bilateral pleural effusions (this has been present in the past). Will go ahead and treat for possible bacterial etiology due to chills and subjective fever.  Instructions, follow up, and supportive care as per AVS.  Follow up with PCP if not improving in the next 48-72 hours, sooner for any new or worsening symptoms.

## 2018-11-05 NOTE — PATIENT INSTRUCTIONS
Bronchitis, Antibiotic Treatment (Adult)    Bronchitis is an infection of the air passages (bronchial tubes) in your lungs. It often occurs when you have a cold. This illness is contagious during the first few days and is spread through the air by coughing and sneezing, or by direct contact (touching the sick person and then touching your own eyes, nose, or mouth).  Symptoms of bronchitis include cough with mucus (phlegm) and low-grade fever. Bronchitis usually lasts 7 to 14 days. Mild cases can be treated with simple home remedies. More severe infection is treated with an antibiotic.  Home care  Follow these guidelines when caring for yourself at home:  · If your symptoms are severe, rest at home for the first 2 to 3 days. When you go back to your usual activities, don't let yourself get too tired.  · Do not smoke. Also avoid being exposed to secondhand smoke.  · You may use over-the-counter medicines to control fever or pain, unless another medicine was prescribed. (Note: If you have chronic liver or kidney disease or have ever had a stomach ulcer or gastrointestinal bleeding, talk with your healthcare provider before using these medicines. Also talk to your provider if you are taking medicine to prevent blood clots.) Aspirin should never be given to anyone younger than 18 years of age who is ill with a viral infection or fever. It may cause severe liver or brain damage.  · Your appetite may be poor, so a light diet is fine. Avoid dehydration by drinking 6 to 8 glasses of fluids per day (such as water, soft drinks, sports drinks, juices, tea, or soup). Extra fluids will help loosen secretions in the nose and lungs.  · Over-the-counter cough, cold, and sore-throat medicines will not shorten the length of the illness, but they may be helpful to reduce symptoms. (Note: Do not use decongestants if you have high blood pressure.)  · Finish all antibiotic medicine. Do this even if you are feeling better after only a  few days.  Follow-up care  Follow up with your healthcare provider, or as advised. If you had an X-ray or ECG (electrocardiogram), a specialist will review it. You will be notified of any new findings that may affect your care.  Note: If you are age 65 or older, or if you have a chronic lung disease or condition that affects your immune system, or you smoke, talk to your healthcare provider about having pneumococcal vaccinations and a yearly influenza vaccination (flu shot).  When to seek medical advice  Call your healthcare provider right away if any of these occur:  · Fever of 100.4°F (38°C) or higher  · Coughing up increased amounts of colored sputum  · Weakness, drowsiness, headache, facial pain, ear pain, or a stiff neck  Call 911, or get immediate medical care  Contact emergency services right away if any of these occur.  · Coughing up blood  · Worsening weakness, drowsiness, headache, or stiff neck  · Trouble breathing, wheezing, or pain with breathing  Date Last Reviewed: 9/13/2015  © 9827-4841 [employer] [address] [place] [address], [place] [address]. All rights reserved. This information is not intended as a substitute for professional medical care. Always follow your healthcare professional's instructions.

## 2018-11-20 ENCOUNTER — TELEPHONE (OUTPATIENT)
Dept: RHEUMATOLOGY | Facility: CLINIC | Age: 83
End: 2018-11-20

## 2018-11-20 NOTE — TELEPHONE ENCOUNTER
Spoke with patient . Labs and appointment with Dr. Muniz scheduled for 1-7-19. Patient declined Dexa appointment . Stated that is is not needed.

## 2018-11-20 NOTE — TELEPHONE ENCOUNTER
----- Message from Lisa No sent at 11/20/2018 10:45 AM CST -----  Contact: Pt  Pt returning phone call; phone hung up    ...[phone] (home)

## 2018-11-26 DIAGNOSIS — M89.9 DISORDER OF BONE AND CARTILAGE: Primary | ICD-10-CM

## 2018-11-26 DIAGNOSIS — M94.9 DISORDER OF BONE AND CARTILAGE: Primary | ICD-10-CM

## 2019-01-07 ENCOUNTER — TELEPHONE (OUTPATIENT)
Dept: RHEUMATOLOGY | Facility: CLINIC | Age: 84
End: 2019-01-07

## 2019-01-07 ENCOUNTER — OFFICE VISIT (OUTPATIENT)
Dept: RHEUMATOLOGY | Facility: CLINIC | Age: 84
End: 2019-01-07
Payer: MEDICARE

## 2019-01-07 ENCOUNTER — OFFICE VISIT (OUTPATIENT)
Dept: OPHTHALMOLOGY | Facility: CLINIC | Age: 84
End: 2019-01-07
Payer: MEDICARE

## 2019-01-07 ENCOUNTER — LAB VISIT (OUTPATIENT)
Dept: LAB | Facility: HOSPITAL | Age: 84
End: 2019-01-07
Attending: INTERNAL MEDICINE
Payer: MEDICARE

## 2019-01-07 VITALS
WEIGHT: 137.81 LBS | HEART RATE: 82 BPM | HEIGHT: 62 IN | DIASTOLIC BLOOD PRESSURE: 82 MMHG | BODY MASS INDEX: 25.36 KG/M2 | SYSTOLIC BLOOD PRESSURE: 144 MMHG

## 2019-01-07 DIAGNOSIS — Z51.81 MEDICATION MONITORING ENCOUNTER: ICD-10-CM

## 2019-01-07 DIAGNOSIS — Z96.1 PSEUDOPHAKIA OF BOTH EYES: ICD-10-CM

## 2019-01-07 DIAGNOSIS — N18.30 STAGE 3 CHRONIC KIDNEY DISEASE: Chronic | ICD-10-CM

## 2019-01-07 DIAGNOSIS — M89.9 DISORDER OF BONE AND CARTILAGE: ICD-10-CM

## 2019-01-07 DIAGNOSIS — Z79.52 LONG TERM CURRENT USE OF SYSTEMIC STEROIDS: Chronic | ICD-10-CM

## 2019-01-07 DIAGNOSIS — D63.1 ANEMIA OF CHRONIC RENAL FAILURE, STAGE 3 (MODERATE): ICD-10-CM

## 2019-01-07 DIAGNOSIS — M70.61 TROCHANTERIC BURSITIS OF BOTH HIPS: ICD-10-CM

## 2019-01-07 DIAGNOSIS — M05.79 RHEUMATOID ARTHRITIS INVOLVING MULTIPLE SITES WITH POSITIVE RHEUMATOID FACTOR: Primary | ICD-10-CM

## 2019-01-07 DIAGNOSIS — M05.79 RHEUMATOID ARTHRITIS INVOLVING MULTIPLE SITES WITH POSITIVE RHEUMATOID FACTOR: ICD-10-CM

## 2019-01-07 DIAGNOSIS — D47.2 MONOCLONAL GAMMOPATHY OF UNDETERMINED SIGNIFICANCE: ICD-10-CM

## 2019-01-07 DIAGNOSIS — M70.62 TROCHANTERIC BURSITIS OF BOTH HIPS: ICD-10-CM

## 2019-01-07 DIAGNOSIS — E11.9 DIABETES MELLITUS TYPE 2 WITHOUT RETINOPATHY: ICD-10-CM

## 2019-01-07 DIAGNOSIS — H40.1131 PRIMARY OPEN ANGLE GLAUCOMA OF BOTH EYES, MILD STAGE: Primary | ICD-10-CM

## 2019-01-07 DIAGNOSIS — D84.9 IMMUNOCOMPROMISED: ICD-10-CM

## 2019-01-07 DIAGNOSIS — N18.30 ANEMIA OF CHRONIC RENAL FAILURE, STAGE 3 (MODERATE): ICD-10-CM

## 2019-01-07 DIAGNOSIS — M94.9 DISORDER OF BONE AND CARTILAGE: ICD-10-CM

## 2019-01-07 LAB
ALBUMIN SERPL BCP-MCNC: 2.8 G/DL
ALP SERPL-CCNC: 98 U/L
ALT SERPL W/O P-5'-P-CCNC: 12 U/L
ANION GAP SERPL CALC-SCNC: 10 MMOL/L
AST SERPL-CCNC: 17 U/L
BASOPHILS # BLD AUTO: 0.01 K/UL
BASOPHILS NFR BLD: 0.1 %
BILIRUB SERPL-MCNC: 0.5 MG/DL
BUN SERPL-MCNC: 17 MG/DL
CALCIUM SERPL-MCNC: 8 MG/DL
CHLORIDE SERPL-SCNC: 99 MMOL/L
CO2 SERPL-SCNC: 31 MMOL/L
CREAT SERPL-MCNC: 1.1 MG/DL
CRP SERPL-MCNC: 103.3 MG/L
DIFFERENTIAL METHOD: ABNORMAL
EOSINOPHIL # BLD AUTO: 0.1 K/UL
EOSINOPHIL NFR BLD: 0.5 %
ERYTHROCYTE [DISTWIDTH] IN BLOOD BY AUTOMATED COUNT: 16.7 %
ERYTHROCYTE [SEDIMENTATION RATE] IN BLOOD BY WESTERGREN METHOD: 63 MM/HR
EST. GFR  (AFRICAN AMERICAN): 53 ML/MIN/1.73 M^2
EST. GFR  (NON AFRICAN AMERICAN): 46 ML/MIN/1.73 M^2
GLUCOSE SERPL-MCNC: 136 MG/DL
HCT VFR BLD AUTO: 33.2 %
HGB BLD-MCNC: 10.6 G/DL
LYMPHOCYTES # BLD AUTO: 1 K/UL
LYMPHOCYTES NFR BLD: 9.7 %
MCH RBC QN AUTO: 32.5 PG
MCHC RBC AUTO-ENTMCNC: 31.9 G/DL
MCV RBC AUTO: 102 FL
MONOCYTES # BLD AUTO: 0.2 K/UL
MONOCYTES NFR BLD: 2.2 %
NEUTROPHILS # BLD AUTO: 9.1 K/UL
NEUTROPHILS NFR BLD: 87.5 %
PLATELET # BLD AUTO: 377 K/UL
PMV BLD AUTO: 8.8 FL
POTASSIUM SERPL-SCNC: 4.1 MMOL/L
PROT SERPL-MCNC: 7.2 G/DL
RBC # BLD AUTO: 3.26 M/UL
SODIUM SERPL-SCNC: 140 MMOL/L
WBC # BLD AUTO: 10.37 K/UL

## 2019-01-07 PROCEDURE — 85025 COMPLETE CBC W/AUTO DIFF WBC: CPT | Mod: HCNC,PO

## 2019-01-07 PROCEDURE — 92250 FUNDUS PHOTOGRAPHY W/I&R: CPT | Mod: HCNC,S$GLB,, | Performed by: OPHTHALMOLOGY

## 2019-01-07 PROCEDURE — 99999 PR PBB SHADOW E&M-EST. PATIENT-LVL III: CPT | Mod: PBBFAC,HCNC,, | Performed by: INTERNAL MEDICINE

## 2019-01-07 PROCEDURE — 1101F PR PT FALLS ASSESS DOC 0-1 FALLS W/OUT INJ PAST YR: ICD-10-PCS | Mod: CPTII,HCNC,S$GLB, | Performed by: INTERNAL MEDICINE

## 2019-01-07 PROCEDURE — 92083 HUMPHREY VISUAL FIELD - OU - BOTH EYES: ICD-10-PCS | Mod: HCNC,S$GLB,, | Performed by: OPHTHALMOLOGY

## 2019-01-07 PROCEDURE — 99499 RISK ADDL DX/OHS AUDIT: ICD-10-PCS | Mod: S$GLB,,, | Performed by: INTERNAL MEDICINE

## 2019-01-07 PROCEDURE — 99499 UNLISTED E&M SERVICE: CPT | Mod: S$GLB,,, | Performed by: OPHTHALMOLOGY

## 2019-01-07 PROCEDURE — 99214 PR OFFICE/OUTPT VISIT, EST, LEVL IV, 30-39 MIN: ICD-10-PCS | Mod: HCNC,S$GLB,, | Performed by: INTERNAL MEDICINE

## 2019-01-07 PROCEDURE — 99499 RISK ADDL DX/OHS AUDIT: ICD-10-PCS | Mod: S$GLB,,, | Performed by: OPHTHALMOLOGY

## 2019-01-07 PROCEDURE — 92250 COLOR FUNDUS PHOTOGRAPHY - OU - BOTH EYES: ICD-10-PCS | Mod: HCNC,S$GLB,, | Performed by: OPHTHALMOLOGY

## 2019-01-07 PROCEDURE — 86140 C-REACTIVE PROTEIN: CPT | Mod: HCNC

## 2019-01-07 PROCEDURE — 99999 PR PBB SHADOW E&M-EST. PATIENT-LVL III: ICD-10-PCS | Mod: PBBFAC,HCNC,, | Performed by: INTERNAL MEDICINE

## 2019-01-07 PROCEDURE — 85651 RBC SED RATE NONAUTOMATED: CPT | Mod: HCNC,PO

## 2019-01-07 PROCEDURE — 1101F PT FALLS ASSESS-DOCD LE1/YR: CPT | Mod: CPTII,HCNC,S$GLB, | Performed by: INTERNAL MEDICINE

## 2019-01-07 PROCEDURE — 92014 COMPRE OPH EXAM EST PT 1/>: CPT | Mod: HCNC,S$GLB,, | Performed by: OPHTHALMOLOGY

## 2019-01-07 PROCEDURE — 92083 EXTENDED VISUAL FIELD XM: CPT | Mod: HCNC,S$GLB,, | Performed by: OPHTHALMOLOGY

## 2019-01-07 PROCEDURE — 36415 COLL VENOUS BLD VENIPUNCTURE: CPT | Mod: HCNC,PO

## 2019-01-07 PROCEDURE — 99999 PR PBB SHADOW E&M-EST. PATIENT-LVL II: CPT | Mod: PBBFAC,HCNC,, | Performed by: OPHTHALMOLOGY

## 2019-01-07 PROCEDURE — 99999 PR PBB SHADOW E&M-EST. PATIENT-LVL II: ICD-10-PCS | Mod: PBBFAC,HCNC,, | Performed by: OPHTHALMOLOGY

## 2019-01-07 PROCEDURE — 99499 UNLISTED E&M SERVICE: CPT | Mod: S$GLB,,, | Performed by: INTERNAL MEDICINE

## 2019-01-07 PROCEDURE — 99214 OFFICE O/P EST MOD 30 MIN: CPT | Mod: HCNC,S$GLB,, | Performed by: INTERNAL MEDICINE

## 2019-01-07 PROCEDURE — 92014 PR EYE EXAM, EST PATIENT,COMPREHESV: ICD-10-PCS | Mod: HCNC,S$GLB,, | Performed by: OPHTHALMOLOGY

## 2019-01-07 PROCEDURE — 80053 COMPREHEN METABOLIC PANEL: CPT | Mod: HCNC,PO

## 2019-01-07 RX ORDER — TRAMADOL HYDROCHLORIDE 50 MG/1
50 TABLET ORAL EVERY 8 HOURS PRN
Qty: 60 TABLET | Refills: 3 | Status: SHIPPED | OUTPATIENT
Start: 2019-01-07

## 2019-01-07 ASSESSMENT — ROUTINE ASSESSMENT OF PATIENT INDEX DATA (RAPID3): MDHAQ FUNCTION SCORE: .6

## 2019-01-07 NOTE — PROGRESS NOTES
SUBJECTIVE:   Yovani Pulido is a 86 y.o. female   Uncorrected distance visual acuity was 20/50 -1 in the right eye and 20/40 in the left eye.   Chief Complaint   Patient presents with    Glaucoma     4 month HVF/FD, Timolol QAM OU, Latanoprost QHS OU        HPI:  HPI     Glaucoma      Additional comments: 4 month HVF/FD, Timolol QAM OU, Latanoprost QHS OU              Comments     Pt has been compliant with her drops. No ocular complaints. Vision is the   same. No ocular pain or irritation.     1. Mod COAG (+ Fhx) goal = 19   Splinter Heme OS  Lopez Leader until 2006  2. 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 Yag OS 10/23/2017  4. Irregular Astigmatism  5. 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 RA    Timolol qam OU  Latanoprost qhs OU    Prednisone 1 mg po daily          Last edited by Shmuel Singh on 1/7/2019  1:53 PM. (History)        Assessment /Plan :  1. Primary open angle glaucoma of both eyes, mild stage Doing well, IOP within acceptable range relative to target IOP and no evidence of progression. Continue current treatment. Reviewed importance of continued compliance with treatment and follow up.     2. Pseudophakia of both eyes  -- Condition stable, no therapeutic change required. Monitoring routinely.     3. Diabetes mellitus type 2 without retinopathy No diabetic retinopathy at this time. Reviewed diabetic eye precautions including avoiding tobacco use,  Good glucose control, and importance of regular follow up.        Return to clinic in 6 months  or as needed.  With IOP Check and GOCT

## 2019-01-07 NOTE — TELEPHONE ENCOUNTER
----- Message from Rema Quinteros sent at 1/7/2019  4:16 PM CST -----  Contact: [phone]  would like to consult with the nurse, patients states she went to the pharmacy to  her RX medication and it was their, patients would like to speak with the nurse concerning this , patients states she is out of her medication and needs it for tonight please call back at [phone] thanks sj

## 2019-01-07 NOTE — PATIENT INSTRUCTIONS
Stay at [facility]- 6 tablets a week- split dose    Same amount of folic acid- 2 day    OK for tramadol for pain if significant    Use Tylenol for additional pain control    Ok for 1  Prednisone day- am    Use moist heat for 20 min up 3 x day when hip hurts and OK for Jabier Costa or Biofreeze cream- its coming because of your l knee damage and altered gait    Get DEXA done before next visit

## 2019-01-14 NOTE — PROGRESS NOTES
Subjective:       Patient ID: Yovani Pulido is a 86 y.o. female.    Chief Complaint: Rheumatoid Arthritis      Yovani is back today for rheumatology follow-up.  Last seen August 2018 by TAMI.  The methotrexate was continued at 6 tablets a week and prednisone lowered to 1 mg a day at that time.  She has seropositive rheumatoid arthritis with last activity score is 0.  She has been feeling okay from her peripheral joints but is having some pain in her hips.  The pain is located laterally. Pain today 7/10-no RA exacerbations. Uses trazodone at night which helps.  Flu vaccine given.  No infections or fevers. Uses tramadol sparingly not daily. Needs refill. Cannot take nsaids due to ckd.               Review of Systems   Constitutional: Negative for activity change, appetite change, chills, fatigue, fever and unexpected weight change.   HENT: Negative.  Negative for mouth sores and trouble swallowing.         No dry mouth   Eyes: Negative.  Negative for photophobia, pain and redness.        No swollen or red eyes, no dry eye     Respiratory: Negative.  Negative for chest tightness, shortness of breath, wheezing and stridor.    Cardiovascular: Negative.  Negative for chest pain.   Gastrointestinal: Negative.  Negative for abdominal pain, blood in stool, diarrhea, nausea and vomiting.   Genitourinary: Negative.  Negative for dysuria, frequency, hematuria and urgency.   Musculoskeletal: Positive for arthralgias and myalgias. Negative for back pain, gait problem, joint swelling, neck pain and neck stiffness.        Hips hurt see HPI   Skin: Negative.  Negative for color change, pallor and rash.   Allergic/Immunologic: Positive for immunocompromised state.   Neurological: Negative.  Negative for weakness and light-headedness.   Psychiatric/Behavioral: Negative for agitation and suicidal ideas.       Past Medical History:   Diagnosis Date    Anemia     Carotid stenosis     Cataract     COAG (chronic open-angle  glaucoma)     Colon polyps     Diabetes mellitus type II     steroid induced    Diverticulosis     GERD (gastroesophageal reflux disease)     hiatal hernia    Glaucoma     Heart murmur     Hyperlipidemia     Hypertension     Hypothyroidism     Rheumatoid arthritis(714.0)     Stroke     lacunar infarct     Past Surgical History:   Procedure Laterality Date    anal fissure repair      APPENDECTOMY  1944    BREAST SURGERY  1992 approx    breast biopsies- benign    CAROTID ENDARTERECTOMY  2009    left    CATARACT EXTRACTION      COLONOSCOPY  2012    COLONOSCOPY N/A 12/11/2015    [facility]    ESOPHAGOGASTRODUODENOSCOPY (EGD) N/A 12/11/2015    [facility]    EYE SURGERY  2004, 2005    bilateral cataracts    HERNIA REPAIR  2001, 2002    abdominal x2, with mesh on second    HYSTERECTOMY  1963    vag hyst    JOINT REPLACEMENT  2008    right knee    THORACENTESIS - Outpatient Right 3/23/2018    [facility]    yag Left      Family History   Problem Relation Age of Onset    Cancer Mother         pancreas    Glaucoma Mother     Cancer Father         lung    Cancer Son 61        melanoma metastatic    Heart disease Brother     Diabetes Sister     Stroke Sister     Blindness Sister     Cataracts Sister     Heart disease Brother     Hyperlipidemia Neg Hx     Hypertension Neg Hx     Macular degeneration Neg Hx     Retinal detachment Neg Hx     Strabismus Neg Hx     Thyroid disease Neg Hx     Colon cancer Neg Hx     Stomach cancer Neg Hx      Social History     Socioeconomic History    Marital status:      Spouse name: Not on file    Number of children: 4    Years of education: Not on file    Highest education level: Not on file   Social Needs    Financial resource strain: Not on file    Food insecurity - worry: Not on file    Food insecurity - inability: Not on file     "Transportation needs - medical: Not on file    Transportation needs - non-medical: Not on file   Occupational History    Not on file   Tobacco Use    Smoking status: Former Smoker     Packs/day: 3.00     Years: 50.00     Pack years: 150.00     Start date: 1948     Last attempt to quit: 1998     Years since quittin.1    Smokeless tobacco: Former User   Substance and Sexual Activity    Alcohol use: No     Alcohol/week: 0.0 oz    Drug use: No    Sexual activity: No   Other Topics Concern    Not on file   Social History Narrative    , then remarried.  after 2-3 weeks. They have still been living together for 31 years. He is 95 now. They are no longer sexually active. Caffeine intake 4 cups coffee daily- though has changed to decaf recently. Does have a living will.      Review of patient's allergies indicates:   Allergen Reactions    Tetanus vaccines and toxoid      Other reaction(s): Swelling    Tetanus-horse serum: 30 years ago    Adhes. band-tape-benzalkonium Rash         Objective:     BP (!) 144/82   Pulse 82   Ht 5' 2" (1.575 m)   Wt 62.5 kg (137 lb 12.6 oz)   BMI 25.20 kg/m²      Physical Exam   Constitutional: She is oriented to person, place, and time and well-developed, well-nourished, and in no distress. No distress.   HENT:   Head: Normocephalic and atraumatic.   Right Ear: External ear normal.   Left Ear: External ear normal.   Mouth/Throat: No oropharyngeal exudate.   Eyes: Conjunctivae and EOM are normal. Pupils are equal, round, and reactive to light. No scleral icterus.   Neck: Normal range of motion. Neck supple. No thyromegaly present.   Cardiovascular: Normal rate, regular rhythm and normal heart sounds.    No murmur heard.  Pulmonary/Chest: Effort normal and breath sounds normal. She exhibits no tenderness.   Abdominal: Soft. Bowel sounds are normal.       Right Side Rheumatological Exam     Examination finds the shoulder, elbow, wrist, knee, 1st PIP, 1st " MCP, 2nd PIP, 2nd MCP, 3rd PIP, 3rd MCP, 4th PIP, 4th MCP, 5th PIP and 5th MCP normal.    Left Side Rheumatological Exam     Examination finds the shoulder, elbow, wrist, knee, 1st PIP, 1st MCP, 2nd PIP, 2nd MCP, 3rd PIP, 3rd MCP, 4th PIP, 4th MCP, 5th PIP and 5th MCP normal.      Lymphadenopathy:     She has no cervical adenopathy.   Neurological: She is alert and oriented to person, place, and time. She displays normal reflexes. No cranial nerve deficit. She exhibits normal muscle tone. Gait normal.   Skin: Skin is warm and dry. No rash noted.     Psychiatric: Affect and judgment normal.   Musculoskeletal: Normal range of motion. She exhibits no edema or tenderness.     No synovitis in peripheral joints.      Both hips have mildly tender trochanteric bursae with normal range of motion           Results for orders placed or performed in visit on 01/07/19   CBC auto differential   Result Value Ref Range    WBC 10.37 3.90 - 12.70 K/uL    RBC 3.26 (L) 4.00 - 5.40 M/uL    Hemoglobin 10.6 (L) 12.0 - 16.0 g/dL    Hematocrit 33.2 (L) 37.0 - 48.5 %     (H) 82 - 98 fL    MCH 32.5 (H) 27.0 - 31.0 pg    MCHC 31.9 (L) 32.0 - 36.0 g/dL    RDW 16.7 (H) 11.5 - 14.5 %    Platelets 377 (H) 150 - 350 K/uL    MPV 8.8 (L) 9.2 - 12.9 fL    Gran # (ANC) 9.1 (H) 1.8 - 7.7 K/uL    Lymph # 1.0 1.0 - 4.8 K/uL    Mono # 0.2 (L) 0.3 - 1.0 K/uL    Eos # 0.1 0.0 - 0.5 K/uL    Baso # 0.01 0.00 - 0.20 K/uL    Gran% 87.5 (H) 38.0 - 73.0 %    Lymph% 9.7 (L) 18.0 - 48.0 %    Mono% 2.2 (L) 4.0 - 15.0 %    Eosinophil% 0.5 0.0 - 8.0 %    Basophil% 0.1 0.0 - 1.9 %    Differential Method Automated    C-reactive protein   Result Value Ref Range    .3 (H) 0.0 - 8.2 mg/L   Sedimentation rate, manual   Result Value Ref Range    Sed Rate 63 (H) 0 - 20 mm/Hr   Comprehensive metabolic panel   Result Value Ref Range    Sodium 140 136 - 145 mmol/L    Potassium 4.1 3.5 - 5.1 mmol/L    Chloride 99 95 - 110 mmol/L    CO2 31 (H) 23 - 29 mmol/L     Glucose 136 (H) 70 - 110 mg/dL    BUN, Bld 17 8 - 23 mg/dL    Creatinine 1.1 0.5 - 1.4 mg/dL    Calcium 8.0 (L) 8.7 - 10.5 mg/dL    Total Protein 7.2 6.0 - 8.4 g/dL    Albumin 2.8 (L) 3.5 - 5.2 g/dL    Total Bilirubin 0.5 0.1 - 1.0 mg/dL    Alkaline Phosphatase 98 55 - 135 U/L    AST 17 10 - 40 U/L    ALT 12 10 - 44 U/L    Anion Gap 10 8 - 16 mmol/L    eGFR if African American 53 (A) >60 mL/min/1.73 m^2    eGFR if non African American 46 (A) >60 mL/min/1.73 m^2       Results for JACQUIE, MARIA RLUCIO MENDIETA (MRN [id number]) as of 8/13/2018 09:24   Ref. Range 7/30/2018 09:44   WBC Latest Ref Range: 3.90 - 12.70 K/uL 7.81   RBC Latest Ref Range: 4.00 - 5.40 M/uL 3.16 (L)   Hemoglobin Latest Ref Range: 12.0 - 16.0 g/dL 9.9 (L)   Hematocrit Latest Ref Range: 37.0 - 48.5 % 32.7 (L)   MCV Latest Ref Range: 82 - 98 fL 104 (H)   MCH Latest Ref Range: 27.0 - 31.0 pg 31.3 (H)   MCHC Latest Ref Range: 32.0 - 36.0 g/dL 30.3 (L)   RDW Latest Ref Range: 11.5 - 14.5 % 15.7 (H)   Platelets Latest Ref Range: 150 - 350 K/uL 401 (H)   MPV Latest Ref Range: 9.2 - 12.9 fL 9.3   Gran% Latest Ref Range: 38.0 - 73.0 % 79.4 (H)   Gran # (ANC) Latest Ref Range: 1.8 - 7.7 K/uL 6.2   Immature Granulocytes Latest Ref Range: 0.0 - 0.5 % 0.4   Immature Grans (Abs) Latest Ref Range: 0.00 - 0.04 K/uL 0.03   Lymph% Latest Ref Range: 18.0 - 48.0 % 12.8 (L)   Lymph # Latest Ref Range: 1.0 - 4.8 K/uL 1.0   Mono% Latest Ref Range: 4.0 - 15.0 % 5.4   Mono # Latest Ref Range: 0.3 - 1.0 K/uL 0.4   Eosinophil% Latest Ref Range: 0.0 - 8.0 % 1.7   Eos # Latest Ref Range: 0.0 - 0.5 K/uL 0.1   Basophil% Latest Ref Range: 0.0 - 1.9 % 0.3   Baso # Latest Ref Range: 0.00 - 0.20 K/uL 0.02   nRBC Latest Ref Range: 0 /100 WBC 0   Sodium Latest Ref Range: 136 - 145 mmol/L 144   Potassium Latest Ref Range: 3.5 - 5.1 mmol/L 4.2   Chloride Latest Ref Range: 95 - 110 mmol/L 102   CO2 Latest Ref Range: 23 - 29 mmol/L 27   Anion Gap Latest Ref Range: 8 - 16 mmol/L 15   BUN, Bld Latest  Ref Range: 8 - 23 mg/dL 22   Creatinine Latest Ref Range: 0.5 - 1.4 mg/dL 1.2   eGFR if non African American Latest Ref Range: >60 mL/min/1.73 m^2 41.3 (A)   eGFR if African American Latest Ref Range: >60 mL/min/1.73 m^2 47.6 (A)   Glucose Latest Ref Range: 70 - 110 mg/dL 103   Calcium Latest Ref Range: 8.7 - 10.5 mg/dL 8.7   Phosphorus Latest Ref Range: 2.7 - 4.5 mg/dL 4.4   Albumin Latest Ref Range: 3.5 - 5.2 g/dL 2.9 (L)   Vit D, 25-Hydroxy Latest Ref Range: 30 - 96 ng/mL 30         dexa 2013 normal     Assessment:       1. Rheumatoid arthritis involving multiple sites with positive rheumatoid factor -CDAI-0   2. Monoclonal gammopathy of undetermined significance -ongoing and probable responsible for the high sed rate and CRP--sees Hematology   3. Anemia of chronic renal failure, stage 3 (moderate) -stable   4. Stage 3 chronic kidney disease -creatinine improved since last visit   5. Long term current use of systemic steroids -a low dose of 1 mg daily   6. Medication monitoring encounter -no toxicity from methotrexate noted   7.  8. Immunocompromised -a up-to-date on vaccines  Osteoarthritis-knees    . 9      Insomnia -stable on trazodone      Plan:        Stay at [facility]- 6 tablets a week- split dose    Same amount of folic acid- 2 day    OK for tramadol for pain if significant-refilled today    Use Tylenol for additional pain control    Ok for 1  Prednisone day- am    DXA before next visit in view of long-term steroids and age    Do not take over the counter meds for arthritis pain other than tylenol type- ie- no Advil, Motrin, Aleve, naproxen or high dose asprin    Use moist heat for 20 min up 3 x day when hip hurts and OK for Jabier Costa or Biofreeze cream- its coming because of your l knee damage and altered gait    Follow-up with Dr. Kendrick on the hematology issue      Prolonged visit: Over35 min spent in patient care and evaluation. Over 20 min of time used in reviewing recent labs and symptoms, discussing  diagnostic possibilities, counseling on medication options, reviewing side effects of therapies, and coordination of care with  Family Dr and Hematology   Patient's questions were all addressed and she understands our plans and risks.

## 2019-01-25 ENCOUNTER — OFFICE VISIT (OUTPATIENT)
Dept: INTERNAL MEDICINE | Facility: CLINIC | Age: 84
End: 2019-01-25
Payer: MEDICARE

## 2019-01-25 ENCOUNTER — HOSPITAL ENCOUNTER (INPATIENT)
Facility: HOSPITAL | Age: 84
LOS: 2 days | Discharge: HOME | DRG: 871 | End: 2019-01-27
Attending: EMERGENCY MEDICINE | Admitting: INTERNAL MEDICINE
Payer: MEDICARE

## 2019-01-25 VITALS
BODY MASS INDEX: 23.85 KG/M2 | WEIGHT: 129.63 LBS | DIASTOLIC BLOOD PRESSURE: 38 MMHG | HEIGHT: 62 IN | SYSTOLIC BLOOD PRESSURE: 66 MMHG | HEART RATE: 83 BPM | TEMPERATURE: 98 F | OXYGEN SATURATION: 96 %

## 2019-01-25 DIAGNOSIS — A41.9 SEPSIS, DUE TO UNSPECIFIED ORGANISM: Primary | ICD-10-CM

## 2019-01-25 DIAGNOSIS — I95.9 HYPOTENSION, UNSPECIFIED HYPOTENSION TYPE: Primary | ICD-10-CM

## 2019-01-25 DIAGNOSIS — R94.31 T WAVE INVERSION IN EKG: ICD-10-CM

## 2019-01-25 DIAGNOSIS — I95.9 HYPOTENSION: ICD-10-CM

## 2019-01-25 DIAGNOSIS — D72.829 LEUKOCYTOSIS: ICD-10-CM

## 2019-01-25 DIAGNOSIS — I95.9 HYPOTENSION, UNSPECIFIED HYPOTENSION TYPE: ICD-10-CM

## 2019-01-25 LAB
ALBUMIN SERPL BCP-MCNC: 2.2 G/DL
ALLENS TEST: ABNORMAL
ALP SERPL-CCNC: 165 U/L
ALT SERPL W/O P-5'-P-CCNC: 42 U/L
ANION GAP SERPL CALC-SCNC: 9 MMOL/L
ANISOCYTOSIS BLD QL SMEAR: SLIGHT
APTT BLDCRRT: 33.8 SEC
AST SERPL-CCNC: 44 U/L
BASOPHILS # BLD AUTO: 0.01 K/UL
BASOPHILS # BLD AUTO: 0.03 K/UL
BASOPHILS NFR BLD: 0 %
BASOPHILS NFR BLD: 0.1 %
BILIRUB SERPL-MCNC: 0.4 MG/DL
BILIRUB UR QL STRIP: NEGATIVE
BNP SERPL-MCNC: 264 PG/ML
BUN SERPL-MCNC: 33 MG/DL
CALCIUM SERPL-MCNC: 8.7 MG/DL
CHLORIDE SERPL-SCNC: 92 MMOL/L
CLARITY UR: CLEAR
CO2 SERPL-SCNC: 31 MMOL/L
COLOR UR: YELLOW
CREAT SERPL-MCNC: 1.5 MG/DL
DELSYS: ABNORMAL
DIFFERENTIAL METHOD: ABNORMAL
DIFFERENTIAL METHOD: ABNORMAL
EOSINOPHIL # BLD AUTO: 0.1 K/UL
EOSINOPHIL # BLD AUTO: 0.1 K/UL
EOSINOPHIL NFR BLD: 0.3 %
EOSINOPHIL NFR BLD: 0.4 %
ERYTHROCYTE [DISTWIDTH] IN BLOOD BY AUTOMATED COUNT: 15 %
ERYTHROCYTE [DISTWIDTH] IN BLOOD BY AUTOMATED COUNT: 16.2 %
EST. GFR  (AFRICAN AMERICAN): 36 ML/MIN/1.73 M^2
EST. GFR  (NON AFRICAN AMERICAN): 31 ML/MIN/1.73 M^2
FIO2: 21
GLUCOSE SERPL-MCNC: 147 MG/DL
GLUCOSE UR QL STRIP: NEGATIVE
HCO3 UR-SCNC: 31.3 MMOL/L (ref 24–28)
HCT VFR BLD AUTO: 29 %
HCT VFR BLD AUTO: 30.3 %
HGB BLD-MCNC: 9.1 G/DL
HGB BLD-MCNC: 9.7 G/DL
HGB UR QL STRIP: ABNORMAL
INFLUENZA A, MOLECULAR: NEGATIVE
INFLUENZA B, MOLECULAR: NEGATIVE
INR PPP: 1.1
KETONES UR QL STRIP: NEGATIVE
LACTATE SERPL-SCNC: 1.5 MMOL/L
LACTATE SERPL-SCNC: 1.6 MMOL/L
LEUKOCYTE ESTERASE UR QL STRIP: NEGATIVE
LIPASE SERPL-CCNC: 22 U/L
LYMPHOCYTES # BLD AUTO: 0.9 K/UL
LYMPHOCYTES # BLD AUTO: 1.1 K/UL
LYMPHOCYTES NFR BLD: 4.5 %
LYMPHOCYTES NFR BLD: 4.8 %
MAGNESIUM SERPL-MCNC: 1.3 MG/DL
MCH RBC QN AUTO: 32.3 PG
MCH RBC QN AUTO: 32.3 PG
MCHC RBC AUTO-ENTMCNC: 31.4 G/DL
MCHC RBC AUTO-ENTMCNC: 32 G/DL
MCV RBC AUTO: 101 FL
MCV RBC AUTO: 103 FL
MODE: ABNORMAL
MONOCYTES # BLD AUTO: 2 K/UL
MONOCYTES # BLD AUTO: 2.2 K/UL
MONOCYTES NFR BLD: 9.3 %
MONOCYTES NFR BLD: 9.8 %
NEUTROPHILS # BLD AUTO: 17.2 K/UL
NEUTROPHILS # BLD AUTO: 20.1 K/UL
NEUTROPHILS NFR BLD: 85.2 %
NEUTROPHILS NFR BLD: 85.9 %
NITRITE UR QL STRIP: NEGATIVE
OVALOCYTES BLD QL SMEAR: ABNORMAL
PCO2 BLDA: 58.3 MMHG (ref 35–45)
PH SMN: 7.34 [PH] (ref 7.35–7.45)
PH UR STRIP: 6 [PH] (ref 5–8)
PHOSPHATE SERPL-MCNC: 4.4 MG/DL
PLATELET # BLD AUTO: 536 K/UL
PLATELET # BLD AUTO: 571 K/UL
PLATELET BLD QL SMEAR: ABNORMAL
PMV BLD AUTO: 8.6 FL
PMV BLD AUTO: 9 FL
PO2 BLDA: 23 MMHG (ref 40–60)
POC BE: 5 MMOL/L
POC SATURATED O2: 34 % (ref 95–100)
POIKILOCYTOSIS BLD QL SMEAR: SLIGHT
POLYCHROMASIA BLD QL SMEAR: ABNORMAL
POTASSIUM SERPL-SCNC: 4.1 MMOL/L
PROCALCITONIN SERPL IA-MCNC: 0.33 NG/ML
PROT SERPL-MCNC: 6.8 G/DL
PROT UR QL STRIP: NEGATIVE
PROTHROMBIN TIME: 11.5 SEC
RBC # BLD AUTO: 2.82 M/UL
RBC # BLD AUTO: 3 M/UL
SAMPLE: ABNORMAL
SODIUM SERPL-SCNC: 132 MMOL/L
SP GR UR STRIP: <=1.005 (ref 1–1.03)
SPECIMEN SOURCE: NORMAL
TROPONIN I SERPL DL<=0.01 NG/ML-MCNC: 0.04 NG/ML
TSH SERPL DL<=0.005 MIU/L-ACNC: 2.71 UIU/ML
URN SPEC COLLECT METH UR: ABNORMAL
UROBILINOGEN UR STRIP-ACNC: NEGATIVE EU/DL
WBC # BLD AUTO: 20.19 K/UL
WBC # BLD AUTO: 23.48 K/UL

## 2019-01-25 PROCEDURE — 99999 PR PBB SHADOW E&M-EST. PATIENT-LVL IV: ICD-10-PCS | Mod: PBBFAC,HCNC,, | Performed by: PHYSICIAN ASSISTANT

## 2019-01-25 PROCEDURE — 82803 BLOOD GASES ANY COMBINATION: CPT | Mod: HCNC

## 2019-01-25 PROCEDURE — 83690 ASSAY OF LIPASE: CPT | Mod: HCNC

## 2019-01-25 PROCEDURE — 36415 COLL VENOUS BLD VENIPUNCTURE: CPT | Mod: HCNC

## 2019-01-25 PROCEDURE — 85610 PROTHROMBIN TIME: CPT | Mod: HCNC

## 2019-01-25 PROCEDURE — 25000003 PHARM REV CODE 250: Mod: HCNC | Performed by: FAMILY MEDICINE

## 2019-01-25 PROCEDURE — 83735 ASSAY OF MAGNESIUM: CPT | Mod: HCNC

## 2019-01-25 PROCEDURE — 63600175 PHARM REV CODE 636 W HCPCS: Mod: HCNC | Performed by: FAMILY MEDICINE

## 2019-01-25 PROCEDURE — 93010 ELECTROCARDIOGRAM REPORT: CPT | Mod: HCNC,,, | Performed by: INTERNAL MEDICINE

## 2019-01-25 PROCEDURE — 96365 THER/PROPH/DIAG IV INF INIT: CPT | Mod: HCNC

## 2019-01-25 PROCEDURE — 93005 ELECTROCARDIOGRAM TRACING: CPT | Mod: HCNC

## 2019-01-25 PROCEDURE — 84443 ASSAY THYROID STIM HORMONE: CPT | Mod: HCNC

## 2019-01-25 PROCEDURE — 99213 PR OFFICE/OUTPT VISIT, EST, LEVL III, 20-29 MIN: ICD-10-PCS | Mod: HCNC,S$GLB,, | Performed by: PHYSICIAN ASSISTANT

## 2019-01-25 PROCEDURE — 1101F PT FALLS ASSESS-DOCD LE1/YR: CPT | Mod: HCNC,CPTII,S$GLB, | Performed by: PHYSICIAN ASSISTANT

## 2019-01-25 PROCEDURE — 93010 EKG 12-LEAD: ICD-10-PCS | Mod: HCNC,,, | Performed by: INTERNAL MEDICINE

## 2019-01-25 PROCEDURE — 99291 CRITICAL CARE FIRST HOUR: CPT | Mod: 25,HCNC

## 2019-01-25 PROCEDURE — 84484 ASSAY OF TROPONIN QUANT: CPT | Mod: HCNC

## 2019-01-25 PROCEDURE — 21400001 HC TELEMETRY ROOM: Mod: HCNC

## 2019-01-25 PROCEDURE — 99999 PR PBB SHADOW E&M-EST. PATIENT-LVL IV: CPT | Mod: PBBFAC,HCNC,, | Performed by: PHYSICIAN ASSISTANT

## 2019-01-25 PROCEDURE — 80053 COMPREHEN METABOLIC PANEL: CPT | Mod: HCNC

## 2019-01-25 PROCEDURE — 84145 PROCALCITONIN (PCT): CPT | Mod: HCNC

## 2019-01-25 PROCEDURE — 87502 INFLUENZA DNA AMP PROBE: CPT | Mod: HCNC

## 2019-01-25 PROCEDURE — 84100 ASSAY OF PHOSPHORUS: CPT | Mod: HCNC

## 2019-01-25 PROCEDURE — 81003 URINALYSIS AUTO W/O SCOPE: CPT | Mod: HCNC

## 2019-01-25 PROCEDURE — 83880 ASSAY OF NATRIURETIC PEPTIDE: CPT | Mod: HCNC

## 2019-01-25 PROCEDURE — 1101F PR PT FALLS ASSESS DOC 0-1 FALLS W/OUT INJ PAST YR: ICD-10-PCS | Mod: HCNC,CPTII,S$GLB, | Performed by: PHYSICIAN ASSISTANT

## 2019-01-25 PROCEDURE — 99900035 HC TECH TIME PER 15 MIN (STAT): Mod: HCNC

## 2019-01-25 PROCEDURE — 85730 THROMBOPLASTIN TIME PARTIAL: CPT | Mod: HCNC

## 2019-01-25 PROCEDURE — 87040 BLOOD CULTURE FOR BACTERIA: CPT | Mod: HCNC

## 2019-01-25 PROCEDURE — 83605 ASSAY OF LACTIC ACID: CPT | Mod: 91,HCNC

## 2019-01-25 PROCEDURE — 93010 ELECTROCARDIOGRAM REPORT: CPT | Mod: HCNC,76,, | Performed by: INTERNAL MEDICINE

## 2019-01-25 PROCEDURE — 85025 COMPLETE CBC W/AUTO DIFF WBC: CPT | Mod: HCNC

## 2019-01-25 PROCEDURE — 96367 TX/PROPH/DG ADDL SEQ IV INF: CPT | Mod: HCNC

## 2019-01-25 PROCEDURE — 99213 OFFICE O/P EST LOW 20 MIN: CPT | Mod: HCNC,S$GLB,, | Performed by: PHYSICIAN ASSISTANT

## 2019-01-25 PROCEDURE — 96361 HYDRATE IV INFUSION ADD-ON: CPT | Mod: HCNC

## 2019-01-25 RX ORDER — SODIUM CHLORIDE 0.9 % (FLUSH) 0.9 %
5 SYRINGE (ML) INJECTION
Status: DISCONTINUED | OUTPATIENT
Start: 2019-01-25 | End: 2019-01-27 | Stop reason: HOSPADM

## 2019-01-25 RX ORDER — CEFUROXIME AXETIL 500 MG/1
500 TABLET ORAL 2 TIMES DAILY
Status: ON HOLD | COMMUNITY
Start: 2019-01-22 | End: 2019-01-27 | Stop reason: HOSPADM

## 2019-01-25 RX ORDER — VANCOMYCIN HCL IN 5 % DEXTROSE 1G/250ML
1000 PLASTIC BAG, INJECTION (ML) INTRAVENOUS
Status: DISCONTINUED | OUTPATIENT
Start: 2019-01-31 | End: 2019-01-26

## 2019-01-25 RX ORDER — MAGNESIUM SULFATE HEPTAHYDRATE 40 MG/ML
2 INJECTION, SOLUTION INTRAVENOUS
Status: COMPLETED | OUTPATIENT
Start: 2019-01-25 | End: 2019-01-25

## 2019-01-25 RX ADMIN — VANCOMYCIN HYDROCHLORIDE 1250 MG: 10 INJECTION, POWDER, LYOPHILIZED, FOR SOLUTION INTRAVENOUS at 12:01

## 2019-01-25 RX ADMIN — CEFEPIME 2 G: 2 INJECTION, POWDER, FOR SOLUTION INTRAVENOUS at 12:01

## 2019-01-25 RX ADMIN — SODIUM CHLORIDE 1755 ML: 0.9 INJECTION, SOLUTION INTRAVENOUS at 12:01

## 2019-01-25 RX ADMIN — MAGNESIUM SULFATE IN WATER 2 G: 40 INJECTION, SOLUTION INTRAVENOUS at 01:01

## 2019-01-25 NOTE — HPI
Yovani Pulido is a 86 year old female with history of HTN, HLD, Carotid stenosis, DM II, and Carotid stenosis who presents to after noting to hypotensive during follow up appointment today with PCP. Patient reports she was recently discharged from [facility] after being treated for CHF. She reports improving symptoms since discharge and was surprised when she was asked to come to ED. In clinic she was noted to have systolic blood pressure in 60's. This has improved since arrival to ED with intravenous hydration. Patient denies any associated symptoms including dyspnea, leg swelling, fever, abdominal pain, or chest pain.     CXR showed a small to moderate pleural effusion but otherwise unremarkable. VQ scan and UA are unremarkable. Leukocytosis of 23k noted. Hospital medicine has been consulted for admission.

## 2019-01-25 NOTE — MEDICAL/APP STUDENT
"  History     Chief Complaint   Patient presents with    Hypotension     pt sent from Sandip ritter office for low blood pressure     HPI    Ms. Yovani Pulido, 85yo F w/ PMHx CHF, HTN, HLD, carotid stenosis and DM was referred to ED by Dr. Ritter for further evaluation of hypotension (66/42 sitting). Patient is asymptomatic. She denies: fever, chills, urinary sxs, CP, SOB, n/v/d, light headedness, syncope. She has been weaker than usual, but she attributes to recent hospital admission.     Her son reports she seemed a bit confused yesterday, and slept most of the day.     She was admitted to [facility] for CHF recently, and discharged 2 days ago.She was SOB and had bilateral leg swelling. Per patient, she had not been taking her "fluid pills" for about 5 weeks due to urinary frequency.     Past Medical History:   Diagnosis Date    Anemia     Carotid stenosis     Cataract     COAG (chronic open-angle glaucoma)     Colon polyps     Diabetes mellitus type II     steroid induced    Diverticulosis     GERD (gastroesophageal reflux disease)     hiatal hernia    Glaucoma     Heart murmur     Hyperlipidemia     Hypertension     Hypothyroidism     Rheumatoid arthritis(714.0)     Stroke     lacunar infarct   -Crohn's     Past Surgical History:   Procedure Laterality Date    anal fissure repair      APPENDECTOMY  1944    BREAST SURGERY  1992 approx    breast biopsies- benign    CAROTID ENDARTERECTOMY  2009    left    CATARACT EXTRACTION      COLONOSCOPY  2012    COLONOSCOPY N/A 12/11/2015    [facility]    ESOPHAGOGASTRODUODENOSCOPY (EGD) N/A 12/11/2015    [facility] at [facility] END[facility]    EYE SURGERY  2004, 2005    bilateral cataracts    HERNIA REPAIR  2001, 2002    abdominal x2, with mesh on second    HYSTERECTOMY  1963    vag hyst    JOINT REPLACEMENT  2008    right knee    THORACENTESIS - Outpatient Right 3/23/2018    Per[facility] by Alex Hallman, " [facility] at [facility] ENDO    yag Left    -Knee replacement     Family History   Problem Relation Age of Onset    Cancer Mother         pancreas    Glaucoma Mother     Cancer Father         lung    Cancer Son 61        melanoma metastatic    Heart disease Brother     Diabetes Sister     Stroke Sister     Blindness Sister     Cataracts Sister     Heart disease Brother     Hyperlipidemia Neg Hx     Hypertension Neg Hx     Macular degeneration Neg Hx     Retinal detachment Neg Hx     Strabismus Neg Hx     Thyroid disease Neg Hx     Colon cancer Neg Hx     Stomach cancer Neg Hx        Social History     Tobacco Use    Smoking status: Former Smoker     Packs/day: 3.00     Years: 50.00     Pack years: 150.00     Start date: 1948     Last attempt to quit: 1998     Years since quittin.1    Smokeless tobacco: Former User   Substance Use Topics    Alcohol use: No     Alcohol/week: 0.0 oz    Drug use: No       Review of Systems   Constitutional: fatigue; no chills, fever  ENT: no sore throat  Resp: no SOB  CVS: no CP, palpitation  GI: no d/n/v  Genitourinary: no dysuria, hematuria but frequent urination   MSK: arthritis, especially of her left thumb  Skin: no rash  Neuro: weakness; no syncope, light headedness, numbness  Heme: easy bruising    Physical Exam   /60 (BP Location: Right arm, Patient Position: Sitting)   Pulse 86   Temp 98.2 °F (36.8 °C) (Oral)   Resp 18   Wt 58.5 kg (129 lb)   SpO2 (!) 94%   BMI 23.59 kg/m²     Physical Exam   Constitutional: patient in no acute distress  Eyes: EOM intact  CVS: regular rate, regular rhythm, systolic murmur?  Pulm: no resp distress, left lower lobe crackles  Abdo: soft, no tenderness  MSK: full ROM  Skin: warm and dry   Neuro: alert, awake, appropriate     Image:   Lung ventilation perfusion imaging--PE unlikely; left lower lobe consistent with pleural effusion   CXR: bilateral pleural effusion     A/P:  1. Hypotension: stable now 121/60  2.  CHF: , no edema, patient dry   3. Pleural effusion: patient not in resp distress    4. Sepsis?: IV vancomycin, cefepime; WBC 23.48  5. CKD: BUN33, Cr1.5- monitor        ED Course

## 2019-01-25 NOTE — PROGRESS NOTES
Yovani Pulido [id number] is a 86 y.o. female who has been consulted for vancomycin dosing for sepsis    The patient has the following labs:     Date Creatinine (mg/dl)    BUN WBC Count   1/25/2019 CrCl cannot be calculated (Unknown ideal weight.). Lab Results   Component Value Date    BUN 33 (H) 01/25/2019     Lab Results   Component Value Date    WBC 23.48 (H) 01/25/2019      which calculates to an CrCl cannot be calculated (Unknown ideal weight.)..       Current weight is 58.5 kg (129 lb)    The patient will be started on vancomycin pulse dosing.  A place kelley dose for 1 gm iv q72hrs will be placed and is not to be given.     Patient will be followed by pharmacy for changes in renal function, toxicity, and efficacy.     The vancomycin random has been ordered for 1/26 @ 0430    Goal random is 15-20    Patient will be re-dosed when levels are < = 18    Thank you for allowing us to participate in this patient's care.     Aileen Arreaga [facility]

## 2019-01-25 NOTE — PROGRESS NOTES
Subjective:       Patient ID: Yovani Pulido is a 86 y.o. female.    Chief Complaint: Hospital Follow Up (PCP - Antoine)    Patient is an 85 yo female with CKD 3 and  CHF. She presents today for an hospital admit. She was admitted for CHF. Her blood pressure is abnormally low today.       Review of Systems   Constitutional: Positive for activity change and fatigue. Negative for chills and fever.   Respiratory: Negative for chest tightness and shortness of breath.    Cardiovascular: Negative for chest pain, palpitations and leg swelling.       Objective:      Physical Exam   Constitutional: She appears well-developed and well-nourished. No distress.   Neck: Neck supple.   Cardiovascular: Normal rate and regular rhythm. Exam reveals no gallop and no friction rub.   Murmur heard.  Pulmonary/Chest: Effort normal and breath sounds normal. No stridor. No respiratory distress. She has no wheezes. She has no rales. She exhibits no tenderness.   Abdominal: Soft. There is no tenderness.   Lymphadenopathy:     She has no cervical adenopathy.   Skin: She is not diaphoretic.   Nursing note and vitals reviewed.      Assessment:       1. Hypotension, unspecified hypotension type        Plan:       Hypotension, unspecified hypotension type       Suggest to go to the ER. She left in stable but guarded condition.

## 2019-01-25 NOTE — SUBJECTIVE & OBJECTIVE
Past Medical History:   Diagnosis Date    Anemia     Carotid stenosis     Cataract     COAG (chronic open-angle glaucoma)     Colon polyps     Diabetes mellitus type II     steroid induced    Diverticulosis     GERD (gastroesophageal reflux disease)     hiatal hernia    Glaucoma     Heart murmur     Hyperlipidemia     Hypertension     Hypothyroidism     Rheumatoid arthritis(714.0)     Stroke     lacunar infarct       Past Surgical History:   Procedure Laterality Date    anal fissure repair      APPENDECTOMY  1944    BREAST SURGERY  1992 approx    breast biopsies- benign    CAROTID ENDARTERECTOMY  2009    left    CATARACT EXTRACTION      COLONOSCOPY  2012    COLONOSCOPY N/A 12/11/2015    [facility]    ESOPHAGOGASTRODUODENOSCOPY (EGD) N/A 12/11/2015    Per[facility] at [facility]    EYE SURGERY  2004, 2005    bilateral cataracts    HERNIA REPAIR  2001, 2002    abdominal x2, with mesh on second    HYSTERECTOMY  1963    vag hyst    JOINT REPLACEMENT  2008    right knee    THORACENTESIS - Outpatient Right 3/23/2018    [facility] by [facility] at [facility] END[facility]    yag Left        Review of patient's allergies indicates:   Allergen Reactions    Tetanus vaccines and toxoid      Other reaction(s): Swelling    Tetanus-horse serum: 30 years ago    Adhes. band-tape-benzalkonium Rash       Current Facility-Administered Medications on File Prior to Encounter   Medication    [DISCONTINUED] GENERIC EXTERNAL MEDICATION     Current Outpatient Medications on File Prior to Encounter   Medication Sig    benazepril (LOTENSIN) 5 MG tablet Take 1 tablet (5 mg total) by mouth once daily.    cefUROXime (CEFTIN) 500 MG tablet Take 500 mg by mouth 2 (two) times daily.    furosemide (LASIX) 20 MG tablet Take 1 tablet (20 mg total) by mouth every Mon, Wed, Fri.    levothyroxine (SYNTHROID) 50 MCG tablet TAKE 1 TABLET BEFORE BREAKFAST    metFORMIN (GLUCOPHAGE)  1000 MG tablet TAKE 1 TABLET TWICE DAILY WITH MEALS    pravastatin (PRAVACHOL) 40 MG tablet TAKE 1 TABLET EVERY DAY    predniSONE (DELTASONE) 1 MG tablet TAKE 1 TABLET TWICE DAILY  OR  AS  DIRECTED    alcohol swabs PadM Apply 1 each topically 2 (two) times daily.    blood glucose control, high Soln by Misc.(Non-Drug; Combo Route) route.    blood glucose control, normal Soln Use as directed.    blood sugar diagnostic Strp Glucose testing daily.    blood-glucose meter Misc Use as directed.    cetirizine (ZYRTEC) 10 MG tablet Take 1 tablet (10 mg total) by mouth once daily.    fluticasone (FLONASE) 50 mcg/actuation nasal spray 2 sprays by Each Nare route once daily.    folic acid (FOLVITE) 800 MCG Tab TAKE 1 TABLET TWICE DAILY    lancets (LANCETS,THIN) Misc Twice daily glucose testing.    latanoprost 0.005 % ophthalmic solution INSTILL 1 DROP INTO BOTH EYES EVERY EVENING    magnesium 250 mg Tab Take 1 tablet by mouth Daily.    methotrexate 2.5 MG Tab TAKE 4 TABLETS IN MORNING AND 4 TABLETS AT NIGHT ONE TIME WEEKLY    omeprazole (PRILOSEC) 20 MG capsule Take 1 capsule (20 mg total) by mouth once daily.    timolol maleate 0.5% (TIMOPTIC) 0.5 % Drop INSTILL 1 DROP INTO BOTH EYES EVERY MORNING    traMADol (ULTRAM) 50 mg tablet Take 1 tablet (50 mg total) by mouth every 8 (eight) hours as needed for Pain.    traZODone (DESYREL) 100 MG tablet TAKE 1 TABLET EVERY EVENING     Family History     Problem Relation (Age of Onset)    Blindness Sister    Cancer Mother, Father, Son (61)    Cataracts Sister    Diabetes Sister    Glaucoma Mother    Heart disease Brother, Brother    Stroke Sister        Tobacco Use    Smoking status: Former Smoker     Packs/day: 3.00     Years: 50.00     Pack years: 150.00     Start date: 1948     Last attempt to quit: 1998     Years since quittin.1    Smokeless tobacco: Former User   Substance and Sexual Activity    Alcohol use: No     Alcohol/week: 0.0 oz    Drug  use: No    Sexual activity: No     Review of Systems   Constitutional: Negative for activity change, diaphoresis, fatigue and unexpected weight change.        Hypotension     HENT: Negative for congestion, ear pain and sore throat.    Eyes: Negative.    Respiratory: Negative for shortness of breath and wheezing.    Cardiovascular: Negative for chest pain and palpitations.   Gastrointestinal: Negative for abdominal pain, constipation, diarrhea and vomiting.   Endocrine: Negative.    Genitourinary: Positive for frequency. Negative for flank pain, hematuria and urgency.   Musculoskeletal: Negative for joint swelling and neck pain.   Skin: Negative for pallor.   Neurological: Negative for seizures, syncope and light-headedness.   Hematological: Negative.    Psychiatric/Behavioral: Negative.       Objective:     Vital Signs (Most Recent):  Temp: 98.2 °F (36.8 °C) (01/25/19 1509)  Pulse: 92 (01/25/19 1601)  Resp: 18 (01/25/19 0921)  BP: (!) 112/56 (01/25/19 1601)  SpO2: 95 % (01/25/19 1601) Vital Signs (24h Range):  Temp:  [97.5 °F (36.4 °C)-98.3 °F (36.8 °C)] 98.2 °F (36.8 °C)  Pulse:  [78-92] 92  Resp:  [18] 18  SpO2:  [92 %-96 %] 95 %  BP: ()/(38-60) 112/56     Weight: 58.5 kg (129 lb)  Body mass index is 23.59 kg/m².    Physical Exam   Constitutional: She is oriented to person, place, and time. She appears well-developed and well-nourished.   Elderly, thin   HENT:   Head: Normocephalic and atraumatic.   Eyes: EOM are normal.   Neck: Normal range of motion. Neck supple.   Cardiovascular: Normal rate, regular rhythm and normal heart sounds.   No murmur heard.  Pulmonary/Chest: Effort normal and breath sounds normal. No respiratory distress.   Abdominal: Soft. Bowel sounds are normal. She exhibits no distension. There is no tenderness.   Musculoskeletal: Normal range of motion. She exhibits no edema.   Neurological: She is alert and oriented to person, place, and time.   Skin: Skin is warm and dry.         CRANIAL  NERVES     CN III, IV, VI   Extraocular motions are normal.        Significant Labs:   CBC:   Recent Labs   Lab 01/25/19  1030 01/25/19  1207   WBC 20.19* 23.48*   HGB 9.1* 9.7*   HCT 29.0* 30.3*   * 536*     CMP:   Recent Labs   Lab 01/25/19  1030   *   K 4.1   CL 92*   CO2 31*   *   BUN 33*   CREATININE 1.5*   CALCIUM 8.7   PROT 6.8   ALBUMIN 2.2*   BILITOT 0.4   ALKPHOS 165*   AST 44*   ALT 42   ANIONGAP 9   EGFRNONAA 31*       Significant Imaging: I have reviewed all pertinent imaging results/findings within the past 24 hours.

## 2019-01-25 NOTE — ED PROVIDER NOTES
SCRIBE #1 NOTE: I, Elana Wheeler, am scribing for, and in the presence of, Laith Lindsey MD/Glenys Anderson MD. I have scribed the entire note.      History      Chief Complaint   Patient presents with    Hypotension     pt sent from [facility] office for low blood pressure       Review of patient's allergies indicates:   Allergen Reactions    Tetanus vaccines and toxoid      Other reaction(s): Swelling    Tetanus-horse serum: 30 years ago    Adhes. band-tape-benzalkonium Rash        HPI   HPI    1/25/2019, 9:43 AM   History obtained from the patient      History of Present Illness: Yovani Pulido is a 86 y.o. female patient with a PMHx of stroke, RA, HTN, HLD, DM, Carotid Stenosis, CHF who presents to the Emergency Department for further evaluation of hypotension. Pt was admitted to [facility] for CHF recently, she was discharged 2 days ago. She reports she had her f/u appointment with Dr. Ritter today and she was referred to the ED for a BP of 66/42. Pt is asymptomatic. She reports she feels fine. She denies any fever, chills, urinary sxs, CP, SOB, n/v/d, light-headedness, syncope, and all other sxs. No further complaints or concerns.       Arrival mode: Personal vehicle      PCP: Dorcas Schultz DO       Past Medical History:  Past Medical History:   Diagnosis Date    Anemia     Carotid stenosis     Cataract     COAG (chronic open-angle glaucoma)     Colon polyps     Diabetes mellitus type II     steroid induced    Diverticulosis     GERD (gastroesophageal reflux disease)     hiatal hernia    Glaucoma     Heart murmur     Hyperlipidemia     Hypertension     Hypothyroidism     Rheumatoid arthritis(714.0)     Stroke     lacunar infarct       Past Surgical History:  Past Surgical History:   Procedure Laterality Date    anal fissure repair      APPENDECTOMY  1944    BREAST SURGERY  1992 approx    breast biopsies- benign    CAROTID ENDARTERECTOMY  2009    left    CATARACT EXTRACTION       COLONOSCOPY  2012    COLONOSCOPY N/A 2015    Performed by [facility]    ESOPHAGOGASTRODUODENOSCOPY (EGD) N/A 2015    [facility]formed by [facility]    EYE SURGERY  ,     bilateral cataracts    HERNIA REPAIR  ,     abdominal x2, with mesh on second    HYSTERECTOMY  1963    vag hyst    JOINT REPLACEMENT  2008    right knee    THORACENTESIS - Outpatient Right 3/23/2018    Performed by Alex Hallman MD at [facility]    yag Left          Family History:  Family History   Problem Relation Age of Onset    Cancer Mother         pancreas    Glaucoma Mother     Cancer Father         lung    Cancer Son 61        melanoma metastatic    Heart disease Brother     Diabetes Sister     Stroke Sister     Blindness Sister     Cataracts Sister     Heart disease Brother     Hyperlipidemia Neg Hx     Hypertension Neg Hx     Macular degeneration Neg Hx     Retinal detachment Neg Hx     Strabismus Neg Hx     Thyroid disease Neg Hx     Colon cancer Neg Hx     Stomach cancer Neg Hx        Social History:  Social History     Tobacco Use    Smoking status: Former Smoker     Packs/day: 3.00     Years: 50.00     Pack years: 150.00     Start date: 1948     Last attempt to quit: 1998     Years since quittin.1    Smokeless tobacco: Former User   Substance and Sexual Activity    Alcohol use: No     Alcohol/week: 0.0 oz    Drug use: No    Sexual activity: No       ROS   Review of Systems   Constitutional: Negative for chills, fatigue and fever.   HENT: Negative for sore throat.    Respiratory: Negative for shortness of breath.    Cardiovascular: Negative for chest pain and palpitations.   Gastrointestinal: Negative for diarrhea, nausea and vomiting.   Genitourinary: Negative for dysuria, frequency and hematuria.   Musculoskeletal: Negative for back pain.   Skin: Negative for rash.   Neurological: Negative for syncope, weakness,  light-headedness and numbness.   Hematological: Does not bruise/bleed easily.   All other systems reviewed and are negative.    Physical Exam      Initial Vitals [01/25/19 0921]   BP Pulse Resp Temp SpO2   (!) 66/42 80 18 97.5 °F (36.4 °C) (!) 92 %      MAP       --          Physical Exam  Nursing Notes and Vital Signs Reviewed.  Constitutional: Patient is in no acute distress. Well-developed and well-nourished.  Head: Atraumatic. Normocephalic.  Eyes: PERRL. EOM intact. Conjunctivae are not pale. No scleral icterus.  ENT: Mucous membranes are dry. Oropharynx is clear and symmetric.    Neck: Supple. Full ROM. No lymphadenopathy.  Cardiovascular: Regular rate. Regular rhythm. No murmurs, rubs, or gallops. Distal pulses are 2+ and symmetric.  Pulmonary/Chest: No respiratory distress. Clear to auscultation bilaterally. No wheezing or rales.  Abdominal: Soft and non-distended.  There is no tenderness.  No rebound, guarding, or rigidity.   Musculoskeletal: Moves all extremities. No obvious deformities. No edema. No calf tenderness.  Skin: Warm and dry.  Neurological:  Alert, awake, and appropriate.  Normal speech.  No acute focal neurological deficits are appreciated.  Psychiatric: Normal affect. Good eye contact. Appropriate in content.    ED Course    Critical Care  Date/Time: 1/25/2019 3:40 PM  Performed by: Glenys Anderson MD  Authorized by: Glenys Anderson MD   Direct patient critical care time: 20 minutes  Additional history critical care time: 5 minutes  Ordering / reviewing critical care time: 5 minutes  Documentation critical care time: 5 minutes  Consulting other physicians critical care time: 5 minutes  Total critical care time (exclusive of procedural time) : 40 minutes  Critical care time was exclusive of teaching time and separately billable procedures and treating other patients.  Critical care was necessary to treat or prevent imminent or life-threatening deterioration of the following conditions:  sepsis.  Critical care was time spent personally by me on the following activities: blood draw for specimens, development of treatment plan with patient or surrogate, discussions with consultants, interpretation of cardiac output measurements, evaluation of patient's response to treatment, examination of patient, obtaining history from patient or surrogate, ordering and performing treatments and interventions, ordering and review of laboratory studies, ordering and review of radiographic studies, pulse oximetry, re-evaluation of patient's condition and review of old charts.        ED Vital Signs:  Vitals:    01/26/19 0153 01/26/19 0300 01/26/19 0306 01/26/19 0351   BP:   (!) 91/50 (!) 109/55   Pulse:  91 87 85   Resp:   18    Temp:   99.3 °F (37.4 °C)    TempSrc:   Oral    SpO2: 95%      Weight:       Height:        01/26/19 0500 01/26/19 0509 01/26/19 0730 01/26/19 0740   BP:    (!) 100/51   Pulse: 88  77 86   Resp:    20   Temp:    98.4 °F (36.9 °C)   TempSrc:    Oral   SpO2:  95%  95%   Weight:       Height:        01/26/19 0917 01/26/19 1130 01/26/19 1254 01/26/19 1355   BP:   (!) 100/49    Pulse: 87 77 83 81   Resp:   18    Temp:       TempSrc:       SpO2:   97%    Weight:       Height:        01/26/19 1500 01/26/19 1711 01/26/19 1730   BP:  (!) 91/47    Pulse: 89 87 88   Resp:  18    Temp:      TempSrc:      SpO2:  96%    Weight:      Height:          Abnormal Lab Results:  Labs Reviewed   CBC W/ AUTO DIFFERENTIAL - Abnormal; Notable for the following components:       Result Value    WBC 20.19 (*)     RBC 2.82 (*)     Hemoglobin 9.1 (*)     Hematocrit 29.0 (*)      (*)     MCH 32.3 (*)     MCHC 31.4 (*)     RDW 15.0 (*)     Platelets 571 (*)     MPV 8.6 (*)     Gran # (ANC) 17.2 (*)     Lymph # 0.9 (*)     Mono # 2.0 (*)     Gran% 85.2 (*)     Lymph% 4.5 (*)     Platelet Estimate Increased (*)     All other components within normal limits   COMPREHENSIVE METABOLIC PANEL - Abnormal; Notable for the  following components:    Sodium 132 (*)     Chloride 92 (*)     CO2 31 (*)     Glucose 147 (*)     BUN, Bld 33 (*)     Creatinine 1.5 (*)     Albumin 2.2 (*)     Alkaline Phosphatase 165 (*)     AST 44 (*)     eGFR if  36 (*)     eGFR if non  31 (*)     All other components within normal limits   TROPONIN I - Abnormal; Notable for the following components:    Troponin I 0.043 (*)     All other components within normal limits   URINALYSIS, REFLEX TO URINE CULTURE - Abnormal; Notable for the following components:    Specific Gravity, UA <=1.005 (*)     Occult Blood UA Trace (*)     All other components within normal limits    Narrative:     Preferred Collection Type->Urine, Clean Catch   MAGNESIUM - Abnormal; Notable for the following components:    Magnesium 1.3 (*)     All other components within normal limits   B-TYPE NATRIURETIC PEPTIDE - Abnormal; Notable for the following components:     (*)     All other components within normal limits   APTT - Abnormal; Notable for the following components:    aPTT 33.8 (*)     All other components within normal limits   PROCALCITONIN - Abnormal; Notable for the following components:    Procalcitonin 0.33 (*)     All other components within normal limits   CBC W/ AUTO DIFFERENTIAL - Abnormal; Notable for the following components:    WBC 23.48 (*)     RBC 3.00 (*)     Hemoglobin 9.7 (*)     Hematocrit 30.3 (*)      (*)     MCH 32.3 (*)     RDW 16.2 (*)     Platelets 536 (*)     MPV 9.0 (*)     Gran # (ANC) 20.1 (*)     Mono # 2.2 (*)     Gran% 85.9 (*)     Lymph% 4.8 (*)     All other components within normal limits   ISTAT PROCEDURE - Abnormal; Notable for the following components:    POC PH 7.338 (*)     POC PCO2 58.3 (*)     POC PO2 23 (*)     POC HCO3 31.3 (*)     POC SATURATED O2 34 (*)     All other components within normal limits   INFLUENZA A & B BY MOLECULAR   TSH   LACTIC ACID, PLASMA   LACTIC ACID, PLASMA   APTT   B-TYPE  NATRIURETIC PEPTIDE   CBC W/ AUTO DIFFERENTIAL   LIPASE   MAGNESIUM   PHOSPHORUS   PROCALCITONIN   PROTIME-INR   PHOSPHORUS   LIPASE   PROTIME-INR        All Lab Results:  Results for orders placed or performed during the hospital encounter of 01/25/19   Blood culture x two cultures. Draw prior to antibiotics.   Result Value Ref Range    Blood Culture, Routine No Growth to date    Blood culture x two cultures. Draw prior to antibiotics.   Result Value Ref Range    Blood Culture, Routine No Growth to date    Influenza A & B by Molecular   Result Value Ref Range    Influenza A, Molecular Negative Negative    Influenza B, Molecular Negative Negative    Flu A & B Source NP    CBC auto differential   Result Value Ref Range    WBC 20.19 (H) 3.90 - 12.70 K/uL    RBC 2.82 (L) 4.00 - 5.40 M/uL    Hemoglobin 9.1 (L) 12.0 - 16.0 g/dL    Hematocrit 29.0 (L) 37.0 - 48.5 %     (H) 82 - 98 fL    MCH 32.3 (H) 27.0 - 31.0 pg    MCHC 31.4 (L) 32.0 - 36.0 g/dL    RDW 15.0 (H) 11.5 - 14.5 %    Platelets 571 (H) 150 - 350 K/uL    MPV 8.6 (L) 9.2 - 12.9 fL    Gran # (ANC) 17.2 (H) 1.8 - 7.7 K/uL    Lymph # 0.9 (L) 1.0 - 4.8 K/uL    Mono # 2.0 (H) 0.3 - 1.0 K/uL    Eos # 0.1 0.0 - 0.5 K/uL    Baso # 0.03 0.00 - 0.20 K/uL    Gran% 85.2 (H) 38.0 - 73.0 %    Lymph% 4.5 (L) 18.0 - 48.0 %    Mono% 9.8 4.0 - 15.0 %    Eosinophil% 0.4 0.0 - 8.0 %    Basophil% 0.1 0.0 - 1.9 %    Platelet Estimate Increased (A)     Aniso Slight     Poik Slight     Poly Occasional     Ovalocytes Occasional     Differential Method Automated    Comprehensive metabolic panel   Result Value Ref Range    Sodium 132 (L) 136 - 145 mmol/L    Potassium 4.1 3.5 - 5.1 mmol/L    Chloride 92 (L) 95 - 110 mmol/L    CO2 31 (H) 23 - 29 mmol/L    Glucose 147 (H) 70 - 110 mg/dL    BUN, Bld 33 (H) 8 - 23 mg/dL    Creatinine 1.5 (H) 0.5 - 1.4 mg/dL    Calcium 8.7 8.7 - 10.5 mg/dL    Total Protein 6.8 6.0 - 8.4 g/dL    Albumin 2.2 (L) 3.5 - 5.2 g/dL    Total Bilirubin 0.4 0.1 -  1.0 mg/dL    Alkaline Phosphatase 165 (H) 55 - 135 U/L    AST 44 (H) 10 - 40 U/L    ALT 42 10 - 44 U/L    Anion Gap 9 8 - 16 mmol/L    eGFR if African American 36 (A) >60 mL/min/1.73 m^2    eGFR if non African American 31 (A) >60 mL/min/1.73 m^2   TSH   Result Value Ref Range    TSH 2.715 0.400 - 4.000 uIU/mL   Troponin I   Result Value Ref Range    Troponin I 0.043 (H) 0.000 - 0.026 ng/mL   Lactic acid, plasma #1   Result Value Ref Range    Lactate (Lactic Acid) 1.5 0.5 - 2.2 mmol/L   Lactic acid, plasma #2   Result Value Ref Range    Lactate (Lactic Acid) 1.6 0.5 - 2.2 mmol/L   Urinalysis, Reflex to Urine Culture Urine, Clean Catch   Result Value Ref Range    Specimen UA Urine, Clean Catch     Color, UA Yellow Yellow, Straw, Kourtney    Appearance, UA Clear Clear    pH, UA 6.0 5.0 - 8.0    Specific Gravity, UA <=1.005 (A) 1.005 - 1.030    Protein, UA Negative Negative    Glucose, UA Negative Negative    Ketones, UA Negative Negative    Bilirubin (UA) Negative Negative    Occult Blood UA Trace (A) Negative    Nitrite, UA Negative Negative    Urobilinogen, UA Negative <2.0 EU/dL    Leukocytes, UA Negative Negative   Magnesium   Result Value Ref Range    Magnesium 1.3 (L) 1.6 - 2.6 mg/dL   Phosphorus   Result Value Ref Range    Phosphorus 4.4 2.7 - 4.5 mg/dL   Lipase   Result Value Ref Range    Lipase 22 4 - 60 U/L   Brain natriuretic peptide   Result Value Ref Range     (H) 0 - 99 pg/mL   APTT   Result Value Ref Range    aPTT 33.8 (H) 21.0 - 32.0 sec   Protime-INR   Result Value Ref Range    Prothrombin Time 11.5 9.0 - 12.5 sec    INR 1.1 0.8 - 1.2   Procalcitonin   Result Value Ref Range    Procalcitonin 0.33 (H) <0.25 ng/mL   CBC auto differential   Result Value Ref Range    WBC 23.48 (H) 3.90 - 12.70 K/uL    RBC 3.00 (L) 4.00 - 5.40 M/uL    Hemoglobin 9.7 (L) 12.0 - 16.0 g/dL    Hematocrit 30.3 (L) 37.0 - 48.5 %     (H) 82 - 98 fL    MCH 32.3 (H) 27.0 - 31.0 pg    MCHC 32.0 32.0 - 36.0 g/dL    RDW 16.2  (H) 11.5 - 14.5 %    Platelets 536 (H) 150 - 350 K/uL    MPV 9.0 (L) 9.2 - 12.9 fL    Gran # (ANC) 20.1 (H) 1.8 - 7.7 K/uL    Lymph # 1.1 1.0 - 4.8 K/uL    Mono # 2.2 (H) 0.3 - 1.0 K/uL    Eos # 0.1 0.0 - 0.5 K/uL    Baso # 0.01 0.00 - 0.20 K/uL    Gran% 85.9 (H) 38.0 - 73.0 %    Lymph% 4.8 (L) 18.0 - 48.0 %    Mono% 9.3 4.0 - 15.0 %    Eosinophil% 0.3 0.0 - 8.0 %    Basophil% 0.0 0.0 - 1.9 %    Differential Method Automated    Vancomycin, random   Result Value Ref Range    Vancomycin, Random 11.7 Not established ug/mL   CBC auto differential   Result Value Ref Range    WBC 16.50 (H) 3.90 - 12.70 K/uL    RBC 2.73 (L) 4.00 - 5.40 M/uL    Hemoglobin 8.8 (L) 12.0 - 16.0 g/dL    Hematocrit 27.6 (L) 37.0 - 48.5 %     (H) 82 - 98 fL    MCH 32.2 (H) 27.0 - 31.0 pg    MCHC 31.9 (L) 32.0 - 36.0 g/dL    RDW 16.2 (H) 11.5 - 14.5 %    Platelets 528 (H) 150 - 350 K/uL    MPV 8.5 (L) 9.2 - 12.9 fL    Gran # (ANC) 13.2 (H) 1.8 - 7.7 K/uL    Lymph # 1.0 1.0 - 4.8 K/uL    Mono # 2.1 (H) 0.3 - 1.0 K/uL    Eos # 0.2 0.0 - 0.5 K/uL    Baso # 0.02 0.00 - 0.20 K/uL    Gran% 80.7 (H) 38.0 - 73.0 %    Lymph% 5.9 (L) 18.0 - 48.0 %    Mono% 12.7 4.0 - 15.0 %    Eosinophil% 1.1 0.0 - 8.0 %    Basophil% 0.1 0.0 - 1.9 %    Differential Method Automated    Basic metabolic panel   Result Value Ref Range    Sodium 135 (L) 136 - 145 mmol/L    Potassium 4.0 3.5 - 5.1 mmol/L    Chloride 96 95 - 110 mmol/L    CO2 30 (H) 23 - 29 mmol/L    Glucose 151 (H) 70 - 110 mg/dL    BUN, Bld 28 (H) 8 - 23 mg/dL    Creatinine 1.2 0.5 - 1.4 mg/dL    Calcium 8.6 (L) 8.7 - 10.5 mg/dL    Anion Gap 9 8 - 16 mmol/L    eGFR if African American 47 (A) >60 mL/min/1.73 m^2    eGFR if non African American 41 (A) >60 mL/min/1.73 m^2   Troponin I   Result Value Ref Range    Troponin I 0.051 (H) 0.000 - 0.026 ng/mL   Troponin I   Result Value Ref Range    Troponin I 0.055 (H) 0.000 - 0.026 ng/mL   ISTAT PROCEDURE   Result Value Ref Range    POC PH 7.338 (L) 7.35 - 7.45     POC PCO2 58.3 (H) 35 - 45 mmHg    POC PO2 23 (LL) 40 - 60 mmHg    POC HCO3 31.3 (H) 24 - 28 mmol/L    POC BE 5 -2 to 2 mmol/L    POC SATURATED O2 34 (L) 95 - 100 %    Sample VENOUS     Allens Test N/A     DelSys Room Air     Mode SPONT     FiO2 21        Imaging Results:  Imaging Results          CT Chest Without Contrast (Final result)  Result time 01/25/19 19:24:09    Final result by Simón Harrison Jr., MD (01/25/19 19:24:09)                 Impression:      1. Small bilateral pleural effusions.  There is passive atelectasis of overlying right lower lobe.  More consolidated appearance of portions of the left lower lobe as may occur with pneumonia.  2. Small focus of ground-glass within the left upper lobe may relate to an additional focus of pneumonia/pneumonitis.  3. Nonspecific 7 mm nodule left upper lobe.  4. Cardiomegaly.  All CT scans at this facility use dose modulation, iterative reconstruction, and/or weight base dosing when appropriate to reduce radiation dose to as low as reasonably achievable.      Electronically signed by: Simón Harrison Jr., MD  Date:    01/25/2019  Time:    19:24             Narrative:    EXAMINATION:  CT CHEST WITHOUT CONTRAST    CLINICAL HISTORY:  sepsis rule out acute process;    TECHNIQUE:  Routine chest CT protocol was performed without contrast. All CT scans at this facility use dose modulation, iterative reconstruction, and/or weight based dosing when appropriate to reduce radiation dose to as low as reasonably achievable.    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  Mild centrilobular emphysema.  There are small bilateral pleural effusions.  Small patch of ground-glass within the left upper lobe on axial series 3 image 114.  It measures 10 mm.  Nonspecific nodule with geometric margination measuring 7 mm within the left upper lobe adjacent to the medial pleural surface.  There are bandlike opacities and volume loss involving the lingula and  left lower lobe.  There is a somewhat confluent area of consolidation peripherally within the left lung base posteriorly.  The right lower lobe has a more common appearance of passive atelectasis.  13 mm short axis station 4R node.  Station 3 node measures 12 mm.  Calcific atheromatous change of the aorta without aneurysm.  The heart is mildly enlarged.  Coronary artery calcifications.  No suspicious bone lesions. Limited images through the upper abdomen demonstrate no acute abnormality.                               NM Lung Ventilation Perfusion Imaging (Final result)  Result time 01/25/19 14:49:41   Procedure changed from NM Lung Quant Diff Perf Vent with Image     Final result by Marek Toledo III, MD (01/25/19 14:49:41)                 Impression:      Low probability for pulmonary embolism.      Electronically signed by: Marek Toledo MD  Date:    01/25/2019  Time:    14:49             Narrative:    EXAMINATION:  NM LUNG VENTILATION AND PERFUSION IMAGING    CLINICAL HISTORY:  Dyspnea, cardiac origin suspected;    TECHNIQUE:  The patient received 1 mCi of technetium 99 DTPA for ventilation images and 6 mCi of technetium 99 MAA for perfusion images.    FINDINGS:  Ventilation images demonstrate heterogeneous pulmonary uptake with multifocal photopenic defects most prominent in the posterior left lung base corresponding to pleural effusion seen on recent x-ray.  There is associated matched perfusion defect in the posterior left lower lobe consistent with pleural effusion..  No mismatched segmental or lobar perfusion defects are identified to suggest pulmonary embolism.                               X-Ray Chest AP Portable (Final result)  Result time 01/25/19 10:10:36    Final result by Joe Moreau MD (01/25/19 10:10:36)                 Impression:      In comparison to the prior study, there is no adverse interval changes      Electronically signed by: Joe Moreau MD  Date:    01/25/2019  Time:    10:10              Narrative:    EXAMINATION:  XR CHEST AP PORTABLE    CLINICAL HISTORY:  Chest Pain;    TECHNIQUE:  Single frontal view of the chest was performed.    COMPARISON:  11/05/2018    FINDINGS:  Stable cardiomegaly.  Aorta demonstrates atherosclerotic disease.  Small to moderate left-sided pleural effusion with left lower lobe atelectasis or airspace disease.  Small right pleural effusion with minimal atelectasis.  Clips are seen in the axillary regions.  Question bypass or stent material within the bilateral subclavian regions.  This was seen on multiple prior exams.  Correlation is recommended.  In comparison to the prior study, there is no adverse interval changes.                               The EKG was ordered, reviewed, and independently interpreted by the ED provider, Dr. Lindsey.  Interpretation time: 1010  Rate: 79 BPM  Rhythm: sinus rhythm with 1st degree AV block  Interpretation: RBBB. No STEMI.  No changes when compared to previous EKG in may 2016         The Emergency Provider reviewed the vital signs and test results, which are outlined above.    ED Discussion     11:11 AM: Dr. Lindsey transfers care of pt to Dr. Anderson, pending lab/imaging results.    11:53 AM: SIRS criteria identified at this time.     1:36 AM: Pt went to provide a urine sample but missed bed pan.     2:24 PM: Pt's MAP is 69 at this time. Will do 3 hour sepsis re-eval when pt returns from VQ scan.     3:07 PM: 30mL/kg IVF have been administered within 3 hours of identification of SIRS criteria. Vital signs were reviewed and a focal sepsis perfusion assessment was performed. BP is currently 121/70.     3:29 PM: HM paged at this time.     3:34 PM: Discussed case with Meliza Ware NP (Hospital Medicine). Dr. Chisholm agrees with current care and management of pt and accepts admission.   Admitting Service: Hospital medicine   Admitting Physician: Dr. Chisholm  Admit to: telemetry    3:39 PM: Re-evaluated pt. I have discussed test  results, shared treatment plan, and the need for admission with patient and family at bedside. Pt and family express understanding at this time and agree with all information. All questions answered. Pt and family have no further questions or concerns at this time. Pt is ready for admit.    ED Course as of Jan 26 1752 Fri Jan 25, 2019   1220 I have reviewed patient's have chart from previous hospitalization and cardiology had estimated an ejection fraction of 60% however final results are pending.  This was recently this month and last BNP was around 700.  There is no pulmonary edema or signs of fluid overload on today's chest x-ray evaluation.  Patient will get septic bolus as per sepsis protocol and will monitor her fluid and respiratory status  [KATELYN]   1418 BP: (!) 94/51 [KATELYN]      ED Course User Index  [KATELYN] Glenys Anderson MD       ED Medication(s):  Medications   sodium chloride 0.9% flush 5 mL (not administered)   0.9%  NaCl infusion ( Intravenous New Bag 1/26/19 0135)   cefepime 2 g in dextrose 5% 50 mL IVPB (ready to mix system) (2 g Intravenous New Bag 1/26/19 1131)   levothyroxine tablet 50 mcg (not administered)   pravastatin tablet 40 mg (40 mg Oral Given 1/26/19 1459)   traMADol tablet 50 mg (not administered)   traZODone tablet 100 mg (not administered)   0.9%  NaCl infusion ( Intravenous New Bag 1/26/19 1458)   sodium chloride 0.9% bolus 1,755 mL (0 mL/kg × 58.5 kg Intravenous Stopped 1/25/19 1650)   cefepime 2 g in dextrose 5% 50 mL IVPB (ready to mix system) (0 g Intravenous Stopped 1/25/19 1302)   vancomycin (VANCOCIN) 1,250 mg in dextrose 5 % 250 mL IVPB (0 mg/kg × 58.5 kg Intravenous Stopped 1/25/19 1402)   magnesium sulfate 2g in water 50mL IVPB (premix) (0 g Intravenous Stopped 1/25/19 1507)   methotrexate tablet 2.5 mg (2.5 mg Oral Given 1/26/19 1501)       Medical Decision Making    Medical Decision Making:   Clinical Tests:   Lab Tests: Ordered and Reviewed  Radiological Study: Ordered and  Reviewed  Medical Tests: Ordered and Reviewed  ED Management:  This patient's care was handed off to me by Dr. Lindsey. This was a pleasant 86-year-old  female who presented to the emergency department for hypotension.  Patient was sent by primary care physician for further evaluation of hypertension.  Upon my evaluation of the patient, she did not have any complaints including chest pain, shortness of breath, nausea vomiting, fever chills.  Patient states that she felt fine.  A septic workup was initiated on the patient and she was given septic bolus.  Patient was found to be afebrile in the emergency department however she had an elevated white count.  Patient was found to be 6 satting approximately 92-96% in the emergency department on room air.  Patient does not report a history of COPD.  Upon my further evaluation of patient's chest x-ray, I had noticed a left lower lobe effusion however an atelectasis or infiltrate could not have been ruled out.  This is most viable likely secondary to a hospitalization that had treated her congestive heart failure exacerbation a few weeks ago.  And this is not uncommon.  A V/Q scan was also ordered to rule out pulmonary embolism as she is not a candidate for CTA of the chest.  V/Q scan was negative. Patient responded well to septic bolus and her blood pressure had been back to normal. Patient was medically and vital is stable upon admission to hospitalist.           Scribe Attestation:   Scribe #1: I performed the above scribed service and the documentation accurately describes the services I performed. I attest to the accuracy of the note.    Attending:   Physician Attestation Statement for Scribe #1: I, Laith Lindsey MD/Glenys Anderson MD, personally performed the services described in this documentation, as scribed by Elana Wheeler, in my presence, and it is both accurate and complete.          Clinical Impression       ICD-10-CM ICD-9-CM   1. Sepsis, due to unspecified  organism A41.9 038.9     995.91   2. Hypotension I95.9 458.9   3. Hypotension, unspecified hypotension type I95.9 458.9   4. T wave inversion in EKG R94.31 794.31   5. Leukocytosis D72.829 288.60       Disposition:   Disposition: Admitted  Condition: Stable         Glenys Anderson MD  01/26/19 0094

## 2019-01-25 NOTE — ED NOTES
Pt presents to the ED today after being referred from her dr office. Pt was hypotensive at DRs PTA. Pt c/o increased fatigue and weakness over past couple days. Pt recently d/c from hospital after CHF exacerbation & SOB.    Patient identifiers verified and correct for Yovani Pulido.    LOC: The patient is awake, alert and aware of environment with an appropriate affect, the patient is oriented x 3 and speaking appropriately.  APPEARANCE: Patient resting comfortably and in no acute distress, patient is clean and well groomed, patient's clothing is properly fastened.  SKIN: The skin is warm and dry, color consistent with ethnicity, patient has normal skin turgor and moist mucus membranes, skin intact, no breakdown or bruising noted.  MUSCULOSKELETAL: Patient moving all extremities spontaneously. Pt c/o generalized weakness & increased fatigue. Pt ambulates with assist/wheelchair.  RESPIRATORY: Airway is open and patent, respirations are spontaneous. +cough  CARDIAC: Patient has a normal rate, no periphreal edema noted, capillary refill < 3 seconds.  ABDOMEN: Soft and non tender to palpation.

## 2019-01-26 PROBLEM — R79.89 ELEVATED TROPONIN: Status: ACTIVE | Noted: 2019-01-26

## 2019-01-26 PROBLEM — R91.1 LUNG NODULE: Status: ACTIVE | Noted: 2019-01-26

## 2019-01-26 PROBLEM — J18.9 PNEUMONIA: Status: ACTIVE | Noted: 2019-01-26

## 2019-01-26 LAB
ANION GAP SERPL CALC-SCNC: 9 MMOL/L
BASOPHILS # BLD AUTO: 0.02 K/UL
BASOPHILS NFR BLD: 0.1 %
BUN SERPL-MCNC: 28 MG/DL
CALCIUM SERPL-MCNC: 8.6 MG/DL
CHLORIDE SERPL-SCNC: 96 MMOL/L
CO2 SERPL-SCNC: 30 MMOL/L
CREAT SERPL-MCNC: 1.2 MG/DL
DIFFERENTIAL METHOD: ABNORMAL
EOSINOPHIL # BLD AUTO: 0.2 K/UL
EOSINOPHIL NFR BLD: 1.1 %
ERYTHROCYTE [DISTWIDTH] IN BLOOD BY AUTOMATED COUNT: 16.2 %
EST. GFR  (AFRICAN AMERICAN): 47 ML/MIN/1.73 M^2
EST. GFR  (NON AFRICAN AMERICAN): 41 ML/MIN/1.73 M^2
GLUCOSE SERPL-MCNC: 151 MG/DL
HCT VFR BLD AUTO: 27.6 %
HGB BLD-MCNC: 8.8 G/DL
LYMPHOCYTES # BLD AUTO: 1 K/UL
LYMPHOCYTES NFR BLD: 5.9 %
MCH RBC QN AUTO: 32.2 PG
MCHC RBC AUTO-ENTMCNC: 31.9 G/DL
MCV RBC AUTO: 101 FL
MONOCYTES # BLD AUTO: 2.1 K/UL
MONOCYTES NFR BLD: 12.7 %
NEUTROPHILS # BLD AUTO: 13.2 K/UL
NEUTROPHILS NFR BLD: 80.7 %
PLATELET # BLD AUTO: 528 K/UL
PMV BLD AUTO: 8.5 FL
POTASSIUM SERPL-SCNC: 4 MMOL/L
RBC # BLD AUTO: 2.73 M/UL
SODIUM SERPL-SCNC: 135 MMOL/L
TROPONIN I SERPL DL<=0.01 NG/ML-MCNC: 0.05 NG/ML
TROPONIN I SERPL DL<=0.01 NG/ML-MCNC: 0.06 NG/ML
VANCOMYCIN SERPL-MCNC: 11.7 UG/ML
WBC # BLD AUTO: 16.5 K/UL

## 2019-01-26 PROCEDURE — 93005 ELECTROCARDIOGRAM TRACING: CPT | Mod: HCNC

## 2019-01-26 PROCEDURE — 63600175 PHARM REV CODE 636 W HCPCS: Mod: HCNC | Performed by: INTERNAL MEDICINE

## 2019-01-26 PROCEDURE — 36415 COLL VENOUS BLD VENIPUNCTURE: CPT | Mod: HCNC

## 2019-01-26 PROCEDURE — 93010 ELECTROCARDIOGRAM REPORT: CPT | Mod: HCNC,,, | Performed by: INTERNAL MEDICINE

## 2019-01-26 PROCEDURE — 93010 EKG 12-LEAD: ICD-10-PCS | Mod: HCNC,,, | Performed by: INTERNAL MEDICINE

## 2019-01-26 PROCEDURE — 25000003 PHARM REV CODE 250: Mod: HCNC | Performed by: INTERNAL MEDICINE

## 2019-01-26 PROCEDURE — 25000003 PHARM REV CODE 250: Mod: HCNC | Performed by: NURSE PRACTITIONER

## 2019-01-26 PROCEDURE — 80202 ASSAY OF VANCOMYCIN: CPT | Mod: HCNC

## 2019-01-26 PROCEDURE — 80048 BASIC METABOLIC PNL TOTAL CA: CPT | Mod: HCNC

## 2019-01-26 PROCEDURE — 84484 ASSAY OF TROPONIN QUANT: CPT | Mod: 91,HCNC

## 2019-01-26 PROCEDURE — 85025 COMPLETE CBC W/AUTO DIFF WBC: CPT | Mod: HCNC

## 2019-01-26 PROCEDURE — 21400001 HC TELEMETRY ROOM: Mod: HCNC

## 2019-01-26 PROCEDURE — 63600175 PHARM REV CODE 636 W HCPCS: Mod: HCNC | Performed by: NURSE PRACTITIONER

## 2019-01-26 RX ORDER — METHOTREXATE 2.5 MG/1
2.5 TABLET ORAL ONCE
Status: COMPLETED | OUTPATIENT
Start: 2019-01-26 | End: 2019-01-26

## 2019-01-26 RX ORDER — TRAMADOL HYDROCHLORIDE 50 MG/1
50 TABLET ORAL EVERY 8 HOURS PRN
Status: DISCONTINUED | OUTPATIENT
Start: 2019-01-26 | End: 2019-01-27 | Stop reason: HOSPADM

## 2019-01-26 RX ORDER — LEVOTHYROXINE SODIUM 50 UG/1
50 TABLET ORAL
Status: DISCONTINUED | OUTPATIENT
Start: 2019-01-27 | End: 2019-01-27 | Stop reason: HOSPADM

## 2019-01-26 RX ORDER — SODIUM CHLORIDE 9 MG/ML
INJECTION, SOLUTION INTRAVENOUS CONTINUOUS
Status: ACTIVE | OUTPATIENT
Start: 2019-01-26 | End: 2019-01-26

## 2019-01-26 RX ORDER — PRAVASTATIN SODIUM 20 MG/1
40 TABLET ORAL DAILY
Status: DISCONTINUED | OUTPATIENT
Start: 2019-01-26 | End: 2019-01-27 | Stop reason: HOSPADM

## 2019-01-26 RX ORDER — TRAZODONE HYDROCHLORIDE 100 MG/1
100 TABLET ORAL NIGHTLY
Status: DISCONTINUED | OUTPATIENT
Start: 2019-01-26 | End: 2019-01-27 | Stop reason: HOSPADM

## 2019-01-26 RX ORDER — SODIUM CHLORIDE 9 MG/ML
INJECTION, SOLUTION INTRAVENOUS CONTINUOUS
Status: ACTIVE | OUTPATIENT
Start: 2019-01-26 | End: 2019-01-27

## 2019-01-26 RX ORDER — CEFEPIME HYDROCHLORIDE 2 G/50ML
2 INJECTION, SOLUTION INTRAVENOUS
Status: DISCONTINUED | OUTPATIENT
Start: 2019-01-26 | End: 2019-01-26 | Stop reason: SDUPTHER

## 2019-01-26 RX ADMIN — VANCOMYCIN HYDROCHLORIDE 1250 MG: 10 INJECTION, POWDER, LYOPHILIZED, FOR SOLUTION INTRAVENOUS at 08:01

## 2019-01-26 RX ADMIN — SODIUM CHLORIDE: 0.9 INJECTION, SOLUTION INTRAVENOUS at 01:01

## 2019-01-26 RX ADMIN — METHOTREXATE SODIUM 2.5 MG: 2.5 TABLET ORAL at 03:01

## 2019-01-26 RX ADMIN — TRAZODONE HYDROCHLORIDE 100 MG: 100 TABLET ORAL at 09:01

## 2019-01-26 RX ADMIN — SODIUM CHLORIDE: 0.9 INJECTION, SOLUTION INTRAVENOUS at 02:01

## 2019-01-26 RX ADMIN — CEFEPIME 2 G: 2 INJECTION, POWDER, FOR SOLUTION INTRAVENOUS at 11:01

## 2019-01-26 RX ADMIN — PRAVASTATIN SODIUM 40 MG: 20 TABLET ORAL at 02:01

## 2019-01-26 RX ADMIN — TRAMADOL HYDROCHLORIDE 50 MG: 50 TABLET, COATED ORAL at 09:01

## 2019-01-26 NOTE — NURSING
Received via stretcher from Gracy RN  Bedside report received   Vital signs stable   AAOx3 name date and situation   Will continue plan of care and monitor for any sign or symptoms of vital decline

## 2019-01-26 NOTE — PLAN OF CARE
Problem: Adult Inpatient Plan of Care  Goal: Plan of Care Review  Outcome: Ongoing (interventions implemented as appropriate)  Assessment complete per flow sheet    Vital signs stable   AAOx3 name date and situation   Tolerated IV re-insertion well    Vital signs stable; tolerated all scheduled care   Patient rounds assessed; free of falls on current shift   No c/o pain or discomfort   Will continue to monitor for any signs or symptoms of vital

## 2019-01-26 NOTE — ASSESSMENT & PLAN NOTE
-due to hypotension and sepsis.   -repeat at 1300.   -T wave inversion noted on EKG; will repeat today

## 2019-01-26 NOTE — SUBJECTIVE & OBJECTIVE
Interval History: patient updated on CT findings. Blood pressure remains marginally low, but asymptomatic.    Review of Systems   Constitutional: Negative for activity change, diaphoresis, fatigue and unexpected weight change.        Hypotension     HENT: Negative for congestion, ear pain and sore throat.    Eyes: Negative.    Respiratory: Negative for shortness of breath and wheezing.    Cardiovascular: Negative for chest pain and palpitations.   Gastrointestinal: Negative for abdominal pain, constipation, diarrhea and vomiting.   Endocrine: Negative.    Genitourinary: Negative for flank pain, frequency, hematuria and urgency.   Musculoskeletal: Negative for joint swelling and neck pain.   Skin: Negative for pallor.   Neurological: Negative for seizures, syncope and light-headedness.   Hematological: Negative.    Psychiatric/Behavioral: Negative.      Objective:     Vital Signs (Most Recent):  Temp: 98.4 °F (36.9 °C) (01/26/19 0740)  Pulse: 83 (01/26/19 1254)  Resp: 18 (01/26/19 1254)  BP: (!) 100/49 (01/26/19 1254)  SpO2: 97 % (01/26/19 1254) Vital Signs (24h Range):  Temp:  [98.2 °F (36.8 °C)-99.3 °F (37.4 °C)] 98.4 °F (36.9 °C)  Pulse:  [77-94] 83  Resp:  [17-20] 18  SpO2:  [93 %-97 %] 97 %  BP: ()/(49-60) 100/49     Weight: 57 kg (125 lb 10.6 oz)  Body mass index is 22.98 kg/m².    Intake/Output Summary (Last 24 hours) at 1/26/2019 1440  Last data filed at 1/25/2019 1650  Gross per 24 hour   Intake 1805 ml   Output --   Net 1805 ml      Physical Exam   Constitutional: She is oriented to person, place, and time. She appears well-developed and well-nourished.   Elderly, thin   HENT:   Head: Normocephalic and atraumatic.   Eyes: EOM are normal.   Neck: Normal range of motion. Neck supple.   Cardiovascular: Normal rate, regular rhythm and normal heart sounds.   No murmur heard.  Pulmonary/Chest: Effort normal and breath sounds normal. No respiratory distress.   Abdominal: Soft. Bowel sounds are normal. She  exhibits no distension. There is no tenderness.   Musculoskeletal: Normal range of motion. She exhibits no edema.   Neurological: She is alert and oriented to person, place, and time.   Skin: Skin is warm and dry.   Significant Labs:   CBC:   Recent Labs   Lab 01/25/19  1030 01/25/19  1207 01/26/19  0843   WBC 20.19* 23.48* 16.50*   HGB 9.1* 9.7* 8.8*   HCT 29.0* 30.3* 27.6*   * 536* 528*     CMP:   Recent Labs   Lab 01/25/19  1030 01/26/19  0843   * 135*   K 4.1 4.0   CL 92* 96   CO2 31* 30*   * 151*   BUN 33* 28*   CREATININE 1.5* 1.2   CALCIUM 8.7 8.6*   PROT 6.8  --    ALBUMIN 2.2*  --    BILITOT 0.4  --    ALKPHOS 165*  --    AST 44*  --    ALT 42  --    ANIONGAP 9 9   EGFRNONAA 31* 41*     Significant Imaging:   CT Chest Without Contrast [[id number]] Resulted: 01/25/19 1924   Order Status: Completed Updated: 01/25/19 1926   Narrative:     EXAMINATION:  CT CHEST WITHOUT CONTRAST    CLINICAL HISTORY:  sepsis rule out acute process;    TECHNIQUE:  Routine chest CT protocol was performed without contrast. All CT scans at this facility use dose modulation, iterative reconstruction, and/or weight based dosing when appropriate to reduce radiation dose to as low as reasonably achievable.    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  Mild centrilobular emphysema.  There are small bilateral pleural effusions.  Small patch of ground-glass within the left upper lobe on axial series 3 image 114.  It measures 10 mm.  Nonspecific nodule with geometric margination measuring 7 mm within the left upper lobe adjacent to the medial pleural surface.  There are bandlike opacities and volume loss involving the lingula and left lower lobe.  There is a somewhat confluent area of consolidation peripherally within the left lung base posteriorly.  The right lower lobe has a more common appearance of passive atelectasis.  13 mm short axis station 4R node.  Station 3 node measures 12 mm.   Calcific atheromatous change of the aorta without aneurysm.  The heart is mildly enlarged.  Coronary artery calcifications.  No suspicious bone lesions. Limited images through the upper abdomen demonstrate no acute abnormality.   Impression:       1. Small bilateral pleural effusions.  There is passive atelectasis of overlying right lower lobe.  More consolidated appearance of portions of the left lower lobe as may occur with pneumonia.  2. Small focus of ground-glass within the left upper lobe may relate to an additional focus of pneumonia/pneumonitis.  3. Nonspecific 7 mm nodule left upper lobe.  4. Cardiomegaly.  All CT scans at this facility use dose modulation, iterative reconstruction, and/or weight base dosing when appropriate to reduce radiation dose to as low as reasonably achievable.

## 2019-01-26 NOTE — PROGRESS NOTES
[facility]  Progress Note    Patient Name: Yovani Pulido  MRN: [id number]  Patient Class: IP- Inpatient   Admission Date: 1/25/2019  Length of Stay: 1 days  Attending Physician: Gustavo Bone, *  Primary Care Provider: Dorcas Schultz DO    Subjective:     Principal Problem:Leukocytosis    HPI:  Yovani Pulido is a 86 year old female with history of HTN, HLD, Carotid stenosis, DM II, and Carotid stenosis who presents to after noting to hypotensive during follow up appointment today with PCP. Patient reports she was recently discharged from [place] after being treated for CHF. She reports improving symptoms since discharge and was surprised when she was asked to come to ED. In clinic she was noted to have systolic blood pressure in 60's. This has improved since arrival to ED with intravenous hydration. Patient denies any associated symptoms including dyspnea, leg swelling, fever, abdominal pain, or chest pain.     CXR showed a small to moderate pleural effusion but otherwise unremarkable. VQ scan and UA are unremarkable. Leukocytosis of 23k noted. Hospital medicine has been consulted for admission.         Hospital Course:  Yovani Pulido is a 86 year old female admitted to [facility] for sepsis due to pneumonia. Patient presented from PCP's office for further evaluation of hypotension. Further work up in ED revealed leukocytosis    Interval History: patient updated on CT findings. Blood pressure remains marginally low, but asymptomatic.    Review of Systems   Constitutional: Negative for activity change, diaphoresis, fatigue and unexpected weight change.        Hypotension     HENT: Negative for congestion, ear pain and sore throat.    Eyes: Negative.    Respiratory: Negative for shortness of breath and wheezing.    Cardiovascular: Negative for chest pain and palpitations.   Gastrointestinal: Negative for abdominal pain, constipation, diarrhea and  vomiting.   Endocrine: Negative.    Genitourinary: Negative for flank pain, frequency, hematuria and urgency.   Musculoskeletal: Negative for joint swelling and neck pain.   Skin: Negative for pallor.   Neurological: Negative for seizures, syncope and light-headedness.   Hematological: Negative.    Psychiatric/Behavioral: Negative.      Objective:     Vital Signs (Most Recent):  Temp: 98.4 °F (36.9 °C) (01/26/19 0740)  Pulse: 83 (01/26/19 1254)  Resp: 18 (01/26/19 1254)  BP: (!) 100/49 (01/26/19 1254)  SpO2: 97 % (01/26/19 1254) Vital Signs (24h Range):  Temp:  [98.2 °F (36.8 °C)-99.3 °F (37.4 °C)] 98.4 °F (36.9 °C)  Pulse:  [77-94] 83  Resp:  [17-20] 18  SpO2:  [93 %-97 %] 97 %  BP: ()/(49-60) 100/49     Weight: 57 kg (125 lb 10.6 oz)  Body mass index is 22.98 kg/m².    Intake/Output Summary (Last 24 hours) at 1/26/2019 1440  Last data filed at 1/25/2019 1650  Gross per 24 hour   Intake 1805 ml   Output --   Net 1805 ml      Physical Exam   Constitutional: She is oriented to person, place, and time. She appears well-developed and well-nourished.   Elderly, thin   HENT:   Head: Normocephalic and atraumatic.   Eyes: EOM are normal.   Neck: Normal range of motion. Neck supple.   Cardiovascular: Normal rate, regular rhythm and normal heart sounds.   No murmur heard.  Pulmonary/Chest: Effort normal and breath sounds normal. No respiratory distress.   Abdominal: Soft. Bowel sounds are normal. She exhibits no distension. There is no tenderness.   Musculoskeletal: Normal range of motion. She exhibits no edema.   Neurological: She is alert and oriented to person, place, and time.   Skin: Skin is warm and dry.   Significant Labs:   CBC:   Recent Labs   Lab 01/25/19  1030 01/25/19  1207 01/26/19  0843   WBC 20.19* 23.48* 16.50*   HGB 9.1* 9.7* 8.8*   HCT 29.0* 30.3* 27.6*   * 536* 528*     CMP:   Recent Labs   Lab 01/25/19  1030 01/26/19  0843   * 135*   K 4.1 4.0   CL 92* 96   CO2 31* 30*   * 151*    BUN 33* 28*   CREATININE 1.5* 1.2   CALCIUM 8.7 8.6*   PROT 6.8  --    ALBUMIN 2.2*  --    BILITOT 0.4  --    ALKPHOS 165*  --    AST 44*  --    ALT 42  --    ANIONGAP 9 9   EGFRNONAA 31* 41*     Significant Imaging:   CT Chest Without Contrast [[id number]] Resulted: 01/25/19 1924   Order Status: Completed Updated: 01/25/19 1926   Narrative:     EXAMINATION:  CT CHEST WITHOUT CONTRAST    CLINICAL HISTORY:  sepsis rule out acute process;    TECHNIQUE:  Routine chest CT protocol was performed without contrast. All CT scans at this facility use dose modulation, iterative reconstruction, and/or weight based dosing when appropriate to reduce radiation dose to as low as reasonably achievable.    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  Mild centrilobular emphysema.  There are small bilateral pleural effusions.  Small patch of ground-glass within the left upper lobe on axial series 3 image 114.  It measures 10 mm.  Nonspecific nodule with geometric margination measuring 7 mm within the left upper lobe adjacent to the medial pleural surface.  There are bandlike opacities and volume loss involving the lingula and left lower lobe.  There is a somewhat confluent area of consolidation peripherally within the left lung base posteriorly.  The right lower lobe has a more common appearance of passive atelectasis.  13 mm short axis station 4R node.  Station 3 node measures 12 mm.  Calcific atheromatous change of the aorta without aneurysm.  The heart is mildly enlarged.  Coronary artery calcifications.  No suspicious bone lesions. Limited images through the upper abdomen demonstrate no acute abnormality.   Impression:       1. Small bilateral pleural effusions.  There is passive atelectasis of overlying right lower lobe.  More consolidated appearance of portions of the left lower lobe as may occur with pneumonia.  2. Small focus of ground-glass within the left upper lobe may relate to an additional  focus of pneumonia/pneumonitis.  3. Nonspecific 7 mm nodule left upper lobe.  4. Cardiomegaly.  All CT scans at this facility use dose modulation, iterative reconstruction, and/or weight base dosing when appropriate to reduce radiation dose to as low as reasonably achievable.           Assessment/Plan:      * Leukocytosis    --due to pneumonia  -trending down     Pneumonia    -Continue Cefepime  -blood cultures are negative; leukocytosis trending down         Elevated troponin    -due to hypotension and sepsis.   -repeat at 1300.   -T wave inversion noted on EKG; will repeat today            Lung nodule    -nonspecific 7 mm nodule left upper lobe  -outpatient follow up     Hypotension    -remains marginal  -continue IVF's  -hold antihypertensives       Diastolic CHF, chronic    -blood pressure is still marginal   -hold ACEi and diuretics     Iron deficiency anemia    -Hemoglobin 9.7>>8.8; could be dilutional; will monitor  -no signs of overt bleeding         VTE Risk Mitigation (From admission, onward)        Ordered     IP VTE HIGH RISK PATIENT  Once      01/25/19 2026     Place sequential compression device  Until discontinued      01/25/19 2026        Rossana Parisi NP  Department of Hospital Medicine   [facility]

## 2019-01-26 NOTE — ASSESSMENT & PLAN NOTE
-etiology not completely clear  -CXR and UA negative  -CT Chest pending  -takes prednisone 1mg daily; not likely cause of marked leukocytosis

## 2019-01-26 NOTE — ASSESSMENT & PLAN NOTE
-unsure of this medication induced or related to sepsis.   -infectious work up at this time is negative.   -she has responded appropriately to IV resuscitation.   -closely monitor  -elevated procalcitonin noted

## 2019-01-26 NOTE — H&P
[facility] Hospital Medicine  History & Physical    Patient Name: Yovani Pulido  MRN: [id number]  Admission Date: 1/25/2019  Attending Physician: Glenys Anderson MD   Primary Care Provider: Dorcas Schultz DO      Patient information was obtained from patient and ER records.     Subjective:     Principal Problem:Leukocytosis    Chief Complaint:   Chief Complaint   Patient presents with    Hypotension     pt sent from [facility] office for low blood pressure        HPI: Yovani Pulido is a 86 year old female with history of HTN, HLD, Carotid stenosis, DM II, and Carotid stenosis who presents to after noting to hypotensive during follow up appointment today with PCP. Patient reports she was recently discharged from [facility] after being treated for CHF. She reports improving symptoms since discharge and was surprised when she was asked to come to ED. In clinic she was noted to have systolic blood pressure in 60's. This has improved since arrival to ED with intravenous hydration. Patient denies any associated symptoms including dyspnea, leg swelling, fever, abdominal pain, or chest pain.     CXR showed a small to moderate pleural effusion but otherwise unremarkable. VQ scan and UA are unremarkable. Leukocytosis of 23k noted. Hospital medicine has been consulted for admission.         Past Medical History:   Diagnosis Date    Anemia     Carotid stenosis     Cataract     COAG (chronic open-angle glaucoma)     Colon polyps     Diabetes mellitus type II     steroid induced    Diverticulosis     GERD (gastroesophageal reflux disease)     hiatal hernia    Glaucoma     Heart murmur     Hyperlipidemia     Hypertension     Hypothyroidism     Rheumatoid arthritis(714.0)     Stroke     lacunar infarct       Past Surgical History:   Procedure Laterality Date    anal fissure repair      APPENDECTOMY  1944    BREAST SURGERY  1992 approx    breast biopsies- benign    CAROTID ENDARTERECTOMY   2009    left    CATARACT EXTRACTION      COLONOSCOPY  2012    COLONOSCOPY N/A 12/11/2015    [facility]formed [facility]    ESOPHAGOGASTRODUODENOSCOPY (EGD) N/A 12/11/2015    Performed [facility] at [facility]    EYE SURGERY  2004, 2005    bilateral cataracts    HERNIA REPAIR  2001, 2002    abdominal x2, with mesh on second    HYSTERECTOMY  1963    vag hyst    JOINT REPLACEMENT  2008    right knee    THORACENTESIS - Outpatient Right 3/23/2018    Performed by Alex Hallman MD at [facility] END[facility]    yag Left        Review of patient's allergies indicates:   Allergen Reactions    Tetanus vaccines and toxoid      Other reaction(s): Swelling    Tetanus-horse serum: 30 years ago    Adhes. band-tape-benzalkonium Rash       Current Facility-Administered Medications on File Prior to Encounter   Medication    [DISCONTINUED] GENERIC EXTERNAL MEDICATION     Current Outpatient Medications on File Prior to Encounter   Medication Sig    benazepril (LOTENSIN) 5 MG tablet Take 1 tablet (5 mg total) by mouth once daily.    cefUROXime (CEFTIN) 500 MG tablet Take 500 mg by mouth 2 (two) times daily.    furosemide (LASIX) 20 MG tablet Take 1 tablet (20 mg total) by mouth every Mon, Wed, Fri.    levothyroxine (SYNTHROID) 50 MCG tablet TAKE 1 TABLET BEFORE BREAKFAST    metFORMIN (GLUCOPHAGE) 1000 MG tablet TAKE 1 TABLET TWICE DAILY WITH MEALS    pravastatin (PRAVACHOL) 40 MG tablet TAKE 1 TABLET EVERY DAY    predniSONE (DELTASONE) 1 MG tablet TAKE 1 TABLET TWICE DAILY  OR  AS  DIRECTED    alcohol swabs PadM Apply 1 each topically 2 (two) times daily.    blood glucose control, high Soln by Misc.(Non-Drug; Combo Route) route.    blood glucose control, normal Soln Use as directed.    blood sugar diagnostic Strp Glucose testing daily.    blood-glucose meter Misc Use as directed.    cetirizine (ZYRTEC) 10 MG tablet Take 1 tablet (10 mg total) by mouth once daily.    fluticasone (FLONASE) 50  mcg/actuation nasal spray 2 sprays by Each Nare route once daily.    folic acid (FOLVITE) 800 MCG Tab TAKE 1 TABLET TWICE DAILY    lancets (LANCETS,THIN) Misc Twice daily glucose testing.    latanoprost 0.005 % ophthalmic solution INSTILL 1 DROP INTO BOTH EYES EVERY EVENING    magnesium 250 mg Tab Take 1 tablet by mouth Daily.    methotrexate 2.5 MG Tab TAKE 4 TABLETS IN MORNING AND 4 TABLETS AT NIGHT ONE TIME WEEKLY    omeprazole (PRILOSEC) 20 MG capsule Take 1 capsule (20 mg total) by mouth once daily.    timolol maleate 0.5% (TIMOPTIC) 0.5 % Drop INSTILL 1 DROP INTO BOTH EYES EVERY MORNING    traMADol (ULTRAM) 50 mg tablet Take 1 tablet (50 mg total) by mouth every 8 (eight) hours as needed for Pain.    traZODone (DESYREL) 100 MG tablet TAKE 1 TABLET EVERY EVENING     Family History     Problem Relation (Age of Onset)    Blindness Sister    Cancer Mother, Father, Son (61)    Cataracts Sister    Diabetes Sister    Glaucoma Mother    Heart disease Brother, Brother    Stroke Sister        Tobacco Use    Smoking status: Former Smoker     Packs/day: 3.00     Years: 50.00     Pack years: 150.00     Start date: 1948     Last attempt to quit: 1998     Years since quittin.1    Smokeless tobacco: Former User   Substance and Sexual Activity    Alcohol use: No     Alcohol/week: 0.0 oz    Drug use: No    Sexual activity: No     Review of Systems   Constitutional: Negative for activity change, diaphoresis, fatigue and unexpected weight change.        Hypotension     HENT: Negative for congestion, ear pain and sore throat.    Eyes: Negative.    Respiratory: Negative for shortness of breath and wheezing.    Cardiovascular: Negative for chest pain and palpitations.   Gastrointestinal: Negative for abdominal pain, constipation, diarrhea and vomiting.   Endocrine: Negative.    Genitourinary: Positive for frequency. Negative for flank pain, hematuria and urgency.   Musculoskeletal: Negative for joint  swelling and neck pain.   Skin: Negative for pallor.   Neurological: Negative for seizures, syncope and light-headedness.   Hematological: Negative.    Psychiatric/Behavioral: Negative.       Objective:     Vital Signs (Most Recent):  Temp: 98.2 °F (36.8 °C) (01/25/19 1509)  Pulse: 92 (01/25/19 1601)  Resp: 18 (01/25/19 0921)  BP: (!) 112/56 (01/25/19 1601)  SpO2: 95 % (01/25/19 1601) Vital Signs (24h Range):  Temp:  [97.5 °F (36.4 °C)-98.3 °F (36.8 °C)] 98.2 °F (36.8 °C)  Pulse:  [78-92] 92  Resp:  [18] 18  SpO2:  [92 %-96 %] 95 %  BP: ()/(38-60) 112/56     Weight: 58.5 kg (129 lb)  Body mass index is 23.59 kg/m².    Physical Exam   Constitutional: She is oriented to person, place, and time. She appears well-developed and well-nourished.   Elderly, thin   HENT:   Head: Normocephalic and atraumatic.   Eyes: EOM are normal.   Neck: Normal range of motion. Neck supple.   Cardiovascular: Normal rate, regular rhythm and normal heart sounds.   No murmur heard.  Pulmonary/Chest: Effort normal and breath sounds normal. No respiratory distress.   Abdominal: Soft. Bowel sounds are normal. She exhibits no distension. There is no tenderness.   Musculoskeletal: Normal range of motion. She exhibits no edema.   Neurological: She is alert and oriented to person, place, and time.   Skin: Skin is warm and dry.         CRANIAL NERVES     CN III, IV, VI   Extraocular motions are normal.        Significant Labs:   CBC:   Recent Labs   Lab 01/25/19  1030 01/25/19  1207   WBC 20.19* 23.48*   HGB 9.1* 9.7*   HCT 29.0* 30.3*   * 536*     CMP:   Recent Labs   Lab 01/25/19  1030   *   K 4.1   CL 92*   CO2 31*   *   BUN 33*   CREATININE 1.5*   CALCIUM 8.7   PROT 6.8   ALBUMIN 2.2*   BILITOT 0.4   ALKPHOS 165*   AST 44*   ALT 42   ANIONGAP 9   EGFRNONAA 31*       Significant Imaging: I have reviewed all pertinent imaging results/findings within the past 24 hours.    Assessment/Plan:     * Leukocytosis    -etiology not  completely clear  -CXR and UA negative  -CT Chest pending  -takes prednisone 1mg daily; not likely cause of marked leukocytosis       Hypotension    -unsure of this medication induced or related to sepsis.   -infectious work up at this time is negative.   -she has responded appropriately to IV resuscitation.   -closely monitor  -elevated procalcitonin noted     Diastolic CHF, chronic    -compensated.   -hold lasix and ACEi     Iron deficiency anemia    -stable; monitor         VTE Risk Mitigation (From admission, onward)    None             Rossana Parisi NP  Department of Hospital Medicine   [facility]

## 2019-01-26 NOTE — HOSPITAL COURSE
Yovani Pulido is a 86 year old female admitted to [facility] for sepsis due to pneumonia. Patient presented from PCP's office for further evaluation of hypotension. Further work up in ED revealed leukocytosis and left lobe pneumonia. Elevated troponin noted and is felt to be related to sepsis. White count responded to IV antibiotic therapy. Hypotension improved with IVF's. She remained hemodynamically stable. She will continue Levaquin x 7 days. Patient was also noted to have nonspecific 7mm pulmonary nodule. She was asked to follow up with Lexx in 3-4 weeks for follow up.

## 2019-01-26 NOTE — PLAN OF CARE
Problem: Adult Inpatient Plan of Care  Goal: Plan of Care Review  Outcome: Ongoing (interventions implemented as appropriate)  Discussed Plan of Care with patient and verbalized understanding - Patient remains AAOx4 - remains free of falls, accidents and trauma during the day shift. Bed in in the low position and the call light is within reach. Tropinin level increased from 0.043 to 0.051 to 0.055, another level is ordered for the morning. EKG showed SR with first degree AV block and occassional PVC. Will continue to monitor

## 2019-01-27 VITALS
TEMPERATURE: 98 F | WEIGHT: 126.75 LBS | DIASTOLIC BLOOD PRESSURE: 58 MMHG | HEIGHT: 62 IN | OXYGEN SATURATION: 94 % | SYSTOLIC BLOOD PRESSURE: 114 MMHG | RESPIRATION RATE: 20 BRPM | BODY MASS INDEX: 23.32 KG/M2 | HEART RATE: 84 BPM

## 2019-01-27 LAB
ANION GAP SERPL CALC-SCNC: 8 MMOL/L
BASOPHILS # BLD AUTO: 0.01 K/UL
BASOPHILS NFR BLD: 0.1 %
BUN SERPL-MCNC: 23 MG/DL
CALCIUM SERPL-MCNC: 8.4 MG/DL
CHLORIDE SERPL-SCNC: 101 MMOL/L
CO2 SERPL-SCNC: 27 MMOL/L
CREAT SERPL-MCNC: 1.1 MG/DL
DIFFERENTIAL METHOD: ABNORMAL
EOSINOPHIL # BLD AUTO: 0.2 K/UL
EOSINOPHIL NFR BLD: 1.4 %
ERYTHROCYTE [DISTWIDTH] IN BLOOD BY AUTOMATED COUNT: 16.1 %
EST. GFR  (AFRICAN AMERICAN): 53 ML/MIN/1.73 M^2
EST. GFR  (NON AFRICAN AMERICAN): 46 ML/MIN/1.73 M^2
GLUCOSE SERPL-MCNC: 115 MG/DL
HCT VFR BLD AUTO: 25.5 %
HGB BLD-MCNC: 8.2 G/DL
LYMPHOCYTES # BLD AUTO: 1 K/UL
LYMPHOCYTES NFR BLD: 7.1 %
MCH RBC QN AUTO: 32.4 PG
MCHC RBC AUTO-ENTMCNC: 32.2 G/DL
MCV RBC AUTO: 101 FL
MONOCYTES # BLD AUTO: 1.9 K/UL
MONOCYTES NFR BLD: 13.1 %
NEUTROPHILS # BLD AUTO: 11.5 K/UL
NEUTROPHILS NFR BLD: 78.3 %
PLATELET # BLD AUTO: 519 K/UL
PMV BLD AUTO: 8.5 FL
POTASSIUM SERPL-SCNC: 3.8 MMOL/L
RBC # BLD AUTO: 2.53 M/UL
SODIUM SERPL-SCNC: 136 MMOL/L
TROPONIN I SERPL DL<=0.01 NG/ML-MCNC: 0.05 NG/ML
WBC # BLD AUTO: 14.66 K/UL

## 2019-01-27 PROCEDURE — 80048 BASIC METABOLIC PNL TOTAL CA: CPT | Mod: HCNC

## 2019-01-27 PROCEDURE — 36415 COLL VENOUS BLD VENIPUNCTURE: CPT | Mod: HCNC

## 2019-01-27 PROCEDURE — 84484 ASSAY OF TROPONIN QUANT: CPT | Mod: HCNC

## 2019-01-27 PROCEDURE — 25000003 PHARM REV CODE 250: Mod: HCNC | Performed by: NURSE PRACTITIONER

## 2019-01-27 PROCEDURE — 85025 COMPLETE CBC W/AUTO DIFF WBC: CPT | Mod: HCNC

## 2019-01-27 RX ORDER — LEVOFLOXACIN 500 MG/1
500 TABLET, FILM COATED ORAL DAILY
Qty: 7 TABLET | Refills: 0 | Status: SHIPPED | OUTPATIENT
Start: 2019-01-27 | End: 2019-02-03

## 2019-01-27 RX ADMIN — PRAVASTATIN SODIUM 40 MG: 20 TABLET ORAL at 08:01

## 2019-01-27 RX ADMIN — LEVOTHYROXINE SODIUM 50 MCG: 50 TABLET ORAL at 06:01

## 2019-01-27 NOTE — PROGRESS NOTES
Patient from home with son and independent with assistive device (walker).  Patient would like to change PCp now that she is living in [place] with son.  SW advised patient to call number she would call for Dr. Schultz and ask for assistance finding a PCP in [place].  Patient stated that she would also f/u with her pulmonolgist.  Patient will discharge to home with son.  Patient doesn't have home health or OP services.  Cathy Mendez LMSW, ACMILKA-YAN, CCM  1/27/2019

## 2019-01-27 NOTE — PLAN OF CARE
01/27/19 1024   Medicare Message   Important Message from Medicare regarding Discharge Appeal Rights Given to patient/caregiver;Explained to patient/caregiver;Signed/date by patient/caregiver

## 2019-01-27 NOTE — PLAN OF CARE
01/27/19 1030   Discharge Assessment   Assessment Type Discharge Planning Assessment   Confirmed/corrected address and phone number on facesheet? Yes   Assessment information obtained from? Patient   Communicated expected length of stay with patient/caregiver yes   Prior to hospitilization cognitive status: Alert/Oriented   Prior to hospitalization functional status: Assistive Equipment   Current cognitive status: Alert/Oriented   Current Functional Status: Assistive Equipment   Facility Arrived From: home   Lives With child(saul), adult  (son)   Able to Return to Prior Arrangements yes   Is patient able to care for self after discharge? Yes   Who are your caregiver(s) and their phone number(s)? Cam Ashok, [phone]   Patient's perception of discharge disposition admitted as an inpatient   Readmission Within the Last 30 Days no previous admission in last 30 days   Patient currently being followed by outpatient case management? No   Patient currently receives any other outside agency services? No   Equipment Currently Used at Home walker, rolling   Do you have any problems affording any of your prescribed medications? No   Is the patient taking medications as prescribed? yes   Does the patient have transportation home? Yes   Transportation Anticipated family or friend will provide   Discharge Plan A Home with family   Discharge Plan B Home with family   DME Needed Upon Discharge  none   Patient/Family in Agreement with Plan yes

## 2019-01-27 NOTE — PROGRESS NOTES
Discharge instructions given to patient and family members, verbalized understanding - IV removed without difficulty, pressure dressing applied to site - cardiac monitor removed and returned to monitor tech. Patient stated that she wants to eat her lunch when it arrives because it is chicken parmesan, stated she would call for the wheelchair when she was done eating. - patient to car via wheelchair without distress noted.

## 2019-01-27 NOTE — DISCHARGE SUMMARY
[facility] Hospital Medicine  Discharge Summary      Patient Name: Yovani Pulido  MRN: [id number]  Admission Date: 1/25/2019  Hospital Length of Stay: 2 days  Discharge Date and Time:  01/27/2019 2:58 PM  Attending Physician: Zoie att. providers found   Discharging Provider: Rossana Parisi NP  Primary Care Provider: Dorcas Schultz DO      HPI:   Yovani Pulido is a 86 year old female with history of HTN, HLD, Carotid stenosis, DM II, and Carotid stenosis who presents to after noting to hypotensive during follow up appointment today with PCP. Patient reports she was recently discharged from [facility] after being treated for CHF. She reports improving symptoms since discharge and was surprised when she was asked to come to ED. In clinic she was noted to have systolic blood pressure in 60's. This has improved since arrival to ED with intravenous hydration. Patient denies any associated symptoms including dyspnea, leg swelling, fever, abdominal pain, or chest pain.     CXR showed a small to moderate pleural effusion but otherwise unremarkable. VQ scan and UA are unremarkable. Leukocytosis of 23k noted. Hospital medicine has been consulted for admission.         * No surgery found *      Hospital Course:   Yovani Pulido is a 86 year old female admitted to [facility] for sepsis due to pneumonia. Patient presented from PCP's office for further evaluation of hypotension. Further work up in ED revealed leukocytosis and left lobe pneumonia. Elevated troponin noted and is felt to be related to sepsis. White count responded to IV antibiotic therapy. Hypotension improved with IVF's. She remained hemodynamically stable. She will continue Levaquin x 7 days. Patient was also noted to have nonspecific 7mm pulmonary nodule. She was asked to follow up with Lexx in 3-4 weeks for follow up.      Consults:     No new Assessment & Plan notes have been filed under this hospital service since the last  note was generated.  Service: Hospital Medicine    Final Active Diagnoses:    Diagnosis Date Noted POA    PRINCIPAL PROBLEM:  Leukocytosis [D72.829] 01/25/2019 Yes    Lung nodule [R91.1] 01/26/2019 Yes    Elevated troponin [R74.8] 01/26/2019 Yes    Pneumonia [J18.9] 01/26/2019 Yes    Hypotension [I95.9] 01/25/2019 Yes    Diastolic CHF, chronic [I50.32] 04/02/2018 Yes    Iron deficiency anemia [D50.9] 02/11/2015 Yes      Problems Resolved During this Admission:       Discharged Condition: stable    Disposition: Home or Self Care    Follow Up:  Follow-up Information     Dorcas Schultz DO.    Specialty:  Internal Medicine  Contact information:  [address] [place] [address] [phone]             Alex Hallman MD.    Specialty:  Pulmonary Disease  Contact information:  [address] [place] [address] [phone]                 Patient Instructions:   No discharge procedures on file.    Significant Diagnostic Studies: Labs:   CMP   Recent Labs   Lab 01/26/19  0843 01/27/19  0439   * 136   K 4.0 3.8   CL 96 101   CO2 30* 27   * 115*   BUN 28* 23   CREATININE 1.2 1.1   CALCIUM 8.6* 8.4*   ANIONGAP 9 8   ESTGFRAFRICA 47* 53*   EGFRNONAA 41* 46*   , CBC   Recent Labs   Lab 01/26/19  0843 01/27/19  0439   WBC 16.50* 14.66*   HGB 8.8* 8.2*   HCT 27.6* 25.5*   * 519*    and INR   Lab Results   Component Value Date    INR 1.1 01/25/2019    INR 1.0 03/22/2012    INR 1.0 01/09/2010       Pending Diagnostic Studies:     None         Medications:  Reconciled Home Medications:      Medication List      START taking these medications    levoFLOXacin 500 MG tablet  Commonly known as:  LEVAQUIN  Take 1 tablet (500 mg total) by mouth once daily. for 7 days        CONTINUE taking these medications    alcohol swabs Padm  Apply 1 each topically 2 (two) times daily.     benazepril 5 MG tablet  Commonly known as:  LOTENSIN  Take 1 tablet (5 mg total) by mouth once daily.     blood  glucose control, high Soln  by Misc.(Non-Drug; Combo Route) route.     blood glucose control, normal Soln  Use as directed.     blood sugar diagnostic Strp  Glucose testing daily.     blood-glucose meter Misc  Use as directed.     cetirizine 10 MG tablet  Commonly known as:  ZYRTEC  Take 1 tablet (10 mg total) by mouth once daily.     fluticasone 50 mcg/actuation nasal spray  Commonly known as:  FLONASE  2 sprays by Each Nare route once daily.     folic acid 800 MCG Tab  Commonly known as:  FOLVITE  TAKE 1 TABLET TWICE DAILY     furosemide 20 MG tablet  Commonly known as:  LASIX  Take 1 tablet (20 mg total) by mouth every Mon, Wed, Fri.     lancets Misc  Commonly known as:  LANCETS,THIN  Twice daily glucose testing.     latanoprost 0.005 % ophthalmic solution  INSTILL 1 DROP INTO BOTH EYES EVERY EVENING     levothyroxine 50 MCG tablet  Commonly known as:  SYNTHROID  TAKE 1 TABLET BEFORE BREAKFAST     magnesium 250 mg Tab  Take 1 tablet by mouth Daily.     metFORMIN 1000 MG tablet  Commonly known as:  GLUCOPHAGE  TAKE 1 TABLET TWICE DAILY WITH MEALS     methotrexate 2.5 MG Tab  TAKE 4 TABLETS IN MORNING AND 4 TABLETS AT NIGHT ONE TIME WEEKLY     omeprazole 20 MG capsule  Commonly known as:  PRILOSEC  Take 1 capsule (20 mg total) by mouth once daily.     pravastatin 40 MG tablet  Commonly known as:  PRAVACHOL  TAKE 1 TABLET EVERY DAY     predniSONE 1 MG tablet  Commonly known as:  DELTASONE  TAKE 1 TABLET TWICE DAILY  OR  AS  DIRECTED     timolol maleate 0.5% 0.5 % Drop  Commonly known as:  TIMOPTIC  INSTILL 1 DROP INTO BOTH EYES EVERY MORNING     traMADol 50 mg tablet  Commonly known as:  ULTRAM  Take 1 tablet (50 mg total) by mouth every 8 (eight) hours as needed for Pain.     traZODone 100 MG tablet  Commonly known as:  DESYREL  TAKE 1 TABLET EVERY EVENING        STOP taking these medications    cefUROXime 500 MG tablet  Commonly known as:  CEFTIN            Indwelling Lines/Drains at time of discharge:    Lines/Drains/Airways          None          Time spent on the discharge of patient: >35 minutes  Patient was seen and examined on the date of discharge and determined to be suitable for discharge.         Rossana Parisi NP  Department of Hospital Medicine  [facility]

## 2019-01-27 NOTE — PLAN OF CARE
01/27/19 1046   Final Note   Assessment Type Final Discharge Note   Anticipated Discharge Disposition Home   Right Care Referral Info   Post Acute Recommendation No Care

## 2019-01-27 NOTE — PLAN OF CARE
Problem: Adult Inpatient Plan of Care  Goal: Plan of Care Review  Outcome: Ongoing (interventions implemented as appropriate)  Assessment complete per flow sheet    Vital signs stable   AAOx3 name date and situation   Vital signs stable; tolerated all scheduled care   Patient rounds assessed; free of falls on current shift   Tolerated PRN medication well; patient states moderated pain relief   Will continue to monitor for any signs or symptoms of vital

## 2019-01-29 ENCOUNTER — PATIENT OUTREACH (OUTPATIENT)
Dept: ADMINISTRATIVE | Facility: CLINIC | Age: 84
End: 2019-01-29

## 2019-01-29 NOTE — PATIENT INSTRUCTIONS
Fall Prevention  Falls often occur due to slipping, tripping or losing your balance. Millions of people fall every year and injure themselves. Here are ways to reduce your risk of falling again.  · Think about your fall, was there anything that caused your fall that can be fixed, removed, or replaced?  · Make your home safe by keeping walkways clear of objects you may trip over.  · Use non-slip pads under rugs. Do not use area rugs or small throw rugs.  · Use non-slip mats in bathtubs and showers.  · Install handrails and lights on staircases.  · Do not walk in poorly lit areas.  · Do not stand on chairs or wobbly ladders.  · Use caution when reaching overhead or looking upward. This position can cause a loss of balance.  · Be sure your shoes fit properly, have non-slip bottoms and are in good condition.   · Wear shoes both inside and out. Avoid going barefoot or wearing slippers.  · Be cautious when going up and down stairs, curbs, and when walking on uneven sidewalks.  · If your balance is poor, consider using a cane or walker.  · If your fall was related to alcohol use, stop or limit alcohol intake.   · If your fall was related to use of sleeping medicines, talk to your doctor about this. You may need to reduce your dosage at bedtime if you awaken during the night to go to the bathroom.    · To reduce the need for nighttime bathroom trips:  ¨ Avoid drinking fluids for several hours before going to bed  ¨ Empty your bladder before going to bed  ¨ Men can keep a urinal at the bedside  · Stay as active as you can. Balance, flexibility, strength, and endurance all come from exercise. They all play a role in preventing falls. Ask your healthcare provider which types of activity are right for you.  · Get your vision checked on a regular basis.  · If you have pets, know where they are before you stand up or walk so you don't trip over them.  · Use night lights.  Date Last Reviewed: 11/5/2015  © 6544-5864 [employer]. [address] [place] [address], [place] [address]. All rights reserved. This information is not intended as a substitute for professional medical care. Always follow your healthcare professional's instructions.

## 2019-01-30 LAB
BACTERIA BLD CULT: NORMAL
BACTERIA BLD CULT: NORMAL

## 2019-02-08 DIAGNOSIS — M06.9 RHEUMATOID ARTHRITIS, INVOLVING UNSPECIFIED SITE, UNSPECIFIED RHEUMATOID FACTOR PRESENCE: ICD-10-CM

## 2019-02-11 RX ORDER — METHOTREXATE 2.5 MG/1
TABLET ORAL
Qty: 96 TABLET | Refills: 1 | Status: ON HOLD | OUTPATIENT
Start: 2019-02-11 | End: 2019-04-04 | Stop reason: HOSPADM

## 2019-02-13 ENCOUNTER — TELEPHONE (OUTPATIENT)
Dept: PULMONOLOGY | Facility: CLINIC | Age: 84
End: 2019-02-13

## 2019-02-13 DIAGNOSIS — J18.9 PNEUMONIA DUE TO INFECTIOUS ORGANISM, UNSPECIFIED LATERALITY, UNSPECIFIED PART OF LUNG: Primary | ICD-10-CM

## 2019-02-18 PROBLEM — J43.2 CENTRILOBULAR EMPHYSEMA: Status: ACTIVE | Noted: 2019-02-18

## 2019-02-25 NOTE — TELEPHONE ENCOUNTER
----- Message from Maria Antonia Hannon sent at 2/25/2019  1:47 PM CST -----  Contact: pt  Type:  RX Refill Request    Who Called: pt  Refill or New Rx:refill  RX Name and Strength:trazodone 1000 mg  How is the patient currently taking it? (ex. 1XDay):1Xnight  Is this a 30 day or 90 day RX:90  Preferred Pharmacy with phone number:.  [facility] - [facility] [address]  [address] [facility] [address] [address]  Phone: [phone] Fax: [phone]  Local or Mail Order:mail order  Ordering Provider:Dr Schultz  Would the patient rather a call back or a response via [facility]? Call back  Best Call Back Number:[phone]  Additional Information: .    Thank you

## 2019-02-26 RX ORDER — TRAZODONE HYDROCHLORIDE 100 MG/1
100 TABLET ORAL NIGHTLY
Qty: 90 TABLET | Refills: 1 | Status: SHIPPED | OUTPATIENT
Start: 2019-02-26 | End: 2019-02-26 | Stop reason: SDUPTHER

## 2019-02-26 NOTE — TELEPHONE ENCOUNTER
----- Message from Lisa No sent at 2/26/2019  2:33 PM CST -----  Contact: pt          Type:  RX Refill Request    Who Called: pt  Refill or New Rx: refill   RX Name and Strength: trazodone 1000mg   How is the patient currently taking it? (ex. 1XDay):  Is this a 30 day or 90 day RX:  Preferred Pharmacy with phone number: [facility] - [facility] [address]  [address] [facility] [address]  Phone: [phone] Fax: [phone]    Local or Mail Order:mail order  Ordering Provider:   Would the patient rather a call back or a response via [facility]?  Call back  Best Call Back Number: [phone] (home)     Additional Information:  Pt states she may need a new prescription.

## 2019-02-26 NOTE — TELEPHONE ENCOUNTER
----- Message from Viri Mathew sent at 2/26/2019  9:20 AM CST -----  Contact: self [phone]  .Type:  Needs Medical Advice    Who Called: Yovani Pulido    Symptoms (please be specific): unable to sleep  How long has patient had these symptoms:  On going  Pharmacy name and phone #:  .  [facility] - [facility] [address]  [address] [address]  Phone: [phone] Fax: [phone]    Would the patient rather a call back or a response via [facility]? Call back   Best Call Back Number: [phone]  Additional Information: Pt states she is having trouble sleeping and would like to have sleep aid, Trazadone called to pharmacy. Please call back at [phone]

## 2019-02-26 NOTE — TELEPHONE ENCOUNTER
----- Message from Viri Mathew sent at 2/26/2019  9:20 AM CST -----  Contact: self [phone]  .Type:  Needs Medical Advice    Who Called: Yovani Pulido    Symptoms (please be specific): unable to sleep  How long has patient had these symptoms:  On going  Pharmacy name and phone #:  .  [facility] [address]  [address] [facility] [address]  Phone: [phone] Fax: [phone]    Would the patient rather a call back or a response via [facility]? Call back   Best Call Back Number: [phone]  Additional Information: Pt states she is having trouble sleeping and would like to have sleep aid, Trazadone called to pharmacy. Please call back at [phone]

## 2019-02-27 RX ORDER — TRAZODONE HYDROCHLORIDE 100 MG/1
100 TABLET ORAL NIGHTLY
Qty: 30 TABLET | Refills: 0 | Status: SHIPPED | OUTPATIENT
Start: 2019-02-27 | End: 2019-02-27 | Stop reason: SDUPTHER

## 2019-02-27 NOTE — TELEPHONE ENCOUNTER
----- Message from Jessica Mchugh sent at 2/27/2019 10:52 AM CST -----  Contact: Patient  Patient requesting a partial refill on Trazodone until her prescription gets from [facility]. Please send partial to Jessica -[phone]. Patient has not slept in 3 days. Please call patient back when sent at [phone]. Thank you

## 2019-02-27 NOTE — TELEPHONE ENCOUNTER
----- Message from Rachael Quezada sent at 2/27/2019  8:03 AM CST -----  Contact: Patient   Type:  RX Refill Request    Who Called: Yovani  Refagueda or New Rx: Refill  RX Name and Strength: Trazodone 1000 mg   How is the patient currently taking it? (ex. 1XDay):1 x day  Is this a 30 day or 90 day RX: 30  Preferred Pharmacy with phone number:  [facility] [address] [facility] [address] [place] [address] [address]  [address] [address] [address]  Phone: [phone] Fax: [phone]  Local or Mail Order: Local  Ordering Provider: Dr. Schultz  Would the patient rather a call back or a response via [facility]? Call back  Best Call Back Number: Please call her at [phone]  Additional Information: Patient is completely out of medication

## 2019-02-28 RX ORDER — TRAZODONE HYDROCHLORIDE 100 MG/1
TABLET ORAL
Qty: 90 TABLET | Refills: 0 | Status: SHIPPED | OUTPATIENT
Start: 2019-02-28

## 2019-03-14 ENCOUNTER — PES CALL (OUTPATIENT)
Dept: ADMINISTRATIVE | Facility: CLINIC | Age: 84
End: 2019-03-14

## 2019-03-29 ENCOUNTER — HOSPITAL ENCOUNTER (OUTPATIENT)
Dept: RADIOLOGY | Facility: HOSPITAL | Age: 84
Discharge: HOME | DRG: 871 | End: 2019-03-29
Attending: NURSE PRACTITIONER
Payer: MEDICARE

## 2019-03-29 ENCOUNTER — OFFICE VISIT (OUTPATIENT)
Dept: HEMATOLOGY/ONCOLOGY | Facility: CLINIC | Age: 84
DRG: 871 | End: 2019-03-29
Payer: MEDICARE

## 2019-03-29 ENCOUNTER — HOSPITAL ENCOUNTER (INPATIENT)
Facility: HOSPITAL | Age: 84
LOS: 6 days | Discharge: HOSPICE | DRG: 871 | End: 2019-04-04
Attending: EMERGENCY MEDICINE | Admitting: EMERGENCY MEDICINE
Payer: MEDICARE

## 2019-03-29 ENCOUNTER — TELEPHONE (OUTPATIENT)
Dept: HEMATOLOGY/ONCOLOGY | Facility: CLINIC | Age: 84
End: 2019-03-29

## 2019-03-29 VITALS
HEART RATE: 93 BPM | DIASTOLIC BLOOD PRESSURE: 64 MMHG | WEIGHT: 129.44 LBS | RESPIRATION RATE: 18 BRPM | BODY MASS INDEX: 24.44 KG/M2 | OXYGEN SATURATION: 92 % | SYSTOLIC BLOOD PRESSURE: 100 MMHG | TEMPERATURE: 98 F | HEIGHT: 61 IN

## 2019-03-29 DIAGNOSIS — D64.9 CHRONIC ANEMIA: ICD-10-CM

## 2019-03-29 DIAGNOSIS — M05.79 RHEUMATOID ARTHRITIS INVOLVING MULTIPLE SITES WITH POSITIVE RHEUMATOID FACTOR: ICD-10-CM

## 2019-03-29 DIAGNOSIS — R93.89 ABNORMAL CHEST X-RAY: ICD-10-CM

## 2019-03-29 DIAGNOSIS — K21.9 GASTROESOPHAGEAL REFLUX DISEASE WITH HIATAL HERNIA: ICD-10-CM

## 2019-03-29 DIAGNOSIS — I50.9 CHF (CONGESTIVE HEART FAILURE): ICD-10-CM

## 2019-03-29 DIAGNOSIS — R06.02 SHORTNESS OF BREATH: ICD-10-CM

## 2019-03-29 DIAGNOSIS — R93.89 ABNORMAL FINDING ON CT SCAN: Primary | ICD-10-CM

## 2019-03-29 DIAGNOSIS — I50.33 ACUTE ON CHRONIC DIASTOLIC CONGESTIVE HEART FAILURE: ICD-10-CM

## 2019-03-29 DIAGNOSIS — R65.20 SEVERE SEPSIS: ICD-10-CM

## 2019-03-29 DIAGNOSIS — J18.9 PNEUMONIA DUE TO INFECTIOUS ORGANISM, UNSPECIFIED LATERALITY, UNSPECIFIED PART OF LUNG: ICD-10-CM

## 2019-03-29 DIAGNOSIS — A41.9 SEVERE SEPSIS: ICD-10-CM

## 2019-03-29 DIAGNOSIS — R79.89 ELEVATED BRAIN NATRIURETIC PEPTIDE (BNP) LEVEL: ICD-10-CM

## 2019-03-29 DIAGNOSIS — E11.29 DIABETES MELLITUS WITH MICROALBUMINURIA: Chronic | ICD-10-CM

## 2019-03-29 DIAGNOSIS — R80.9 DIABETES MELLITUS WITH MICROALBUMINURIA: Chronic | ICD-10-CM

## 2019-03-29 DIAGNOSIS — K44.9 GASTROESOPHAGEAL REFLUX DISEASE WITH HIATAL HERNIA: ICD-10-CM

## 2019-03-29 DIAGNOSIS — J90 PLEURAL EFFUSION, BILATERAL: ICD-10-CM

## 2019-03-29 DIAGNOSIS — D50.0 IRON DEFICIENCY ANEMIA DUE TO CHRONIC BLOOD LOSS: Primary | ICD-10-CM

## 2019-03-29 DIAGNOSIS — R63.4 WEIGHT LOSS, NON-INTENTIONAL: ICD-10-CM

## 2019-03-29 PROBLEM — E87.1 HYPONATREMIA: Status: ACTIVE | Noted: 2019-03-29

## 2019-03-29 LAB
ALBUMIN SERPL BCP-MCNC: 1.7 G/DL (ref 3.5–5.2)
ALP SERPL-CCNC: 236 U/L (ref 55–135)
ALT SERPL W/O P-5'-P-CCNC: 19 U/L (ref 10–44)
ANION GAP SERPL CALC-SCNC: 13 MMOL/L (ref 8–16)
APTT BLDCRRT: 38.2 SEC (ref 21–32)
AST SERPL-CCNC: 33 U/L (ref 10–40)
BASOPHILS # BLD AUTO: 0.01 K/UL (ref 0–0.2)
BASOPHILS NFR BLD: 0 % (ref 0–1.9)
BILIRUB SERPL-MCNC: 0.5 MG/DL (ref 0.1–1)
BILIRUB UR QL STRIP: NEGATIVE
BNP SERPL-MCNC: 570 PG/ML (ref 0–99)
BUN SERPL-MCNC: 17 MG/DL (ref 8–23)
CALCIUM SERPL-MCNC: 7 MG/DL (ref 8.7–10.5)
CHLORIDE SERPL-SCNC: 91 MMOL/L (ref 95–110)
CLARITY UR: CLEAR
CO2 SERPL-SCNC: 25 MMOL/L (ref 23–29)
COLOR UR: YELLOW
CREAT SERPL-MCNC: 1 MG/DL (ref 0.5–1.4)
DIFFERENTIAL METHOD: ABNORMAL
EOSINOPHIL # BLD AUTO: 0.1 K/UL (ref 0–0.5)
EOSINOPHIL NFR BLD: 0.4 % (ref 0–8)
ERYTHROCYTE [DISTWIDTH] IN BLOOD BY AUTOMATED COUNT: 17 % (ref 11.5–14.5)
EST. GFR  (AFRICAN AMERICAN): 59 ML/MIN/1.73 M^2
EST. GFR  (NON AFRICAN AMERICAN): 51 ML/MIN/1.73 M^2
GLUCOSE SERPL-MCNC: 165 MG/DL (ref 70–110)
GLUCOSE UR QL STRIP: NEGATIVE
HCT VFR BLD AUTO: 26.2 % (ref 37–48.5)
HGB BLD-MCNC: 8.3 G/DL (ref 12–16)
HGB UR QL STRIP: NEGATIVE
INFLUENZA A, MOLECULAR: NEGATIVE
INFLUENZA B, MOLECULAR: NEGATIVE
INR PPP: 1.2 (ref 0.8–1.2)
KETONES UR QL STRIP: NEGATIVE
LACTATE SERPL-SCNC: 1.7 MMOL/L (ref 0.5–2.2)
LACTATE SERPL-SCNC: 2.7 MMOL/L (ref 0.5–2.2)
LEUKOCYTE ESTERASE UR QL STRIP: NEGATIVE
LYMPHOCYTES # BLD AUTO: 1.5 K/UL (ref 1–4.8)
LYMPHOCYTES NFR BLD: 6.2 % (ref 18–48)
MCH RBC QN AUTO: 30.5 PG (ref 27–31)
MCHC RBC AUTO-ENTMCNC: 31.7 G/DL (ref 32–36)
MCV RBC AUTO: 96 FL (ref 82–98)
MONOCYTES # BLD AUTO: 1.6 K/UL (ref 0.3–1)
MONOCYTES NFR BLD: 6.4 % (ref 4–15)
NEUTROPHILS # BLD AUTO: 21 K/UL (ref 1.8–7.7)
NEUTROPHILS NFR BLD: 87.7 % (ref 38–73)
NITRITE UR QL STRIP: NEGATIVE
PH UR STRIP: 6 [PH] (ref 5–8)
PLATELET # BLD AUTO: 714 K/UL (ref 150–350)
PMV BLD AUTO: 8.9 FL (ref 9.2–12.9)
POCT GLUCOSE: 133 MG/DL (ref 70–110)
POTASSIUM SERPL-SCNC: 3.4 MMOL/L (ref 3.5–5.1)
PROCALCITONIN SERPL IA-MCNC: 0.63 NG/ML
PROT SERPL-MCNC: 5.9 G/DL (ref 6–8.4)
PROT UR QL STRIP: NEGATIVE
PROTHROMBIN TIME: 13 SEC (ref 9–12.5)
RBC # BLD AUTO: 2.72 M/UL (ref 4–5.4)
SODIUM SERPL-SCNC: 129 MMOL/L (ref 136–145)
SP GR UR STRIP: 1.01 (ref 1–1.03)
SPECIMEN SOURCE: NORMAL
TROPONIN I SERPL DL<=0.01 NG/ML-MCNC: 0.04 NG/ML (ref 0–0.03)
TROPONIN I SERPL DL<=0.01 NG/ML-MCNC: 0.05 NG/ML (ref 0–0.03)
URN SPEC COLLECT METH UR: NORMAL
UROBILINOGEN UR STRIP-ACNC: NEGATIVE EU/DL
WBC # BLD AUTO: 24.17 K/UL (ref 3.9–12.7)

## 2019-03-29 PROCEDURE — 99999 PR PBB SHADOW E&M-EST. PATIENT-LVL III: ICD-10-PCS | Mod: PBBFAC,,, | Performed by: NURSE PRACTITIONER

## 2019-03-29 PROCEDURE — 84145 PROCALCITONIN (PCT): CPT

## 2019-03-29 PROCEDURE — 99215 PR OFFICE/OUTPT VISIT, EST, LEVL V, 40-54 MIN: ICD-10-PCS | Mod: S$GLB,,, | Performed by: NURSE PRACTITIONER

## 2019-03-29 PROCEDURE — 84484 ASSAY OF TROPONIN QUANT: CPT

## 2019-03-29 PROCEDURE — 80053 COMPREHEN METABOLIC PANEL: CPT

## 2019-03-29 PROCEDURE — 99215 OFFICE O/P EST HI 40 MIN: CPT | Mod: S$GLB,,, | Performed by: NURSE PRACTITIONER

## 2019-03-29 PROCEDURE — 85025 COMPLETE CBC W/AUTO DIFF WBC: CPT | Mod: 91

## 2019-03-29 PROCEDURE — 81003 URINALYSIS AUTO W/O SCOPE: CPT

## 2019-03-29 PROCEDURE — 99499 RISK ADDL DX/OHS AUDIT: ICD-10-PCS | Mod: S$GLB,,, | Performed by: NURSE PRACTITIONER

## 2019-03-29 PROCEDURE — 25000003 PHARM REV CODE 250: Performed by: EMERGENCY MEDICINE

## 2019-03-29 PROCEDURE — 93005 ELECTROCARDIOGRAM TRACING: CPT

## 2019-03-29 PROCEDURE — 1101F PT FALLS ASSESS-DOCD LE1/YR: CPT | Mod: CPTII,S$GLB,, | Performed by: NURSE PRACTITIONER

## 2019-03-29 PROCEDURE — 71046 XR CHEST PA AND LATERAL: ICD-10-PCS | Mod: 26,,, | Performed by: RADIOLOGY

## 2019-03-29 PROCEDURE — 11000001 HC ACUTE MED/SURG PRIVATE ROOM

## 2019-03-29 PROCEDURE — 87502 INFLUENZA DNA AMP PROBE: CPT

## 2019-03-29 PROCEDURE — 96367 TX/PROPH/DG ADDL SEQ IV INF: CPT

## 2019-03-29 PROCEDURE — 85730 THROMBOPLASTIN TIME PARTIAL: CPT

## 2019-03-29 PROCEDURE — 96365 THER/PROPH/DIAG IV INF INIT: CPT

## 2019-03-29 PROCEDURE — 63600175 PHARM REV CODE 636 W HCPCS: Performed by: EMERGENCY MEDICINE

## 2019-03-29 PROCEDURE — 83605 ASSAY OF LACTIC ACID: CPT

## 2019-03-29 PROCEDURE — 93010 EKG 12-LEAD: ICD-10-PCS | Mod: ,,, | Performed by: INTERNAL MEDICINE

## 2019-03-29 PROCEDURE — 99291 CRITICAL CARE FIRST HOUR: CPT | Mod: 25

## 2019-03-29 PROCEDURE — 83036 HEMOGLOBIN GLYCOSYLATED A1C: CPT

## 2019-03-29 PROCEDURE — 85610 PROTHROMBIN TIME: CPT

## 2019-03-29 PROCEDURE — 83880 ASSAY OF NATRIURETIC PEPTIDE: CPT

## 2019-03-29 PROCEDURE — 71046 X-RAY EXAM CHEST 2 VIEWS: CPT | Mod: TC

## 2019-03-29 PROCEDURE — 99499 UNLISTED E&M SERVICE: CPT | Mod: S$GLB,,, | Performed by: NURSE PRACTITIONER

## 2019-03-29 PROCEDURE — 87040 BLOOD CULTURE FOR BACTERIA: CPT | Mod: 59

## 2019-03-29 PROCEDURE — 84484 ASSAY OF TROPONIN QUANT: CPT | Mod: 91

## 2019-03-29 PROCEDURE — 71046 X-RAY EXAM CHEST 2 VIEWS: CPT | Mod: 26,,, | Performed by: RADIOLOGY

## 2019-03-29 PROCEDURE — 96366 THER/PROPH/DIAG IV INF ADDON: CPT

## 2019-03-29 PROCEDURE — 36415 COLL VENOUS BLD VENIPUNCTURE: CPT

## 2019-03-29 PROCEDURE — 83605 ASSAY OF LACTIC ACID: CPT | Mod: 91

## 2019-03-29 PROCEDURE — 63600175 PHARM REV CODE 636 W HCPCS: Performed by: REGISTERED NURSE

## 2019-03-29 PROCEDURE — G0378 HOSPITAL OBSERVATION PER HR: HCPCS

## 2019-03-29 PROCEDURE — 99999 PR PBB SHADOW E&M-EST. PATIENT-LVL III: CPT | Mod: PBBFAC,,, | Performed by: NURSE PRACTITIONER

## 2019-03-29 PROCEDURE — 1101F PR PT FALLS ASSESS DOC 0-1 FALLS W/OUT INJ PAST YR: ICD-10-PCS | Mod: CPTII,S$GLB,, | Performed by: NURSE PRACTITIONER

## 2019-03-29 PROCEDURE — 96375 TX/PRO/DX INJ NEW DRUG ADDON: CPT

## 2019-03-29 PROCEDURE — 93010 ELECTROCARDIOGRAM REPORT: CPT | Mod: ,,, | Performed by: INTERNAL MEDICINE

## 2019-03-29 RX ORDER — IBUPROFEN 200 MG
24 TABLET ORAL
Status: DISCONTINUED | OUTPATIENT
Start: 2019-03-29 | End: 2019-04-04 | Stop reason: HOSPADM

## 2019-03-29 RX ORDER — FUROSEMIDE 10 MG/ML
40 INJECTION INTRAMUSCULAR; INTRAVENOUS 2 TIMES DAILY
Status: DISCONTINUED | OUTPATIENT
Start: 2019-03-30 | End: 2019-03-31

## 2019-03-29 RX ORDER — SODIUM CHLORIDE 0.9 % (FLUSH) 0.9 %
10 SYRINGE (ML) INJECTION
Status: DISCONTINUED | OUTPATIENT
Start: 2019-03-29 | End: 2019-04-04 | Stop reason: HOSPADM

## 2019-03-29 RX ORDER — GLUCAGON 1 MG
1 KIT INJECTION
Status: DISCONTINUED | OUTPATIENT
Start: 2019-03-29 | End: 2019-04-04 | Stop reason: HOSPADM

## 2019-03-29 RX ORDER — IBUPROFEN 200 MG
16 TABLET ORAL
Status: DISCONTINUED | OUTPATIENT
Start: 2019-03-29 | End: 2019-04-04 | Stop reason: HOSPADM

## 2019-03-29 RX ORDER — INSULIN ASPART 100 [IU]/ML
0-5 INJECTION, SOLUTION INTRAVENOUS; SUBCUTANEOUS
Status: DISCONTINUED | OUTPATIENT
Start: 2019-03-29 | End: 2019-04-04 | Stop reason: HOSPADM

## 2019-03-29 RX ORDER — CHOLECALCIFEROL (VITAMIN D3) 25 MCG
TABLET,CHEWABLE ORAL
Refills: 0 | COMMUNITY
Start: 2019-01-22

## 2019-03-29 RX ORDER — FUROSEMIDE 10 MG/ML
40 INJECTION INTRAMUSCULAR; INTRAVENOUS
Status: COMPLETED | OUTPATIENT
Start: 2019-03-29 | End: 2019-03-29

## 2019-03-29 RX ORDER — NAPROXEN SODIUM 220 MG/1
81 TABLET, FILM COATED ORAL
COMMUNITY

## 2019-03-29 RX ADMIN — SODIUM CHLORIDE 1700 ML: 0.9 INJECTION, SOLUTION INTRAVENOUS at 05:03

## 2019-03-29 RX ADMIN — FUROSEMIDE 40 MG: 10 INJECTION, SOLUTION INTRAMUSCULAR; INTRAVENOUS at 07:03

## 2019-03-29 RX ADMIN — VANCOMYCIN HYDROCHLORIDE 750 MG: 750 INJECTION, POWDER, LYOPHILIZED, FOR SOLUTION INTRAVENOUS at 05:03

## 2019-03-29 RX ADMIN — PIPERACILLIN AND TAZOBACTAM 4.5 G: 4; .5 INJECTION, POWDER, LYOPHILIZED, FOR SOLUTION INTRAVENOUS; PARENTERAL at 04:03

## 2019-03-29 NOTE — CONSULTS
Clinical Pharmacy: Vancomycin Progress Note    Pharmacy consulted to dose Vancomycin by Jarod Neyda  85 y/o female with PMH of DM, CKD, and CHF    Indication: Pneumonia  Goal trough: 15-20 mcg/ml     WBC = 24.17  Tmax = 98.7  SCr = 1.0, CrCl = 33.3 ml/min     Blood cultures: pending  Other Abx: Zosyn  Dosing weight: 58.7 kg    Pt received a 750mg dose in ER & will be pulse dosed at this time. A random level will be ordered with AM labs. If kidney function remains stable, can consider scheduling dosing. Patient may be re-dosed once level is <18 mcg/ml  Random level due: 03/30 at 0430 with AM labs.     Thank you for allowing us to participate in this patient's care.   Kyra Lynne, PharmD 3/29/2019 6:33 PM

## 2019-03-29 NOTE — TELEPHONE ENCOUNTER
----- Message from Nani Orozco sent at 3/29/2019 10:35 AM CDT -----  Contact: Patient  Patient called to schedule her Iron Infusion; she said she is supposed to do labs as well in [place]. Please call her to schedule at [phone].    Thanks,  Nani

## 2019-03-29 NOTE — TELEPHONE ENCOUNTER
Spoke with patient, stated she is feeling very tired and weak and would like to be seen today at [place]. Scheduled patient with Reina Sullivan for this afternoon. Confirmed appointment time and location.

## 2019-03-29 NOTE — PROGRESS NOTES
Subjective:       Patient ID: Yovani Pulido is a 86 y.o. female.    Chief Complaint: Anemia and Results    86 year old female, presents for evaluation of c/o SOB, fatigue. Patient has been seen by Dr. Kendrick in the past for iron deficiency anemia, and MGUS. Patient has been treated with Feraheme in the past ans has tolerated well. Patient was admitted to the hospital in January for pneumonia. Denies fever, productive cough. Reports dizziness, weakness.     Review of Systems   Constitutional: Positive for activity change, appetite change and fatigue. Negative for chills, diaphoresis, fever and unexpected weight change.   HENT: Negative for congestion, hearing loss, mouth sores, nosebleeds and trouble swallowing.    Eyes: Negative for discharge and visual disturbance.   Respiratory: Positive for cough, chest tightness and shortness of breath.    Cardiovascular: Positive for leg swelling. Negative for chest pain and palpitations.   Gastrointestinal: Positive for abdominal distention. Negative for blood in stool, constipation, diarrhea, nausea and vomiting.   Endocrine: Negative for cold intolerance and heat intolerance.   Genitourinary: Negative for difficulty urinating, dyspareunia, enuresis, flank pain and hematuria.   Musculoskeletal: Positive for arthralgias, back pain and myalgias.   Skin: Negative.    Neurological: Positive for weakness and headaches. Negative for dizziness and light-headedness.   Hematological: Negative for adenopathy. Does not bruise/bleed easily.   Psychiatric/Behavioral: Negative for agitation, behavioral problems and confusion. The patient is nervous/anxious.        Medication List with Changes/Refills   Current Medications    ALCOHOL SWABS PADM    Apply 1 each topically 2 (two) times daily.    ASPIRIN 81 MG CHEW    Take 81 mg by mouth.    BENAZEPRIL (LOTENSIN) 5 MG TABLET    Take 1 tablet (5 mg total) by mouth once daily.    BLOOD GLUCOSE CONTROL, HIGH SOLN    by Misc.(Non-Drug; Combo  Route) route.    BLOOD GLUCOSE CONTROL, NORMAL SOLN    Use as directed.    BLOOD SUGAR DIAGNOSTIC STRP    Glucose testing daily.    BLOOD-GLUCOSE METER MISC    Use as directed.    CETIRIZINE (ZYRTEC) 10 MG TABLET    Take 1 tablet (10 mg total) by mouth once daily.    CYANOCOBALAMIN, VITAMIN B-12, 2,500 MCG TAB    TK 1 T PO D    FLUTICASONE (FLONASE) 50 MCG/ACTUATION NASAL SPRAY    2 sprays by Each Nare route once daily.    FOLIC ACID (FOLVITE) 800 MCG TAB    TAKE 1 TABLET TWICE DAILY    FUROSEMIDE (LASIX) 20 MG TABLET    Take 1 tablet (20 mg total) by mouth every Mon, Wed, Fri.    LANCETS (LANCETS,THIN) MISC    Twice daily glucose testing.    LATANOPROST 0.005 % OPHTHALMIC SOLUTION    INSTILL 1 DROP INTO BOTH EYES EVERY EVENING    LEVOTHYROXINE (SYNTHROID) 50 MCG TABLET    TAKE 1 TABLET BEFORE BREAKFAST    MAGNESIUM 250 MG TAB    Take 1 tablet by mouth Daily.    MAGNESIUM OXIDE-MG AA CHELATE (MG-PLUS-PROTEIN) 133 MG TAB    Take by mouth.    METFORMIN (GLUCOPHAGE) 1000 MG TABLET    TAKE 1 TABLET TWICE DAILY WITH MEALS    METHOTREXATE 2.5 MG TAB    TAKE 4 TABLETS IN MORNING AND 4 TABLETS AT NIGHT ONE TIME WEEKLY    OMEPRAZOLE (PRILOSEC) 20 MG CAPSULE    Take 1 capsule (20 mg total) by mouth once daily.    PRAVASTATIN (PRAVACHOL) 40 MG TABLET    TAKE 1 TABLET EVERY DAY    PREDNISONE (DELTASONE) 1 MG TABLET    TAKE 1 TABLET TWICE DAILY  OR  AS  DIRECTED    TIMOLOL (BETIMOL) 0.5 % OPHTHALMIC SOLUTION    1 drop.    TIMOLOL MALEATE 0.5% (TIMOPTIC) 0.5 % DROP    INSTILL 1 DROP INTO BOTH EYES EVERY MORNING    TRAMADOL (ULTRAM) 50 MG TABLET    Take 1 tablet (50 mg total) by mouth every 8 (eight) hours as needed for Pain.    TRAZODONE (DESYREL) 100 MG TABLET    TAKE 1 TABLET BY MOUTH ONCE EVERY EVENING     Objective:     Vitals:    03/29/19 1250   BP: 100/64   Pulse: 93   Resp: 18   Temp: 97.6 °F (36.4 °C)     Physical Exam   Constitutional: She is oriented to person, place, and time. She appears lethargic. She appears  cachectic. She appears ill. No distress.   HENT:   Head: Normocephalic and atraumatic.   Right Ear: Hearing and external ear normal.   Left Ear: Hearing and external ear normal.   Nose: No rhinorrhea or sinus tenderness. Right sinus exhibits no maxillary sinus tenderness and no frontal sinus tenderness. Left sinus exhibits no maxillary sinus tenderness and no frontal sinus tenderness.   Mouth/Throat: Uvula is midline, oropharynx is clear and moist and mucous membranes are normal. No oral lesions.   Eyes: Pupils are equal, round, and reactive to light. Conjunctivae are normal. Right eye exhibits no discharge. Left eye exhibits no discharge.   Neck: Normal range of motion. Carotid bruit is not present. No tracheal deviation present. No thyromegaly present.   Cardiovascular: Normal rate, regular rhythm, S1 normal, S2 normal and intact distal pulses.   Murmur heard.  Pulses:       Dorsalis pedis pulses are 2+ on the right side, and 2+ on the left side.   Pulmonary/Chest: Tachypnea noted. She has wheezes in the right upper field and the right middle field. She has rales in the right upper field, the right middle field and the right lower field.   Abdominal: Soft. Bowel sounds are normal. She exhibits distension. She exhibits no mass. There is no tenderness.   Musculoskeletal: Normal range of motion.   Lymphadenopathy:     She has no cervical adenopathy.        Right: No supraclavicular adenopathy present.        Left: No supraclavicular adenopathy present.   Neurological: She is oriented to person, place, and time. She has normal strength. She appears lethargic. No sensory deficit. Coordination and gait normal.   Skin: Skin is warm and dry. Capillary refill takes less than 2 seconds. No rash noted. There is pallor.   Psychiatric: Her speech is normal and behavior is normal. Judgment and thought content normal. Her mood appears anxious. Cognition and memory are normal. She does not exhibit a depressed mood.   Vitals  reviewed.      Assessment:       Problem List Items Addressed This Visit        Pulmonary    Pneumonia    Relevant Orders    X-Ray Chest PA And Lateral    Immunoglobulin free LT chains blood       Oncology    Iron deficiency anemia due to chronic blood loss - Primary    Relevant Orders    Comprehensive metabolic panel    Ferritin    Iron and TIBC    Immunoglobulin free LT chains blood    CBC auto differential (Completed)       Endocrine    Diabetes mellitus with microalbuminuria (Chronic)    Weight loss, non-intentional       GI    Gastroesophageal reflux disease with hiatal hernia       Orthopedic    Rheumatoid arthritis involving multiple sites with positive rheumatoid factor          Plan:       1) Referred patient to ER for continued treatement due to decreased O2 saturation and abnormal lung sounds. CXR results: pulmonary edema vs. Pneumonia.    2) Lab orders as charted. Will review when available.   3) Will replete with iron if needed based on lab results when available.    All of the above was discussed with patient and family. Report called to ER and patient transported per personal vehicle with family.     I will review assessment/plan with collaborating physician Dr. Reese.

## 2019-03-29 NOTE — PATIENT INSTRUCTIONS
Iron-Deficiency Anemia (Adult)  Red blood cells carry oxygen to the tissues of your body. Anemia is a condition in which you have too few red blood cells. You need iron to make red cells. Anemia makes you feel tired and run down. When anemia becomes severe, your skin becomes pale. You may feel short of breath after physical activity. Other symptoms include:  · Headaches  · Dizziness  · Leg cramps with physical activity  · Drowsiness  · Restless legs  Your anemia is caused by not having enough iron in your body. This may be because of:  · Loss of blood. This can be caused by heavy menstrual periods. It can also be caused by bleeding from the stomach or intestines.  · Poor diet. You may not be eating enough foods that contain iron.  · Inability to absorb iron from the foods you eat  · Pregnancy  If your blood count is low enough, your healthcare provider may prescribe an iron supplement. It usually takes about 2 to 3 months of treatment with iron supplements to correct anemia. Severe cases of anemia need a blood transfusion to quickly ease symptoms and deliver more oxygen to the cells.  Home care  Follow these guidelines when caring for yourself at home:  · Eat foods high in iron. This will boost the amount of iron stored in your body. It is a natural way to build up the number of blood cells. Good sources of iron include beef, liver, spinach and other dark green leafy vegetables, whole grains, beans, and nuts.  · Do not overexert yourself.  · Talk with your healthcare provider before traveling by air or traveling to high altitudes.  Follow-up care  Follow up with your healthcare provider in 2 months, or as advised. This is to have another red blood cell count to be sure your anemia has been fixed.  When to seek medical advice  Call your healthcare provider right away if any of these occur:  · Shortness of breath or chest pain  · Dizziness or fainting  · Vomiting blood or passing red or black-colored stool   Date  Last Reviewed: 2/25/2016  © 4123-1367 [employer] [address] [place] [address], [place] [address]. All rights reserved. This information is not intended as a substitute for professional medical care. Always follow your healthcare professional's instructions.

## 2019-03-29 NOTE — ED PROVIDER NOTES
SCRIBE #1 NOTE: IDodie am scribing for, and in the presence of, Jarod Faye NP. I have scribed the entire note.     3/29/2019, 3:21 PM   History obtained from the patient      History of Present Illness: Yovani Pulido is a 86 y.o. female patient with PMHx of RA, HTN, COPD, DM, and CHF who presents to the Emergency Department for shortness of breath which onset intermittently 1 week ago. Pt was seen by Reina Sullivan NP (Hem/Onc) today for sxs and referred to ED for decreased O2 saturation, abnormal lung sounds, and CXR that resulted pulmonary edema vs. Pneumonia. Pt also c/o chills and generalized weakness.    3:28 PM: Pt was placed back in treatment lounge. I explained to pt that due to lack of available rooms pt was seen by myself to begin the workup. Pt understands they will be seen and dispositioned by the next available physician. Pt voices understanding and agrees with plan. Pt also understands the longer than normal wait time. I am removing myself from the care of pt and pt will now be assigned to the next available physician.       Physician Attestation Statement for Scribe #1: I, Jarod Faye NP, personally performed the services described in this documentation, as scribed by Dodie Tariq, in my presence, and it is both accurate and complete.       SCRIBE #1 NOTE: IDelfina, michael scribing for, and in the presence of, Francis Winchester MD. I have scribed the entire note.     SCRIBE #2 NOTE: Jaye MONACO am scribing for, and in the presence of,  Ilene Ulloa MD. I have scribed the remaining portions of the note not scribed by Scribe #1.     History      Chief Complaint   Patient presents with    Abnormal Lab     patient referred to ED by Dr. Kendrick for further eval        Review of patient's allergies indicates:   Allergen Reactions    Tetanus vaccines and toxoid      Other reaction(s): Swelling    Tetanus-horse serum: 30 years ago    Adhes.  band-tape-benzalkonium Rash        HPI   HPI    3/29/2019, 3:34 PM   History obtained from the patient      History of Present Illness: Yovani Pulido is a 86 y.o. female patient with PMHx of RA, HTN, COPD, DM, and CHF who presents to the Emergency Department for worsening SOB which onset gradually 1 week ago. Symptoms are intermittent and moderate in severity. Sxs exacerbated on exertion. No mitigating factors reported. Pt was seen by Reina Sullivan NP (Hem/Onc) today for sxs and referred to ED for decreased O2 saturation, abnormal lung sounds, and CXR that resulted pulmonary edema vs. Pneumonia.  Associated sxs include chills and fatigue. Patient denies any CP, fever, diaphoresis, palpitations, extremity weakness/numbness, leg pain/swelling, dizziness, cough, n/v, and all other sxs at this time. No further complaints or concerns at this time.         Arrival mode: Personal vehicle     PCP: Dorcas Schultz DO       Past Medical History:  Past Medical History:   Diagnosis Date    Anemia     Carotid stenosis     Cataract     COAG (chronic open-angle glaucoma)     Colon polyps     Diabetes mellitus type II     steroid induced    Diverticulosis     GERD (gastroesophageal reflux disease)     hiatal hernia    Glaucoma     Heart murmur     Hyperlipidemia     Hypertension     Hypothyroidism     Rheumatoid arthritis(714.0)     Stroke     lacunar infarct       Past Surgical History:  Past Surgical History:   Procedure Laterality Date    anal fissure repair      APPENDECTOMY  1944    BREAST SURGERY  1992 approx    breast biopsies- benign    CAROTID ENDARTERECTOMY  2009    left    CATARACT EXTRACTION      COLONOSCOPY  2012    COLONOSCOPY N/A 12/11/2015    [facility]    ESOPHAGOGASTRODUODENOSCOPY (EGD) N/A 12/11/2015    [facility] at [facility] END[facility]    EYE SURGERY  2004, 2005    bilateral cataracts    HERNIA REPAIR  2001, 2002    abdominal x2, with mesh  on second    HYSTERECTOMY  1963    vag hyst    JOINT REPLACEMENT      right knee    THORACENTESIS - Outpatient Right 3/23/2018    [facility]    yag Left          Family History:  Family History   Problem Relation Age of Onset    Cancer Mother         pancreas    Glaucoma Mother     Cancer Father         lung    Cancer Son 61        melanoma metastatic    Heart disease Brother     Diabetes Sister     Stroke Sister     Blindness Sister     Cataracts Sister     Heart disease Brother     Hyperlipidemia Neg Hx     Hypertension Neg Hx     Macular degeneration Neg Hx     Retinal detachment Neg Hx     Strabismus Neg Hx     Thyroid disease Neg Hx     Colon cancer Neg Hx     Stomach cancer Neg Hx        Social History:  Social History     Tobacco Use    Smoking status: Former Smoker     Packs/day: 3.00     Years: 50.00     Pack years: 150.00     Start date: 1948     Last attempt to quit: 1998     Years since quittin.3    Smokeless tobacco: Former User   Substance and Sexual Activity    Alcohol use: No     Alcohol/week: 0.0 oz    Drug use: No    Sexual activity: Never       ROS   Review of Systems   Constitutional: Positive for chills and fatigue. Negative for diaphoresis and fever.   HENT: Negative for sore throat.    Respiratory: Positive for shortness of breath. Negative for cough.    Cardiovascular: Negative for chest pain, palpitations and leg swelling.   Gastrointestinal: Negative for abdominal pain, nausea and vomiting.   Genitourinary: Negative for dysuria.   Musculoskeletal: Negative for back pain.   Skin: Negative for rash.   Neurological: Negative for dizziness, weakness and numbness.   Hematological: Does not bruise/bleed easily.   All other systems reviewed and are negative.      Physical Exam      Initial Vitals [19 1513]   BP Pulse Resp Temp SpO2   (!) 100/59 88 20 98.7 °F (37.1 °C) 95 %      MAP       --          Physical  Exam  Nursing Notes and Vital Signs Reviewed.  Constitutional: Patient is in no acute distress. Elderly  Head: Atraumatic. Normocephalic.  Eyes: PERRL. EOM intact. Conjunctivae are not pale. No scleral icterus.  ENT: Mucous membranes are moist. Oropharynx is clear and symmetric.    Neck: Supple. Full ROM. No lymphadenopathy.  Cardiovascular: Regular rate. Regular rhythm. No murmurs, rubs, or gallops. Distal pulses are 2+ and symmetric.  Pulmonary/Chest: No respiratory distress. Crackles at bases bilaterally.  Abdominal: Soft and non-distended.  There is no tenderness.  No rebound, guarding, or rigidity.   Musculoskeletal: Moves all extremities. No obvious deformities. 3+ pitting edema to bilateral ankles.  Skin: Warm and dry.  Neurological:  Alert, awake, and appropriate.  Normal speech.  No acute focal neurological deficits are appreciated.  Psychiatric: Normal affect. Good eye contact. Appropriate in content.    ED Course    Critical Care  Date/Time: 3/29/2019 5:22 PM  Performed by: Ilene Ulloa MD  Authorized by: Ilene Ulloa MD   Direct patient critical care time: 15 minutes  Additional history critical care time: 8 minutes  Ordering / reviewing critical care time: 9 minutes  Documentation critical care time: 4 minutes  Consulting other physicians critical care time: 5 minutes  Consult with family critical care time: 4 minutes  Total critical care time (exclusive of procedural time) : 45 minutes  Critical care time was exclusive of separately billable procedures and treating other patients and teaching time.  Critical care was necessary to treat or prevent imminent or life-threatening deterioration of the following conditions: sepsis.  Critical care was time spent personally by me on the following activities: blood draw for specimens, development of treatment plan with patient or surrogate, discussions with consultants, interpretation of cardiac output measurements, evaluation of patient's  response to treatment, examination of patient, obtaining history from patient or surrogate, ordering and performing treatments and interventions, ordering and review of laboratory studies, re-evaluation of patient's condition and pulse oximetry.        ED Vital Signs:  Vitals:    03/29/19 1513 03/29/19 1600 03/29/19 1645 03/29/19 1656   BP: (!) 100/59 (!) 119/57 (!) 102/53    Pulse: 88 91 85    Resp: 20 (!) 22 20    Temp: 98.7 °F (37.1 °C)      SpO2: 95% 100% 100%    Weight:    58.7 kg (129 lb 6.4 oz)       Abnormal Lab Results:  Labs Reviewed   CBC W/ AUTO DIFFERENTIAL - Abnormal; Notable for the following components:       Result Value    WBC 24.17 (*)     RBC 2.72 (*)     Hemoglobin 8.3 (*)     Hematocrit 26.2 (*)     MCHC 31.7 (*)     RDW 17.0 (*)     Platelets 714 (*)     MPV 8.9 (*)     Gran # (ANC) 21.0 (*)     Mono # 1.6 (*)     Gran% 87.7 (*)     Lymph% 6.2 (*)     All other components within normal limits   COMPREHENSIVE METABOLIC PANEL - Abnormal; Notable for the following components:    Sodium 129 (*)     Potassium 3.4 (*)     Chloride 91 (*)     Glucose 165 (*)     Calcium 7.0 (*)     Total Protein 5.9 (*)     Albumin 1.7 (*)     Alkaline Phosphatase 236 (*)     eGFR if  59 (*)     eGFR if non  51 (*)     All other components within normal limits   PROTIME-INR - Abnormal; Notable for the following components:    Prothrombin Time 13.0 (*)     All other components within normal limits   APTT - Abnormal; Notable for the following components:    aPTT 38.2 (*)     All other components within normal limits   B-TYPE NATRIURETIC PEPTIDE - Abnormal; Notable for the following components:     (*)     All other components within normal limits   TROPONIN I - Abnormal; Notable for the following components:    Troponin I 0.053 (*)     All other components within normal limits   LACTIC ACID, PLASMA - Abnormal; Notable for the following components:    Lactate (Lactic Acid) 2.7 (*)      All other components within normal limits   CULTURE, BLOOD   CULTURE, BLOOD   INFLUENZA A & B BY MOLECULAR   URINALYSIS, REFLEX TO URINE CULTURE   LACTIC ACID, PLASMA        All Lab Results:  Results for orders placed or performed during the hospital encounter of 03/29/19   CBC auto differential   Result Value Ref Range    WBC 24.17 (H) 3.90 - 12.70 K/uL    RBC 2.72 (L) 4.00 - 5.40 M/uL    Hemoglobin 8.3 (L) 12.0 - 16.0 g/dL    Hematocrit 26.2 (L) 37.0 - 48.5 %    MCV 96 82 - 98 fL    MCH 30.5 27.0 - 31.0 pg    MCHC 31.7 (L) 32.0 - 36.0 g/dL    RDW 17.0 (H) 11.5 - 14.5 %    Platelets 714 (H) 150 - 350 K/uL    MPV 8.9 (L) 9.2 - 12.9 fL    Gran # (ANC) 21.0 (H) 1.8 - 7.7 K/uL    Lymph # 1.5 1.0 - 4.8 K/uL    Mono # 1.6 (H) 0.3 - 1.0 K/uL    Eos # 0.1 0.0 - 0.5 K/uL    Baso # 0.01 0.00 - 0.20 K/uL    Gran% 87.7 (H) 38.0 - 73.0 %    Lymph% 6.2 (L) 18.0 - 48.0 %    Mono% 6.4 4.0 - 15.0 %    Eosinophil% 0.4 0.0 - 8.0 %    Basophil% 0.0 0.0 - 1.9 %    Differential Method Automated    Comprehensive metabolic panel   Result Value Ref Range    Sodium 129 (L) 136 - 145 mmol/L    Potassium 3.4 (L) 3.5 - 5.1 mmol/L    Chloride 91 (L) 95 - 110 mmol/L    CO2 25 23 - 29 mmol/L    Glucose 165 (H) 70 - 110 mg/dL    BUN, Bld 17 8 - 23 mg/dL    Creatinine 1.0 0.5 - 1.4 mg/dL    Calcium 7.0 (L) 8.7 - 10.5 mg/dL    Total Protein 5.9 (L) 6.0 - 8.4 g/dL    Albumin 1.7 (L) 3.5 - 5.2 g/dL    Total Bilirubin 0.5 0.1 - 1.0 mg/dL    Alkaline Phosphatase 236 (H) 55 - 135 U/L    AST 33 10 - 40 U/L    ALT 19 10 - 44 U/L    Anion Gap 13 8 - 16 mmol/L    eGFR if African American 59 (A) >60 mL/min/1.73 m^2    eGFR if non African American 51 (A) >60 mL/min/1.73 m^2   Protime-INR   Result Value Ref Range    Prothrombin Time 13.0 (H) 9.0 - 12.5 sec    INR 1.2 0.8 - 1.2   APTT   Result Value Ref Range    aPTT 38.2 (H) 21.0 - 32.0 sec   Brain natriuretic peptide   Result Value Ref Range     (H) 0 - 99 pg/mL   Troponin I   Result Value Ref Range     Troponin I 0.053 (H) 0.000 - 0.026 ng/mL   Lactic acid, plasma   Result Value Ref Range    Lactate (Lactic Acid) 2.7 (H) 0.5 - 2.2 mmol/L         Imaging Results:  Imaging Results    None        The EKG was ordered, reviewed, and independently interpreted by the ED provider.  Interpretation time: 16:00  Rate: 92 BPM  Rhythm: Wide QRS rhythm  Interpretation: RBBB. No STEMI.           The Emergency Provider reviewed the vital signs and test results, which are outlined above.    ED Discussion     3:55 PM: Dr. Winchester transfers care of pt to Dr. Ulloa, pending lab results.    5:19 PM: Re-evaluated pt. I have discussed test results, shared treatment plan, and the need for admission with patient at bedside. Pt expresses understanding at this time and agree with all information. All questions answered. Pt has no further questions or concerns at this time. Pt is ready for admit.    5:21 PM: Discussed case with Kavya Barrera NP ([facility]). Dr. Salinas agrees with current care and management of pt and accepts admission.   Admitting Service: Hospital medicine   Admitting Physician: Dr. Salinas  Admit to: obs tele    Dr. Salinas at bedside, not wanting patient to receive full fluid bolus due to concerns of patient having more of chf picture and not pneumonia, however discussed my concern with elevated lactic acid and WBC, patient receiving IV antibiotics, blood pressure has been stable.       ED Medication(s):  Medications   vancomycin 750 mg in dextrose 5 % 250 mL IVPB (ready to mix system) (has no administration in time range)   sodium chloride 0.9% bolus 1,700 mL (has no administration in time range)   piperacillin-tazobactam 4.5 g in dextrose 5 % 100 mL IVPB (ready to mix system) (4.5 g Intravenous New Bag 3/29/19 1861)             Medical Decision Making    Medical Decision Making:   Clinical Tests:   Lab Tests: Ordered and Reviewed  Medical Tests: Ordered and Reviewed           Scribe Attestation:   Scribe  #1: I performed the above scribed service and the documentation accurately describes the services I performed. I attest to the accuracy of the note.    Attending:   Physician Attestation Statement for Scribe #1: I, Francis Winchester MD, personally performed the services described in this documentation, as scribed by Delfina Grijalva, in my presence, and it is both accurate and complete.       Scribe Attestation:   Scribe #2: I performed the above scribed service and the documentation accurately describes the services I performed. I attest to the accuracy of the note.    Attending Attestation:           Physician Attestation for Scribe:    Physician Attestation Statement for Scribe #2: I, Ilene Ulloa MD, reviewed documentation, as scribed by Jaye Boyd in my presence, and it is both accurate and complete. I also acknowledge and confirm the content of the note done by Scribe #1.          Clinical Impression       ICD-10-CM ICD-9-CM   1. Abnormal chest x-ray R93.89 793.2   2. Shortness of breath R06.02 786.05   3. Severe sepsis A41.9 038.9    R65.20 995.92   4. Chronic anemia D64.9 285.9   5. Elevated brain natriuretic peptide (BNP) level R79.89 790.99   6. CHF (congestive heart failure) I50.9 428.0       Disposition:   Disposition: Admitted  Condition: Fair                   Ilene Ulloa MD  03/29/19 1943

## 2019-03-30 PROBLEM — A41.9 SEPSIS: Status: ACTIVE | Noted: 2019-03-30

## 2019-03-30 LAB
ALBUMIN SERPL BCP-MCNC: 1.6 G/DL (ref 3.5–5.2)
ALP SERPL-CCNC: 229 U/L (ref 55–135)
ALT SERPL W/O P-5'-P-CCNC: 14 U/L (ref 10–44)
ANION GAP SERPL CALC-SCNC: 12 MMOL/L (ref 8–16)
ANION GAP SERPL CALC-SCNC: 12 MMOL/L (ref 8–16)
AST SERPL-CCNC: 22 U/L (ref 10–40)
BASOPHILS # BLD AUTO: 0.01 K/UL (ref 0–0.2)
BASOPHILS # BLD AUTO: 0.01 K/UL (ref 0–0.2)
BASOPHILS NFR BLD: 0.1 % (ref 0–1.9)
BASOPHILS NFR BLD: 0.1 % (ref 0–1.9)
BILIRUB SERPL-MCNC: 0.5 MG/DL (ref 0.1–1)
BUN SERPL-MCNC: 16 MG/DL (ref 8–23)
BUN SERPL-MCNC: 16 MG/DL (ref 8–23)
CALCIUM SERPL-MCNC: 7.3 MG/DL (ref 8.7–10.5)
CALCIUM SERPL-MCNC: 7.3 MG/DL (ref 8.7–10.5)
CHLORIDE SERPL-SCNC: 93 MMOL/L (ref 95–110)
CHLORIDE SERPL-SCNC: 93 MMOL/L (ref 95–110)
CO2 SERPL-SCNC: 28 MMOL/L (ref 23–29)
CO2 SERPL-SCNC: 28 MMOL/L (ref 23–29)
CREAT SERPL-MCNC: 1.1 MG/DL (ref 0.5–1.4)
CREAT SERPL-MCNC: 1.1 MG/DL (ref 0.5–1.4)
DIFFERENTIAL METHOD: ABNORMAL
DIFFERENTIAL METHOD: ABNORMAL
EOSINOPHIL # BLD AUTO: 0.1 K/UL (ref 0–0.5)
EOSINOPHIL # BLD AUTO: 0.1 K/UL (ref 0–0.5)
EOSINOPHIL NFR BLD: 0.8 % (ref 0–8)
EOSINOPHIL NFR BLD: 0.8 % (ref 0–8)
ERYTHROCYTE [DISTWIDTH] IN BLOOD BY AUTOMATED COUNT: 16.8 % (ref 11.5–14.5)
ERYTHROCYTE [DISTWIDTH] IN BLOOD BY AUTOMATED COUNT: 16.8 % (ref 11.5–14.5)
EST. GFR  (AFRICAN AMERICAN): 53 ML/MIN/1.73 M^2
EST. GFR  (AFRICAN AMERICAN): 53 ML/MIN/1.73 M^2
EST. GFR  (NON AFRICAN AMERICAN): 46 ML/MIN/1.73 M^2
EST. GFR  (NON AFRICAN AMERICAN): 46 ML/MIN/1.73 M^2
ESTIMATED AVG GLUCOSE: 114 MG/DL (ref 68–131)
ESTIMATED PA SYSTOLIC PRESSURE: 19.65
GLUCOSE SERPL-MCNC: 143 MG/DL (ref 70–110)
GLUCOSE SERPL-MCNC: 143 MG/DL (ref 70–110)
HBA1C MFR BLD HPLC: 5.6 % (ref 4–5.6)
HCT VFR BLD AUTO: 26 % (ref 37–48.5)
HCT VFR BLD AUTO: 26 % (ref 37–48.5)
HGB BLD-MCNC: 8 G/DL (ref 12–16)
HGB BLD-MCNC: 8 G/DL (ref 12–16)
LYMPHOCYTES # BLD AUTO: 0.7 K/UL (ref 1–4.8)
LYMPHOCYTES # BLD AUTO: 0.7 K/UL (ref 1–4.8)
LYMPHOCYTES NFR BLD: 4.2 % (ref 18–48)
LYMPHOCYTES NFR BLD: 4.2 % (ref 18–48)
MCH RBC QN AUTO: 29.7 PG (ref 27–31)
MCH RBC QN AUTO: 29.7 PG (ref 27–31)
MCHC RBC AUTO-ENTMCNC: 30.8 G/DL (ref 32–36)
MCHC RBC AUTO-ENTMCNC: 30.8 G/DL (ref 32–36)
MCV RBC AUTO: 97 FL (ref 82–98)
MCV RBC AUTO: 97 FL (ref 82–98)
MITRAL VALVE STENOSIS: NORMAL
MONOCYTES # BLD AUTO: 1.6 K/UL (ref 0.3–1)
MONOCYTES # BLD AUTO: 1.6 K/UL (ref 0.3–1)
MONOCYTES NFR BLD: 9.6 % (ref 4–15)
MONOCYTES NFR BLD: 9.6 % (ref 4–15)
NEUTROPHILS # BLD AUTO: 14.1 K/UL (ref 1.8–7.7)
NEUTROPHILS # BLD AUTO: 14.1 K/UL (ref 1.8–7.7)
NEUTROPHILS NFR BLD: 85.3 % (ref 38–73)
NEUTROPHILS NFR BLD: 85.3 % (ref 38–73)
PLATELET # BLD AUTO: 708 K/UL (ref 150–350)
PLATELET # BLD AUTO: 708 K/UL (ref 150–350)
PMV BLD AUTO: 9 FL (ref 9.2–12.9)
PMV BLD AUTO: 9 FL (ref 9.2–12.9)
POCT GLUCOSE: 139 MG/DL (ref 70–110)
POCT GLUCOSE: 147 MG/DL (ref 70–110)
POCT GLUCOSE: 220 MG/DL (ref 70–110)
POCT GLUCOSE: 230 MG/DL (ref 70–110)
POTASSIUM SERPL-SCNC: 4.2 MMOL/L (ref 3.5–5.1)
POTASSIUM SERPL-SCNC: 4.2 MMOL/L (ref 3.5–5.1)
PROT SERPL-MCNC: 5.7 G/DL (ref 6–8.4)
RBC # BLD AUTO: 2.69 M/UL (ref 4–5.4)
RBC # BLD AUTO: 2.69 M/UL (ref 4–5.4)
RETIRED EF AND QEF - SEE NOTES: 60 (ref 55–65)
SODIUM SERPL-SCNC: 133 MMOL/L (ref 136–145)
SODIUM SERPL-SCNC: 133 MMOL/L (ref 136–145)
TROPONIN I SERPL DL<=0.01 NG/ML-MCNC: 0.05 NG/ML (ref 0–0.03)
VANCOMYCIN SERPL-MCNC: 14.5 UG/ML
VANCOMYCIN SERPL-MCNC: 8.2 UG/ML
WBC # BLD AUTO: 16.51 K/UL (ref 3.9–12.7)
WBC # BLD AUTO: 16.51 K/UL (ref 3.9–12.7)

## 2019-03-30 PROCEDURE — 93306 TTE W/DOPPLER COMPLETE: CPT | Mod: 26,,, | Performed by: INTERNAL MEDICINE

## 2019-03-30 PROCEDURE — 80053 COMPREHEN METABOLIC PANEL: CPT

## 2019-03-30 PROCEDURE — 25000003 PHARM REV CODE 250: Performed by: INTERNAL MEDICINE

## 2019-03-30 PROCEDURE — 63600175 PHARM REV CODE 636 W HCPCS: Performed by: NURSE PRACTITIONER

## 2019-03-30 PROCEDURE — 80202 ASSAY OF VANCOMYCIN: CPT

## 2019-03-30 PROCEDURE — 96372 THER/PROPH/DIAG INJ SC/IM: CPT

## 2019-03-30 PROCEDURE — 80202 ASSAY OF VANCOMYCIN: CPT | Mod: 91

## 2019-03-30 PROCEDURE — 96376 TX/PRO/DX INJ SAME DRUG ADON: CPT

## 2019-03-30 PROCEDURE — 25000003 PHARM REV CODE 250: Performed by: EMERGENCY MEDICINE

## 2019-03-30 PROCEDURE — 25000003 PHARM REV CODE 250: Performed by: NURSE PRACTITIONER

## 2019-03-30 PROCEDURE — 63600175 PHARM REV CODE 636 W HCPCS: Performed by: EMERGENCY MEDICINE

## 2019-03-30 PROCEDURE — 93306 2D ECHO WITH COLOR FLOW DOPPLER: ICD-10-PCS | Mod: 26,,, | Performed by: INTERNAL MEDICINE

## 2019-03-30 PROCEDURE — 94799 UNLISTED PULMONARY SVC/PX: CPT

## 2019-03-30 PROCEDURE — 63600175 PHARM REV CODE 636 W HCPCS: Performed by: HOSPITALIST

## 2019-03-30 PROCEDURE — 94760 N-INVAS EAR/PLS OXIMETRY 1: CPT

## 2019-03-30 PROCEDURE — 85025 COMPLETE CBC W/AUTO DIFF WBC: CPT

## 2019-03-30 PROCEDURE — 99214 OFFICE O/P EST MOD 30 MIN: CPT | Mod: ,,, | Performed by: INTERNAL MEDICINE

## 2019-03-30 PROCEDURE — 27000221 HC OXYGEN, UP TO 24 HOURS

## 2019-03-30 PROCEDURE — 94761 N-INVAS EAR/PLS OXIMETRY MLT: CPT

## 2019-03-30 PROCEDURE — 93306 TTE W/DOPPLER COMPLETE: CPT

## 2019-03-30 PROCEDURE — 96375 TX/PRO/DX INJ NEW DRUG ADDON: CPT

## 2019-03-30 PROCEDURE — 63600175 PHARM REV CODE 636 W HCPCS: Performed by: INTERNAL MEDICINE

## 2019-03-30 PROCEDURE — 99214 PR OFFICE/OUTPT VISIT, EST, LEVL IV, 30-39 MIN: ICD-10-PCS | Mod: ,,, | Performed by: INTERNAL MEDICINE

## 2019-03-30 PROCEDURE — G0378 HOSPITAL OBSERVATION PER HR: HCPCS

## 2019-03-30 PROCEDURE — 11000001 HC ACUTE MED/SURG PRIVATE ROOM

## 2019-03-30 PROCEDURE — 36415 COLL VENOUS BLD VENIPUNCTURE: CPT

## 2019-03-30 PROCEDURE — 84484 ASSAY OF TROPONIN QUANT: CPT

## 2019-03-30 RX ORDER — IPRATROPIUM BROMIDE AND ALBUTEROL SULFATE 2.5; .5 MG/3ML; MG/3ML
3 SOLUTION RESPIRATORY (INHALATION) EVERY 6 HOURS PRN
Status: DISCONTINUED | OUTPATIENT
Start: 2019-03-30 | End: 2019-04-04 | Stop reason: HOSPADM

## 2019-03-30 RX ORDER — LEVOTHYROXINE SODIUM 50 UG/1
50 TABLET ORAL
Status: DISCONTINUED | OUTPATIENT
Start: 2019-03-31 | End: 2019-04-04 | Stop reason: HOSPADM

## 2019-03-30 RX ORDER — PRAVASTATIN SODIUM 20 MG/1
40 TABLET ORAL DAILY
Status: DISCONTINUED | OUTPATIENT
Start: 2019-03-30 | End: 2019-04-04 | Stop reason: HOSPADM

## 2019-03-30 RX ADMIN — PIPERACILLIN SODIUM AND TAZOBACTAM SODIUM 4.5 G: 4; .5 INJECTION, POWDER, LYOPHILIZED, FOR SOLUTION INTRAVENOUS at 01:03

## 2019-03-30 RX ADMIN — IRON SUCROSE 200 MG: 20 INJECTION, SOLUTION INTRAVENOUS at 04:03

## 2019-03-30 RX ADMIN — PIPERACILLIN SODIUM AND TAZOBACTAM SODIUM 4.5 G: 4; .5 INJECTION, POWDER, LYOPHILIZED, FOR SOLUTION INTRAVENOUS at 10:03

## 2019-03-30 RX ADMIN — PIPERACILLIN SODIUM AND TAZOBACTAM SODIUM 4.5 G: 4; .5 INJECTION, POWDER, LYOPHILIZED, FOR SOLUTION INTRAVENOUS at 05:03

## 2019-03-30 RX ADMIN — VANCOMYCIN HYDROCHLORIDE 750 MG: 750 INJECTION, POWDER, LYOPHILIZED, FOR SOLUTION INTRAVENOUS at 08:03

## 2019-03-30 RX ADMIN — FUROSEMIDE 40 MG: 10 INJECTION, SOLUTION INTRAVENOUS at 05:03

## 2019-03-30 RX ADMIN — INSULIN ASPART 1 UNITS: 100 INJECTION, SOLUTION INTRAVENOUS; SUBCUTANEOUS at 11:03

## 2019-03-30 RX ADMIN — INSULIN ASPART 2 UNITS: 100 INJECTION, SOLUTION INTRAVENOUS; SUBCUTANEOUS at 11:03

## 2019-03-30 RX ADMIN — VANCOMYCIN HYDROCHLORIDE 750 MG: 750 INJECTION, POWDER, LYOPHILIZED, FOR SOLUTION INTRAVENOUS at 10:03

## 2019-03-30 RX ADMIN — PRAVASTATIN SODIUM 40 MG: 20 TABLET ORAL at 08:03

## 2019-03-30 NOTE — H&P
[facility] Hospital Medicine  History & Physical    Patient Name: Yovani Pulido  MRN: [id number]  Admission Date: 3/29/2019  Attending Physician: Ilene Ulloa MD   Primary Care Provider: Dorcas Schultz DO         Patient information was obtained from patient, relative(s), past medical records and ER records.     Subjective:     Principal Problem:Acute on chronic diastolic congestive heart failure    Chief Complaint:   Chief Complaint   Patient presents with    Abnormal Lab     patient referred to ED by Dr. Kendrick for further eval         HPI: Yovani Pulido is a 86 y.o. female patient with PMHx of RA, HTN, COPD, DM, and CHF  who was sent to ER from Dr. Kendrick office (where she goes for [facility]), for progressive SOB gradually  since last 1 week. SOB is intermittent and moderate in severity and gets worse with exertion. Pt was seen by Reina Sullivan NP (Hem/Onc) today for sxs and referred to ED for decreased O2 saturation, abnormal lung sounds, and CXR that resulted pulmonary edema vs. Pneumonia.  Associated sxs include chills and fatigue. Patient denies any CP, fever, diaphoresis, palpitations, extremity weakness/numbness, leg pain/swelling, dizziness, cough, n/v, and all other sxs at this time. No further complaints or concerns at this time.     She was admitted here in Jan 2019 for Pneumonia and was at [facility] before that for CHF exacerbation. She had Thoracentesis by Dr. Hallman last year in March 2018. Pt was suspected of severe Sepsis in the ER due to Leukocytosis of 24K with Lactate of 2.7, however her BNP is 570 and her CXR showed Pulm edema with B Pleural Effusions as well as Hyponatremia of 129 as well as mild BLE pitting edema -- hence Rehydration was stopped and pt given IV Lasix and admitted. Pt was hypoxemic initially in the office but not in the ER. Pt has normal Echo last year.           Past Medical History:   Diagnosis Date    Anemia     Carotid stenosis      Cataract     COAG (chronic open-angle glaucoma)     Colon polyps     Diabetes mellitus type II     steroid induced    Diverticulosis     GERD (gastroesophageal reflux disease)     hiatal hernia    Glaucoma     Heart murmur     Hyperlipidemia     Hypertension     Hypothyroidism     Rheumatoid arthritis(714.0)     Stroke     lacunar infarct       Past Surgical History:   Procedure Laterality Date    anal fissure repair      APPENDECTOMY  1944    BREAST SURGERY  1992 approx    breast biopsies- benign    CAROTID ENDARTERECTOMY  2009    left    CATARACT EXTRACTION      COLONOSCOPY  2012    COLONOSCOPY N/A 12/11/2015    [facility]    ESOPHAGOGASTRODUODENOSCOPY (EGD) N/A 12/11/2015    [facility] at [facility]    EYE SURGERY  2004, 2005    bilateral cataracts    HERNIA REPAIR  2001, 2002    abdominal x2, with mesh on second    HYSTERECTOMY  1963    vag hyst    JOINT REPLACEMENT  2008    right knee    THORACENTESIS - Outpatient Right 3/23/2018    Performed by [facility] at [facility] END[facility]    yag Left        Review of patient's allergies indicates:   Allergen Reactions    Tetanus vaccines and toxoid      Other reaction(s): Swelling    Tetanus-horse serum: 30 years ago    Adhes. band-tape-benzalkonium Rash       No current facility-administered medications on file prior to encounter.      Current Outpatient Medications on File Prior to Encounter   Medication Sig    alcohol swabs PadM Apply 1 each topically 2 (two) times daily.    benazepril (LOTENSIN) 5 MG tablet Take 1 tablet (5 mg total) by mouth once daily.    blood glucose control, normal Soln Use as directed.    blood sugar diagnostic Strp Glucose testing daily.    blood-glucose meter Misc Use as directed.    cetirizine (ZYRTEC) 10 MG tablet Take 1 tablet (10 mg total) by mouth once daily.    fluticasone (FLONASE) 50 mcg/actuation nasal spray 2 sprays by Each Nare route once daily.     folic acid (FOLVITE) 800 MCG Tab TAKE 1 TABLET TWICE DAILY    furosemide (LASIX) 20 MG tablet Take 1 tablet (20 mg total) by mouth every Mon, Wed, Fri.    lancets (LANCETS,THIN) Misc Twice daily glucose testing.    latanoprost 0.005 % ophthalmic solution INSTILL 1 DROP INTO BOTH EYES EVERY EVENING    levothyroxine (SYNTHROID) 50 MCG tablet TAKE 1 TABLET BEFORE BREAKFAST    metFORMIN (GLUCOPHAGE) 1000 MG tablet TAKE 1 TABLET TWICE DAILY WITH MEALS    methotrexate 2.5 MG Tab TAKE 4 TABLETS IN MORNING AND 4 TABLETS AT NIGHT ONE TIME WEEKLY    omeprazole (PRILOSEC) 20 MG capsule Take 1 capsule (20 mg total) by mouth once daily.    pravastatin (PRAVACHOL) 40 MG tablet TAKE 1 TABLET EVERY DAY    predniSONE (DELTASONE) 1 MG tablet TAKE 1 TABLET TWICE DAILY  OR  AS  DIRECTED    timolol maleate 0.5% (TIMOPTIC) 0.5 % Drop INSTILL 1 DROP INTO BOTH EYES EVERY MORNING    traMADol (ULTRAM) 50 mg tablet Take 1 tablet (50 mg total) by mouth every 8 (eight) hours as needed for Pain.    traZODone (DESYREL) 100 MG tablet TAKE 1 TABLET BY MOUTH ONCE EVERY EVENING    aspirin 81 MG Chew Take 81 mg by mouth.    blood glucose control, high Soln by Misc.(Non-Drug; Combo Route) route.    cyanocobalamin, vitamin B-12, 2,500 mcg Tab TK 1 T PO D    magnesium 250 mg Tab Take 1 tablet by mouth Daily.    magnesium oxide-Mg AA chelate (MG-PLUS-PROTEIN) 133 mg Tab Take by mouth.    timolol (BETIMOL) 0.5 % ophthalmic solution 1 drop.     Family History     Problem Relation (Age of Onset)    Blindness Sister    Cancer Mother, Father, Son (61)    Cataracts Sister    Diabetes Sister    Glaucoma Mother    Heart disease Brother, Brother    Stroke Sister        Tobacco Use    Smoking status: Former Smoker     Packs/day: 3.00     Years: 50.00     Pack years: 150.00     Start date: 1948     Last attempt to quit: 1998     Years since quittin.3    Smokeless tobacco: Former User   Substance and Sexual Activity    Alcohol  use: No     Alcohol/week: 0.0 oz    Drug use: No    Sexual activity: Never     Review of Systems   Constitutional: Positive for activity change, appetite change, chills and fatigue. Negative for fever.   HENT: Negative.    Eyes: Negative.    Respiratory: Positive for chest tightness and shortness of breath. Negative for cough and wheezing.    Cardiovascular: Positive for leg swelling. Negative for chest pain and palpitations.   Gastrointestinal: Negative.    Endocrine: Negative.    Genitourinary: Negative.    Musculoskeletal: Positive for arthralgias and myalgias.   Skin: Negative.    Neurological: Positive for weakness.   Psychiatric/Behavioral: Negative.      Objective:     Vital Signs (Most Recent):  Temp: 98.7 °F (37.1 °C) (03/29/19 1513)  Pulse: 85 (03/29/19 1800)  Resp: 20 (03/29/19 1800)  BP: (!) 109/54 (03/29/19 1800)  SpO2: 100 % (03/29/19 1800) Vital Signs (24h Range):  Temp:  [97.6 °F (36.4 °C)-98.7 °F (37.1 °C)] 98.7 °F (37.1 °C)  Pulse:  [85-93] 85  Resp:  [18-22] 20  SpO2:  [92 %-100 %] 100 %  BP: (100-119)/(53-64) 109/54     Weight: 58.7 kg (129 lb 6.4 oz)  Body mass index is 24.43 kg/m².    Physical Exam   Constitutional: She is oriented to person, place, and time. Vital signs are normal. She appears well-developed and well-nourished. She is active and cooperative. She has a sickly appearance.   Pleasant but frail elderly lady in NAD   HENT:   Head: Normocephalic and atraumatic.   Mouth/Throat: Oropharynx is clear and moist.   Eyes: Pupils are equal, round, and reactive to light. Conjunctivae and EOM are normal.   Neck: Normal range of motion. Neck supple. No JVD present.   Cardiovascular: Normal rate, regular rhythm, normal heart sounds and intact distal pulses.   Pulmonary/Chest: Effort normal. She has wheezes. She has rales.   Abdominal: Soft. Bowel sounds are normal.   Genitourinary:   Genitourinary Comments: deferred   Musculoskeletal: Normal range of motion. She exhibits edema.   1-2 + edema    Neurological: She is alert and oriented to person, place, and time.   Skin: Skin is warm and dry. Capillary refill takes less than 2 seconds.   Psychiatric: She has a normal mood and affect. Judgment and thought content normal.   Nursing note and vitals reviewed.        CRANIAL NERVES     CN III, IV, VI   Pupils are equal, round, and reactive to light.  Extraocular motions are normal.     Results for orders placed or performed during the hospital encounter of 03/29/19   Influenza A & B by Molecular   Result Value Ref Range    Influenza A, Molecular Negative Negative    Influenza B, Molecular Negative Negative    Flu A & B Source Nasal swab    CBC auto differential   Result Value Ref Range    WBC 24.17 (H) 3.90 - 12.70 K/uL    RBC 2.72 (L) 4.00 - 5.40 M/uL    Hemoglobin 8.3 (L) 12.0 - 16.0 g/dL    Hematocrit 26.2 (L) 37.0 - 48.5 %    MCV 96 82 - 98 fL    MCH 30.5 27.0 - 31.0 pg    MCHC 31.7 (L) 32.0 - 36.0 g/dL    RDW 17.0 (H) 11.5 - 14.5 %    Platelets 714 (H) 150 - 350 K/uL    MPV 8.9 (L) 9.2 - 12.9 fL    Gran # (ANC) 21.0 (H) 1.8 - 7.7 K/uL    Lymph # 1.5 1.0 - 4.8 K/uL    Mono # 1.6 (H) 0.3 - 1.0 K/uL    Eos # 0.1 0.0 - 0.5 K/uL    Baso # 0.01 0.00 - 0.20 K/uL    Gran% 87.7 (H) 38.0 - 73.0 %    Lymph% 6.2 (L) 18.0 - 48.0 %    Mono% 6.4 4.0 - 15.0 %    Eosinophil% 0.4 0.0 - 8.0 %    Basophil% 0.0 0.0 - 1.9 %    Differential Method Automated    Comprehensive metabolic panel   Result Value Ref Range    Sodium 129 (L) 136 - 145 mmol/L    Potassium 3.4 (L) 3.5 - 5.1 mmol/L    Chloride 91 (L) 95 - 110 mmol/L    CO2 25 23 - 29 mmol/L    Glucose 165 (H) 70 - 110 mg/dL    BUN, Bld 17 8 - 23 mg/dL    Creatinine 1.0 0.5 - 1.4 mg/dL    Calcium 7.0 (L) 8.7 - 10.5 mg/dL    Total Protein 5.9 (L) 6.0 - 8.4 g/dL    Albumin 1.7 (L) 3.5 - 5.2 g/dL    Total Bilirubin 0.5 0.1 - 1.0 mg/dL    Alkaline Phosphatase 236 (H) 55 - 135 U/L    AST 33 10 - 40 U/L    ALT 19 10 - 44 U/L    Anion Gap 13 8 - 16 mmol/L    eGFR if African American  59 (A) >60 mL/min/1.73 m^2    eGFR if non African American 51 (A) >60 mL/min/1.73 m^2   Protime-INR   Result Value Ref Range    Prothrombin Time 13.0 (H) 9.0 - 12.5 sec    INR 1.2 0.8 - 1.2   APTT   Result Value Ref Range    aPTT 38.2 (H) 21.0 - 32.0 sec   Brain natriuretic peptide   Result Value Ref Range     (H) 0 - 99 pg/mL   Troponin I   Result Value Ref Range    Troponin I 0.053 (H) 0.000 - 0.026 ng/mL   Urinalysis, Reflex to Urine Culture Urine, Clean Catch   Result Value Ref Range    Specimen UA Urine, Clean Catch     Color, UA Yellow Yellow, Straw, Kourtney    Appearance, UA Clear Clear    pH, UA 6.0 5.0 - 8.0    Specific Gravity, UA 1.010 1.005 - 1.030    Protein, UA Negative Negative    Glucose, UA Negative Negative    Ketones, UA Negative Negative    Bilirubin (UA) Negative Negative    Occult Blood UA Negative Negative    Nitrite, UA Negative Negative    Urobilinogen, UA Negative <2.0 EU/dL    Leukocytes, UA Negative Negative   Lactic acid, plasma   Result Value Ref Range    Lactate (Lactic Acid) 2.7 (H) 0.5 - 2.2 mmol/L     Significant Labs: All pertinent labs within the past 24 hours have been reviewed.  Imaging Results    None       Significant Imaging: I have reviewed all pertinent imaging results/findings within the past 24 hours.    Assessment/Plan:     * Acute on chronic diastolic congestive heart failure  Difficult to discern between acute Sepsis from B Pneumonia vs CHF but pt has hx of Diastolic CHF as well hyponatremia and B pleural effusion-- hence appears in Heart Failure  Pt started on Lasix 40 IV bid      Hyponatremia  Moderate Hyponatremia-- likely dilutional  Continue with IV lasix      Diabetes mellitus type 2 without retinopathy  Check A1c, SSI      Centrilobular emphysema  Mild copd, will start O2 2 L NC and use nebs prn        VTE Risk Mitigation (From admission, onward)    None             Jayde Salinas MD  Department of Hospital Medicine   [facility]

## 2019-03-30 NOTE — ASSESSMENT & PLAN NOTE
Difficult to discern between acute Sepsis from B Pneumonia vs CHF but pt has hx of Diastolic CHF as well hyponatremia and B pleural effusion-- hence appears in Heart Failure  Pt started on Lasix 40 IV bid

## 2019-03-30 NOTE — PLAN OF CARE
Problem: Adult Inpatient Plan of Care  Goal: Plan of Care Review  Outcome: Ongoing (interventions implemented as appropriate)  Pt repositioned independently. No SOB reported during shift. VSS. Bed alarm activated. Trace edema to BLE.   Fall precautions in place. Call light and personal items within reach. Educated patient on side effects of medication administered. Pt verbalized understanding. 24 hour order check done.  Will continue to monitor.

## 2019-03-30 NOTE — PROGRESS NOTES
Clinical Pharmacy Progress Note - Vancomycin Dosing     Vancomycin Day # 2    Indication: Pneumonia  Goal trough: 15-20 mcg/mL     Concomitant abx tx: Zosyn 4.5 g IV every 8 hours.     Estimated Creatinine Clearance: 30.3 mL/min (based on SCr of 1.1 mg/dL).  Serum Creatinine increased slightly from 1 yesterday.      Lab Results   Component Value Date    WBC 16.51 (H) 03/30/2019    WBC 16.51 (H) 03/30/2019       Tmax/24h 98.7 °F (37.1 °C)     Cultures: Blood x 2 collected 3/29/19: NGTD     Vancomycin random level 8.2 mcg/mL at 0456 today (approximately 11.5 hours post dose of 750 mg on 3/29/19.)     Plan:   - Continue pulse dosing due to advanced age, and CKD. Vancomycin place kelley order to remain in Epic.   - Vancomycin 750 mg IV x 1 dose today.   - Repeat Vancomycin random level on 3/30/19 at 2030 (12 hours post dose).   - Plan to re-dose when random level is < 18 mcg/mL  - Pharmacy will continue to follow patient?s clinical status, renal function, C&S, and adjust dose as necessary to maintain trough levels between 15 and 20 mcg/mL.      Thank you for allowing us to participate in this patient's care.      Nava Christie, PharmD 03/30/2019 7:34 AM

## 2019-03-30 NOTE — PROGRESS NOTES
[facility]  Hematology/Oncology  Progress Note    Patient Name: Yovani Pulido  Admission Date: 3/29/2019  Hospital Length of Stay: 0 days  Code Status: Full Code     Subjective:     HPI:    86-year-old female with a history of rheumatoid arthritis, COPD, diabetes mellitus, CHF who was admitted for progressive shortness of breath over the previous week.  The patient was found to be hypoxic admitted with chest x-ray changes consistent with pneumonia versus pulmonary edema.  She was admitted and diuresed overnight with significant improvement in shortness of breath.    Oncology Treatment Plan:   [No treatment plan]    Medications:  Continuous Infusions:  Scheduled Meds:   furosemide  40 mg Intravenous BID    piperacillin-tazobactam (ZOSYN) IVPB  4.5 g Intravenous Q8H    [START ON 4/5/2019] vancomycin (VANCOCIN) IVPB  750 mg Intravenous Q72H     PRN Meds:dextrose 50%, dextrose 50%, glucagon (human recombinant), glucose, glucose, insulin aspart U-100, sodium chloride 0.9%     Review of patient's allergies indicates:   Allergen Reactions    Tetanus vaccines and toxoid      Other reaction(s): Swelling    Tetanus-horse serum: 30 years ago    Adhes. band-tape-benzalkonium Rash        Past Medical History:   Diagnosis Date    Anemia     Carotid stenosis     Cataract     COAG (chronic open-angle glaucoma)     Colon polyps     Diabetes mellitus type II     steroid induced    Diverticulosis     GERD (gastroesophageal reflux disease)     hiatal hernia    Glaucoma     Heart murmur     Hyperlipidemia     Hypertension     Hypothyroidism     Rheumatoid arthritis(714.0)     Stroke     lacunar infarct     Past Surgical History:   Procedure Laterality Date    anal fissure repair      APPENDECTOMY  1944    BREAST SURGERY  1992 approx    breast biopsies- benign    CAROTID ENDARTERECTOMY  2009    left    CATARACT EXTRACTION      COLONOSCOPY  2012    COLONOSCOPY N/A 12/11/2015    [facility]formed  [facility]    ESOPHAGOGASTRODUODENOSCOPY (EGD) N/A 2015    [facility] by [facility] at [facility]    EYE SURGERY  ,     bilateral cataracts    HERNIA REPAIR  ,     abdominal x2, with mesh on second    HYSTERECTOMY      vag hyst    JOINT REPLACEMENT      right knee    THORACENTESIS - Outpatient Right 3/23/2018    Performed by Alex Hallman MD at [facility] END[facility]    yag Left      Family History     Problem Relation (Age of Onset)    Blindness Sister    Cancer Mother, Father, Son (61)    Cataracts Sister    Diabetes Sister    Glaucoma Mother    Heart disease Brother, Brother    Stroke Sister        Tobacco Use    Smoking status: Former Smoker     Packs/day: 3.00     Years: 50.00     Pack years: 150.00     Start date: 1948     Last attempt to quit: 1998     Years since quittin.3    Smokeless tobacco: Former User   Substance and Sexual Activity    Alcohol use: No     Alcohol/week: 0.0 oz    Drug use: No    Sexual activity: Never       Review of Systems   Constitutional: Positive for activity change and fatigue. Negative for appetite change, fever and unexpected weight change.   HENT: Negative for congestion, facial swelling, mouth sores, nosebleeds, sore throat, trouble swallowing and voice change.    Eyes: Negative.    Respiratory: Positive for shortness of breath. Negative for apnea, cough, choking, chest tightness and stridor.    Cardiovascular: Positive for leg swelling. Negative for chest pain and palpitations.   Gastrointestinal: Negative for abdominal distention, abdominal pain, anal bleeding, constipation, diarrhea, nausea and vomiting.   Endocrine: Negative.    Genitourinary: Negative for difficulty urinating, dyspareunia, dysuria, enuresis, flank pain, frequency, genital sores, hematuria, pelvic pain and vaginal bleeding.   Musculoskeletal: Negative for arthralgias, back pain, gait problem, joint swelling, myalgias and neck pain.    Skin: Negative for pallor, rash and wound.   Allergic/Immunologic: Negative.    Neurological: Positive for weakness. Negative for tremors, seizures, syncope, facial asymmetry, speech difficulty, light-headedness and numbness.   Hematological: Negative for adenopathy. Does not bruise/bleed easily.   Psychiatric/Behavioral: Negative for agitation, behavioral problems, confusion, dysphoric mood and hallucinations. The patient is not nervous/anxious and is not hyperactive.      Objective:     Vital Signs (Most Recent):  Temp: 98.1 °F (36.7 °C) (03/30/19 0721)  Pulse: 88 (03/30/19 0948)  Resp: 18 (03/30/19 0721)  BP: (!) 95/50 (03/30/19 0948)  SpO2: 99 % (03/30/19 0721) Vital Signs (24h Range):  Temp:  [97.6 °F (36.4 °C)-98.7 °F (37.1 °C)] 98.1 °F (36.7 °C)  Pulse:  [83-95] 88  Resp:  [17-22] 18  SpO2:  [92 %-100 %] 99 %  BP: ()/(43-65) 95/50     Weight: 58.7 kg (129 lb 6.4 oz)  Body mass index is 24.43 kg/m².  Body surface area is 1.59 meters squared.      Intake/Output Summary (Last 24 hours) at 3/30/2019 1100  Last data filed at 3/30/2019 0505  Gross per 24 hour   Intake 1630 ml   Output 1400 ml   Net 230 ml       Physical Exam   Constitutional: She is oriented to person, place, and time. She appears well-developed and well-nourished. No distress.   Well groomed   HENT:   Head: Normocephalic and atraumatic.   Right Ear: Tympanic membrane and ear canal normal.   Left Ear: Tympanic membrane and ear canal normal.   Nose: Nose normal. Right sinus exhibits no maxillary sinus tenderness and no frontal sinus tenderness. Left sinus exhibits no maxillary sinus tenderness and no frontal sinus tenderness.   Mouth/Throat: Oropharynx is clear and moist and mucous membranes are normal. No oral lesions. Normal dentition. No oropharyngeal exudate.   Eyes: Pupils are equal, round, and reactive to light. Conjunctivae, EOM and lids are normal. Lids are everted and swept, no foreign bodies found. No scleral icterus.   Neck:  Trachea normal and normal range of motion. Neck supple. No JVD present. No tracheal deviation present. No thyroid mass and no thyromegaly present.   No crepitus   Cardiovascular: Regular rhythm, intact distal pulses and normal pulses. Exam reveals no gallop.   Murmur heard.  Pulmonary/Chest: Effort normal. No stridor. No respiratory distress. She has no wheezes. She has rhonchi. She has rales. She exhibits no tenderness.   Abdominal: Soft. Normal appearance and bowel sounds are normal. She exhibits no distension and no mass. There is no hepatosplenomegaly. There is no tenderness. There is no rebound and no guarding.   Musculoskeletal: Normal range of motion. She exhibits no edema or tenderness.   Lymphadenopathy:     She has no cervical adenopathy.   Neurological: She is alert and oriented to person, place, and time. She displays normal reflexes. No cranial nerve deficit. She exhibits normal muscle tone. Coordination normal.   Skin: Skin is warm and dry. No rash noted. She is not diaphoretic. No cyanosis or erythema. No pallor. Nails show no clubbing.   Psychiatric: She has a normal mood and affect. Her behavior is normal. Judgment and thought content normal.       Significant Labs:   CBC:   Recent Labs   Lab 03/29/19  1320 03/29/19  1616 03/30/19  0456   WBC 23.48* 24.17* 16.51*  16.51*   HGB 8.4* 8.3* 8.0*  8.0*   HCT 27.8* 26.2* 26.0*  26.0*   * 714* 708*  708*   , CMP:   Recent Labs   Lab 03/29/19  1320 03/29/19  1616 03/30/19  0456   * 129* 133*  133*   K 3.6 3.4* 4.2  4.2   CL 90* 91* 93*  93*   CO2 25 25 28  28   * 165* 143*  143*   BUN 18 17 16  16   CREATININE 1.1 1.0 1.1  1.1   CALCIUM 7.2* 7.0* 7.3*  7.3*   PROT 6.1 5.9* 5.7*   ALBUMIN 1.7* 1.7* 1.6*   BILITOT 0.4 0.5 0.5   ALKPHOS 263* 236* 229*   AST 27 33 22   ALT 18 19 14   ANIONGAP 14 13 12  12   EGFRNONAA 45.6* 51* 46*  46*   , Coagulation:   Recent Labs   Lab 03/29/19  1616   INR 1.2   APTT 38.2*   , Haptoglobin:  No results for input(s): HAPTOGLOBIN in the last 48 hours., Immunology: No results for input(s): SPEP, ISABELLA, DANIEL, FREELAMBDALI in the last 48 hours., LDH: No results for input(s): LDHCSF, BFSOURCE in the last 48 hours. and LFTs:   Recent Labs   Lab 03/29/19  1320 03/29/19  1616 03/30/19  0456   ALT 18 19 14   AST 27 33 22   ALKPHOS 263* 236* 229*   BILITOT 0.4 0.5 0.5   PROT 6.1 5.9* 5.7*   ALBUMIN 1.7* 1.7* 1.6*       Diagnostic Results:  I have reviewed all pertinent imaging results/findings within the past 24 hours.    Assessment/Plan:     * Acute on chronic diastolic congestive heart failure  Shortness of breath significantly improving with diuresis and broad-spectrum antibiotics.  However hemoglobin is only 8.0 with evidence of iron deficiency on labs.  I will strongly consider transfusion of 1 unit of PRBCs.    --continue to monitor CBC daily  --consider 1 unit of PRBCs to decrease cardiac demand  --continue supportive care          Thank you for your consult. I will follow-up with patient. Please contact us if you have any additional questions.     Kai Medina Jr, MD  Hematology/Oncology  [facility]

## 2019-03-30 NOTE — SUBJECTIVE & OBJECTIVE
Past Medical History:   Diagnosis Date    Anemia     Carotid stenosis     Cataract     COAG (chronic open-angle glaucoma)     Colon polyps     Diabetes mellitus type II     steroid induced    Diverticulosis     GERD (gastroesophageal reflux disease)     hiatal hernia    Glaucoma     Heart murmur     Hyperlipidemia     Hypertension     Hypothyroidism     Rheumatoid arthritis(714.0)     Stroke     lacunar infarct       Past Surgical History:   Procedure Laterality Date    anal fissure repair      APPENDECTOMY  1944    BREAST SURGERY  1992 approx    breast biopsies- benign    CAROTID ENDARTERECTOMY  2009    left    CATARACT EXTRACTION      COLONOSCOPY  2012    COLONOSCOPY N/A 12/11/2015    Performed by [facility]obedo [facility] at [facility]    ESOPHAGOGASTRODUODENOSCOPY (EGD) N/A 12/11/2015    Performed by Gavin [facility]edo MD at [facility] END[facility]    EYE SURGERY  2004, 2005    bilateral cataracts    HERNIA REPAIR  2001, 2002    abdominal x2, with mesh on second    HYSTERECTOMY  1963    vag hyst    JOINT REPLACEMENT  2008    right knee    THORACENTESIS - Outpatient Right 3/23/2018    Performed by Alex Hallman MD at [facility] END[facility]    yag Left        Review of patient's allergies indicates:   Allergen Reactions    Tetanus vaccines and toxoid      Other reaction(s): Swelling    Tetanus-horse serum: 30 years ago    Adhes. band-tape-benzalkonium Rash       No current facility-administered medications on file prior to encounter.      Current Outpatient Medications on File Prior to Encounter   Medication Sig    alcohol swabs PadM Apply 1 each topically 2 (two) times daily.    benazepril (LOTENSIN) 5 MG tablet Take 1 tablet (5 mg total) by mouth once daily.    blood glucose control, normal Soln Use as directed.    blood sugar diagnostic Strp Glucose testing daily.    blood-glucose meter Misc Use as directed.    cetirizine (ZYRTEC) 10 MG tablet Take 1 tablet (10 mg total) by mouth once daily.    fluticasone  (FLONASE) 50 mcg/actuation nasal spray 2 sprays by Each Nare route once daily.    folic acid (FOLVITE) 800 MCG Tab TAKE 1 TABLET TWICE DAILY    furosemide (LASIX) 20 MG tablet Take 1 tablet (20 mg total) by mouth every Mon, Wed, Fri.    lancets (LANCETS,THIN) Misc Twice daily glucose testing.    latanoprost 0.005 % ophthalmic solution INSTILL 1 DROP INTO BOTH EYES EVERY EVENING    levothyroxine (SYNTHROID) 50 MCG tablet TAKE 1 TABLET BEFORE BREAKFAST    metFORMIN (GLUCOPHAGE) 1000 MG tablet TAKE 1 TABLET TWICE DAILY WITH MEALS    methotrexate 2.5 MG Tab TAKE 4 TABLETS IN MORNING AND 4 TABLETS AT NIGHT ONE TIME WEEKLY    omeprazole (PRILOSEC) 20 MG capsule Take 1 capsule (20 mg total) by mouth once daily.    pravastatin (PRAVACHOL) 40 MG tablet TAKE 1 TABLET EVERY DAY    predniSONE (DELTASONE) 1 MG tablet TAKE 1 TABLET TWICE DAILY  OR  AS  DIRECTED    timolol maleate 0.5% (TIMOPTIC) 0.5 % Drop INSTILL 1 DROP INTO BOTH EYES EVERY MORNING    traMADol (ULTRAM) 50 mg tablet Take 1 tablet (50 mg total) by mouth every 8 (eight) hours as needed for Pain.    traZODone (DESYREL) 100 MG tablet TAKE 1 TABLET BY MOUTH ONCE EVERY EVENING    aspirin 81 MG Chew Take 81 mg by mouth.    blood glucose control, high Soln by Misc.(Non-Drug; Combo Route) route.    cyanocobalamin, vitamin B-12, 2,500 mcg Tab TK 1 T PO D    magnesium 250 mg Tab Take 1 tablet by mouth Daily.    magnesium oxide-Mg AA chelate (MG-PLUS-PROTEIN) 133 mg Tab Take by mouth.    timolol (BETIMOL) 0.5 % ophthalmic solution 1 drop.     Family History     Problem Relation (Age of Onset)    Blindness Sister    Cancer Mother, Father, Son (61)    Cataracts Sister    Diabetes Sister    Glaucoma Mother    Heart disease Brother, Brother    Stroke Sister        Tobacco Use    Smoking status: Former Smoker     Packs/day: 3.00     Years: 50.00     Pack years: 150.00     Start date: 1948     Last attempt to quit: 1998     Years since quittin.3     Smokeless tobacco: Former User   Substance and Sexual Activity    Alcohol use: No     Alcohol/week: 0.0 oz    Drug use: No    Sexual activity: Never     Review of Systems   Constitutional: Positive for activity change, appetite change, chills and fatigue. Negative for fever.   HENT: Negative.    Eyes: Negative.    Respiratory: Positive for chest tightness and shortness of breath. Negative for cough and wheezing.    Cardiovascular: Positive for leg swelling. Negative for chest pain and palpitations.   Gastrointestinal: Negative.    Endocrine: Negative.    Genitourinary: Negative.    Musculoskeletal: Positive for arthralgias and myalgias.   Skin: Negative.    Neurological: Positive for weakness.   Psychiatric/Behavioral: Negative.      Objective:     Vital Signs (Most Recent):  Temp: 98.7 °F (37.1 °C) (03/29/19 1513)  Pulse: 85 (03/29/19 1800)  Resp: 20 (03/29/19 1800)  BP: (!) 109/54 (03/29/19 1800)  SpO2: 100 % (03/29/19 1800) Vital Signs (24h Range):  Temp:  [97.6 °F (36.4 °C)-98.7 °F (37.1 °C)] 98.7 °F (37.1 °C)  Pulse:  [85-93] 85  Resp:  [18-22] 20  SpO2:  [92 %-100 %] 100 %  BP: (100-119)/(53-64) 109/54     Weight: 58.7 kg (129 lb 6.4 oz)  Body mass index is 24.43 kg/m².    Physical Exam   Constitutional: She is oriented to person, place, and time. Vital signs are normal. She appears well-developed and well-nourished. She is active and cooperative. She has a sickly appearance.   Pleasant but frail elderly lady in NAD   HENT:   Head: Normocephalic and atraumatic.   Mouth/Throat: Oropharynx is clear and moist.   Eyes: Pupils are equal, round, and reactive to light. Conjunctivae and EOM are normal.   Neck: Normal range of motion. Neck supple. No JVD present.   Cardiovascular: Normal rate, regular rhythm, normal heart sounds and intact distal pulses.   Pulmonary/Chest: Effort normal. She has wheezes. She has rales.   Abdominal: Soft. Bowel sounds are normal.   Genitourinary:   Genitourinary Comments:  deferred   Musculoskeletal: Normal range of motion. She exhibits edema.   1-2 + edema   Neurological: She is alert and oriented to person, place, and time.   Skin: Skin is warm and dry. Capillary refill takes less than 2 seconds.   Psychiatric: She has a normal mood and affect. Judgment and thought content normal.   Nursing note and vitals reviewed.        CRANIAL NERVES     CN III, IV, VI   Pupils are equal, round, and reactive to light.  Extraocular motions are normal.     Results for orders placed or performed during the hospital encounter of 03/29/19   Influenza A & B by Molecular   Result Value Ref Range    Influenza A, Molecular Negative Negative    Influenza B, Molecular Negative Negative    Flu A & B Source Nasal swab    CBC auto differential   Result Value Ref Range    WBC 24.17 (H) 3.90 - 12.70 K/uL    RBC 2.72 (L) 4.00 - 5.40 M/uL    Hemoglobin 8.3 (L) 12.0 - 16.0 g/dL    Hematocrit 26.2 (L) 37.0 - 48.5 %    MCV 96 82 - 98 fL    MCH 30.5 27.0 - 31.0 pg    MCHC 31.7 (L) 32.0 - 36.0 g/dL    RDW 17.0 (H) 11.5 - 14.5 %    Platelets 714 (H) 150 - 350 K/uL    MPV 8.9 (L) 9.2 - 12.9 fL    Gran # (ANC) 21.0 (H) 1.8 - 7.7 K/uL    Lymph # 1.5 1.0 - 4.8 K/uL    Mono # 1.6 (H) 0.3 - 1.0 K/uL    Eos # 0.1 0.0 - 0.5 K/uL    Baso # 0.01 0.00 - 0.20 K/uL    Gran% 87.7 (H) 38.0 - 73.0 %    Lymph% 6.2 (L) 18.0 - 48.0 %    Mono% 6.4 4.0 - 15.0 %    Eosinophil% 0.4 0.0 - 8.0 %    Basophil% 0.0 0.0 - 1.9 %    Differential Method Automated    Comprehensive metabolic panel   Result Value Ref Range    Sodium 129 (L) 136 - 145 mmol/L    Potassium 3.4 (L) 3.5 - 5.1 mmol/L    Chloride 91 (L) 95 - 110 mmol/L    CO2 25 23 - 29 mmol/L    Glucose 165 (H) 70 - 110 mg/dL    BUN, Bld 17 8 - 23 mg/dL    Creatinine 1.0 0.5 - 1.4 mg/dL    Calcium 7.0 (L) 8.7 - 10.5 mg/dL    Total Protein 5.9 (L) 6.0 - 8.4 g/dL    Albumin 1.7 (L) 3.5 - 5.2 g/dL    Total Bilirubin 0.5 0.1 - 1.0 mg/dL    Alkaline Phosphatase 236 (H) 55 - 135 U/L    AST 33 10 -  40 U/L    ALT 19 10 - 44 U/L    Anion Gap 13 8 - 16 mmol/L    eGFR if African American 59 (A) >60 mL/min/1.73 m^2    eGFR if non African American 51 (A) >60 mL/min/1.73 m^2   Protime-INR   Result Value Ref Range    Prothrombin Time 13.0 (H) 9.0 - 12.5 sec    INR 1.2 0.8 - 1.2   APTT   Result Value Ref Range    aPTT 38.2 (H) 21.0 - 32.0 sec   Brain natriuretic peptide   Result Value Ref Range     (H) 0 - 99 pg/mL   Troponin I   Result Value Ref Range    Troponin I 0.053 (H) 0.000 - 0.026 ng/mL   Urinalysis, Reflex to Urine Culture Urine, Clean Catch   Result Value Ref Range    Specimen UA Urine, Clean Catch     Color, UA Yellow Yellow, Straw, Kourtney    Appearance, UA Clear Clear    pH, UA 6.0 5.0 - 8.0    Specific Gravity, UA 1.010 1.005 - 1.030    Protein, UA Negative Negative    Glucose, UA Negative Negative    Ketones, UA Negative Negative    Bilirubin (UA) Negative Negative    Occult Blood UA Negative Negative    Nitrite, UA Negative Negative    Urobilinogen, UA Negative <2.0 EU/dL    Leukocytes, UA Negative Negative   Lactic acid, plasma   Result Value Ref Range    Lactate (Lactic Acid) 2.7 (H) 0.5 - 2.2 mmol/L     Significant Labs: All pertinent labs within the past 24 hours have been reviewed.  Imaging Results    None       Significant Imaging: I have reviewed all pertinent imaging results/findings within the past 24 hours.

## 2019-03-30 NOTE — ASSESSMENT & PLAN NOTE
Shortness of breath significantly improving with diuresis and broad-spectrum antibiotics.  However hemoglobin is only 8.0 with evidence of iron deficiency on labs.  I will strongly consider transfusion of 1 unit of PRBCs.    --continue to monitor CBC daily  --consider 1 unit of PRBCs to decrease cardiac demand  --continue supportive care

## 2019-03-30 NOTE — ASSESSMENT & PLAN NOTE
- hx of Diastolic CHF as well hyponatremia and B pleural effusion present on imaging-however PNA not excluded due to presence of bilateral opacities and significant leukocytosis   Pt started on Lasix 40 IV bid  -supplemental oxygen   -strict I/Os  -daily weights   -Echo results pending   -Troponin-flat and mildly elevated   -BNP > 500

## 2019-03-30 NOTE — ASSESSMENT & PLAN NOTE
-H/H 8/26  -Heme/Onc following   -iron studies reviewed   -Venofer x 1 dose given   -will transfuse if needed   -repeat CBC in am

## 2019-03-30 NOTE — ASSESSMENT & PLAN NOTE
Suspected due to opacities on imaging and significant leukocytosis in the setting of CHF  -IV antibiotics   -supplemental oxygen   -blood culture results pending  -Amrit prn   -IS

## 2019-03-30 NOTE — ASSESSMENT & PLAN NOTE
-WBC 24 and RR 22 in the setting of PNA  -IV antibiotics   -blood culture results pending   -IV bolus given in ED   -IV hydration not continued due to CHF  -Leukocytosis noted with downward trend noted

## 2019-03-30 NOTE — HOSPITAL COURSE
Pt admitted to Observation Unit for Acute on chronic diastolic congestive heart failure.  BNP > 500 with Troponin flat and mildly elevated.  Chest xray showed findings representative of pulmonary edema vs. pneumonia which cannot be excluded.  Pt treated with IV Lasix and supplemental oxygen.  H/H declined in the setting CHF.  Iron studies reviewed and Venofer x 1 dose given.  Leukocytosis noted with IV antibiotics given as PNA cannot be excluded via imaging.  Pt verbalized symptom improvement on current therapy.  On 3/31/19, Lasix held in the setting hypokalemia and hypotension.  Potassium replaced with repeat lab normalized.  H/H remained low qith mild improvement noted after Venofer.  Case discussed with Heme/Onc and PRBCs x 1 unit transfused. Pt reported hx of chronic diarrhea possibly due to Chron's disease.  Pt states she does not want any endoscopy procedure due to her age.  Oral iron supplementation initiated.  Magnesium replaced.  Pt continues to report increased incidence of diarrhea.  Stool studies with culture results negative.  CT of chest/abdomen/pelvis obtained with results showing enteritis and pulmonary nodules scattered throughout both lungs and mild amount of alveolar consolidation scattered throughout both lungs. There are hypodense masses scattered throughout the liver and spleen concerning for metastatic disease.  Imaging results discussed with patient/family by Dr. Medina (Heme/Onc).  Magnesium and Potassium normalized.  Hyponatremia noted.  Urine sodium and urine creatinine normal.  Stool studies negative and lactoferrin pending.   ID consulted due to persistent leukocytosis.  Antibiotics adjusted per recommendation. Patient stated she does not want any treatment and does not want a biopsy performed.  CM consulted for discharge planning and to establish Hospice.  IV hydration and oral sodium supplements continued for hyponatremia.  Code status changed to DNR per patient.  Leukocytosis and  Sodium mildly improved.  Magnesium replaced.  Blood cultures with no growth to date.  Pt seen and examined on the date of discharge and deemed suitable for discharge to home with [facility].  Augmentin, Imodium, Magnesium oxide, Lopressor, ferrous sulfate, and Sodium Chloride prescribed.  Pt instructed to follow up with [facility].

## 2019-03-30 NOTE — SUBJECTIVE & OBJECTIVE
Interval History: pt states she is feeling better today and able to ambulate to bathroom with decreased SOB.  Leukocytosis noted with pt afebrile.  IV antibiotics continued.  Blood culture with NGTD.  Iron studies reviewed.  Anemia noted in the setting of CHF.  Will give Venofer x 1 dose and transfuse if Hgb < 8.  Heme/Onc following.      Review of Systems   Constitutional: Positive for activity change and fatigue. Negative for appetite change, fever and unexpected weight change.   HENT: Negative for congestion, facial swelling, mouth sores, nosebleeds, sore throat, trouble swallowing and voice change.    Eyes: Negative.    Respiratory: Positive for shortness of breath. Negative for apnea, cough, choking, chest tightness and stridor.    Cardiovascular: Positive for leg swelling (improved). Negative for chest pain and palpitations.   Gastrointestinal: Negative for abdominal distention, abdominal pain, anal bleeding, constipation, diarrhea, nausea and vomiting.   Endocrine: Negative.    Genitourinary: Negative for difficulty urinating, dyspareunia, dysuria, enuresis, flank pain, frequency, genital sores, hematuria, pelvic pain and vaginal bleeding.   Musculoskeletal: Negative for arthralgias, back pain, gait problem, joint swelling, myalgias and neck pain.   Skin: Negative for pallor, rash and wound.   Allergic/Immunologic: Negative.    Neurological: Positive for weakness. Negative for tremors, seizures, syncope, facial asymmetry, speech difficulty, light-headedness and numbness.   Hematological: Negative for adenopathy. Does not bruise/bleed easily.   Psychiatric/Behavioral: Negative for agitation, behavioral problems, confusion, dysphoric mood and hallucinations. The patient is not nervous/anxious and is not hyperactive.      Objective:     Vital Signs (Most Recent):  Temp: 98.3 °F (36.8 °C) (03/30/19 1227)  Pulse: 101 (03/30/19 1320)  Resp: 18 (03/30/19 1227)  BP: (!) 95/50 (03/30/19 1227)  SpO2: 95 % (03/30/19  1227) Vital Signs (24h Range):  Temp:  [97.6 °F (36.4 °C)-98.7 °F (37.1 °C)] 98.3 °F (36.8 °C)  Pulse:  [] 101  Resp:  [17-22] 18  SpO2:  [95 %-100 %] 95 %  BP: ()/(43-65) 95/50     Weight: 58.7 kg (129 lb 6.4 oz)  Body mass index is 24.43 kg/m².    Intake/Output Summary (Last 24 hours) at 3/30/2019 1446  Last data filed at 3/30/2019 1100  Gross per 24 hour   Intake 1980 ml   Output 1400 ml   Net 580 ml      Physical Exam   Constitutional: She is oriented to person, place, and time. She appears well-developed and well-nourished. No distress.   Well groomed   HENT:   Head: Normocephalic and atraumatic.   Right Ear: Tympanic membrane and ear canal normal.   Left Ear: Tympanic membrane and ear canal normal.   Nose: Nose normal. Right sinus exhibits no maxillary sinus tenderness and no frontal sinus tenderness. Left sinus exhibits no maxillary sinus tenderness and no frontal sinus tenderness.   Mouth/Throat: Oropharynx is clear and moist and mucous membranes are normal. No oral lesions. Normal dentition. No oropharyngeal exudate.   Eyes: Pupils are equal, round, and reactive to light. Conjunctivae, EOM and lids are normal. Lids are everted and swept, no foreign bodies found. No scleral icterus.   Neck: Trachea normal and normal range of motion. Neck supple. No JVD present. No tracheal deviation present. No thyroid mass and no thyromegaly present.   No crepitus   Cardiovascular: Regular rhythm, intact distal pulses and normal pulses. Exam reveals no gallop.   Murmur heard.  Pulmonary/Chest: Effort normal. No stridor. No respiratory distress. She has no wheezes. She has rhonchi. She has rales. She exhibits no tenderness.   Abdominal: Soft. Normal appearance and bowel sounds are normal. She exhibits no distension and no mass. There is no hepatosplenomegaly. There is no tenderness. There is no rebound and no guarding.   Musculoskeletal: Normal range of motion. She exhibits no edema or tenderness.    Lymphadenopathy:     She has no cervical adenopathy.   Neurological: She is alert and oriented to person, place, and time. She displays normal reflexes. No cranial nerve deficit. She exhibits normal muscle tone. Coordination normal.   Skin: Skin is warm and dry. No rash noted. She is not diaphoretic. No cyanosis or erythema. No pallor. Nails show no clubbing.   Psychiatric: She has a normal mood and affect. Her behavior is normal. Judgment and thought content normal.       Significant Labs:   CBC:   Recent Labs   Lab 03/29/19  1320 03/29/19  1616 03/30/19  0456   WBC 23.48* 24.17* 16.51*  16.51*   HGB 8.4* 8.3* 8.0*  8.0*   HCT 27.8* 26.2* 26.0*  26.0*   * 714* 708*  708*     CMP:   Recent Labs   Lab 03/29/19  1320 03/29/19  1616 03/30/19  0456   * 129* 133*  133*   K 3.6 3.4* 4.2  4.2   CL 90* 91* 93*  93*   CO2 25 25 28  28   * 165* 143*  143*   BUN 18 17 16  16   CREATININE 1.1 1.0 1.1  1.1   CALCIUM 7.2* 7.0* 7.3*  7.3*   PROT 6.1 5.9* 5.7*   ALBUMIN 1.7* 1.7* 1.6*   BILITOT 0.4 0.5 0.5   ALKPHOS 263* 236* 229*   AST 27 33 22   ALT 18 19 14   ANIONGAP 14 13 12  12   EGFRNONAA 45.6* 51* 46*  46*     Troponin:   Recent Labs   Lab 03/29/19  1616 03/29/19  2257 03/30/19  0456   TROPONINI 0.053* 0.044* 0.047*     TSH:   Recent Labs   Lab 01/25/19  1030   TSH 2.715       Significant Imaging:   Imaging Results    None

## 2019-03-30 NOTE — HPI
Yovani Pulido is a 86 y.o. female patient with PMHx of RA, HTN, COPD, DM, and CHF  who was sent to ER from Dr. Kendrick office (where she goes for [facility]), for progressive SOB gradually since last 1 week. SOB is intermittent and moderate in severity and gets worse with exertion. Pt was seen by Reina Sullivan NP (Hem/Onc) today for sxs and referred to ED for decreased O2 saturation, abnormal lung sounds, and CXR that resulted pulmonary edema vs. Pneumonia.  Associated sxs include chills and fatigue. Patient denies any CP, fever, diaphoresis, palpitations, extremity weakness/numbness, leg pain/swelling, dizziness, cough, n/v, and all other sxs at this time. No further complaints or concerns at this time.     She was admitted here in Jan 2019 for Pneumonia and was at [facility] before that for CHF exacerbation. She had Thoracentesis by Dr. Hallman last year in March 2018. Pt was suspected of severe Sepsis in the ER due to Leukocytosis of 24K with Lactate of 2.7, however her BNP is 570 and her CXR showed Pulm edema with B Pleural Effusions as well as Hyponatremia of 129 as well as mild BLE pitting edema -- hence Rehydration was stopped and pt given IV Lasix and admitted. Pt was hypoxemic initially in the office but not in the ER. Pt has normal Echo last year.

## 2019-03-30 NOTE — PROGRESS NOTES
Notified VINCENT Patrick of pt's /61, asked to re-check. Re-checked /51. VINCENT Patrick states ok to administer lasix.

## 2019-03-30 NOTE — SUBJECTIVE & OBJECTIVE
Oncology Treatment Plan:   [No treatment plan]    Medications:  Continuous Infusions:  Scheduled Meds:   furosemide  40 mg Intravenous BID    piperacillin-tazobactam (ZOSYN) IVPB  4.5 g Intravenous Q8H    [START ON 4/5/2019] vancomycin (VANCOCIN) IVPB  750 mg Intravenous Q72H     PRN Meds:dextrose 50%, dextrose 50%, glucagon (human recombinant), glucose, glucose, insulin aspart U-100, sodium chloride 0.9%     Review of patient's allergies indicates:   Allergen Reactions    Tetanus vaccines and toxoid      Other reaction(s): Swelling    Tetanus-horse serum: 30 years ago    Adhes. band-tape-benzalkonium Rash        Past Medical History:   Diagnosis Date    Anemia     Carotid stenosis     Cataract     COAG (chronic open-angle glaucoma)     Colon polyps     Diabetes mellitus type II     steroid induced    Diverticulosis     GERD (gastroesophageal reflux disease)     hiatal hernia    Glaucoma     Heart murmur     Hyperlipidemia     Hypertension     Hypothyroidism     Rheumatoid arthritis(714.0)     Stroke     lacunar infarct     Past Surgical History:   Procedure Laterality Date    anal fissure repair      APPENDECTOMY  1944    BREAST SURGERY  1992 approx    breast biopsies- benign    CAROTID ENDARTERECTOMY  2009    left    CATARACT EXTRACTION      COLONOSCOPY  2012    COLONOSCOPY N/A 12/11/2015    Performed by [facility] at [facility]    ESOPHAGOGASTRODUODENOSCOPY (EGD) N/A 12/11/2015    Performed by Gavin [facility]edo [facility] at [facility]    EYE SURGERY  2004, 2005    bilateral cataracts    HERNIA REPAIR  2001, 2002    abdominal x2, with mesh on second    HYSTERECTOMY  1963    vag hyst    JOINT REPLACEMENT  2008    right knee    THORACENTESIS - Outpatient Right 3/23/2018    Performed by Alex Hallman MD at [facility]    yag Left      Family History     Problem Relation (Age of Onset)    Blindness Sister    Cancer Mother, Father, Son (61)    Cataracts Sister    Diabetes Sister    Glaucoma  Mother    Heart disease Brother, Brother    Stroke Sister        Tobacco Use    Smoking status: Former Smoker     Packs/day: 3.00     Years: 50.00     Pack years: 150.00     Start date: 1948     Last attempt to quit: 1998     Years since quittin.3    Smokeless tobacco: Former User   Substance and Sexual Activity    Alcohol use: No     Alcohol/week: 0.0 oz    Drug use: No    Sexual activity: Never       Review of Systems   Constitutional: Positive for activity change and fatigue. Negative for appetite change, fever and unexpected weight change.   HENT: Negative for congestion, facial swelling, mouth sores, nosebleeds, sore throat, trouble swallowing and voice change.    Eyes: Negative.    Respiratory: Positive for shortness of breath. Negative for apnea, cough, choking, chest tightness and stridor.    Cardiovascular: Positive for leg swelling. Negative for chest pain and palpitations.   Gastrointestinal: Negative for abdominal distention, abdominal pain, anal bleeding, constipation, diarrhea, nausea and vomiting.   Endocrine: Negative.    Genitourinary: Negative for difficulty urinating, dyspareunia, dysuria, enuresis, flank pain, frequency, genital sores, hematuria, pelvic pain and vaginal bleeding.   Musculoskeletal: Negative for arthralgias, back pain, gait problem, joint swelling, myalgias and neck pain.   Skin: Negative for pallor, rash and wound.   Allergic/Immunologic: Negative.    Neurological: Positive for weakness. Negative for tremors, seizures, syncope, facial asymmetry, speech difficulty, light-headedness and numbness.   Hematological: Negative for adenopathy. Does not bruise/bleed easily.   Psychiatric/Behavioral: Negative for agitation, behavioral problems, confusion, dysphoric mood and hallucinations. The patient is not nervous/anxious and is not hyperactive.      Objective:     Vital Signs (Most Recent):  Temp: 98.1 °F (36.7 °C) (19 0721)  Pulse: 88 (19 0948)  Resp: 18  (03/30/19 0721)  BP: (!) 95/50 (03/30/19 0948)  SpO2: 99 % (03/30/19 0721) Vital Signs (24h Range):  Temp:  [97.6 °F (36.4 °C)-98.7 °F (37.1 °C)] 98.1 °F (36.7 °C)  Pulse:  [83-95] 88  Resp:  [17-22] 18  SpO2:  [92 %-100 %] 99 %  BP: ()/(43-65) 95/50     Weight: 58.7 kg (129 lb 6.4 oz)  Body mass index is 24.43 kg/m².  Body surface area is 1.59 meters squared.      Intake/Output Summary (Last 24 hours) at 3/30/2019 1100  Last data filed at 3/30/2019 0505  Gross per 24 hour   Intake 1630 ml   Output 1400 ml   Net 230 ml       Physical Exam   Constitutional: She is oriented to person, place, and time. She appears well-developed and well-nourished. No distress.   Well groomed   HENT:   Head: Normocephalic and atraumatic.   Right Ear: Tympanic membrane and ear canal normal.   Left Ear: Tympanic membrane and ear canal normal.   Nose: Nose normal. Right sinus exhibits no maxillary sinus tenderness and no frontal sinus tenderness. Left sinus exhibits no maxillary sinus tenderness and no frontal sinus tenderness.   Mouth/Throat: Oropharynx is clear and moist and mucous membranes are normal. No oral lesions. Normal dentition. No oropharyngeal exudate.   Eyes: Pupils are equal, round, and reactive to light. Conjunctivae, EOM and lids are normal. Lids are everted and swept, no foreign bodies found. No scleral icterus.   Neck: Trachea normal and normal range of motion. Neck supple. No JVD present. No tracheal deviation present. No thyroid mass and no thyromegaly present.   No crepitus   Cardiovascular: Regular rhythm, intact distal pulses and normal pulses. Exam reveals no gallop.   Murmur heard.  Pulmonary/Chest: Effort normal. No stridor. No respiratory distress. She has no wheezes. She has rhonchi. She has rales. She exhibits no tenderness.   Abdominal: Soft. Normal appearance and bowel sounds are normal. She exhibits no distension and no mass. There is no hepatosplenomegaly. There is no tenderness. There is no rebound  and no guarding.   Musculoskeletal: Normal range of motion. She exhibits no edema or tenderness.   Lymphadenopathy:     She has no cervical adenopathy.   Neurological: She is alert and oriented to person, place, and time. She displays normal reflexes. No cranial nerve deficit. She exhibits normal muscle tone. Coordination normal.   Skin: Skin is warm and dry. No rash noted. She is not diaphoretic. No cyanosis or erythema. No pallor. Nails show no clubbing.   Psychiatric: She has a normal mood and affect. Her behavior is normal. Judgment and thought content normal.       Significant Labs:   CBC:   Recent Labs   Lab 03/29/19  1320 03/29/19  1616 03/30/19  0456   WBC 23.48* 24.17* 16.51*  16.51*   HGB 8.4* 8.3* 8.0*  8.0*   HCT 27.8* 26.2* 26.0*  26.0*   * 714* 708*  708*   , CMP:   Recent Labs   Lab 03/29/19  1320 03/29/19  1616 03/30/19  0456   * 129* 133*  133*   K 3.6 3.4* 4.2  4.2   CL 90* 91* 93*  93*   CO2 25 25 28  28   * 165* 143*  143*   BUN 18 17 16  16   CREATININE 1.1 1.0 1.1  1.1   CALCIUM 7.2* 7.0* 7.3*  7.3*   PROT 6.1 5.9* 5.7*   ALBUMIN 1.7* 1.7* 1.6*   BILITOT 0.4 0.5 0.5   ALKPHOS 263* 236* 229*   AST 27 33 22   ALT 18 19 14   ANIONGAP 14 13 12  12   EGFRNONAA 45.6* 51* 46*  46*   , Coagulation:   Recent Labs   Lab 03/29/19 1616   INR 1.2   APTT 38.2*   , Haptoglobin: No results for input(s): HAPTOGLOBIN in the last 48 hours., Immunology: No results for input(s): SPEP, ISABELLA, DANIEL, FREELAMBDALI in the last 48 hours., LDH: No results for input(s): LDHCSF, BFSOURCE in the last 48 hours. and LFTs:   Recent Labs   Lab 03/29/19  1320 03/29/19  1616 03/30/19  0456   ALT 18 19 14   AST 27 33 22   ALKPHOS 263* 236* 229*   BILITOT 0.4 0.5 0.5   PROT 6.1 5.9* 5.7*   ALBUMIN 1.7* 1.7* 1.6*       Diagnostic Results:  I have reviewed all pertinent imaging results/findings within the past 24 hours.

## 2019-03-30 NOTE — HPI
86-year-old female with a history of rheumatoid arthritis, COPD, diabetes mellitus, CHF who was admitted for progressive shortness of breath over the previous week.  The patient was found to be hypoxic admitted with chest x-ray changes consistent with pneumonia versus pulmonary edema.  She was admitted and diuresed overnight with significant improvement in shortness of breath.

## 2019-03-30 NOTE — PLAN OF CARE
Problem: Adult Inpatient Plan of Care  Goal: Plan of Care Review  Outcome: Ongoing (interventions implemented as appropriate)  Patient remains free of falls and safety precautions maintained. Afebrile. No complaints of pain. IV abx per order. NSR-Sinus tachy. Will continue to monitor. 12 hour chart check.

## 2019-03-31 PROBLEM — R93.89 ABNORMAL CHEST X-RAY: Status: ACTIVE | Noted: 2019-03-31

## 2019-03-31 LAB
ABO + RH BLD: NORMAL
ALBUMIN SERPL BCP-MCNC: 1.6 G/DL (ref 3.5–5.2)
ALP SERPL-CCNC: 248 U/L (ref 55–135)
ALT SERPL W/O P-5'-P-CCNC: 19 U/L (ref 10–44)
ANION GAP SERPL CALC-SCNC: 13 MMOL/L (ref 8–16)
AST SERPL-CCNC: 25 U/L (ref 10–40)
BASOPHILS # BLD AUTO: 0.02 K/UL (ref 0–0.2)
BASOPHILS NFR BLD: 0.1 % (ref 0–1.9)
BILIRUB SERPL-MCNC: 0.7 MG/DL (ref 0.1–1)
BLD GP AB SCN CELLS X3 SERPL QL: NORMAL
BLOOD GROUP ANTIBODIES SERPL: NORMAL
BUN SERPL-MCNC: 15 MG/DL (ref 8–23)
CALCIUM SERPL-MCNC: 7 MG/DL (ref 8.7–10.5)
CHLORIDE SERPL-SCNC: 88 MMOL/L (ref 95–110)
CO2 SERPL-SCNC: 30 MMOL/L (ref 23–29)
CREAT SERPL-MCNC: 1.1 MG/DL (ref 0.5–1.4)
DIFFERENTIAL METHOD: ABNORMAL
EOSINOPHIL # BLD AUTO: 0.1 K/UL (ref 0–0.5)
EOSINOPHIL NFR BLD: 0.7 % (ref 0–8)
ERYTHROCYTE [DISTWIDTH] IN BLOOD BY AUTOMATED COUNT: 17 % (ref 11.5–14.5)
EST. GFR  (AFRICAN AMERICAN): 53 ML/MIN/1.73 M^2
EST. GFR  (NON AFRICAN AMERICAN): 46 ML/MIN/1.73 M^2
GLUCOSE SERPL-MCNC: 133 MG/DL (ref 70–110)
HCT VFR BLD AUTO: 25.7 % (ref 37–48.5)
HGB BLD-MCNC: 8.1 G/DL (ref 12–16)
LYMPHOCYTES # BLD AUTO: 0.7 K/UL (ref 1–4.8)
LYMPHOCYTES NFR BLD: 3.9 % (ref 18–48)
MAGNESIUM SERPL-MCNC: 0.7 MG/DL (ref 1.6–2.6)
MCH RBC QN AUTO: 30.2 PG (ref 27–31)
MCHC RBC AUTO-ENTMCNC: 31.5 G/DL (ref 32–36)
MCV RBC AUTO: 96 FL (ref 82–98)
MONOCYTES # BLD AUTO: 0.3 K/UL (ref 0.3–1)
MONOCYTES NFR BLD: 1.5 % (ref 4–15)
NEUTROPHILS # BLD AUTO: 16 K/UL (ref 1.8–7.7)
NEUTROPHILS NFR BLD: 93.8 % (ref 38–73)
PLATELET # BLD AUTO: 707 K/UL (ref 150–350)
PMV BLD AUTO: 8.9 FL (ref 9.2–12.9)
POCT GLUCOSE: 139 MG/DL (ref 70–110)
POCT GLUCOSE: 168 MG/DL (ref 70–110)
POCT GLUCOSE: 172 MG/DL (ref 70–110)
POCT GLUCOSE: 174 MG/DL (ref 70–110)
POTASSIUM SERPL-SCNC: 2.8 MMOL/L (ref 3.5–5.1)
POTASSIUM SERPL-SCNC: 3.5 MMOL/L (ref 3.5–5.1)
PROT SERPL-MCNC: 5.6 G/DL (ref 6–8.4)
RBC # BLD AUTO: 2.68 M/UL (ref 4–5.4)
SODIUM SERPL-SCNC: 131 MMOL/L (ref 136–145)
WBC # BLD AUTO: 17.03 K/UL (ref 3.9–12.7)

## 2019-03-31 PROCEDURE — 25000003 PHARM REV CODE 250: Performed by: NURSE PRACTITIONER

## 2019-03-31 PROCEDURE — 99214 OFFICE O/P EST MOD 30 MIN: CPT | Mod: ,,, | Performed by: INTERNAL MEDICINE

## 2019-03-31 PROCEDURE — 96376 TX/PRO/DX INJ SAME DRUG ADON: CPT

## 2019-03-31 PROCEDURE — 63600175 PHARM REV CODE 636 W HCPCS: Performed by: INTERNAL MEDICINE

## 2019-03-31 PROCEDURE — 94799 UNLISTED PULMONARY SVC/PX: CPT

## 2019-03-31 PROCEDURE — 86901 BLOOD TYPING SEROLOGIC RH(D): CPT

## 2019-03-31 PROCEDURE — 25000003 PHARM REV CODE 250: Performed by: EMERGENCY MEDICINE

## 2019-03-31 PROCEDURE — 99900035 HC TECH TIME PER 15 MIN (STAT)

## 2019-03-31 PROCEDURE — 83735 ASSAY OF MAGNESIUM: CPT

## 2019-03-31 PROCEDURE — 85025 COMPLETE CBC W/AUTO DIFF WBC: CPT

## 2019-03-31 PROCEDURE — 84132 ASSAY OF SERUM POTASSIUM: CPT

## 2019-03-31 PROCEDURE — 86922 COMPATIBILITY TEST ANTIGLOB: CPT

## 2019-03-31 PROCEDURE — 21400001 HC TELEMETRY ROOM

## 2019-03-31 PROCEDURE — 86870 RBC ANTIBODY IDENTIFICATION: CPT

## 2019-03-31 PROCEDURE — 96375 TX/PRO/DX INJ NEW DRUG ADDON: CPT

## 2019-03-31 PROCEDURE — 36415 COLL VENOUS BLD VENIPUNCTURE: CPT

## 2019-03-31 PROCEDURE — 99214 PR OFFICE/OUTPT VISIT, EST, LEVL IV, 30-39 MIN: ICD-10-PCS | Mod: ,,, | Performed by: INTERNAL MEDICINE

## 2019-03-31 PROCEDURE — 63600175 PHARM REV CODE 636 W HCPCS: Performed by: EMERGENCY MEDICINE

## 2019-03-31 PROCEDURE — 11000001 HC ACUTE MED/SURG PRIVATE ROOM

## 2019-03-31 PROCEDURE — 80053 COMPREHEN METABOLIC PANEL: CPT

## 2019-03-31 PROCEDURE — 25000003 PHARM REV CODE 250: Performed by: INTERNAL MEDICINE

## 2019-03-31 RX ORDER — POTASSIUM CHLORIDE 7.45 MG/ML
10 INJECTION INTRAVENOUS
Status: COMPLETED | OUTPATIENT
Start: 2019-03-31 | End: 2019-03-31

## 2019-03-31 RX ORDER — FERROUS SULFATE 325(65) MG
325 TABLET, DELAYED RELEASE (ENTERIC COATED) ORAL DAILY
Status: DISCONTINUED | OUTPATIENT
Start: 2019-03-31 | End: 2019-04-04 | Stop reason: HOSPADM

## 2019-03-31 RX ORDER — MAGNESIUM SULFATE 1 G/100ML
1 INJECTION INTRAVENOUS
Status: COMPLETED | OUTPATIENT
Start: 2019-03-31 | End: 2019-03-31

## 2019-03-31 RX ORDER — POTASSIUM CHLORIDE 20 MEQ/1
40 TABLET, EXTENDED RELEASE ORAL ONCE
Status: COMPLETED | OUTPATIENT
Start: 2019-03-31 | End: 2019-03-31

## 2019-03-31 RX ORDER — HYDROCODONE BITARTRATE AND ACETAMINOPHEN 500; 5 MG/1; MG/1
TABLET ORAL
Status: DISCONTINUED | OUTPATIENT
Start: 2019-03-31 | End: 2019-04-04 | Stop reason: HOSPADM

## 2019-03-31 RX ORDER — RAMELTEON 8 MG/1
8 TABLET ORAL NIGHTLY PRN
Status: DISCONTINUED | OUTPATIENT
Start: 2019-03-31 | End: 2019-04-04 | Stop reason: HOSPADM

## 2019-03-31 RX ORDER — MIDODRINE HYDROCHLORIDE 2.5 MG/1
2.5 TABLET ORAL 3 TIMES DAILY PRN
Status: DISCONTINUED | OUTPATIENT
Start: 2019-03-31 | End: 2019-04-03

## 2019-03-31 RX ADMIN — POTASSIUM CHLORIDE 40 MEQ: 1500 TABLET, EXTENDED RELEASE ORAL at 08:03

## 2019-03-31 RX ADMIN — PIPERACILLIN SODIUM AND TAZOBACTAM SODIUM 4.5 G: 4; .5 INJECTION, POWDER, LYOPHILIZED, FOR SOLUTION INTRAVENOUS at 05:03

## 2019-03-31 RX ADMIN — MAGNESIUM SULFATE IN DEXTROSE 1 G: 10 INJECTION, SOLUTION INTRAVENOUS at 01:03

## 2019-03-31 RX ADMIN — PRAVASTATIN SODIUM 40 MG: 20 TABLET ORAL at 08:03

## 2019-03-31 RX ADMIN — POTASSIUM CHLORIDE 10 MEQ: 7.46 INJECTION, SOLUTION INTRAVENOUS at 10:03

## 2019-03-31 RX ADMIN — PIPERACILLIN SODIUM AND TAZOBACTAM SODIUM 4.5 G: 4; .5 INJECTION, POWDER, LYOPHILIZED, FOR SOLUTION INTRAVENOUS at 11:03

## 2019-03-31 RX ADMIN — POTASSIUM CHLORIDE 10 MEQ: 7.46 INJECTION, SOLUTION INTRAVENOUS at 08:03

## 2019-03-31 RX ADMIN — FERROUS SULFATE TAB EC 325 MG (65 MG FE EQUIVALENT) 325 MG: 325 (65 FE) TABLET DELAYED RESPONSE at 02:03

## 2019-03-31 RX ADMIN — PIPERACILLIN SODIUM AND TAZOBACTAM SODIUM 4.5 G: 4; .5 INJECTION, POWDER, LYOPHILIZED, FOR SOLUTION INTRAVENOUS at 01:03

## 2019-03-31 RX ADMIN — MAGNESIUM SULFATE IN DEXTROSE 1 G: 10 INJECTION, SOLUTION INTRAVENOUS at 11:03

## 2019-03-31 RX ADMIN — LEVOTHYROXINE SODIUM 50 MCG: 50 TABLET ORAL at 05:03

## 2019-03-31 RX ADMIN — MIDODRINE HYDROCHLORIDE 2.5 MG: 2.5 TABLET ORAL at 02:03

## 2019-03-31 RX ADMIN — RAMELTEON 8 MG: 8 TABLET, FILM COATED ORAL at 09:03

## 2019-03-31 RX ADMIN — POTASSIUM CHLORIDE 10 MEQ: 7.46 INJECTION, SOLUTION INTRAVENOUS at 09:03

## 2019-03-31 NOTE — PLAN OF CARE
Problem: Adult Inpatient Plan of Care  Goal: Plan of Care Review  Outcome: Ongoing (interventions implemented as appropriate)  Pt frequently ambulated to bathroom during shift. VSS. Bed alarm activated. IV ABX during shift.   Fall precautions in place. Call light and personal items within reach. Educated patient on side effects of medication administered. Pt verbalized understanding. 24 hour order check done.  Will continue to monitor.

## 2019-03-31 NOTE — ASSESSMENT & PLAN NOTE
-H/H 8/26  -Heme/Onc following   -iron studies reviewed   -Venofer x 1 dose given on 3/30/19  -ferrous sulfate initiated   -PRBCs x 1 unit given   -repeat CBC in am

## 2019-03-31 NOTE — ASSESSMENT & PLAN NOTE
Patient is status post 1 dose of Venofer.  Thrombocytosis secondary to iron deficiency and inflammation/infection.  Can consider myeloproliferative workup if platelet count continues to increase or does not improve with treatment

## 2019-03-31 NOTE — ASSESSMENT & PLAN NOTE
Suspected due to opacities on imaging and significant leukocytosis in the setting of CHF  -IV antibiotics   -supplemental oxygen   -blood culture results pending  -Amrit prn   -IS   -repeat xray showed pleural effusion vs perihilar infiltrate or congestion  -pt afebrile with leukocytosis continued

## 2019-03-31 NOTE — PROGRESS NOTES
[facility] -   Hematology/Oncology  Progress Note    Patient Name: Yovani Pulido  Admission Date: 3/29/2019  Hospital Length of Stay: 0 days  Code Status: Full Code     Subjective:     HPI:    86-year-old female with a history of rheumatoid arthritis, COPD, diabetes mellitus, CHF who was admitted for progressive shortness of breath over the previous week.  The patient was found to be hypoxic admitted with chest x-ray changes consistent with pneumonia versus pulmonary edema.  She was admitted and diuresed overnight with significant improvement in shortness of breath.    Interval History:   The patient continues to endorse significant fatigue however shortness of breath has improved    Oncology Treatment Plan:   [No treatment plan]    Medications:  Continuous Infusions:  Scheduled Meds:   furosemide  40 mg Intravenous BID    levothyroxine  50 mcg Oral Before breakfast    piperacillin-tazobactam (ZOSYN) IVPB  4.5 g Intravenous Q8H    pravastatin  40 mg Oral Daily     PRN Meds:albuterol-ipratropium, dextrose 50%, dextrose 50%, glucagon (human recombinant), glucose, glucose, insulin aspart U-100, sodium chloride 0.9%     Review of Systems   Constitutional: Positive for activity change and fatigue. Negative for appetite change, fever and unexpected weight change.   HENT: Negative for congestion, dental problem, drooling, ear discharge, facial swelling, mouth sores, nosebleeds, postnasal drip, rhinorrhea, sinus pressure, sore throat, trouble swallowing and voice change.    Eyes: Negative.    Respiratory: Positive for shortness of breath (Improving). Negative for apnea, cough and stridor.    Cardiovascular: Positive for leg swelling. Negative for chest pain and palpitations.   Gastrointestinal: Negative for abdominal distention, abdominal pain, anal bleeding, constipation, diarrhea, nausea and vomiting.   Endocrine: Negative.    Genitourinary: Negative for difficulty urinating, dyspareunia, dysuria,  enuresis, flank pain, frequency, genital sores, hematuria, pelvic pain and vaginal bleeding.   Musculoskeletal: Negative for arthralgias, back pain, gait problem, joint swelling, myalgias and neck pain.   Skin: Negative for pallor, rash and wound.   Allergic/Immunologic: Negative.    Neurological: Positive for weakness. Negative for tremors, syncope, facial asymmetry, light-headedness and numbness.   Hematological: Negative for adenopathy. Does not bruise/bleed easily.   Psychiatric/Behavioral: Negative for agitation, behavioral problems, confusion, dysphoric mood and hallucinations. The patient is not nervous/anxious and is not hyperactive.      Objective:     Vital Signs (Most Recent):  Temp: 97.6 °F (36.4 °C) (03/31/19 0701)  Pulse: 90 (03/31/19 0916)  Resp: 18 (03/31/19 0701)  BP: (!) 102/56 (03/31/19 0701)  SpO2: 99 % (03/31/19 0701) Vital Signs (24h Range):  Temp:  [97.6 °F (36.4 °C)-99.1 °F (37.3 °C)] 97.6 °F (36.4 °C)  Pulse:  [] 90  Resp:  [16-20] 18  SpO2:  [94 %-99 %] 99 %  BP: ()/(50-61) 102/56     Weight: 57.5 kg (126 lb 11.2 oz)  Body mass index is 23.92 kg/m².  Body surface area is 1.57 meters squared.      Intake/Output Summary (Last 24 hours) at 3/31/2019 1101  Last data filed at 3/31/2019 1006  Gross per 24 hour   Intake 1080 ml   Output 550 ml   Net 530 ml       Physical Exam   Constitutional: She is oriented to person, place, and time. She appears well-developed and well-nourished. No distress.   Well groomed   HENT:   Head: Normocephalic and atraumatic.   Right Ear: Tympanic membrane and ear canal normal.   Left Ear: Tympanic membrane and ear canal normal.   Nose: Nose normal. Right sinus exhibits no maxillary sinus tenderness and no frontal sinus tenderness. Left sinus exhibits no maxillary sinus tenderness and no frontal sinus tenderness.   Mouth/Throat: Oropharynx is clear and moist and mucous membranes are normal. No oral lesions. Normal dentition. No oropharyngeal exudate.    Eyes: Pupils are equal, round, and reactive to light. Conjunctivae, EOM and lids are normal. Lids are everted and swept, no foreign bodies found. No scleral icterus.   Neck: Trachea normal and normal range of motion. Neck supple. No JVD present. No tracheal deviation present. No thyroid mass and no thyromegaly present.   No crepitus   Cardiovascular: Normal rate, regular rhythm, normal heart sounds, intact distal pulses and normal pulses. Exam reveals no gallop and no friction rub.   No murmur heard.  Pulmonary/Chest: Effort normal and breath sounds normal. No accessory muscle usage or stridor. No apnea. No respiratory distress. She has no decreased breath sounds. She has no wheezes. She has no rhonchi. She has no rales. She exhibits no tenderness.   Abdominal: Soft. Normal appearance and bowel sounds are normal. She exhibits no distension and no mass. There is no hepatosplenomegaly. There is no tenderness. There is no rebound and no guarding.   Musculoskeletal: Normal range of motion. She exhibits no edema or tenderness.   Lymphadenopathy:     She has no cervical adenopathy.   Neurological: She is alert and oriented to person, place, and time. No cranial nerve deficit. She exhibits normal muscle tone. Coordination normal.   Skin: Skin is warm and dry. No abrasion, no bruising, no burn, no ecchymosis and no rash noted. Rash is not macular, not pustular and not urticarial. She is not diaphoretic. No cyanosis or erythema. No pallor. Nails show no clubbing.   Psychiatric: She has a normal mood and affect. Her behavior is normal. Judgment and thought content normal.       Significant Labs:   CBC:   Recent Labs   Lab 03/29/19  1616 03/30/19  0456 03/31/19  0629   WBC 24.17* 16.51*  16.51* 17.03*   HGB 8.3* 8.0*  8.0* 8.1*   HCT 26.2* 26.0*  26.0* 25.7*   * 708*  708* 707*   , CMP:   Recent Labs   Lab 03/29/19  1616 03/30/19  0456 03/31/19  0629   * 133*  133* 131*   K 3.4* 4.2  4.2 2.8*   CL 91* 93*   93* 88*   CO2 25 28  28 30*   * 143*  143* 133*   BUN 17 16  16 15   CREATININE 1.0 1.1  1.1 1.1   CALCIUM 7.0* 7.3*  7.3* 7.0*   PROT 5.9* 5.7* 5.6*   ALBUMIN 1.7* 1.6* 1.6*   BILITOT 0.5 0.5 0.7   ALKPHOS 236* 229* 248*   AST 33 22 25   ALT 19 14 19   ANIONGAP 13 12  12 13   EGFRNONAA 51* 46*  46* 46*   , Coagulation:   Recent Labs   Lab 03/29/19  1616   INR 1.2   APTT 38.2*   , Immunology: No results for input(s): SPEP, ISABELLA, DANIEL, FREELAMBDALI in the last 48 hours., LDH: No results for input(s): LDHCSF, BFSOURCE in the last 48 hours., LFTs:   Recent Labs   Lab 03/29/19  1616 03/30/19  0456 03/31/19  0629   ALT 19 14 19   AST 33 22 25   ALKPHOS 236* 229* 248*   BILITOT 0.5 0.5 0.7   PROT 5.9* 5.7* 5.6*   ALBUMIN 1.7* 1.6* 1.6*   , Reticulocytes: No results for input(s): RETIC in the last 48 hours. and Tumor Markers: No results for input(s): PSA, CEA, , AFPTM, [id number],  in the last 48 hours.    Invalid input(s): ALGTM    Diagnostic Results:  I have reviewed all pertinent imaging results/findings within the past 24 hours.    Assessment/Plan:     * Acute on chronic diastolic congestive heart failure  Shortness of breath significantly improving with diuresis and broad-spectrum antibiotics.  However hemoglobin is only 8.0 with evidence of iron deficiency on labs.    Patient received 1 dose of Venofer yesterday with hemoglobin of 8.1 today.  --consider 1 unit of PRBCs to decrease cardiac demand    Iron deficiency anemia  Patient is status post 1 dose of Venofer.  Thrombocytosis secondary to iron deficiency and inflammation/infection.  Can consider myeloproliferative workup if platelet count continues to increase or does not improve with treatment        Thank you for your consult. I will follow-up with patient. Please contact us if you have any additional questions.     Kai Medina Jr, MD  Hematology/Oncology  [facility]

## 2019-03-31 NOTE — SUBJECTIVE & OBJECTIVE
Interval History:   The patient continues to endorse significant fatigue however shortness of breath has improved    Oncology Treatment Plan:   [No treatment plan]    Medications:  Continuous Infusions:  Scheduled Meds:   furosemide  40 mg Intravenous BID    levothyroxine  50 mcg Oral Before breakfast    piperacillin-tazobactam (ZOSYN) IVPB  4.5 g Intravenous Q8H    pravastatin  40 mg Oral Daily     PRN Meds:albuterol-ipratropium, dextrose 50%, dextrose 50%, glucagon (human recombinant), glucose, glucose, insulin aspart U-100, sodium chloride 0.9%     Review of Systems   Constitutional: Positive for activity change and fatigue. Negative for appetite change, fever and unexpected weight change.   HENT: Negative for congestion, dental problem, drooling, ear discharge, facial swelling, mouth sores, nosebleeds, postnasal drip, rhinorrhea, sinus pressure, sore throat, trouble swallowing and voice change.    Eyes: Negative.    Respiratory: Positive for shortness of breath (Improving). Negative for apnea, cough and stridor.    Cardiovascular: Positive for leg swelling. Negative for chest pain and palpitations.   Gastrointestinal: Negative for abdominal distention, abdominal pain, anal bleeding, constipation, diarrhea, nausea and vomiting.   Endocrine: Negative.    Genitourinary: Negative for difficulty urinating, dyspareunia, dysuria, enuresis, flank pain, frequency, genital sores, hematuria, pelvic pain and vaginal bleeding.   Musculoskeletal: Negative for arthralgias, back pain, gait problem, joint swelling, myalgias and neck pain.   Skin: Negative for pallor, rash and wound.   Allergic/Immunologic: Negative.    Neurological: Positive for weakness. Negative for tremors, syncope, facial asymmetry, light-headedness and numbness.   Hematological: Negative for adenopathy. Does not bruise/bleed easily.   Psychiatric/Behavioral: Negative for agitation, behavioral problems, confusion, dysphoric mood and hallucinations. The  patient is not nervous/anxious and is not hyperactive.      Objective:     Vital Signs (Most Recent):  Temp: 97.6 °F (36.4 °C) (03/31/19 0701)  Pulse: 90 (03/31/19 0916)  Resp: 18 (03/31/19 0701)  BP: (!) 102/56 (03/31/19 0701)  SpO2: 99 % (03/31/19 0701) Vital Signs (24h Range):  Temp:  [97.6 °F (36.4 °C)-99.1 °F (37.3 °C)] 97.6 °F (36.4 °C)  Pulse:  [] 90  Resp:  [16-20] 18  SpO2:  [94 %-99 %] 99 %  BP: ()/(50-61) 102/56     Weight: 57.5 kg (126 lb 11.2 oz)  Body mass index is 23.92 kg/m².  Body surface area is 1.57 meters squared.      Intake/Output Summary (Last 24 hours) at 3/31/2019 1101  Last data filed at 3/31/2019 1006  Gross per 24 hour   Intake 1080 ml   Output 550 ml   Net 530 ml       Physical Exam   Constitutional: She is oriented to person, place, and time. She appears well-developed and well-nourished. No distress.   Well groomed   HENT:   Head: Normocephalic and atraumatic.   Right Ear: Tympanic membrane and ear canal normal.   Left Ear: Tympanic membrane and ear canal normal.   Nose: Nose normal. Right sinus exhibits no maxillary sinus tenderness and no frontal sinus tenderness. Left sinus exhibits no maxillary sinus tenderness and no frontal sinus tenderness.   Mouth/Throat: Oropharynx is clear and moist and mucous membranes are normal. No oral lesions. Normal dentition. No oropharyngeal exudate.   Eyes: Pupils are equal, round, and reactive to light. Conjunctivae, EOM and lids are normal. Lids are everted and swept, no foreign bodies found. No scleral icterus.   Neck: Trachea normal and normal range of motion. Neck supple. No JVD present. No tracheal deviation present. No thyroid mass and no thyromegaly present.   No crepitus   Cardiovascular: Normal rate, regular rhythm, normal heart sounds, intact distal pulses and normal pulses. Exam reveals no gallop and no friction rub.   No murmur heard.  Pulmonary/Chest: Effort normal and breath sounds normal. No accessory muscle usage or  stridor. No apnea. No respiratory distress. She has no decreased breath sounds. She has no wheezes. She has no rhonchi. She has no rales. She exhibits no tenderness.   Abdominal: Soft. Normal appearance and bowel sounds are normal. She exhibits no distension and no mass. There is no hepatosplenomegaly. There is no tenderness. There is no rebound and no guarding.   Musculoskeletal: Normal range of motion. She exhibits no edema or tenderness.   Lymphadenopathy:     She has no cervical adenopathy.   Neurological: She is alert and oriented to person, place, and time. No cranial nerve deficit. She exhibits normal muscle tone. Coordination normal.   Skin: Skin is warm and dry. No abrasion, no bruising, no burn, no ecchymosis and no rash noted. Rash is not macular, not pustular and not urticarial. She is not diaphoretic. No cyanosis or erythema. No pallor. Nails show no clubbing.   Psychiatric: She has a normal mood and affect. Her behavior is normal. Judgment and thought content normal.       Significant Labs:   CBC:   Recent Labs   Lab 03/29/19  1616 03/30/19  0456 03/31/19  0629   WBC 24.17* 16.51*  16.51* 17.03*   HGB 8.3* 8.0*  8.0* 8.1*   HCT 26.2* 26.0*  26.0* 25.7*   * 708*  708* 707*   , CMP:   Recent Labs   Lab 03/29/19  1616 03/30/19  0456 03/31/19  0629   * 133*  133* 131*   K 3.4* 4.2  4.2 2.8*   CL 91* 93*  93* 88*   CO2 25 28  28 30*   * 143*  143* 133*   BUN 17 16  16 15   CREATININE 1.0 1.1  1.1 1.1   CALCIUM 7.0* 7.3*  7.3* 7.0*   PROT 5.9* 5.7* 5.6*   ALBUMIN 1.7* 1.6* 1.6*   BILITOT 0.5 0.5 0.7   ALKPHOS 236* 229* 248*   AST 33 22 25   ALT 19 14 19   ANIONGAP 13 12  12 13   EGFRNONAA 51* 46*  46* 46*   , Coagulation:   Recent Labs   Lab 03/29/19  1616   INR 1.2   APTT 38.2*   , Immunology: No results for input(s): SPEP, ISABELLA, DANIEL, FREELAMBDALI in the last 48 hours., LDH: No results for input(s): LDHCSF, BFSOURCE in the last 48 hours., LFTs:   Recent Labs   Lab  03/29/19  1616 03/30/19  0456 03/31/19  0629   ALT 19 14 19   AST 33 22 25   ALKPHOS 236* 229* 248*   BILITOT 0.5 0.5 0.7   PROT 5.9* 5.7* 5.6*   ALBUMIN 1.7* 1.6* 1.6*   , Reticulocytes: No results for input(s): RETIC in the last 48 hours. and Tumor Markers: No results for input(s): PSA, CEA, , AFPTM, [id number],  in the last 48 hours.    Invalid input(s): ALGTM    Diagnostic Results:  I have reviewed all pertinent imaging results/findings within the past 24 hours.

## 2019-03-31 NOTE — ASSESSMENT & PLAN NOTE
-suspected due hx of Diastolic CHF as well hyponatremia and B pleural effusion present on imaging-however PNA not excluded due to presence of bilateral opacities and significant leukocytosis   -Lasix 40 IV bid held due to hypotension and hypokalemia  -supplemental oxygen   -strict I/Os  -daily weights   -Echo results showed EF 60% with trivial MVS  -Troponin-flat and mildly elevated   -BNP > 500

## 2019-03-31 NOTE — ASSESSMENT & PLAN NOTE
-WBC 24 and RR 22 in the setting of PNA  -IV antibiotics   -blood culture results with NGTD  -IV bolus given in ED   -IV hydration not continued due to CHF  -Leukocytosis noted with downward trend noted

## 2019-03-31 NOTE — SUBJECTIVE & OBJECTIVE
Interval History: pt stable and verbalized improvement of SOB and leg swelling.  Pt reported continued weakness and fatigue.  H/H showed mild improvement after Venofer x 1 dose.  PRBCs x 1 unit given with oral supplementation continued.  Lasix held due to hypotension and hypokalemia.  Potassium replaced with repeat lab within normal range.  Magnesium replaced.  Leukocytosis continued with downward trend noted.  Current therapy continued.  Heme/Onc following.      Review of Systems   Constitutional: Positive for activity change and fatigue. Negative for appetite change, fever and unexpected weight change.   HENT: Negative for congestion, dental problem, drooling, ear discharge, facial swelling, mouth sores, nosebleeds, postnasal drip, rhinorrhea, sinus pressure, sore throat, trouble swallowing and voice change.    Eyes: Negative.    Respiratory: Positive for shortness of breath (Improving). Negative for apnea, cough and stridor.    Cardiovascular: Positive for leg swelling (improved). Negative for chest pain and palpitations.   Gastrointestinal: Negative for abdominal distention, abdominal pain, anal bleeding, constipation, diarrhea, nausea and vomiting.   Endocrine: Negative.    Genitourinary: Negative for difficulty urinating, dyspareunia, dysuria, enuresis, flank pain, frequency, genital sores, hematuria, pelvic pain and vaginal bleeding.   Musculoskeletal: Negative for arthralgias, back pain, gait problem, joint swelling, myalgias and neck pain.   Skin: Negative for pallor, rash and wound.   Allergic/Immunologic: Negative.    Neurological: Positive for weakness. Negative for tremors, syncope, facial asymmetry, light-headedness and numbness.   Hematological: Negative for adenopathy. Does not bruise/bleed easily.   Psychiatric/Behavioral: Negative for agitation, behavioral problems, confusion, dysphoric mood and hallucinations. The patient is not nervous/anxious and is not hyperactive.      Objective:     Vital  Signs (Most Recent):  Temp: 97.3 °F (36.3 °C) (03/31/19 1309)  Pulse: 90 (03/31/19 1309)  Resp: 18 (03/31/19 1309)  BP: (!) 86/53 (03/31/19 1309)  SpO2: (!) 93 % (03/31/19 1309) Vital Signs (24h Range):  Temp:  [97.3 °F (36.3 °C)-97.8 °F (36.6 °C)] 97.3 °F (36.3 °C)  Pulse:  [] 90  Resp:  [16-20] 18  SpO2:  [93 %-99 %] 93 %  BP: ()/(51-61) 86/53     Weight: 57.5 kg (126 lb 11.2 oz)  Body mass index is 23.92 kg/m².    Intake/Output Summary (Last 24 hours) at 3/31/2019 1606  Last data filed at 3/31/2019 1304  Gross per 24 hour   Intake 1480 ml   Output 900 ml   Net 580 ml      Physical Exam   Constitutional: She is oriented to person, place, and time. She appears well-developed and well-nourished. No distress.   Well groomed   HENT:   Head: Normocephalic and atraumatic.   Right Ear: Tympanic membrane and ear canal normal.   Left Ear: Tympanic membrane and ear canal normal.   Nose: Nose normal. Right sinus exhibits no maxillary sinus tenderness and no frontal sinus tenderness. Left sinus exhibits no maxillary sinus tenderness and no frontal sinus tenderness.   Mouth/Throat: Oropharynx is clear and moist and mucous membranes are normal. No oral lesions. Normal dentition. No oropharyngeal exudate.   Eyes: Pupils are equal, round, and reactive to light. Conjunctivae, EOM and lids are normal. Lids are everted and swept, no foreign bodies found. No scleral icterus.   Neck: Trachea normal and normal range of motion. Neck supple. No JVD present. No tracheal deviation present. No thyroid mass and no thyromegaly present.   No crepitus   Cardiovascular: Normal rate, regular rhythm, normal heart sounds, intact distal pulses and normal pulses. Exam reveals no gallop and no friction rub.   No murmur heard.  Pulmonary/Chest: Effort normal and breath sounds normal. No accessory muscle usage or stridor. No apnea. No respiratory distress. She has no decreased breath sounds. She has no wheezes. She has no rhonchi. She has no  rales. She exhibits no tenderness.   Abdominal: Soft. Normal appearance and bowel sounds are normal. She exhibits no distension and no mass. There is no hepatosplenomegaly. There is no tenderness. There is no rebound and no guarding.   Genitourinary:   Genitourinary Comments: Deferred   Musculoskeletal: Normal range of motion. She exhibits no edema or tenderness.   Lymphadenopathy:     She has no cervical adenopathy.   Neurological: She is alert and oriented to person, place, and time. No cranial nerve deficit. She exhibits normal muscle tone. Coordination normal.   Skin: Skin is warm and dry. No abrasion, no bruising, no burn, no ecchymosis and no rash noted. Rash is not macular, not pustular and not urticarial. She is not diaphoretic. No cyanosis or erythema. No pallor. Nails show no clubbing.   Psychiatric: She has a normal mood and affect. Her behavior is normal. Judgment and thought content normal.       Significant Labs:   CBC:   Recent Labs   Lab 03/29/19  1616 03/30/19  0456 03/31/19  0629   WBC 24.17* 16.51*  16.51* 17.03*   HGB 8.3* 8.0*  8.0* 8.1*   HCT 26.2* 26.0*  26.0* 25.7*   * 708*  708* 707*     CMP:   Recent Labs   Lab 03/29/19  1616 03/30/19  0456 03/31/19  0629 03/31/19  1344   * 133*  133* 131*  --    K 3.4* 4.2  4.2 2.8* 3.5   CL 91* 93*  93* 88*  --    CO2 25 28  28 30*  --    * 143*  143* 133*  --    BUN 17 16  16 15  --    CREATININE 1.0 1.1  1.1 1.1  --    CALCIUM 7.0* 7.3*  7.3* 7.0*  --    PROT 5.9* 5.7* 5.6*  --    ALBUMIN 1.7* 1.6* 1.6*  --    BILITOT 0.5 0.5 0.7  --    ALKPHOS 236* 229* 248*  --    AST 33 22 25  --    ALT 19 14 19  --    ANIONGAP 13 12  12 13  --    EGFRNONAA 51* 46*  46* 46*  --      Magnesium:   Recent Labs   Lab 03/31/19  0629   MG 0.7*       Significant Imaging:   Imaging Results    None

## 2019-03-31 NOTE — PLAN OF CARE
Problem: Adult Inpatient Plan of Care  Goal: Plan of Care Review  Outcome: Ongoing (interventions implemented as appropriate)  Patient remains free of falls and safety precautions maintained. Afebrile. No complaints of pain. K and mag replacement administered today. Pt to receive 1 unit PRBC. NSR. Will continue to monitor. 12 hour chart check.

## 2019-03-31 NOTE — PROGRESS NOTES
[facility] Hospital Medicine  Progress Note    Patient Name: Yovani Pulido  MRN: [id number]  Patient Class: IP- Inpatient   Admission Date: 3/29/2019  Length of Stay: 0 days  Attending Physician: Cassandra Henriquez MD  Primary Care Provider: Dorcas Schultz DO        Subjective:     Principal Problem:Acute on chronic diastolic congestive heart failure    HPI:  Yovani Pulido is a 86 y.o. female patient with PMHx of RA, HTN, COPD, DM, and CHF  who was sent to ER from Dr. Kendrick office (where she goes for [facility]), for progressive SOB gradually  since last 1 week. SOB is intermittent and moderate in severity and gets worse with exertion. Pt was seen by Reina Sullivan NP (Hem/Onc) today for sxs and referred to ED for decreased O2 saturation, abnormal lung sounds, and CXR that resulted pulmonary edema vs. Pneumonia.  Associated sxs include chills and fatigue. Patient denies any CP, fever, diaphoresis, palpitations, extremity weakness/numbness, leg pain/swelling, dizziness, cough, n/v, and all other sxs at this time. No further complaints or concerns at this time.     She was admitted here in Jan 2019 for Pneumonia and was at [facility] before that for CHF exacerbation. She had Thoracentesis by Dr. Hallman last year in March 2018. Pt was suspected of severe Sepsis in the ER due to Leukocytosis of 24K with Lactate of 2.7, however her BNP is 570 and her CXR showed Pulm edema with B Pleural Effusions as well as Hyponatremia of 129 as well as mild BLE pitting edema -- hence Rehydration was stopped and pt given IV Lasix and admitted. Pt was hypoxemic initially in the office but not in the ER. Pt has normal Echo last year.           Hospital Course:  Pt admitted to Observation Unit for Acute on chronic diastolic congestive heart failure.  BNP > 500 with Troponin flat and mildly elevated.  Chest xray showed findings representative of pulmonary edema vs. pneumonia which cannot be excluded.  Pt treated  with IV Lasix and supplemental oxygen.  H/H declined in the setting CHF.  Iron studies reviewed and Venofer x 1 dose given.  Leukocytosis noted with IV antibiotics given as PNA cannot be excluded via imaging.  Pt verbalized symptom improvement on current therapy.  On 3/31/19, Lasix held in the setting hypokalemia and hypotension.  Potassium replaced with repeat lab normalized.  H/H remained low qith mild improvement noted after Venofer.  Case discussed with Heme/Onc and PRBCs x 1 unit transfused. Pt reported hx of chronic diarrhea possibly due to Chron's disease.  Pt states she does not want any endoscopy procedure due to her age.  Oral iron supplementation initiated.  Magnesium replaced.      Interval History: pt stable and verbalized improvement of SOB and leg swelling.  Pt reported continued weakness and fatigue.  H/H showed mild improvement after Venofer x 1 dose.  PRBCs x 1 unit given with oral supplementation continued.  Lasix held due to hypotension and hypokalemia.  Potassium replaced with repeat lab within normal range.  Magnesium replaced.  Leukocytosis continued with downward trend noted.  Current therapy continued.  Heme/Onc following.      Review of Systems   Constitutional: Positive for activity change and fatigue. Negative for appetite change, fever and unexpected weight change.   HENT: Negative for congestion, dental problem, drooling, ear discharge, facial swelling, mouth sores, nosebleeds, postnasal drip, rhinorrhea, sinus pressure, sore throat, trouble swallowing and voice change.    Eyes: Negative.    Respiratory: Positive for shortness of breath (Improving). Negative for apnea, cough and stridor.    Cardiovascular: Positive for leg swelling (improved). Negative for chest pain and palpitations.   Gastrointestinal: Negative for abdominal distention, abdominal pain, anal bleeding, constipation, diarrhea, nausea and vomiting.   Endocrine: Negative.    Genitourinary: Negative for difficulty  urinating, dyspareunia, dysuria, enuresis, flank pain, frequency, genital sores, hematuria, pelvic pain and vaginal bleeding.   Musculoskeletal: Negative for arthralgias, back pain, gait problem, joint swelling, myalgias and neck pain.   Skin: Negative for pallor, rash and wound.   Allergic/Immunologic: Negative.    Neurological: Positive for weakness. Negative for tremors, syncope, facial asymmetry, light-headedness and numbness.   Hematological: Negative for adenopathy. Does not bruise/bleed easily.   Psychiatric/Behavioral: Negative for agitation, behavioral problems, confusion, dysphoric mood and hallucinations. The patient is not nervous/anxious and is not hyperactive.      Objective:     Vital Signs (Most Recent):  Temp: 97.3 °F (36.3 °C) (03/31/19 1309)  Pulse: 90 (03/31/19 1309)  Resp: 18 (03/31/19 1309)  BP: (!) 86/53 (03/31/19 1309)  SpO2: (!) 93 % (03/31/19 1309) Vital Signs (24h Range):  Temp:  [97.3 °F (36.3 °C)-97.8 °F (36.6 °C)] 97.3 °F (36.3 °C)  Pulse:  [] 90  Resp:  [16-20] 18  SpO2:  [93 %-99 %] 93 %  BP: ()/(51-61) 86/53     Weight: 57.5 kg (126 lb 11.2 oz)  Body mass index is 23.92 kg/m².    Intake/Output Summary (Last 24 hours) at 3/31/2019 1606  Last data filed at 3/31/2019 1304  Gross per 24 hour   Intake 1480 ml   Output 900 ml   Net 580 ml      Physical Exam   Constitutional: She is oriented to person, place, and time. She appears well-developed and well-nourished. No distress.   Well groomed   HENT:   Head: Normocephalic and atraumatic.   Right Ear: Tympanic membrane and ear canal normal.   Left Ear: Tympanic membrane and ear canal normal.   Nose: Nose normal. Right sinus exhibits no maxillary sinus tenderness and no frontal sinus tenderness. Left sinus exhibits no maxillary sinus tenderness and no frontal sinus tenderness.   Mouth/Throat: Oropharynx is clear and moist and mucous membranes are normal. No oral lesions. Normal dentition. No oropharyngeal exudate.   Eyes: Pupils  are equal, round, and reactive to light. Conjunctivae, EOM and lids are normal. Lids are everted and swept, no foreign bodies found. No scleral icterus.   Neck: Trachea normal and normal range of motion. Neck supple. No JVD present. No tracheal deviation present. No thyroid mass and no thyromegaly present.   No crepitus   Cardiovascular: Normal rate, regular rhythm, normal heart sounds, intact distal pulses and normal pulses. Exam reveals no gallop and no friction rub.   No murmur heard.  Pulmonary/Chest: Effort normal and breath sounds normal. No accessory muscle usage or stridor. No apnea. No respiratory distress. She has no decreased breath sounds. She has no wheezes. She has no rhonchi. She has no rales. She exhibits no tenderness.   Abdominal: Soft. Normal appearance and bowel sounds are normal. She exhibits no distension and no mass. There is no hepatosplenomegaly. There is no tenderness. There is no rebound and no guarding.   Genitourinary:   Genitourinary Comments: Deferred   Musculoskeletal: Normal range of motion. She exhibits no edema or tenderness.   Lymphadenopathy:     She has no cervical adenopathy.   Neurological: She is alert and oriented to person, place, and time. No cranial nerve deficit. She exhibits normal muscle tone. Coordination normal.   Skin: Skin is warm and dry. No abrasion, no bruising, no burn, no ecchymosis and no rash noted. Rash is not macular, not pustular and not urticarial. She is not diaphoretic. No cyanosis or erythema. No pallor. Nails show no clubbing.   Psychiatric: She has a normal mood and affect. Her behavior is normal. Judgment and thought content normal.       Significant Labs:   CBC:   Recent Labs   Lab 03/29/19  1616 03/30/19  0456 03/31/19  0629   WBC 24.17* 16.51*  16.51* 17.03*   HGB 8.3* 8.0*  8.0* 8.1*   HCT 26.2* 26.0*  26.0* 25.7*   * 708*  708* 707*     CMP:   Recent Labs   Lab 03/29/19  1616 03/30/19  0456 03/31/19  0629 03/31/19  1344   *  133*  133* 131*  --    K 3.4* 4.2  4.2 2.8* 3.5   CL 91* 93*  93* 88*  --    CO2 25 28  28 30*  --    * 143*  143* 133*  --    BUN 17 16  16 15  --    CREATININE 1.0 1.1  1.1 1.1  --    CALCIUM 7.0* 7.3*  7.3* 7.0*  --    PROT 5.9* 5.7* 5.6*  --    ALBUMIN 1.7* 1.6* 1.6*  --    BILITOT 0.5 0.5 0.7  --    ALKPHOS 236* 229* 248*  --    AST 33 22 25  --    ALT 19 14 19  --    ANIONGAP 13 12  12 13  --    EGFRNONAA 51* 46*  46* 46*  --      Magnesium:   Recent Labs   Lab 03/31/19  0629   MG 0.7*       Significant Imaging:   Imaging Results    None       Assessment/Plan:      * Acute on chronic diastolic congestive heart failure  -suspected due hx of Diastolic CHF as well hyponatremia and B pleural effusion present on imaging-however PNA not excluded due to presence of bilateral opacities and significant leukocytosis   -Lasix 40 IV bid held due to hypotension and hypokalemia  -supplemental oxygen   -strict I/Os  -daily weights   -Echo results showed EF 60% with trivial MVS  -Troponin-flat and mildly elevated   -BNP > 500       Abnormal chest x-ray  -in the setting of PNA vs pulmonary congestion   -plan as previously discussed      Sepsis  -WBC 24 and RR 22 in the setting of PNA  -IV antibiotics   -blood culture results with NGTD  -IV bolus given in ED   -IV hydration not continued due to CHF  -Leukocytosis noted with downward trend noted         Hyponatremia  -improved   Moderate Hyponatremia-- initially dilutional  IV lasix held      Centrilobular emphysema  Mild copd, will start O2 2 L NC and use nebs prn      Pneumonia  Suspected due to opacities on imaging and significant leukocytosis in the setting of CHF  -IV antibiotics   -supplemental oxygen   -blood culture results pending  -Duonebs prn   -IS   -repeat xray showed pleural effusion vs perihilar infiltrate or congestion  -pt afebrile with leukocytosis continued       Diabetes mellitus type 2 without retinopathy  Check A1c  -SSI and Accuchecks  continued      Iron deficiency anemia  -H/H 8/26  -Heme/Onc following   -iron studies reviewed   -Venofer x 1 dose given on 3/30/19  -ferrous sulfate initiated   -PRBCs x 1 unit given   -repeat CBC in am       Hypomagnesemia  -replaced   -repeat level in am     VTE Risk Mitigation (From admission, onward)        Ordered     IP VTE HIGH RISK PATIENT  Once      03/29/19 2009     Place sequential compression device  Until discontinued      03/29/19 2009              Kate Patrick NP  Department of Hospital Medicine   [facility]

## 2019-03-31 NOTE — ASSESSMENT & PLAN NOTE
Shortness of breath significantly improving with diuresis and broad-spectrum antibiotics.  However hemoglobin is only 8.0 with evidence of iron deficiency on labs.    Patient received 1 dose of Venofer yesterday with hemoglobin of 8.1 today.  --consider 1 unit of PRBCs to decrease cardiac demand

## 2019-03-31 NOTE — PLAN OF CARE
03/31/19 1629   Medicare Message   Important Message from Medicare regarding Discharge Appeal Rights Given to patient/caregiver;Explained to patient/caregiver;Signed/date by patient/caregiver   Date IMM was signed 03/31/19   Time IMM was signed [id number]

## 2019-03-31 NOTE — PLAN OF CARE
Assessment completed.  Met with the patient/ family. CM explained and left info in blue transition of care folder regarding Advance Directives, Living Will ( FULL CODE ) ,  Pamphlet on D/C planning on admission and Pharmacy bedside delivery.  The role of CM explained for  transitions of care/ discharge planning. Per EMR, PMHx of RA, HTN, COPD, DM, and CHF  who was sent to ER from Dr. Kendrick office (where she goes for [facility]), for progressive SOB gradually  since last 1 week. SOB is intermittent and moderate in severity and gets worse with exertion. Pt was seen by Reina Sullivan NP (Hem/Onc) today for sxs and referred to ED for decreased O2 saturation, abnormal lung sounds, and CXR that resulted pulmonary edema vs. Pneumonia.  Associated sxs include chills and fatigue. Patient denies any CP, fever, diaphoresis, palpitations, extremity weakness/numbness, leg pain/swelling, dizziness, cough, n/v, and all other sxs at this time. No further complaints or concerns at this time. She was admitted here in Jan 2019 for Pneumonia and was at [facility] before that for CHF exacerbation. She had Thoracentesis by Dr. Hallman last year in March 2018. Pt was suspected of severe Sepsis in the ER due to Leukocytosis of 24K with Lactate of 2.7,  and her CXR showed Pulm edema with B Pleural Effusions as well as Hyponatremia of 129 as well as mild BLE pitting edema -- hence Rehydration was stopped and pt given IV Lasix and admitted.   Patient has family support  Patient has [employer] insurance.  Patient has no needs at this time but may need  peer therapy recommendations or if a skilled need is identified. . CM to f/u for safe transition           03/31/19 2835   Discharge Assessment   Assessment Type Discharge Planning Assessment   Confirmed/corrected address and phone number on facesheet? Yes   Assessment information obtained from? Medical Record   Communicated expected length of stay with patient/caregiver yes   Prior to  hospitilization cognitive status: Alert/Oriented   Prior to hospitalization functional status: Assistive Equipment   Current cognitive status: Alert/Oriented   Current Functional Status: Assistive Equipment   Lives With alone   Able to Return to Prior Arrangements yes   Is patient able to care for self after discharge? Yes   Patient's perception of discharge disposition home or selfcare   Readmission Within the Last 30 Days no previous admission in last 30 days   Patient currently being followed by outpatient case management? No   Patient currently receives any other outside agency services? No   Equipment Currently Used at Home walker, rolling   Do you have any problems affording any of your prescribed medications? TBD   Is the patient taking medications as prescribed? yes   Does the patient have transportation home? Yes   Transportation Anticipated family or friend will provide   Does the patient receive services at the [facility]? No   Discharge Plan A Home with family   Discharge Plan B Home with family   DME Needed Upon Discharge  none   Patient/Family in Agreement with Plan yes

## 2019-04-01 PROBLEM — E83.42 HYPOMAGNESEMIA: Status: ACTIVE | Noted: 2019-04-01

## 2019-04-01 PROBLEM — R19.7 DIARRHEA: Status: ACTIVE | Noted: 2019-04-01

## 2019-04-01 LAB
ALBUMIN SERPL BCP-MCNC: 1.6 G/DL (ref 3.5–5.2)
ALP SERPL-CCNC: 296 U/L (ref 55–135)
ALT SERPL W/O P-5'-P-CCNC: 19 U/L (ref 10–44)
ANION GAP SERPL CALC-SCNC: 12 MMOL/L (ref 8–16)
AST SERPL-CCNC: 23 U/L (ref 10–40)
BASOPHILS # BLD AUTO: 0.01 K/UL (ref 0–0.2)
BASOPHILS NFR BLD: 0.1 % (ref 0–1.9)
BILIRUB SERPL-MCNC: 1.3 MG/DL (ref 0.1–1)
BLD PROD TYP BPU: NORMAL
BLOOD UNIT EXPIRATION DATE: NORMAL
BLOOD UNIT TYPE CODE: 7300
BLOOD UNIT TYPE: NORMAL
BUN SERPL-MCNC: 14 MG/DL (ref 8–23)
CALCIUM SERPL-MCNC: 7.8 MG/DL (ref 8.7–10.5)
CHLORIDE SERPL-SCNC: 89 MMOL/L (ref 95–110)
CO2 SERPL-SCNC: 28 MMOL/L (ref 23–29)
CODING SYSTEM: NORMAL
CREAT SERPL-MCNC: 1 MG/DL (ref 0.5–1.4)
DIFFERENTIAL METHOD: ABNORMAL
DISPENSE STATUS: NORMAL
EOSINOPHIL # BLD AUTO: 0.1 K/UL (ref 0–0.5)
EOSINOPHIL NFR BLD: 0.6 % (ref 0–8)
ERYTHROCYTE [DISTWIDTH] IN BLOOD BY AUTOMATED COUNT: 17.7 % (ref 11.5–14.5)
EST. GFR  (AFRICAN AMERICAN): 59 ML/MIN/1.73 M^2
EST. GFR  (NON AFRICAN AMERICAN): 51 ML/MIN/1.73 M^2
GLUCOSE SERPL-MCNC: 130 MG/DL (ref 70–110)
HCT VFR BLD AUTO: 29.6 % (ref 37–48.5)
HGB BLD-MCNC: 9.7 G/DL (ref 12–16)
LYMPHOCYTES # BLD AUTO: 0.6 K/UL (ref 1–4.8)
LYMPHOCYTES NFR BLD: 3.1 % (ref 18–48)
MAGNESIUM SERPL-MCNC: 1.3 MG/DL (ref 1.6–2.6)
MAGNESIUM SERPL-MCNC: 2.5 MG/DL (ref 1.6–2.6)
MCH RBC QN AUTO: 30 PG (ref 27–31)
MCHC RBC AUTO-ENTMCNC: 32.8 G/DL (ref 32–36)
MCV RBC AUTO: 92 FL (ref 82–98)
MONOCYTES # BLD AUTO: 0.3 K/UL (ref 0.3–1)
MONOCYTES NFR BLD: 1.4 % (ref 4–15)
NEUTROPHILS # BLD AUTO: 18.2 K/UL (ref 1.8–7.7)
NEUTROPHILS NFR BLD: 94.8 % (ref 38–73)
NUM UNITS TRANS PACKED RBC: NORMAL
PLATELET # BLD AUTO: 723 K/UL (ref 150–350)
PMV BLD AUTO: 9 FL (ref 9.2–12.9)
POCT GLUCOSE: 162 MG/DL (ref 70–110)
POCT GLUCOSE: 190 MG/DL (ref 70–110)
POCT GLUCOSE: 195 MG/DL (ref 70–110)
POCT GLUCOSE: 196 MG/DL (ref 70–110)
POTASSIUM SERPL-SCNC: 3.4 MMOL/L (ref 3.5–5.1)
PROT SERPL-MCNC: 6 G/DL (ref 6–8.4)
RBC # BLD AUTO: 3.23 M/UL (ref 4–5.4)
SODIUM SERPL-SCNC: 129 MMOL/L (ref 136–145)
WBC # BLD AUTO: 19.18 K/UL (ref 3.9–12.7)

## 2019-04-01 PROCEDURE — 25000003 PHARM REV CODE 250: Performed by: NURSE PRACTITIONER

## 2019-04-01 PROCEDURE — 97161 PT EVAL LOW COMPLEX 20 MIN: CPT

## 2019-04-01 PROCEDURE — 63600175 PHARM REV CODE 636 W HCPCS: Performed by: EMERGENCY MEDICINE

## 2019-04-01 PROCEDURE — 94761 N-INVAS EAR/PLS OXIMETRY MLT: CPT

## 2019-04-01 PROCEDURE — 94799 UNLISTED PULMONARY SVC/PX: CPT

## 2019-04-01 PROCEDURE — 25500020 PHARM REV CODE 255: Performed by: NURSE PRACTITIONER

## 2019-04-01 PROCEDURE — 85025 COMPLETE CBC W/AUTO DIFF WBC: CPT

## 2019-04-01 PROCEDURE — 97116 GAIT TRAINING THERAPY: CPT

## 2019-04-01 PROCEDURE — 25500020 PHARM REV CODE 255: Performed by: INTERNAL MEDICINE

## 2019-04-01 PROCEDURE — 80053 COMPREHEN METABOLIC PANEL: CPT

## 2019-04-01 PROCEDURE — 63600175 PHARM REV CODE 636 W HCPCS: Performed by: NURSE PRACTITIONER

## 2019-04-01 PROCEDURE — 27000221 HC OXYGEN, UP TO 24 HOURS

## 2019-04-01 PROCEDURE — 36415 COLL VENOUS BLD VENIPUNCTURE: CPT

## 2019-04-01 PROCEDURE — 94640 AIRWAY INHALATION TREATMENT: CPT

## 2019-04-01 PROCEDURE — 83735 ASSAY OF MAGNESIUM: CPT | Mod: 91

## 2019-04-01 PROCEDURE — 21400001 HC TELEMETRY ROOM

## 2019-04-01 PROCEDURE — 99233 SBSQ HOSP IP/OBS HIGH 50: CPT | Mod: ,,, | Performed by: INTERNAL MEDICINE

## 2019-04-01 PROCEDURE — 36430 TRANSFUSION BLD/BLD COMPNT: CPT

## 2019-04-01 PROCEDURE — 25000003 PHARM REV CODE 250: Performed by: EMERGENCY MEDICINE

## 2019-04-01 PROCEDURE — P9016 RBC LEUKOCYTES REDUCED: HCPCS

## 2019-04-01 PROCEDURE — 25000242 PHARM REV CODE 250 ALT 637 W/ HCPCS: Performed by: NURSE PRACTITIONER

## 2019-04-01 PROCEDURE — 99233 PR SUBSEQUENT HOSPITAL CARE,LEVL III: ICD-10-PCS | Mod: ,,, | Performed by: INTERNAL MEDICINE

## 2019-04-01 PROCEDURE — 97530 THERAPEUTIC ACTIVITIES: CPT

## 2019-04-01 PROCEDURE — 11000001 HC ACUTE MED/SURG PRIVATE ROOM

## 2019-04-01 PROCEDURE — 97165 OT EVAL LOW COMPLEX 30 MIN: CPT

## 2019-04-01 RX ORDER — POTASSIUM CHLORIDE 20 MEQ/1
20 TABLET, EXTENDED RELEASE ORAL ONCE
Status: COMPLETED | OUTPATIENT
Start: 2019-04-01 | End: 2019-04-01

## 2019-04-01 RX ORDER — MAGNESIUM SULFATE HEPTAHYDRATE 40 MG/ML
2 INJECTION, SOLUTION INTRAVENOUS
Status: DISCONTINUED | OUTPATIENT
Start: 2019-04-01 | End: 2019-04-01

## 2019-04-01 RX ADMIN — IOHEXOL 75 ML: 350 INJECTION, SOLUTION INTRAVENOUS at 05:04

## 2019-04-01 RX ADMIN — LEVOTHYROXINE SODIUM 50 MCG: 50 TABLET ORAL at 05:04

## 2019-04-01 RX ADMIN — MAGNESIUM SULFATE IN WATER 2 G: 40 INJECTION, SOLUTION INTRAVENOUS at 02:04

## 2019-04-01 RX ADMIN — PIPERACILLIN SODIUM AND TAZOBACTAM SODIUM 4.5 G: 4; .5 INJECTION, POWDER, LYOPHILIZED, FOR SOLUTION INTRAVENOUS at 11:04

## 2019-04-01 RX ADMIN — IOHEXOL 30 ML: 350 INJECTION, SOLUTION INTRAVENOUS at 12:04

## 2019-04-01 RX ADMIN — PIPERACILLIN SODIUM AND TAZOBACTAM SODIUM 4.5 G: 4; .5 INJECTION, POWDER, LYOPHILIZED, FOR SOLUTION INTRAVENOUS at 09:04

## 2019-04-01 RX ADMIN — PRAVASTATIN SODIUM 40 MG: 20 TABLET ORAL at 08:04

## 2019-04-01 RX ADMIN — IPRATROPIUM BROMIDE AND ALBUTEROL SULFATE 3 ML: .5; 3 SOLUTION RESPIRATORY (INHALATION) at 11:04

## 2019-04-01 RX ADMIN — FERROUS SULFATE TAB EC 325 MG (65 MG FE EQUIVALENT) 325 MG: 325 (65 FE) TABLET DELAYED RESPONSE at 08:04

## 2019-04-01 RX ADMIN — MAGNESIUM SULFATE IN WATER 2 G: 40 INJECTION, SOLUTION INTRAVENOUS at 01:04

## 2019-04-01 RX ADMIN — RAMELTEON 8 MG: 8 TABLET, FILM COATED ORAL at 09:04

## 2019-04-01 RX ADMIN — MAGNESIUM SULFATE IN WATER 2 G: 40 INJECTION, SOLUTION INTRAVENOUS at 12:04

## 2019-04-01 RX ADMIN — POTASSIUM CHLORIDE 20 MEQ: 1500 TABLET, EXTENDED RELEASE ORAL at 09:04

## 2019-04-01 RX ADMIN — PIPERACILLIN SODIUM AND TAZOBACTAM SODIUM 4.5 G: 4; .5 INJECTION, POWDER, LYOPHILIZED, FOR SOLUTION INTRAVENOUS at 04:04

## 2019-04-01 NOTE — PROGRESS NOTES
"[facility]  Hematology/Oncology  Progress Note    Patient Name: Yovani Pulido  Admission Date: 3/29/2019  Hospital Length of Stay: 1 days  Code Status: Full Code     Subjective:     HPI:    86-year-old female with a history of rheumatoid arthritis, COPD, diabetes mellitus, CHF who was admitted for progressive shortness of breath over the previous week.  The patient was found to be hypoxic admitted with chest x-ray changes consistent with pneumonia versus pulmonary edema.  She was admitted and diuresed overnight with significant improvement in shortness of breath.    Interval History: Patient reports having diarrhea this AM and feeling "weak". Patient able to ambulate with assistance of PT. Venofer given over the weekend and patient given 1 unit PRBC. Patient reports improvement in SOB this AM.     Oncology Treatment Plan:   [No treatment plan]    Medications:  Continuous Infusions:  Scheduled Meds:   ferrous sulfate  325 mg Oral Daily    levothyroxine  50 mcg Oral Before breakfast    magnesium sulfate IVPB  2 g Intravenous Q1H    omnipaque  30 mL Oral Once    piperacillin-tazobactam (ZOSYN) IVPB  4.5 g Intravenous Q8H    pravastatin  40 mg Oral Daily     PRN Meds:sodium chloride, albuterol-ipratropium, dextrose 50%, dextrose 50%, glucagon (human recombinant), glucose, glucose, insulin aspart U-100, midodrine, ramelteon, sodium chloride 0.9%     Review of Systems   Constitutional: Positive for fatigue. Negative for chills, diaphoresis, fever and unexpected weight change.   HENT: Positive for postnasal drip. Negative for congestion, hearing loss, mouth sores, sore throat and trouble swallowing.    Eyes: Negative for pain, discharge, redness and visual disturbance.   Respiratory: Positive for shortness of breath. Negative for cough and chest tightness.    Cardiovascular: Negative for chest pain and palpitations.   Gastrointestinal: Positive for abdominal distention, abdominal pain and " diarrhea. Negative for blood in stool, constipation, nausea and vomiting.   Endocrine: Negative for cold intolerance and heat intolerance.   Genitourinary: Negative for difficulty urinating, dyspareunia, enuresis, flank pain and hematuria.   Musculoskeletal: Positive for arthralgias, back pain and myalgias.   Skin: Negative.    Neurological: Negative for dizziness, weakness, light-headedness and headaches.   Hematological: Negative for adenopathy. Does not bruise/bleed easily.   Psychiatric/Behavioral: Negative for agitation, behavioral problems and confusion. The patient is nervous/anxious.      Objective:     Vital Signs (Most Recent):  Temp: 98.4 °F (36.9 °C) (04/01/19 1159)  Pulse: 90 (04/01/19 1159)  Resp: 18 (04/01/19 1159)  BP: 109/62 (04/01/19 1159)  SpO2: (!) 94 % (04/01/19 1159) Vital Signs (24h Range):  Temp:  [97.3 °F (36.3 °C)-98.9 °F (37.2 °C)] 98.4 °F (36.9 °C)  Pulse:  [83-98] 90  Resp:  [14-20] 18  SpO2:  [93 %-96 %] 94 %  BP: ()/(50-82) 109/62     Weight: 57.4 kg (126 lb 8 oz)  Body mass index is 23.88 kg/m².  Body surface area is 1.57 meters squared.      Intake/Output Summary (Last 24 hours) at 4/1/2019 1227  Last data filed at 4/1/2019 0900  Gross per 24 hour   Intake 1386.67 ml   Output --   Net 1386.67 ml       Physical Exam   Constitutional: She is oriented to person, place, and time. She appears well-developed and well-nourished. No distress.   HENT:   Head: Normocephalic and atraumatic.   Right Ear: Hearing and external ear normal.   Left Ear: Hearing and external ear normal.   Nose: No rhinorrhea or sinus tenderness. Right sinus exhibits no maxillary sinus tenderness and no frontal sinus tenderness. Left sinus exhibits no maxillary sinus tenderness and no frontal sinus tenderness.   Mouth/Throat: Uvula is midline, oropharynx is clear and moist and mucous membranes are normal. No oral lesions.   Eyes: Pupils are equal, round, and reactive to light. Conjunctivae are normal. Right eye  exhibits no discharge. Left eye exhibits no discharge.   Neck: Normal range of motion. Carotid bruit is not present. No tracheal deviation present. No thyromegaly present.   Cardiovascular: Normal rate, regular rhythm, S1 normal, S2 normal, normal heart sounds and intact distal pulses.   No murmur heard.  Pulses:       Dorsalis pedis pulses are 2+ on the right side, and 2+ on the left side.   Pulmonary/Chest: Effort normal. No respiratory distress. She has decreased breath sounds in the right lower field and the left lower field. She has rales in the right middle field.   Abdominal: Soft. She exhibits distension. She exhibits no mass. Bowel sounds are increased. There is generalized tenderness.   Musculoskeletal: Normal range of motion. She exhibits no edema.   Lymphadenopathy:     She has no cervical adenopathy.        Right: No supraclavicular adenopathy present.        Left: No supraclavicular adenopathy present.   Neurological: She is alert and oriented to person, place, and time. She has normal strength. No sensory deficit. Coordination and gait normal.   Skin: Skin is warm and dry. Capillary refill takes less than 2 seconds. No rash noted. There is pallor.   Psychiatric: Her speech is normal and behavior is normal. Judgment and thought content normal. Her mood appears anxious. Her affect is labile. She exhibits a depressed mood. She exhibits abnormal recent memory.   Nursing note and vitals reviewed.      Significant Labs:   CBC:   Recent Labs   Lab 03/31/19  0629 04/01/19  0445   WBC 17.03* 19.18*   HGB 8.1* 9.7*   HCT 25.7* 29.6*   * 723*    and CMP:   Recent Labs   Lab 03/31/19  0629 03/31/19  1344 04/01/19  0445   *  --  129*   K 2.8* 3.5 3.4*   CL 88*  --  89*   CO2 30*  --  28   *  --  130*   BUN 15  --  14   CREATININE 1.1  --  1.0   CALCIUM 7.0*  --  7.8*   PROT 5.6*  --  6.0   ALBUMIN 1.6*  --  1.6*   BILITOT 0.7  --  1.3*   ALKPHOS 248*  --  296*   AST 25  --  23   ALT 19  --  19    ANIONGAP 13  --  12   EGFRNONAA 46*  --  51*       Diagnostic Results:  I have reviewed all pertinent imaging results/findings within the past 24 hours.    Assessment/Plan:     * Acute on chronic diastolic congestive heart failure  Shortness of breath significantly improving with diuresis and broad-spectrum antibiotics.  However hemoglobin is only 8.0 with evidence of iron deficiency on labs.    Patient received 1 dose of Venofer yesterday with hemoglobin of 8.1 today, s/p 1 unit PRBC due to symptomatic anemia.   --Patient reports improvement in SOB this AM, able to ambulate with assistance of PT.     Iron deficiency anemia  Patient is status post 1 dose of Venofer and 1 unit PRBC since admission.   Thrombocytosis secondary to iron deficiency and inflammation/infection.  May consider myeloproliferative workup due to continued thrombocytosis.         Thank you for your consult. I will follow-up with patient. Please contact us if you have any additional questions.     Reina Sullivan NP  Hematology/Oncology  [facility]

## 2019-04-01 NOTE — ASSESSMENT & PLAN NOTE
-pt states she was told she may have Crohn's dx but refused further work up and evaluation   -pt reports intermittent episodes x 2 years usually relieved by Immodium at home  -stool studies pending

## 2019-04-01 NOTE — PT/OT/SLP EVAL
Physical Therapy Evaluation    Patient Name:  Yovani Pulido   MRN:  [id number]    Recommendations:     Discharge Recommendations:  home health PT   Discharge Equipment Recommendations: none   Barriers to discharge: None    Assessment:     Yovani Pulido is a 86 y.o. female admitted with a medical diagnosis of Acute on chronic diastolic congestive heart failure.  She presents with the following impairments/functional limitations:  weakness, gait instability, decreased upper extremity function, decreased lower extremity function, impaired endurance, impaired balance, impaired functional mobilty, pain, decreased ROM .    Rehab Prognosis: Good; patient would benefit from acute skilled PT services to address these deficits and reach maximum level of function.    Recent Surgery: * No surgery found *      Plan:     During this hospitalization, patient to be seen   to address the identified rehab impairments via gait training, therapeutic activities, therapeutic exercises and progress toward the following goals:    · Plan of Care Expires:  04/08/19    Subjective     Chief Complaint: PAIN  Patient/Family Comments/goals: INC STRENGTH  Pain/Comfort:  · Pain Rating 1: 10/10  · Location 1: abdomen  · Pain Rating Post-Intervention 1: 10/10    Patients cultural, spiritual, [employer]ism conflicts given the current situation:      Living Environment:  PT LIVES AT HOME WITH SONS AND WAS MOD I WITH RW AND DOESN'T DRIVE  Prior to admission, patients level of function was MOD I.  Equipment used at home: walker, rolling.  DME owned (not currently used): none.  Upon discharge, patient will have assistance from FAMILY.    Objective:     Communicated with NURSE CAZARES AND Epic CHART REVIEW prior to session.  Patient found supine with telemetry  upon PT entry to room.    General Precautions: Standard, fall   Orthopedic Precautions:N/A   Braces: N/A     Exams:  · RLE ROM: WFL  · RLE Strength: LIMITED  · LLE ROM: WFL  · LLE Strength:  LIMITED    Functional Mobility:  PT MET IN RM SUP.SIT EOB WITH MIN A  AND SCOOTED TO EOB WITH MIN A. PT STOOD WITH RW AND MIN A FOR GT X 160' WITH STEP TO GT. PT RETURNED TO RM T/F TO CHAIR WITH MIN A. PT EDUCATED ON ROLE OF P.T. AND TE TO COMPLETE FOR STRENGTHENING.     AM-PAC 6 CLICK MOBILITY  Total Score:16     Patient left up in chair with all lines intact, call button in reach and NURSE notified.    GOALS:   Multidisciplinary Problems     Physical Therapy Goals        Problem: Physical Therapy Goal    Goal Priority Disciplines Outcome Goal Variances Interventions   Physical Therapy Goal     PT, PT/OT      Description:  PT WILL BE SEEN FOR P.T. FOR A MIN OF 5 OUT OF 7 DAYS A WEEK  LT19  1. PT WILL COMPLETE BED MOBILITY IND  2. PT WILL GT TRAIN X 250' WITH RW  YAMIL  3. PT WILL T/F TO CHAIR WITH RW MOD I  4. PT WILL COMPLETE B LE TE X 20 REPS                    History:     Past Medical History:   Diagnosis Date    Anemia     Carotid stenosis     Cataract     COAG (chronic open-angle glaucoma)     Colon polyps     Diabetes mellitus type II     steroid induced    Diverticulosis     GERD (gastroesophageal reflux disease)     hiatal hernia    Glaucoma     Heart murmur     Hyperlipidemia     Hypertension     Hypothyroidism     Rheumatoid arthritis(714.0)     Stroke     lacunar infarct       Past Surgical History:   Procedure Laterality Date    anal fissure repair      APPENDECTOMY  1944    BREAST SURGERY  1992 approx    breast biopsies- benign    CAROTID ENDARTERECTOMY  2009    left    CATARACT EXTRACTION      COLONOSCOPY  2012    COLONOSCOPY N/A 2015    [facility]    ESOPHAGOGASTRODUODENOSCOPY (EGD) N/A 2015    [facility]    EYE SURGERY  ,     bilateral cataracts    HERNIA REPAIR  ,     abdominal x2, with mesh on second    HYSTERECTOMY  1963    vag hyst    JOINT REPLACEMENT  2008    right  knee    THORACENTESIS - Outpatient Right 3/23/2018    [facility]    yag Left        Time Tracking:     PT Received On: 04/01/19  PT Start Time: 1011     PT Stop Time: 1040  PT Total Time (min): 29 min     Billable Minutes: Evaluation 19 and Gait Training 10      Hali Adames, PT  04/01/2019

## 2019-04-01 NOTE — SUBJECTIVE & OBJECTIVE
Interval History: pt states she is feeling very weak and reports increased episodes of diarrhea noted.  Leukocytosis persists.  Stool studies pending.  CT of chest/abdomen/pelvis results pending.  Potassium and Magnesium replaced.      Review of Systems   Constitutional: Positive for activity change and fatigue. Negative for appetite change, fever and unexpected weight change.   HENT: Negative for congestion, dental problem, drooling, ear discharge, facial swelling, mouth sores, nosebleeds, postnasal drip, rhinorrhea, sinus pressure, sore throat, trouble swallowing and voice change.    Eyes: Negative.    Respiratory: Positive for shortness of breath (Improving). Negative for apnea, cough and stridor.    Cardiovascular: Positive for leg swelling (improved). Negative for chest pain and palpitations.   Gastrointestinal: Positive for diarrhea. Negative for abdominal distention, abdominal pain, anal bleeding, constipation, nausea and vomiting.   Endocrine: Negative.  Negative for cold intolerance and heat intolerance.   Genitourinary: Negative for difficulty urinating, dyspareunia, dysuria, enuresis, flank pain, frequency, genital sores, hematuria, pelvic pain and vaginal bleeding.   Musculoskeletal: Negative for arthralgias, back pain, gait problem, joint swelling, myalgias and neck pain.   Skin: Negative for pallor, rash and wound.   Allergic/Immunologic: Negative.    Neurological: Positive for weakness. Negative for tremors, syncope, facial asymmetry, light-headedness and numbness.   Hematological: Negative for adenopathy. Does not bruise/bleed easily.   Psychiatric/Behavioral: Positive for sleep disturbance. Negative for agitation, behavioral problems, confusion, dysphoric mood and hallucinations. The patient is not nervous/anxious and is not hyperactive.      Objective:     Vital Signs (Most Recent):  Temp: 98.4 °F (36.9 °C) (04/01/19 1159)  Pulse: 90 (04/01/19 1159)  Resp: 18 (04/01/19 1159)  BP: 109/62 (04/01/19  1159)  SpO2: (!) 94 % (04/01/19 1159) Vital Signs (24h Range):  Temp:  [97.7 °F (36.5 °C)-98.9 °F (37.2 °C)] 98.4 °F (36.9 °C)  Pulse:  [83-98] 90  Resp:  [14-20] 18  SpO2:  [93 %-96 %] 94 %  BP: ()/(50-82) 109/62     Weight: 57.4 kg (126 lb 8 oz)  Body mass index is 23.88 kg/m².    Intake/Output Summary (Last 24 hours) at 4/1/2019 1557  Last data filed at 4/1/2019 1348  Gross per 24 hour   Intake 1206.67 ml   Output --   Net 1206.67 ml      Physical Exam   Constitutional: She is oriented to person, place, and time. She appears well-developed and well-nourished. No distress.   Well groomed   HENT:   Head: Normocephalic and atraumatic.   Right Ear: Tympanic membrane and ear canal normal.   Left Ear: Tympanic membrane and ear canal normal.   Nose: Nose normal. Right sinus exhibits no maxillary sinus tenderness and no frontal sinus tenderness. Left sinus exhibits no maxillary sinus tenderness and no frontal sinus tenderness.   Mouth/Throat: Oropharynx is clear and moist and mucous membranes are normal. No oral lesions. Normal dentition. No oropharyngeal exudate.   Eyes: Pupils are equal, round, and reactive to light. Conjunctivae, EOM and lids are normal. Lids are everted and swept, no foreign bodies found. No scleral icterus.   Neck: Trachea normal and normal range of motion. Neck supple. No JVD present. No tracheal deviation present. No thyroid mass and no thyromegaly present.   Cardiovascular: Normal rate, regular rhythm, normal heart sounds, intact distal pulses and normal pulses. Exam reveals no gallop and no friction rub.   No murmur heard.  Pulmonary/Chest: Effort normal. No accessory muscle usage or stridor. No apnea. No respiratory distress. She has decreased breath sounds in the right lower field and the left lower field. She has no wheezes. She has no rhonchi. She has no rales. She exhibits no tenderness.   Abdominal: Soft. Normal appearance and bowel sounds are normal. She exhibits no distension and  no mass. There is no hepatosplenomegaly. There is no tenderness. There is no rebound and no guarding.   Genitourinary:   Genitourinary Comments: Deferred   Musculoskeletal: Normal range of motion. She exhibits no edema or tenderness.   Lymphadenopathy:     She has no cervical adenopathy.   Neurological: She is alert and oriented to person, place, and time. No cranial nerve deficit. She exhibits normal muscle tone. Coordination normal.   Skin: Skin is warm and dry. No abrasion, no bruising, no burn, no ecchymosis and no rash noted. Rash is not macular, not pustular and not urticarial. She is not diaphoretic. No cyanosis or erythema. There is pallor. Nails show no clubbing.   Psychiatric: She has a normal mood and affect. Her behavior is normal. Judgment and thought content normal.       Significant Labs:   CBC:   Recent Labs   Lab 03/31/19  0629 04/01/19  0445   WBC 17.03* 19.18*   HGB 8.1* 9.7*   HCT 25.7* 29.6*   * 723*     CMP:   Recent Labs   Lab 03/31/19  0629 03/31/19  1344 04/01/19  0445   *  --  129*   K 2.8* 3.5 3.4*   CL 88*  --  89*   CO2 30*  --  28   *  --  130*   BUN 15  --  14   CREATININE 1.1  --  1.0   CALCIUM 7.0*  --  7.8*   PROT 5.6*  --  6.0   ALBUMIN 1.6*  --  1.6*   BILITOT 0.7  --  1.3*   ALKPHOS 248*  --  296*   AST 25  --  23   ALT 19  --  19   ANIONGAP 13  --  12   EGFRNONAA 46*  --  51*     Magnesium:   Recent Labs   Lab 03/31/19  0629 04/01/19  0445   MG 0.7* 1.3*       Significant Imaging:   Imaging Results    None

## 2019-04-01 NOTE — ASSESSMENT & PLAN NOTE
Shortness of breath significantly improving with diuresis and broad-spectrum antibiotics.  However hemoglobin is only 8.0 with evidence of iron deficiency on labs.    Patient received 1 dose of Venofer yesterday with hemoglobin of 8.1 today, s/p 1 unit PRBC due to symptomatic anemia.   --Patient reports improvement in SOB this AM, able to ambulate with assistance of PT.

## 2019-04-01 NOTE — ASSESSMENT & PLAN NOTE
Suspected due to opacities on imaging and significant leukocytosis in the setting of pleural effusion   -IV antibiotics   -supplemental oxygen   -blood culture results with no growth to date  -Amrit prn   -IS   -repeat xray showed pleural effusion vs perihilar infiltrate or congestion  -pt afebrile with leukocytosis continued   -CT of chest/abdomen/pelvis pending

## 2019-04-01 NOTE — ASSESSMENT & PLAN NOTE
-H/H 8/26  -Heme/Onc following   -iron studies reviewed   -Venofer x 1 dose given on 3/30/19  -ferrous sulfate initiated   -PRBCs x 1 unit given   -repeat CBC in am   -4/1-H/H stable post transfusion

## 2019-04-01 NOTE — SUBJECTIVE & OBJECTIVE
"Interval History: Patient reports having diarrhea this AM and feeling "weak". Patient able to ambulate with assistance of PT. Venofer given over the weekend and patient given 1 unit PRBC. Patient reports improvement in SOB this AM.     Oncology Treatment Plan:   [No treatment plan]    Medications:  Continuous Infusions:  Scheduled Meds:   ferrous sulfate  325 mg Oral Daily    levothyroxine  50 mcg Oral Before breakfast    magnesium sulfate IVPB  2 g Intravenous Q1H    omnipaque  30 mL Oral Once    piperacillin-tazobactam (ZOSYN) IVPB  4.5 g Intravenous Q8H    pravastatin  40 mg Oral Daily     PRN Meds:sodium chloride, albuterol-ipratropium, dextrose 50%, dextrose 50%, glucagon (human recombinant), glucose, glucose, insulin aspart U-100, midodrine, ramelteon, sodium chloride 0.9%     Review of Systems   Constitutional: Positive for fatigue. Negative for chills, diaphoresis, fever and unexpected weight change.   HENT: Positive for postnasal drip. Negative for congestion, hearing loss, mouth sores, sore throat and trouble swallowing.    Eyes: Negative for pain, discharge, redness and visual disturbance.   Respiratory: Positive for shortness of breath. Negative for cough and chest tightness.    Cardiovascular: Negative for chest pain and palpitations.   Gastrointestinal: Positive for abdominal distention, abdominal pain and diarrhea. Negative for blood in stool, constipation, nausea and vomiting.   Endocrine: Negative for cold intolerance and heat intolerance.   Genitourinary: Negative for difficulty urinating, dyspareunia, enuresis, flank pain and hematuria.   Musculoskeletal: Positive for arthralgias, back pain and myalgias.   Skin: Negative.    Neurological: Negative for dizziness, weakness, light-headedness and headaches.   Hematological: Negative for adenopathy. Does not bruise/bleed easily.   Psychiatric/Behavioral: Negative for agitation, behavioral problems and confusion. The patient is nervous/anxious.  "     Objective:     Vital Signs (Most Recent):  Temp: 98.4 °F (36.9 °C) (04/01/19 1159)  Pulse: 90 (04/01/19 1159)  Resp: 18 (04/01/19 1159)  BP: 109/62 (04/01/19 1159)  SpO2: (!) 94 % (04/01/19 1159) Vital Signs (24h Range):  Temp:  [97.3 °F (36.3 °C)-98.9 °F (37.2 °C)] 98.4 °F (36.9 °C)  Pulse:  [83-98] 90  Resp:  [14-20] 18  SpO2:  [93 %-96 %] 94 %  BP: ()/(50-82) 109/62     Weight: 57.4 kg (126 lb 8 oz)  Body mass index is 23.88 kg/m².  Body surface area is 1.57 meters squared.      Intake/Output Summary (Last 24 hours) at 4/1/2019 1227  Last data filed at 4/1/2019 0900  Gross per 24 hour   Intake 1386.67 ml   Output --   Net 1386.67 ml       Physical Exam   Constitutional: She is oriented to person, place, and time. She appears well-developed and well-nourished. No distress.   HENT:   Head: Normocephalic and atraumatic.   Right Ear: Hearing and external ear normal.   Left Ear: Hearing and external ear normal.   Nose: No rhinorrhea or sinus tenderness. Right sinus exhibits no maxillary sinus tenderness and no frontal sinus tenderness. Left sinus exhibits no maxillary sinus tenderness and no frontal sinus tenderness.   Mouth/Throat: Uvula is midline, oropharynx is clear and moist and mucous membranes are normal. No oral lesions.   Eyes: Pupils are equal, round, and reactive to light. Conjunctivae are normal. Right eye exhibits no discharge. Left eye exhibits no discharge.   Neck: Normal range of motion. Carotid bruit is not present. No tracheal deviation present. No thyromegaly present.   Cardiovascular: Normal rate, regular rhythm, S1 normal, S2 normal, normal heart sounds and intact distal pulses.   No murmur heard.  Pulses:       Dorsalis pedis pulses are 2+ on the right side, and 2+ on the left side.   Pulmonary/Chest: Effort normal. No respiratory distress. She has decreased breath sounds in the right lower field and the left lower field. She has rales in the right middle field.   Abdominal: Soft. She  exhibits distension. She exhibits no mass. Bowel sounds are increased. There is generalized tenderness.   Musculoskeletal: Normal range of motion. She exhibits no edema.   Lymphadenopathy:     She has no cervical adenopathy.        Right: No supraclavicular adenopathy present.        Left: No supraclavicular adenopathy present.   Neurological: She is alert and oriented to person, place, and time. She has normal strength. No sensory deficit. Coordination and gait normal.   Skin: Skin is warm and dry. Capillary refill takes less than 2 seconds. No rash noted. There is pallor.   Psychiatric: Her speech is normal and behavior is normal. Judgment and thought content normal. Her mood appears anxious. Her affect is labile. She exhibits a depressed mood. She exhibits abnormal recent memory.   Nursing note and vitals reviewed.      Significant Labs:   CBC:   Recent Labs   Lab 03/31/19  0629 04/01/19  0445   WBC 17.03* 19.18*   HGB 8.1* 9.7*   HCT 25.7* 29.6*   * 723*    and CMP:   Recent Labs   Lab 03/31/19  0629 03/31/19  1344 04/01/19  0445   *  --  129*   K 2.8* 3.5 3.4*   CL 88*  --  89*   CO2 30*  --  28   *  --  130*   BUN 15  --  14   CREATININE 1.1  --  1.0   CALCIUM 7.0*  --  7.8*   PROT 5.6*  --  6.0   ALBUMIN 1.6*  --  1.6*   BILITOT 0.7  --  1.3*   ALKPHOS 248*  --  296*   AST 25  --  23   ALT 19  --  19   ANIONGAP 13  --  12   EGFRNONAA 46*  --  51*       Diagnostic Results:  I have reviewed all pertinent imaging results/findings within the past 24 hours.

## 2019-04-01 NOTE — ASSESSMENT & PLAN NOTE
Patient is status post 1 dose of Venofer and 1 unit PRBC since admission.   Thrombocytosis secondary to iron deficiency and inflammation/infection.  May consider myeloproliferative workup due to continued thrombocytosis.

## 2019-04-01 NOTE — PLAN OF CARE
Problem: Adult Inpatient Plan of Care  Goal: Plan of Care Review  Outcome: Ongoing (interventions implemented as appropriate)  Patient remains free of falls and safety precautions maintained. Afebrile. No complaints of pain. Will collect stool for sample. IV abx per order. Mag replacement per order. NSR. Will continue to monitor. 12 hour chart check.

## 2019-04-01 NOTE — ASSESSMENT & PLAN NOTE
-WBC 24 and RR 22 in the setting of PNA  -IV antibiotics   -blood culture results with NGTD  -IV bolus given in ED   -IV hydration not continued due to CHF  -Leukocytosis persists  -CT of chest/abdomen/pelvis pending

## 2019-04-01 NOTE — PROGRESS NOTES
[facility] Hospital Medicine  Progress Note    Patient Name: Yovani Pulido  MRN: [id number]  Patient Class: IP- Inpatient   Admission Date: 3/29/2019  Length of Stay: 1 days  Attending Physician: Cassandra Henriquez MD  Primary Care Provider: Dorcas Schultz DO        Subjective:     Principal Problem:Acute on chronic diastolic congestive heart failure    HPI:  Yovani Pulido is a 86 y.o. female patient with PMHx of RA, HTN, COPD, DM, and CHF  who was sent to ER from Dr. Kendrick office (where she goes for [facility]), for progressive SOB gradually  since last 1 week. SOB is intermittent and moderate in severity and gets worse with exertion. Pt was seen by Reina Sullivan NP (Hem/Onc) today for sxs and referred to ED for decreased O2 saturation, abnormal lung sounds, and CXR that resulted pulmonary edema vs. Pneumonia.  Associated sxs include chills and fatigue. Patient denies any CP, fever, diaphoresis, palpitations, extremity weakness/numbness, leg pain/swelling, dizziness, cough, n/v, and all other sxs at this time. No further complaints or concerns at this time.     She was admitted here in Jan 2019 for Pneumonia and was at [facility] before that for CHF exacerbation. She had Thoracentesis by Dr. Hallman last year in March 2018. Pt was suspected of severe Sepsis in the ER due to Leukocytosis of 24K with Lactate of 2.7, however her BNP is 570 and her CXR showed Pulm edema with B Pleural Effusions as well as Hyponatremia of 129 as well as mild BLE pitting edema -- hence Rehydration was stopped and pt given IV Lasix and admitted. Pt was hypoxemic initially in the office but not in the ER. Pt has normal Echo last year.           Hospital Course:  Pt admitted to Observation Unit for Acute on chronic diastolic congestive heart failure.  BNP > 500 with Troponin flat and mildly elevated.  Chest xray showed findings representative of pulmonary edema vs. pneumonia which cannot be excluded.  Pt treated  with IV Lasix and supplemental oxygen.  H/H declined in the setting CHF.  Iron studies reviewed and Venofer x 1 dose given.  Leukocytosis noted with IV antibiotics given as PNA cannot be excluded via imaging.  Pt verbalized symptom improvement on current therapy.  On 3/31/19, Lasix held in the setting hypokalemia and hypotension.  Potassium replaced with repeat lab normalized.  H/H remained low qith mild improvement noted after Venofer.  Case discussed with Heme/Onc and PRBCs x 1 unit transfused. Pt reported hx of chronic diarrhea possibly due to Chron's disease.  Pt states she does not want any endoscopy procedure due to her age.  Oral iron supplementation initiated.  Magnesium replaced.  Pt continues to report increased incidence of diarrhea.  Stool studies sent.  Leukocytosis persists.  CT of chest/abdomen/pelvis results pending.  Potassium and Magnesium replaced.      Interval History: pt states she is feeling very weak and reports increased episodes of diarrhea noted.  Leukocytosis persists.  Stool studies pending.  CT of chest/abdomen/pelvis results pending.  Potassium and Magnesium replaced.      Review of Systems   Constitutional: Positive for activity change and fatigue. Negative for appetite change, fever and unexpected weight change.   HENT: Negative for congestion, dental problem, drooling, ear discharge, facial swelling, mouth sores, nosebleeds, postnasal drip, rhinorrhea, sinus pressure, sore throat, trouble swallowing and voice change.    Eyes: Negative.    Respiratory: Positive for shortness of breath (Improving). Negative for apnea, cough and stridor.    Cardiovascular: Positive for leg swelling (improved). Negative for chest pain and palpitations.   Gastrointestinal: Positive for diarrhea. Negative for abdominal distention, abdominal pain, anal bleeding, constipation, nausea and vomiting.   Endocrine: Negative.  Negative for cold intolerance and heat intolerance.   Genitourinary: Negative for  difficulty urinating, dyspareunia, dysuria, enuresis, flank pain, frequency, genital sores, hematuria, pelvic pain and vaginal bleeding.   Musculoskeletal: Negative for arthralgias, back pain, gait problem, joint swelling, myalgias and neck pain.   Skin: Negative for pallor, rash and wound.   Allergic/Immunologic: Negative.    Neurological: Positive for weakness. Negative for tremors, syncope, facial asymmetry, light-headedness and numbness.   Hematological: Negative for adenopathy. Does not bruise/bleed easily.   Psychiatric/Behavioral: Positive for sleep disturbance. Negative for agitation, behavioral problems, confusion, dysphoric mood and hallucinations. The patient is not nervous/anxious and is not hyperactive.      Objective:     Vital Signs (Most Recent):  Temp: 98.4 °F (36.9 °C) (04/01/19 1159)  Pulse: 90 (04/01/19 1159)  Resp: 18 (04/01/19 1159)  BP: 109/62 (04/01/19 1159)  SpO2: (!) 94 % (04/01/19 1159) Vital Signs (24h Range):  Temp:  [97.7 °F (36.5 °C)-98.9 °F (37.2 °C)] 98.4 °F (36.9 °C)  Pulse:  [83-98] 90  Resp:  [14-20] 18  SpO2:  [93 %-96 %] 94 %  BP: ()/(50-82) 109/62     Weight: 57.4 kg (126 lb 8 oz)  Body mass index is 23.88 kg/m².    Intake/Output Summary (Last 24 hours) at 4/1/2019 1557  Last data filed at 4/1/2019 1348  Gross per 24 hour   Intake 1206.67 ml   Output --   Net 1206.67 ml      Physical Exam   Constitutional: She is oriented to person, place, and time. She appears well-developed and well-nourished. No distress.   Well groomed   HENT:   Head: Normocephalic and atraumatic.   Right Ear: Tympanic membrane and ear canal normal.   Left Ear: Tympanic membrane and ear canal normal.   Nose: Nose normal. Right sinus exhibits no maxillary sinus tenderness and no frontal sinus tenderness. Left sinus exhibits no maxillary sinus tenderness and no frontal sinus tenderness.   Mouth/Throat: Oropharynx is clear and moist and mucous membranes are normal. No oral lesions. Normal dentition. No  oropharyngeal exudate.   Eyes: Pupils are equal, round, and reactive to light. Conjunctivae, EOM and lids are normal. Lids are everted and swept, no foreign bodies found. No scleral icterus.   Neck: Trachea normal and normal range of motion. Neck supple. No JVD present. No tracheal deviation present. No thyroid mass and no thyromegaly present.   Cardiovascular: Normal rate, regular rhythm, normal heart sounds, intact distal pulses and normal pulses. Exam reveals no gallop and no friction rub.   No murmur heard.  Pulmonary/Chest: Effort normal. No accessory muscle usage or stridor. No apnea. No respiratory distress. She has decreased breath sounds in the right lower field and the left lower field. She has no wheezes. She has no rhonchi. She has no rales. She exhibits no tenderness.   Abdominal: Soft. Normal appearance and bowel sounds are normal. She exhibits no distension and no mass. There is no hepatosplenomegaly. There is no tenderness. There is no rebound and no guarding.   Genitourinary:   Genitourinary Comments: Deferred   Musculoskeletal: Normal range of motion. She exhibits no edema or tenderness.   Lymphadenopathy:     She has no cervical adenopathy.   Neurological: She is alert and oriented to person, place, and time. No cranial nerve deficit. She exhibits normal muscle tone. Coordination normal.   Skin: Skin is warm and dry. No abrasion, no bruising, no burn, no ecchymosis and no rash noted. Rash is not macular, not pustular and not urticarial. She is not diaphoretic. No cyanosis or erythema. There is pallor. Nails show no clubbing.   Psychiatric: She has a normal mood and affect. Her behavior is normal. Judgment and thought content normal.       Significant Labs:   CBC:   Recent Labs   Lab 03/31/19  0629 04/01/19  0445   WBC 17.03* 19.18*   HGB 8.1* 9.7*   HCT 25.7* 29.6*   * 723*     CMP:   Recent Labs   Lab 03/31/19  0629 03/31/19  1344 04/01/19  0445   *  --  129*   K 2.8* 3.5 3.4*   CL  88*  --  89*   CO2 30*  --  28   *  --  130*   BUN 15  --  14   CREATININE 1.1  --  1.0   CALCIUM 7.0*  --  7.8*   PROT 5.6*  --  6.0   ALBUMIN 1.6*  --  1.6*   BILITOT 0.7  --  1.3*   ALKPHOS 248*  --  296*   AST 25  --  23   ALT 19  --  19   ANIONGAP 13  --  12   EGFRNONAA 46*  --  51*     Magnesium:   Recent Labs   Lab 03/31/19  0629 04/01/19  0445   MG 0.7* 1.3*       Significant Imaging:   Imaging Results    None       Assessment/Plan:      * Acute on chronic diastolic congestive heart failure  -suspected due hx of Diastolic CHF as well hyponatremia and B pleural effusion present on imaging-however PNA not excluded due to presence of bilateral opacities and significant leukocytosis   -Lasix 40 IV bid held due to hypotension and hypokalemia  -supplemental oxygen   -strict I/Os  -daily weights   -Echo results showed EF 60% with trivial MVS  -Troponin-flat and mildly elevated   -BNP > 500       Diarrhea  -pt states she was told she may have Crohn's dx but refused further work up and evaluation   -pt reports intermittent episodes x 2 years usually relieved by Immodium at home  -stool studies pending         Hypomagnesemia  Mag 0.7>1.3  -replaced      Abnormal chest x-ray  -in the setting of PNA vs pulmonary congestion   -plan as previously discussed      Sepsis  -WBC 24 and RR 22 in the setting of PNA  -IV antibiotics   -blood culture results with NGTD  -IV bolus given in ED   -IV hydration not continued due to CHF  -Leukocytosis persists  -CT of chest/abdomen/pelvis pending         Hyponatremia  -Na 129  Moderate Hyponatremia-- initially dilutional  IV lasix held      Centrilobular emphysema  Mild COPD  -supplemental oxygen   -Duonebs prn      Pneumonia  Suspected due to opacities on imaging and significant leukocytosis in the setting of pleural effusion   -IV antibiotics   -supplemental oxygen   -blood culture results with no growth to date  -Duonebs prn   -IS   -repeat xray showed pleural effusion vs  perihilar infiltrate or congestion  -pt afebrile with leukocytosis continued   -CT of chest/abdomen/pelvis pending       Diabetes mellitus type 2 without retinopathy  Check A1c  -SSI and Accuchecks continued      Iron deficiency anemia  -H/H 8/26  -Heme/Onc following   -iron studies reviewed   -Venofer x 1 dose given on 3/30/19  -ferrous sulfate initiated   -PRBCs x 1 unit given   -repeat CBC in am   -4/1-H/H stable post transfusion         VTE Risk Mitigation (From admission, onward)        Ordered     IP VTE HIGH RISK PATIENT  Once      03/29/19 2009     Place sequential compression device  Until discontinued      03/29/19 2009              Kate Patrick NP  Department of Hospital Medicine   [facility]

## 2019-04-01 NOTE — NURSING
Chart reviewed for diabetes  Attempted to discuss with patient,    She is asleep and did not disturb  Diabetes is not a new diagnosis and glucose is stable. No further action unless diabetes educational needs are identified

## 2019-04-01 NOTE — PLAN OF CARE
Problem: Adult Inpatient Plan of Care  Goal: Plan of Care Review  Outcome: Ongoing (interventions implemented as appropriate)  Answered pt and pt's son's questions regarding blood transfusion prior to starting blood. VSS. Bed alarm activated. Encouraged pt to use IS during shift. Pt ambulates to bathroom with one assist.   Fall precautions in place. Call light and personal items within reach. Educated patient on side effects of medication administered. Pt verbalized understanding. 24 hour order check done.  Will continue to monitor.

## 2019-04-01 NOTE — PT/OT/SLP EVAL
Occupational Therapy   Evaluation    Name: Yovani Pulido  MRN: [id number]  Admitting Diagnosis:  Acute on chronic diastolic congestive heart failure      Recommendations:     Discharge Recommendations: home health OT  Discharge Equipment Recommendations:     Barriers to discharge:  None    Assessment:     Yovani Pulido is a 86 y.o. female with a medical diagnosis of Acute on chronic diastolic congestive heart failure.  She presents with DEBILITY AND GENERALIZED WEAKNESS. Performance deficits affecting function: weakness, impaired self care skills, impaired balance, decreased safety awareness, decreased ROM, impaired endurance, impaired functional mobilty, gait instability, pain.      Rehab Prognosis: Fair; patient would benefit from acute skilled OT services to address these deficits and reach maximum level of function.       Plan:     Patient to be seen 3 x/week to address the above listed problems via self-care/home management, therapeutic activities, therapeutic exercises  · Plan of Care Expires: 04/01/19  · Plan of Care Reviewed with: patient    Subjective     Chief Complaint: DEBILITY AND GENERALIZED WEAKNESS  Patient/Family Comments/goals:     Occupational Profile:  Living Environment: LIVES SON IN 1 STORY HOUSE WITH NO STEPS  Previous level of function: (I) WITH ADL'S AND MOD(I) WITH FUNCTIONAL MOBILITY  Roles and Routines: OCCUPATIONAL THERAPY  Equipment Used at Home:  RW  Assistance upon Discharge:     Pain/Comfort:  · Pain Rating 1: 10/10  · Location - Side 1: Bilateral  · Location 1: abdomen    Patients cultural, spiritual, Scientologist conflicts given the current situation:      Objective:     Communicated with: NURSE AND Epic CHART REVIEW prior to session.  Patient found HOB elevated with   upon OT entry to room.    General Precautions: Standard, fall   Orthopedic Precautions:    Braces: N/A     Occupational Performance:    Bed Mobility:    · Patient completed Rolling/Turning to Right with  minimum assistance  · Patient completed Scooting/Bridging with minimum assistance  · Patient completed Supine to Sit with minimum assistance    Functional Mobility/Transfers:  · Patient completed Sit <> Stand Transfer with minimum assistance  with  rolling walker   · Patient completed Bed <> Chair Transfer using Step Transfer technique with minimum assistance with rolling walker  · Functional Mobility: PT AMBULATED 160 FEET WITH RW    Activities of Daily Living:  · Upper Body Dressing: minimum assistance .  · Lower Body Dressing: minimum assistance .    Cognitive/Visual Perceptual:  Cognitive/Psychosocial Skills:     -       Oriented to: Person, Place, Time and Situation   -       Follows Commands/attention:Follows two-step commands  -       Communication: clear/fluent  -       Memory: No Deficits noted  -       Safety awareness/insight to disability: impaired   Visual/Perceptual:      -Intact .    Physical Exam:  Upper Extremity Range of Motion:     -       Right Upper Extremity: WFL  -       Left Upper Extremity: WFL  Upper Extremity Strength:    -       Right Upper Extremity: MMT: 3/5 GROSSLY  -       Left Upper Extremity: MMT:3/5 GROSSLY   Strength:    -       Right Upper Extremity: MMT: 3/5 GROSSLY  -       Left Upper Extremity: MMT: 3/5 GROSSLY    AMPAC 6 Click ADL:  AMPAC Total Score: 20    Treatment & Education:    Education:    Patient left up in chair with all lines intact, call button in reach and NURSE notified    GOALS:   Multidisciplinary Problems     Occupational Therapy Goals        Problem: Occupational Therapy Goal    Goal Priority Disciplines Outcome Interventions   Occupational Therapy Goal     OT, PT/OT     Description:  OT GOALS TO BE MET BY 4-8-19  S WITH UE DRESSING  S WITH LE DRESSING  PT WILL TOLERATE 1 SET X 15 REPS B UE ROM EXCERCISE                    History:     Past Medical History:   Diagnosis Date    Anemia     Carotid stenosis     Cataract     COAG (chronic open-angle  glaucoma)     Colon polyps     Diabetes mellitus type II     steroid induced    Diverticulosis     GERD (gastroesophageal reflux disease)     hiatal hernia    Glaucoma     Heart murmur     Hyperlipidemia     Hypertension     Hypothyroidism     Rheumatoid arthritis(714.0)     Stroke     lacunar infarct       Past Surgical History:   Procedure Laterality Date    anal fissure repair      APPENDECTOMY  1944    BREAST SURGERY  1992 approx    breast biopsies- benign    CAROTID ENDARTERECTOMY  2009    left    CATARACT EXTRACTION      COLONOSCOPY  2012    COLONOSCOPY N/A 12/11/2015    [facility]formed [facility]    ESOPHAGOGASTRODUODENOSCOPY (EGD) N/A 12/11/2015    [facility]    EYE SURGERY  2004, 2005    bilateral cataracts    HERNIA REPAIR  2001, 2002    abdominal x2, with mesh on second    HYSTERECTOMY  1963    vag hyst    JOINT REPLACEMENT  2008    right knee    THORACENTESIS - Outpatient Right 3/23/2018    Performed by [facility]man [facility] at [facility]    yag Left        Time Tracking:     OT Date of Treatment: 04/01/19  OT Start Time: 1015  OT Stop Time: 1040  OT Total Time (min): 25 min    Billable Minutes:Evaluation 10 MINUTES  Therapeutic Activity 15 MINUTES    Kady Ellsworth, OT  4/1/2019

## 2019-04-02 PROBLEM — R93.89 ABNORMAL FINDING ON CT SCAN: Status: ACTIVE | Noted: 2019-04-02

## 2019-04-02 PROBLEM — K52.9 ENTERITIS: Status: ACTIVE | Noted: 2019-04-02

## 2019-04-02 LAB
ALBUMIN SERPL BCP-MCNC: 1.4 G/DL (ref 3.5–5.2)
ALP SERPL-CCNC: 346 U/L (ref 55–135)
ALT SERPL W/O P-5'-P-CCNC: 18 U/L (ref 10–44)
ANION GAP SERPL CALC-SCNC: 11 MMOL/L (ref 8–16)
AST SERPL-CCNC: 23 U/L (ref 10–40)
BASOPHILS # BLD AUTO: 0.01 K/UL (ref 0–0.2)
BASOPHILS NFR BLD: 0 % (ref 0–1.9)
BILIRUB SERPL-MCNC: 0.7 MG/DL (ref 0.1–1)
BUN SERPL-MCNC: 13 MG/DL (ref 8–23)
CALCIUM SERPL-MCNC: 7.8 MG/DL (ref 8.7–10.5)
CHLORIDE SERPL-SCNC: 88 MMOL/L (ref 95–110)
CO2 SERPL-SCNC: 28 MMOL/L (ref 23–29)
CREAT SERPL-MCNC: 1 MG/DL (ref 0.5–1.4)
CREAT UR-MCNC: 84.3 MG/DL (ref 15–325)
DACRYOCYTES BLD QL SMEAR: ABNORMAL
DIFFERENTIAL METHOD: ABNORMAL
EOSINOPHIL # BLD AUTO: 0.1 K/UL (ref 0–0.5)
EOSINOPHIL NFR BLD: 0.4 % (ref 0–8)
ERYTHROCYTE [DISTWIDTH] IN BLOOD BY AUTOMATED COUNT: 18.2 % (ref 11.5–14.5)
EST. GFR  (AFRICAN AMERICAN): 59 ML/MIN/1.73 M^2
EST. GFR  (NON AFRICAN AMERICAN): 51 ML/MIN/1.73 M^2
GLUCOSE SERPL-MCNC: 197 MG/DL (ref 70–110)
HCT VFR BLD AUTO: 28.8 % (ref 37–48.5)
HGB BLD-MCNC: 9.2 G/DL (ref 12–16)
LACTATE SERPL-SCNC: 1.6 MMOL/L (ref 0.5–2.2)
LYMPHOCYTES # BLD AUTO: 0.6 K/UL (ref 1–4.8)
LYMPHOCYTES NFR BLD: 2.7 % (ref 18–48)
MAGNESIUM SERPL-MCNC: 1.8 MG/DL (ref 1.6–2.6)
MCH RBC QN AUTO: 29.5 PG (ref 27–31)
MCHC RBC AUTO-ENTMCNC: 31.9 G/DL (ref 32–36)
MCV RBC AUTO: 92 FL (ref 82–98)
MONOCYTES # BLD AUTO: 0.4 K/UL (ref 0.3–1)
MONOCYTES NFR BLD: 2 % (ref 4–15)
NEUTROPHILS # BLD AUTO: 20.3 K/UL (ref 1.8–7.7)
NEUTROPHILS NFR BLD: 95.6 % (ref 38–73)
OSMOLALITY UR: 436 MOSM/KG (ref 50–1200)
OVALOCYTES BLD QL SMEAR: ABNORMAL
PLATELET # BLD AUTO: 696 K/UL (ref 150–350)
PMV BLD AUTO: 9 FL (ref 9.2–12.9)
POCT GLUCOSE: 179 MG/DL (ref 70–110)
POCT GLUCOSE: 180 MG/DL (ref 70–110)
POCT GLUCOSE: 183 MG/DL (ref 70–110)
POCT GLUCOSE: 192 MG/DL (ref 70–110)
POIKILOCYTOSIS BLD QL SMEAR: SLIGHT
POLYCHROMASIA BLD QL SMEAR: ABNORMAL
POTASSIUM SERPL-SCNC: 3.4 MMOL/L (ref 3.5–5.1)
PROT SERPL-MCNC: 5.5 G/DL (ref 6–8.4)
RBC # BLD AUTO: 3.12 M/UL (ref 4–5.4)
SODIUM SERPL-SCNC: 124 MMOL/L (ref 136–145)
SODIUM SERPL-SCNC: 127 MMOL/L (ref 136–145)
SODIUM UR-SCNC: 21 MMOL/L (ref 20–250)
SPHEROCYTES BLD QL SMEAR: ABNORMAL
STOMATOCYTES BLD QL SMEAR: PRESENT
TARGETS BLD QL SMEAR: ABNORMAL
URATE SERPL-MCNC: 2.6 MG/DL (ref 2.4–5.7)
URATE SERPL-MCNC: 2.8 MG/DL (ref 2.4–5.7)
WBC # BLD AUTO: 21.39 K/UL (ref 3.9–12.7)

## 2019-04-02 PROCEDURE — 27000221 HC OXYGEN, UP TO 24 HOURS

## 2019-04-02 PROCEDURE — 25000003 PHARM REV CODE 250: Performed by: NURSE PRACTITIONER

## 2019-04-02 PROCEDURE — 83935 ASSAY OF URINE OSMOLALITY: CPT

## 2019-04-02 PROCEDURE — 25000003 PHARM REV CODE 250: Performed by: EMERGENCY MEDICINE

## 2019-04-02 PROCEDURE — 94799 UNLISTED PULMONARY SVC/PX: CPT

## 2019-04-02 PROCEDURE — 83735 ASSAY OF MAGNESIUM: CPT

## 2019-04-02 PROCEDURE — 99233 PR SUBSEQUENT HOSPITAL CARE,LEVL III: ICD-10-PCS | Mod: ,,, | Performed by: INTERNAL MEDICINE

## 2019-04-02 PROCEDURE — 94761 N-INVAS EAR/PLS OXIMETRY MLT: CPT

## 2019-04-02 PROCEDURE — 36415 COLL VENOUS BLD VENIPUNCTURE: CPT

## 2019-04-02 PROCEDURE — 83605 ASSAY OF LACTIC ACID: CPT

## 2019-04-02 PROCEDURE — 84295 ASSAY OF SERUM SODIUM: CPT

## 2019-04-02 PROCEDURE — 25000003 PHARM REV CODE 250: Performed by: INTERNAL MEDICINE

## 2019-04-02 PROCEDURE — 84300 ASSAY OF URINE SODIUM: CPT

## 2019-04-02 PROCEDURE — 82570 ASSAY OF URINE CREATININE: CPT

## 2019-04-02 PROCEDURE — 85025 COMPLETE CBC W/AUTO DIFF WBC: CPT

## 2019-04-02 PROCEDURE — 11000001 HC ACUTE MED/SURG PRIVATE ROOM

## 2019-04-02 PROCEDURE — 21400001 HC TELEMETRY ROOM

## 2019-04-02 PROCEDURE — 63600175 PHARM REV CODE 636 W HCPCS: Performed by: EMERGENCY MEDICINE

## 2019-04-02 PROCEDURE — 84550 ASSAY OF BLOOD/URIC ACID: CPT | Mod: 91

## 2019-04-02 PROCEDURE — 80053 COMPREHEN METABOLIC PANEL: CPT

## 2019-04-02 PROCEDURE — 99233 SBSQ HOSP IP/OBS HIGH 50: CPT | Mod: ,,, | Performed by: INTERNAL MEDICINE

## 2019-04-02 RX ORDER — SODIUM CHLORIDE 9 MG/ML
INJECTION, SOLUTION INTRAVENOUS CONTINUOUS
Status: DISCONTINUED | OUTPATIENT
Start: 2019-04-02 | End: 2019-04-03

## 2019-04-02 RX ORDER — POTASSIUM CHLORIDE 20 MEQ/1
20 TABLET, EXTENDED RELEASE ORAL ONCE
Status: COMPLETED | OUTPATIENT
Start: 2019-04-02 | End: 2019-04-02

## 2019-04-02 RX ORDER — POTASSIUM CHLORIDE 20 MEQ/1
40 TABLET, EXTENDED RELEASE ORAL ONCE
Status: COMPLETED | OUTPATIENT
Start: 2019-04-02 | End: 2019-04-02

## 2019-04-02 RX ORDER — ALPRAZOLAM 0.5 MG/1
0.5 TABLET ORAL ONCE
Status: COMPLETED | OUTPATIENT
Start: 2019-04-02 | End: 2019-04-02

## 2019-04-02 RX ADMIN — POTASSIUM CHLORIDE 40 MEQ: 1500 TABLET, EXTENDED RELEASE ORAL at 09:04

## 2019-04-02 RX ADMIN — SODIUM CHLORIDE: 0.9 INJECTION, SOLUTION INTRAVENOUS at 06:04

## 2019-04-02 RX ADMIN — FERROUS SULFATE TAB EC 325 MG (65 MG FE EQUIVALENT) 325 MG: 325 (65 FE) TABLET DELAYED RESPONSE at 09:04

## 2019-04-02 RX ADMIN — RAMELTEON 8 MG: 8 TABLET, FILM COATED ORAL at 09:04

## 2019-04-02 RX ADMIN — PIPERACILLIN SODIUM AND TAZOBACTAM SODIUM 4.5 G: 4; .5 INJECTION, POWDER, LYOPHILIZED, FOR SOLUTION INTRAVENOUS at 05:04

## 2019-04-02 RX ADMIN — PRAVASTATIN SODIUM 40 MG: 20 TABLET ORAL at 09:04

## 2019-04-02 RX ADMIN — POTASSIUM CHLORIDE 20 MEQ: 1500 TABLET, EXTENDED RELEASE ORAL at 09:04

## 2019-04-02 RX ADMIN — PIPERACILLIN SODIUM AND TAZOBACTAM SODIUM 4.5 G: 4; .5 INJECTION, POWDER, LYOPHILIZED, FOR SOLUTION INTRAVENOUS at 12:04

## 2019-04-02 RX ADMIN — ALPRAZOLAM 0.5 MG: 0.5 TABLET ORAL at 09:04

## 2019-04-02 RX ADMIN — LEVOTHYROXINE SODIUM 50 MCG: 50 TABLET ORAL at 05:04

## 2019-04-02 RX ADMIN — PIPERACILLIN SODIUM AND TAZOBACTAM SODIUM 4.5 G: 4; .5 INJECTION, POWDER, LYOPHILIZED, FOR SOLUTION INTRAVENOUS at 09:04

## 2019-04-02 NOTE — PROGRESS NOTES
[facility] -   Hematology/Oncology  Progress Note    Patient Name: Yovani Pulido  Admission Date: 3/29/2019  Hospital Length of Stay: 2 days  Code Status: Full Code     Subjective:     HPI:    86-year-old female with a history of rheumatoid arthritis, COPD, diabetes mellitus, CHF who was admitted for progressive shortness of breath over the previous week.  The patient was found to be hypoxic admitted with chest x-ray changes consistent with pneumonia versus pulmonary edema.  She was admitted and diuresed overnight with significant improvement in shortness of breath.    Interval History: Patient had a CT scan of the abdomen on 4/1/19, results show hypodense lesions in the Liver and enlarged lymph nodes. Will discuss these results with the family when they are available.     Oncology Treatment Plan:   [No treatment plan]    Medications:  Continuous Infusions:  Scheduled Meds:   ferrous sulfate  325 mg Oral Daily    levothyroxine  50 mcg Oral Before breakfast    piperacillin-tazobactam (ZOSYN) IVPB  4.5 g Intravenous Q8H    pravastatin  40 mg Oral Daily     PRN Meds:sodium chloride, albuterol-ipratropium, dextrose 50%, dextrose 50%, glucagon (human recombinant), glucose, glucose, insulin aspart U-100, midodrine, ramelteon, sodium chloride 0.9%     Review of Systems   Constitutional: Positive for activity change, appetite change and fatigue. Negative for chills, diaphoresis, fever and unexpected weight change.   HENT: Negative for congestion, hearing loss, mouth sores, nosebleeds and trouble swallowing.    Eyes: Negative for discharge and visual disturbance.   Respiratory: Negative for cough, chest tightness and shortness of breath.    Cardiovascular: Negative for chest pain, palpitations and leg swelling.   Gastrointestinal: Positive for abdominal distention, abdominal pain and diarrhea. Negative for blood in stool, constipation, nausea and vomiting.   Endocrine: Negative for cold intolerance  and heat intolerance.   Genitourinary: Negative for difficulty urinating, dyspareunia, flank pain, frequency and hematuria.   Musculoskeletal: Positive for arthralgias, back pain and myalgias.   Skin: Negative.    Neurological: Negative for dizziness, weakness, light-headedness and headaches.   Hematological: Negative for adenopathy. Does not bruise/bleed easily.   Psychiatric/Behavioral: Negative for agitation, behavioral problems and confusion. The patient is nervous/anxious.      Objective:     Vital Signs (Most Recent):  Temp: 98.8 °F (37.1 °C) (04/02/19 1227)  Pulse: 86 (04/02/19 1227)  Resp: 16 (04/02/19 1227)  BP: (!) 106/52 (04/02/19 1227)  SpO2: 100 % (04/02/19 1227) Vital Signs (24h Range):  Temp:  [97.6 °F (36.4 °C)-98.8 °F (37.1 °C)] 98.8 °F (37.1 °C)  Pulse:  [] 86  Resp:  [16-20] 16  SpO2:  [91 %-100 %] 100 %  BP: ()/(52-95) 106/52     Weight: 57.3 kg (126 lb 4.8 oz)  Body mass index is 23.85 kg/m².  Body surface area is 1.57 meters squared.      Intake/Output Summary (Last 24 hours) at 4/2/2019 1252  Last data filed at 4/2/2019 0547  Gross per 24 hour   Intake 780 ml   Output --   Net 780 ml       Physical Exam   Constitutional: She is oriented to person, place, and time. She appears lethargic. She appears cachectic. She appears ill. No distress.   HENT:   Head: Normocephalic and atraumatic.   Right Ear: Hearing and external ear normal.   Left Ear: Hearing and external ear normal.   Nose: No rhinorrhea or sinus tenderness. Right sinus exhibits no maxillary sinus tenderness and no frontal sinus tenderness. Left sinus exhibits no maxillary sinus tenderness and no frontal sinus tenderness.   Mouth/Throat: Uvula is midline, oropharynx is clear and moist and mucous membranes are normal. No oral lesions.   Eyes: Pupils are equal, round, and reactive to light. Conjunctivae are normal. Right eye exhibits no discharge. Left eye exhibits no discharge.   Neck: Normal range of motion. Carotid bruit is  not present. No tracheal deviation present. No thyromegaly present.   Cardiovascular: Normal rate, regular rhythm, S1 normal, S2 normal, normal heart sounds and intact distal pulses.   No murmur heard.  Pulses:       Dorsalis pedis pulses are 2+ on the right side, and 2+ on the left side.   Pulmonary/Chest: Effort normal and breath sounds normal. No respiratory distress.   Abdominal: Soft. She exhibits distension. She exhibits no mass. Bowel sounds are increased. There is generalized tenderness.   Musculoskeletal: Normal range of motion.   Lymphadenopathy:     She has no cervical adenopathy.        Right: No supraclavicular adenopathy present.        Left: No supraclavicular adenopathy present.   Neurological: She is oriented to person, place, and time. She has normal strength. She appears lethargic. No sensory deficit. Coordination and gait normal.   Skin: Skin is warm and dry. Capillary refill takes less than 2 seconds. No rash noted. There is pallor.   Psychiatric: Her speech is normal and behavior is normal. Judgment and thought content normal. Her mood appears anxious. Her affect is labile. Cognition and memory are normal. She exhibits a depressed mood.   Nursing note and vitals reviewed.      Significant Labs:   CBC:   Recent Labs   Lab 04/01/19  0445 04/02/19  0417   WBC 19.18* 21.39*   HGB 9.7* 9.2*   HCT 29.6* 28.8*   * 696*    and CMP:   Recent Labs   Lab 03/31/19  1344 04/01/19  0445 04/02/19  0417   NA  --  129* 127*   K 3.5 3.4* 3.4*   CL  --  89* 88*   CO2  --  28 28   GLU  --  130* 197*   BUN  --  14 13   CREATININE  --  1.0 1.0   CALCIUM  --  7.8* 7.8*   PROT  --  6.0 5.5*   ALBUMIN  --  1.6* 1.4*   BILITOT  --  1.3* 0.7   ALKPHOS  --  296* 346*   AST  --  23 23   ALT  --  19 18   ANIONGAP  --  12 11   EGFRNONAA  --  51* 51*       Diagnostic Results:  I have reviewed all pertinent imaging results/findings within the past 24 hours.    Assessment/Plan:     * Acute on chronic diastolic congestive  heart failure  Shortness of breath significantly improving with diuresis and broad-spectrum antibiotics.  However hemoglobin is only 8.0 with evidence of iron deficiency on labs.    Patient received 1 dose of Venofer yesterday with hemoglobin of 8.1 today, s/p 1 unit PRBC due to symptomatic anemia.   --Patient participating with PT  --Placed on O2NC overnight due to SOB     Iron deficiency anemia  Patient is status post 1 dose of Venofer and 1 unit PRBC since admission.   Thrombocytosis secondary to iron deficiency and inflammation/infection.  May consider myeloproliferative workup due to continued thrombocytosis.     --Hgb: 9.2 today  --Continue daily CBC, CMP        Thank you for your consult. I will follow-up with patient. Please contact us if you have any additional questions.     Reina Sullivan NP  Hematology/Oncology  [facility]

## 2019-04-02 NOTE — NURSING
Pt hypoxic on RA, SATS WNL on 2L NC. C/o L sided chest discomfort and difficulty breathing. Pt does not appear to be in any distress. Notified LESLEE Figueroa NP. Will give PRN RT tx and assess afterwards.

## 2019-04-02 NOTE — ASSESSMENT & PLAN NOTE
-WBC 24 and RR 22 in the setting of PNA  -IV antibiotics   -blood culture results with NGTD  -IV bolus given in ED   -IV hydration not continued due to CHF  -Leukocytosis persists  -CT of chest/abdomen/pelvis results reviewed

## 2019-04-02 NOTE — ASSESSMENT & PLAN NOTE
Suspected due to opacities on imaging and significant leukocytosis in the setting of pleural effusion   -IV antibiotics   -supplemental oxygen   -blood culture results with no growth to date  -Amrit prn   -IS   -repeat xray showed pleural effusion vs perihilar infiltrate or congestion  -pt afebrile with leukocytosis continued   -CT of chest/abdomen/pelvis showed pulmonary nodules scattered throughout both lungs. There is a mild amount of alveolar consolidation scattered throughout both lungs. There are hypodense masses scattered throughout the liver and spleen.

## 2019-04-02 NOTE — PLAN OF CARE
Problem: Adult Inpatient Plan of Care  Goal: Patient-Specific Goal (Individualization)  Outcome: Ongoing (interventions implemented as appropriate)  Patient remains injury free.  No signs or symptoms of distress noted. Will continue to monitor.

## 2019-04-02 NOTE — ASSESSMENT & PLAN NOTE
-H/H 8/26  -Heme/Onc following   -iron studies reviewed   -Venofer x 1 dose given on 3/30/19  -ferrous sulfate initiated   -PRBCs x 1 unit given   -repeat CBC in am   -4/1-H/H stable post transfusion   -stable

## 2019-04-02 NOTE — ASSESSMENT & PLAN NOTE
-Na 129>>127  Moderate Hyponatremia-- initially thought to be dilutional  IV lasix held  -urine sodium and urine creatinine normal   -urine osmolality pending   -Uric acid 2.6

## 2019-04-02 NOTE — PROGRESS NOTES
[facility] Hospital Medicine  Progress Note    Patient Name: Yovani Pulido  MRN: [id number]  Patient Class: IP- Inpatient   Admission Date: 3/29/2019  Length of Stay: 2 days  Attending Physician: Cassandra Henriquez MD  Primary Care Provider: Dorcas Schultz DO        Subjective:     Principal Problem:Acute on chronic diastolic congestive heart failure    HPI:  Yovani Pulido is a 86 y.o. female patient with PMHx of RA, HTN, COPD, DM, and CHF  who was sent to ER from Dr. Kendrick office (where she goes for SAKINA), for progressive SOB gradually  since last 1 week. SOB is intermittent and moderate in severity and gets worse with exertion. Pt was seen by Reina Sullivan NP (Hem/Onc) today for sxs and referred to ED for decreased O2 saturation, abnormal lung sounds, and CXR that resulted pulmonary edema vs. Pneumonia.  Associated sxs include chills and fatigue. Patient denies any CP, fever, diaphoresis, palpitations, extremity weakness/numbness, leg pain/swelling, dizziness, cough, n/v, and all other sxs at this time. No further complaints or concerns at this time.     She was admitted here in Jan 2019 for Pneumonia and was at [facility] before that for CHF exacerbation. She had Thoracentesis by Dr. Hallman last year in March 2018. Pt was suspected of severe Sepsis in the ER due to Leukocytosis of 24K with Lactate of 2.7, however her BNP is 570 and her CXR showed Pulm edema with B Pleural Effusions as well as Hyponatremia of 129 as well as mild BLE pitting edema -- hence Rehydration was stopped and pt given IV Lasix and admitted. Pt was hypoxemic initially in the office but not in the ER. Pt has normal Echo last year.           Hospital Course:  Pt admitted to Observation Unit for Acute on chronic diastolic congestive heart failure.  BNP > 500 with Troponin flat and mildly elevated.  Chest xray showed findings representative of pulmonary edema vs. pneumonia which cannot be excluded.  Pt treated  with IV Lasix and supplemental oxygen.  H/H declined in the setting CHF.  Iron studies reviewed and Venofer x 1 dose given.  Leukocytosis noted with IV antibiotics given as PNA cannot be excluded via imaging.  Pt verbalized symptom improvement on current therapy.  On 3/31/19, Lasix held in the setting hypokalemia and hypotension.  Potassium replaced with repeat lab normalized.  H/H remained low qith mild improvement noted after Venofer.  Case discussed with Heme/Onc and PRBCs x 1 unit transfused. Pt reported hx of chronic diarrhea possibly due to Chron's disease.  Pt states she does not want any endoscopy procedure due to her age.  Oral iron supplementation initiated.  Magnesium replaced.  Pt continues to report increased incidence of diarrhea.  Stool studies sent.  CT of chest/abdomen/pelvis results pending.  Potassium and Magnesium replaced.  CT results showed enteritis and pulmonary nodules scattered throughout both lungs and mild amount of alveolar consolidation scattered throughout both lungs. There are hypodense masses scattered throughout the liver and spleen.  Imaging results discussed with patient/family by Dr. Medina (Heme/Onc).  Magnesium and Potassium normalized.  Hyponatremia noted.  Urine sodium and urine creatinine normal.  Stool studies pending.  Leukocytosis continued with increase noted.      Interval History: pt imaging results discussed with patient and family at bedside.  Magnesium and Potassium normalized.  Hyponatremia continued.  Uric acid 2.6 and T. Bili 0.7.  Urine sodium and urine creatinine normal.  Urine Osmolality pending.  Diarrhea reported with stool studies pending.  Heme/Onc following.      Review of Systems   Constitutional: Positive for activity change, appetite change and fatigue. Negative for chills, diaphoresis, fever and unexpected weight change.   HENT: Negative for congestion, hearing loss, mouth sores, nosebleeds and trouble swallowing.    Eyes: Negative for discharge and  visual disturbance.   Respiratory: Negative for cough, chest tightness and shortness of breath.    Cardiovascular: Negative for chest pain, palpitations and leg swelling.   Gastrointestinal: Positive for abdominal distention, abdominal pain and diarrhea. Negative for blood in stool, constipation, nausea and vomiting.   Endocrine: Negative for cold intolerance and heat intolerance.   Genitourinary: Negative for difficulty urinating, dyspareunia, flank pain, frequency and hematuria.   Musculoskeletal: Positive for arthralgias, back pain and myalgias.   Skin: Negative.    Neurological: Positive for weakness. Negative for dizziness, light-headedness and headaches.   Hematological: Negative for adenopathy. Does not bruise/bleed easily.   Psychiatric/Behavioral: Positive for sleep disturbance. Negative for agitation, behavioral problems and confusion. The patient is nervous/anxious.      Objective:     Vital Signs (Most Recent):  Temp: 98.2 °F (36.8 °C) (04/02/19 1538)  Pulse: 93 (04/02/19 1538)  Resp: 18 (04/02/19 1538)  BP: 135/69 (04/02/19 1538)  SpO2: 99 % (04/02/19 1538) Vital Signs (24h Range):  Temp:  [97.6 °F (36.4 °C)-98.8 °F (37.1 °C)] 98.2 °F (36.8 °C)  Pulse:  [] 93  Resp:  [16-20] 18  SpO2:  [91 %-100 %] 99 %  BP: ()/(52-95) 135/69     Weight: 57.3 kg (126 lb 4.8 oz)  Body mass index is 23.85 kg/m².    Intake/Output Summary (Last 24 hours) at 4/2/2019 1635  Last data filed at 4/2/2019 1239  Gross per 24 hour   Intake 1120 ml   Output --   Net 1120 ml      Physical Exam   Constitutional: She is oriented to person, place, and time. She appears well-developed and well-nourished. She appears lethargic. She appears ill. No distress. Nasal cannula in place.   HENT:   Head: Normocephalic and atraumatic.   Right Ear: Tympanic membrane and ear canal normal.   Left Ear: Tympanic membrane and ear canal normal.   Nose: Nose normal. Right sinus exhibits no maxillary sinus tenderness and no frontal sinus  tenderness. Left sinus exhibits no maxillary sinus tenderness and no frontal sinus tenderness.   Mouth/Throat: Oropharynx is clear and moist and mucous membranes are normal. No oral lesions. Normal dentition. No oropharyngeal exudate.   Eyes: Pupils are equal, round, and reactive to light. Conjunctivae, EOM and lids are normal. Lids are everted and swept, no foreign bodies found. No scleral icterus.   Neck: Trachea normal and normal range of motion. Neck supple. No JVD present. No tracheal deviation present. No thyroid mass and no thyromegaly present.   Cardiovascular: Normal rate, regular rhythm, normal heart sounds, intact distal pulses and normal pulses. Exam reveals no gallop and no friction rub.   No murmur heard.  Pulmonary/Chest: Effort normal. No accessory muscle usage or stridor. No apnea. No respiratory distress. She has decreased breath sounds in the right lower field and the left lower field. She has no wheezes. She has no rhonchi. She has no rales. She exhibits no tenderness.   Abdominal: Soft. Normal appearance and bowel sounds are normal. She exhibits no distension and no mass. There is no hepatosplenomegaly. There is no tenderness. There is no rebound and no guarding.   Genitourinary:   Genitourinary Comments: Deferred   Musculoskeletal: Normal range of motion. She exhibits no edema or tenderness.   Lymphadenopathy:     She has no cervical adenopathy.   Neurological: She is oriented to person, place, and time. She appears lethargic. No cranial nerve deficit. She exhibits normal muscle tone. Coordination normal.   Skin: Skin is warm and dry. No abrasion, no bruising, no burn, no ecchymosis and no rash noted. Rash is not macular, not pustular and not urticarial. She is not diaphoretic. No cyanosis or erythema. There is pallor. Nails show no clubbing.   Psychiatric: She has a normal mood and affect. Her behavior is normal. Judgment and thought content normal.       Significant Labs:   CBC:   Recent Labs    Lab 04/01/19 0445 04/02/19 0417   WBC 19.18* 21.39*   HGB 9.7* 9.2*   HCT 29.6* 28.8*   * 696*     CMP:   Recent Labs   Lab 04/01/19 0445 04/02/19 0417   * 127*   K 3.4* 3.4*   CL 89* 88*   CO2 28 28   * 197*   BUN 14 13   CREATININE 1.0 1.0   CALCIUM 7.8* 7.8*   PROT 6.0 5.5*   ALBUMIN 1.6* 1.4*   BILITOT 1.3* 0.7   ALKPHOS 296* 346*   AST 23 23   ALT 19 18   ANIONGAP 12 11   EGFRNONAA 51* 51*     Magnesium:   Recent Labs   Lab 04/01/19 0445 04/01/19 1736 04/02/19 0417   MG 1.3* 2.5 1.8       Significant Imaging:   Imaging Results    None       Assessment/Plan:      * Acute on chronic diastolic congestive heart failure  -suspected due hx of Diastolic CHF as well hyponatremia and B pleural effusion present on imaging-however PNA not excluded due to presence of bilateral opacities and significant leukocytosis   -Lasix 40 IV bid held due to hypotension and hypokalemia  -supplemental oxygen   -strict I/Os  -daily weights   -Echo results showed EF 60% with trivial MVS  -Troponin-flat and mildly elevated   -BNP > 500       Enteritis  -noted on CT with the ileum has a thickened wall that measures 6 mm characteristic of enteritis  -gentle IV hydration   -IV antibiotics   -pt afebrile-leukocytosis continued   -antiemetics prn      Abnormal finding on CT scan  -imaging results showed pulmonary nodules scattered throughout both lungs. There is a mild amount of alveolar consolidation scattered throughout both lungs. There are hypodense masses scattered throughout the liver and spleen.  Results concerning for metastatic process.   -Heme/Onc following for SAKINA  -imaging results and need for biopsy discussed       Diarrhea  -in the setting of enteritis   -pt states she was told she may have Crohn's dx but refused further work up and evaluation   -pt reports intermittent episodes x 2 years usually relieved by Immodium at home  -stool studies pending         Hypomagnesemia  Mag 0.7>1.3  -normalized on  4/2  -replaced      Abnormal chest x-ray  -in the setting of PNA vs pulmonary congestion   -CT chest/abdomen/pelvis obtained   -plan as previously discussed        Sepsis  -WBC 24 and RR 22 in the setting of PNA  -IV antibiotics   -blood culture results with NGTD  -IV bolus given in ED   -IV hydration not continued due to CHF  -Leukocytosis persists  -CT of chest/abdomen/pelvis results reviewed        Hyponatremia  -Na 129>>127  Moderate Hyponatremia-- initially thought to be dilutional  IV lasix held  -urine sodium and urine creatinine normal   -urine osmolality pending   -Uric acid 2.6      Centrilobular emphysema  Mild COPD  -supplemental oxygen   -Duonebs prn      Pneumonia  Suspected due to opacities on imaging and significant leukocytosis in the setting of pleural effusion   -IV antibiotics   -supplemental oxygen   -blood culture results with no growth to date  -Duonebs prn   -IS   -repeat xray showed pleural effusion vs perihilar infiltrate or congestion  -pt afebrile with leukocytosis continued   -CT of chest/abdomen/pelvis showed pulmonary nodules scattered throughout both lungs. There is a mild amount of alveolar consolidation scattered throughout both lungs. There are hypodense masses scattered throughout the liver and spleen.      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

## 2019-04-02 NOTE — ASSESSMENT & PLAN NOTE
Patient is status post 1 dose of Venofer and 1 unit PRBC since admission.   Thrombocytosis secondary to iron deficiency and inflammation/infection.  May consider myeloproliferative workup due to continued thrombocytosis.     --Hgb: 9.2 today  --Continue daily CBC, CMP

## 2019-04-02 NOTE — SUBJECTIVE & OBJECTIVE
Interval History: Patient had a CT scan of the abdomen on 4/1/19, results show hypodense lesions in the Liver and enlarged lymph nodes. Will discuss these results with the family when they are available.     Oncology Treatment Plan:   [No treatment plan]    Medications:  Continuous Infusions:  Scheduled Meds:   ferrous sulfate  325 mg Oral Daily    levothyroxine  50 mcg Oral Before breakfast    piperacillin-tazobactam (ZOSYN) IVPB  4.5 g Intravenous Q8H    pravastatin  40 mg Oral Daily     PRN Meds:sodium chloride, albuterol-ipratropium, dextrose 50%, dextrose 50%, glucagon (human recombinant), glucose, glucose, insulin aspart U-100, midodrine, ramelteon, sodium chloride 0.9%     Review of Systems   Constitutional: Positive for activity change, appetite change and fatigue. Negative for chills, diaphoresis, fever and unexpected weight change.   HENT: Negative for congestion, hearing loss, mouth sores, nosebleeds and trouble swallowing.    Eyes: Negative for discharge and visual disturbance.   Respiratory: Negative for cough, chest tightness and shortness of breath.    Cardiovascular: Negative for chest pain, palpitations and leg swelling.   Gastrointestinal: Positive for abdominal distention, abdominal pain and diarrhea. Negative for blood in stool, constipation, nausea and vomiting.   Endocrine: Negative for cold intolerance and heat intolerance.   Genitourinary: Negative for difficulty urinating, dyspareunia, flank pain, frequency and hematuria.   Musculoskeletal: Positive for arthralgias, back pain and myalgias.   Skin: Negative.    Neurological: Negative for dizziness, weakness, light-headedness and headaches.   Hematological: Negative for adenopathy. Does not bruise/bleed easily.   Psychiatric/Behavioral: Negative for agitation, behavioral problems and confusion. The patient is nervous/anxious.      Objective:     Vital Signs (Most Recent):  Temp: 98.8 °F (37.1 °C) (04/02/19 1227)  Pulse: 86 (04/02/19  1227)  Resp: 16 (04/02/19 1227)  BP: (!) 106/52 (04/02/19 1227)  SpO2: 100 % (04/02/19 1227) Vital Signs (24h Range):  Temp:  [97.6 °F (36.4 °C)-98.8 °F (37.1 °C)] 98.8 °F (37.1 °C)  Pulse:  [] 86  Resp:  [16-20] 16  SpO2:  [91 %-100 %] 100 %  BP: ()/(52-95) 106/52     Weight: 57.3 kg (126 lb 4.8 oz)  Body mass index is 23.85 kg/m².  Body surface area is 1.57 meters squared.      Intake/Output Summary (Last 24 hours) at 4/2/2019 1252  Last data filed at 4/2/2019 0547  Gross per 24 hour   Intake 780 ml   Output --   Net 780 ml       Physical Exam   Constitutional: She is oriented to person, place, and time. She appears lethargic. She appears cachectic. She appears ill. No distress.   HENT:   Head: Normocephalic and atraumatic.   Right Ear: Hearing and external ear normal.   Left Ear: Hearing and external ear normal.   Nose: No rhinorrhea or sinus tenderness. Right sinus exhibits no maxillary sinus tenderness and no frontal sinus tenderness. Left sinus exhibits no maxillary sinus tenderness and no frontal sinus tenderness.   Mouth/Throat: Uvula is midline, oropharynx is clear and moist and mucous membranes are normal. No oral lesions.   Eyes: Pupils are equal, round, and reactive to light. Conjunctivae are normal. Right eye exhibits no discharge. Left eye exhibits no discharge.   Neck: Normal range of motion. Carotid bruit is not present. No tracheal deviation present. No thyromegaly present.   Cardiovascular: Normal rate, regular rhythm, S1 normal, S2 normal, normal heart sounds and intact distal pulses.   No murmur heard.  Pulses:       Dorsalis pedis pulses are 2+ on the right side, and 2+ on the left side.   Pulmonary/Chest: Effort normal and breath sounds normal. No respiratory distress.   Abdominal: Soft. She exhibits distension. She exhibits no mass. Bowel sounds are increased. There is generalized tenderness.   Musculoskeletal: Normal range of motion.   Lymphadenopathy:     She has no cervical  adenopathy.        Right: No supraclavicular adenopathy present.        Left: No supraclavicular adenopathy present.   Neurological: She is oriented to person, place, and time. She has normal strength. She appears lethargic. No sensory deficit. Coordination and gait normal.   Skin: Skin is warm and dry. Capillary refill takes less than 2 seconds. No rash noted. There is pallor.   Psychiatric: Her speech is normal and behavior is normal. Judgment and thought content normal. Her mood appears anxious. Her affect is labile. Cognition and memory are normal. She exhibits a depressed mood.   Nursing note and vitals reviewed.      Significant Labs:   CBC:   Recent Labs   Lab 04/01/19  0445 04/02/19  0417   WBC 19.18* 21.39*   HGB 9.7* 9.2*   HCT 29.6* 28.8*   * 696*    and CMP:   Recent Labs   Lab 03/31/19  1344 04/01/19  0445 04/02/19  0417   NA  --  129* 127*   K 3.5 3.4* 3.4*   CL  --  89* 88*   CO2  --  28 28   GLU  --  130* 197*   BUN  --  14 13   CREATININE  --  1.0 1.0   CALCIUM  --  7.8* 7.8*   PROT  --  6.0 5.5*   ALBUMIN  --  1.6* 1.4*   BILITOT  --  1.3* 0.7   ALKPHOS  --  296* 346*   AST  --  23 23   ALT  --  19 18   ANIONGAP  --  12 11   EGFRNONAA  --  51* 51*       Diagnostic Results:  I have reviewed all pertinent imaging results/findings within the past 24 hours.

## 2019-04-02 NOTE — ASSESSMENT & PLAN NOTE
Shortness of breath significantly improving with diuresis and broad-spectrum antibiotics.  However hemoglobin is only 8.0 with evidence of iron deficiency on labs.    Patient received 1 dose of Venofer yesterday with hemoglobin of 8.1 today, s/p 1 unit PRBC due to symptomatic anemia.   --Patient participating with PT  --Placed on O2NC overnight due to SOB

## 2019-04-02 NOTE — ASSESSMENT & PLAN NOTE
-noted on CT with the ileum has a thickened wall that measures 6 mm characteristic of enteritis  -gentle IV hydration   -IV antibiotics   -pt afebrile-leukocytosis continued   -antiemetics prn

## 2019-04-02 NOTE — ASSESSMENT & PLAN NOTE
-in the setting of enteritis   -pt states she was told she may have Crohn's dx but refused further work up and evaluation   -pt reports intermittent episodes x 2 years usually relieved by Immodium at home  -stool studies pending

## 2019-04-02 NOTE — ASSESSMENT & PLAN NOTE
-in the setting of PNA vs pulmonary congestion   -CT chest/abdomen/pelvis obtained   -plan as previously discussed

## 2019-04-02 NOTE — ASSESSMENT & PLAN NOTE
-imaging results showed pulmonary nodules scattered throughout both lungs. There is a mild amount of alveolar consolidation scattered throughout both lungs. There are hypodense masses scattered throughout the liver and spleen.  Results concerning for metastatic process.   -Heme/Onc following for SAKINA  -imaging results and need for biopsy discussed

## 2019-04-02 NOTE — SUBJECTIVE & OBJECTIVE
Interval History: pt imaging results discussed with patient and family at bedside.  Magnesium and Potassium normalized.  Hyponatremia continued.  Uric acid 2.6 and T. Bili 0.7.  Urine sodium and urine creatinine normal.  Urine Osmolality pending.  Diarrhea reported with stool studies pending.  Heme/Onc following.      Review of Systems   Constitutional: Positive for activity change, appetite change and fatigue. Negative for chills, diaphoresis, fever and unexpected weight change.   HENT: Negative for congestion, hearing loss, mouth sores, nosebleeds and trouble swallowing.    Eyes: Negative for discharge and visual disturbance.   Respiratory: Negative for cough, chest tightness and shortness of breath.    Cardiovascular: Negative for chest pain, palpitations and leg swelling.   Gastrointestinal: Positive for abdominal distention, abdominal pain and diarrhea. Negative for blood in stool, constipation, nausea and vomiting.   Endocrine: Negative for cold intolerance and heat intolerance.   Genitourinary: Negative for difficulty urinating, dyspareunia, flank pain, frequency and hematuria.   Musculoskeletal: Positive for arthralgias, back pain and myalgias.   Skin: Negative.    Neurological: Positive for weakness. Negative for dizziness, light-headedness and headaches.   Hematological: Negative for adenopathy. Does not bruise/bleed easily.   Psychiatric/Behavioral: Positive for sleep disturbance. Negative for agitation, behavioral problems and confusion. The patient is nervous/anxious.      Objective:     Vital Signs (Most Recent):  Temp: 98.2 °F (36.8 °C) (04/02/19 1538)  Pulse: 93 (04/02/19 1538)  Resp: 18 (04/02/19 1538)  BP: 135/69 (04/02/19 1538)  SpO2: 99 % (04/02/19 1538) Vital Signs (24h Range):  Temp:  [97.6 °F (36.4 °C)-98.8 °F (37.1 °C)] 98.2 °F (36.8 °C)  Pulse:  [] 93  Resp:  [16-20] 18  SpO2:  [91 %-100 %] 99 %  BP: ()/(52-95) 135/69     Weight: 57.3 kg (126 lb 4.8 oz)  Body mass index is 23.85  kg/m².    Intake/Output Summary (Last 24 hours) at 4/2/2019 1635  Last data filed at 4/2/2019 1239  Gross per 24 hour   Intake 1120 ml   Output --   Net 1120 ml      Physical Exam   Constitutional: She is oriented to person, place, and time. She appears well-developed and well-nourished. She appears lethargic. She appears ill. No distress. Nasal cannula in place.   HENT:   Head: Normocephalic and atraumatic.   Right Ear: Tympanic membrane and ear canal normal.   Left Ear: Tympanic membrane and ear canal normal.   Nose: Nose normal. Right sinus exhibits no maxillary sinus tenderness and no frontal sinus tenderness. Left sinus exhibits no maxillary sinus tenderness and no frontal sinus tenderness.   Mouth/Throat: Oropharynx is clear and moist and mucous membranes are normal. No oral lesions. Normal dentition. No oropharyngeal exudate.   Eyes: Pupils are equal, round, and reactive to light. Conjunctivae, EOM and lids are normal. Lids are everted and swept, no foreign bodies found. No scleral icterus.   Neck: Trachea normal and normal range of motion. Neck supple. No JVD present. No tracheal deviation present. No thyroid mass and no thyromegaly present.   Cardiovascular: Normal rate, regular rhythm, normal heart sounds, intact distal pulses and normal pulses. Exam reveals no gallop and no friction rub.   No murmur heard.  Pulmonary/Chest: Effort normal. No accessory muscle usage or stridor. No apnea. No respiratory distress. She has decreased breath sounds in the right lower field and the left lower field. She has no wheezes. She has no rhonchi. She has no rales. She exhibits no tenderness.   Abdominal: Soft. Normal appearance and bowel sounds are normal. She exhibits no distension and no mass. There is no hepatosplenomegaly. There is no tenderness. There is no rebound and no guarding.   Genitourinary:   Genitourinary Comments: Deferred   Musculoskeletal: Normal range of motion. She exhibits no edema or tenderness.    Lymphadenopathy:     She has no cervical adenopathy.   Neurological: She is oriented to person, place, and time. She appears lethargic. No cranial nerve deficit. She exhibits normal muscle tone. Coordination normal.   Skin: Skin is warm and dry. No abrasion, no bruising, no burn, no ecchymosis and no rash noted. Rash is not macular, not pustular and not urticarial. She is not diaphoretic. No cyanosis or erythema. There is pallor. Nails show no clubbing.   Psychiatric: She has a normal mood and affect. Her behavior is normal. Judgment and thought content normal.       Significant Labs:   CBC:   Recent Labs   Lab 04/01/19 0445 04/02/19 0417   WBC 19.18* 21.39*   HGB 9.7* 9.2*   HCT 29.6* 28.8*   * 696*     CMP:   Recent Labs   Lab 04/01/19 0445 04/02/19 0417   * 127*   K 3.4* 3.4*   CL 89* 88*   CO2 28 28   * 197*   BUN 14 13   CREATININE 1.0 1.0   CALCIUM 7.8* 7.8*   PROT 6.0 5.5*   ALBUMIN 1.6* 1.4*   BILITOT 1.3* 0.7   ALKPHOS 296* 346*   AST 23 23   ALT 19 18   ANIONGAP 12 11   EGFRNONAA 51* 51*     Magnesium:   Recent Labs   Lab 04/01/19 0445 04/01/19  1736 04/02/19 0417   MG 1.3* 2.5 1.8       Significant Imaging:   Imaging Results    None

## 2019-04-03 LAB
ALBUMIN SERPL BCP-MCNC: 1.6 G/DL (ref 3.5–5.2)
ALP SERPL-CCNC: 434 U/L (ref 55–135)
ALT SERPL W/O P-5'-P-CCNC: 21 U/L (ref 10–44)
ANION GAP SERPL CALC-SCNC: 11 MMOL/L (ref 8–16)
ANION GAP SERPL CALC-SCNC: 9 MMOL/L (ref 8–16)
AST SERPL-CCNC: 19 U/L (ref 10–40)
BACTERIA BLD CULT: NORMAL
BACTERIA BLD CULT: NORMAL
BASOPHILS # BLD AUTO: 0.02 K/UL (ref 0–0.2)
BASOPHILS NFR BLD: 0.1 % (ref 0–1.9)
BILIRUB SERPL-MCNC: 0.9 MG/DL (ref 0.1–1)
BUN SERPL-MCNC: 13 MG/DL (ref 8–23)
BUN SERPL-MCNC: 13 MG/DL (ref 8–23)
CALCIUM SERPL-MCNC: 8.2 MG/DL (ref 8.7–10.5)
CALCIUM SERPL-MCNC: 8.9 MG/DL (ref 8.7–10.5)
CHLORIDE SERPL-SCNC: 90 MMOL/L (ref 95–110)
CHLORIDE SERPL-SCNC: 90 MMOL/L (ref 95–110)
CO2 SERPL-SCNC: 25 MMOL/L (ref 23–29)
CO2 SERPL-SCNC: 27 MMOL/L (ref 23–29)
CORTIS SERPL-MCNC: 20 UG/DL
CREAT SERPL-MCNC: 0.9 MG/DL (ref 0.5–1.4)
CREAT SERPL-MCNC: 1 MG/DL (ref 0.5–1.4)
DIFFERENTIAL METHOD: ABNORMAL
EOSINOPHIL # BLD AUTO: 0.2 K/UL (ref 0–0.5)
EOSINOPHIL NFR BLD: 0.5 % (ref 0–8)
ERYTHROCYTE [DISTWIDTH] IN BLOOD BY AUTOMATED COUNT: 17.8 % (ref 11.5–14.5)
EST. GFR  (AFRICAN AMERICAN): 59 ML/MIN/1.73 M^2
EST. GFR  (AFRICAN AMERICAN): >60 ML/MIN/1.73 M^2
EST. GFR  (NON AFRICAN AMERICAN): 51 ML/MIN/1.73 M^2
EST. GFR  (NON AFRICAN AMERICAN): 58 ML/MIN/1.73 M^2
GLUCOSE SERPL-MCNC: 149 MG/DL (ref 70–110)
GLUCOSE SERPL-MCNC: 155 MG/DL (ref 70–110)
HCT VFR BLD AUTO: 30.7 % (ref 37–48.5)
HGB BLD-MCNC: 9.7 G/DL (ref 12–16)
LYMPHOCYTES # BLD AUTO: 1.1 K/UL (ref 1–4.8)
LYMPHOCYTES NFR BLD: 3.4 % (ref 18–48)
MAGNESIUM SERPL-MCNC: 1.3 MG/DL (ref 1.6–2.6)
MCH RBC QN AUTO: 29.6 PG (ref 27–31)
MCHC RBC AUTO-ENTMCNC: 31.6 G/DL (ref 32–36)
MCV RBC AUTO: 94 FL (ref 82–98)
MONOCYTES # BLD AUTO: 1.3 K/UL (ref 0.3–1)
MONOCYTES NFR BLD: 4.4 % (ref 4–15)
NEUTROPHILS # BLD AUTO: 28.2 K/UL (ref 1.8–7.7)
NEUTROPHILS NFR BLD: 93.1 % (ref 38–73)
OB PNL STL: NEGATIVE
OVALOCYTES BLD QL SMEAR: ABNORMAL
PLATELET # BLD AUTO: 656 K/UL (ref 150–350)
PMV BLD AUTO: 8.9 FL (ref 9.2–12.9)
POCT GLUCOSE: 159 MG/DL (ref 70–110)
POCT GLUCOSE: 181 MG/DL (ref 70–110)
POCT GLUCOSE: 184 MG/DL (ref 70–110)
POCT GLUCOSE: 247 MG/DL (ref 70–110)
POIKILOCYTOSIS BLD QL SMEAR: SLIGHT
POLYCHROMASIA BLD QL SMEAR: ABNORMAL
POTASSIUM SERPL-SCNC: 3.9 MMOL/L (ref 3.5–5.1)
POTASSIUM SERPL-SCNC: 4.2 MMOL/L (ref 3.5–5.1)
PROCALCITONIN SERPL IA-MCNC: 0.61 NG/ML
PROT SERPL-MCNC: 6.1 G/DL (ref 6–8.4)
RBC # BLD AUTO: 3.28 M/UL (ref 4–5.4)
SODIUM SERPL-SCNC: 126 MMOL/L (ref 136–145)
SODIUM SERPL-SCNC: 126 MMOL/L (ref 136–145)
STOMATOCYTES BLD QL SMEAR: PRESENT
TARGETS BLD QL SMEAR: ABNORMAL
TSH SERPL DL<=0.005 MIU/L-ACNC: 3.81 UIU/ML (ref 0.4–4)
WBC # BLD AUTO: 30.8 K/UL (ref 3.9–12.7)
WBC #/AREA STL HPF: NORMAL /[HPF]

## 2019-04-03 PROCEDURE — 36415 COLL VENOUS BLD VENIPUNCTURE: CPT

## 2019-04-03 PROCEDURE — 25000003 PHARM REV CODE 250: Performed by: EMERGENCY MEDICINE

## 2019-04-03 PROCEDURE — 82533 TOTAL CORTISOL: CPT

## 2019-04-03 PROCEDURE — 99233 SBSQ HOSP IP/OBS HIGH 50: CPT | Mod: ,,, | Performed by: INTERNAL MEDICINE

## 2019-04-03 PROCEDURE — 83735 ASSAY OF MAGNESIUM: CPT

## 2019-04-03 PROCEDURE — 82272 OCCULT BLD FECES 1-3 TESTS: CPT

## 2019-04-03 PROCEDURE — 84145 PROCALCITONIN (PCT): CPT

## 2019-04-03 PROCEDURE — 25000003 PHARM REV CODE 250: Performed by: NURSE PRACTITIONER

## 2019-04-03 PROCEDURE — 94640 AIRWAY INHALATION TREATMENT: CPT

## 2019-04-03 PROCEDURE — 25000003 PHARM REV CODE 250: Performed by: INTERNAL MEDICINE

## 2019-04-03 PROCEDURE — 85025 COMPLETE CBC W/AUTO DIFF WBC: CPT

## 2019-04-03 PROCEDURE — 94760 N-INVAS EAR/PLS OXIMETRY 1: CPT

## 2019-04-03 PROCEDURE — 99233 PR SUBSEQUENT HOSPITAL CARE,LEVL III: ICD-10-PCS | Mod: ,,, | Performed by: INTERNAL MEDICINE

## 2019-04-03 PROCEDURE — 63600175 PHARM REV CODE 636 W HCPCS: Performed by: NURSE PRACTITIONER

## 2019-04-03 PROCEDURE — 21400001 HC TELEMETRY ROOM

## 2019-04-03 PROCEDURE — 80053 COMPREHEN METABOLIC PANEL: CPT

## 2019-04-03 PROCEDURE — 94799 UNLISTED PULMONARY SVC/PX: CPT

## 2019-04-03 PROCEDURE — 84443 ASSAY THYROID STIM HORMONE: CPT

## 2019-04-03 PROCEDURE — 80048 BASIC METABOLIC PNL TOTAL CA: CPT

## 2019-04-03 PROCEDURE — 63600175 PHARM REV CODE 636 W HCPCS: Performed by: EMERGENCY MEDICINE

## 2019-04-03 PROCEDURE — 97530 THERAPEUTIC ACTIVITIES: CPT

## 2019-04-03 PROCEDURE — 87045 FECES CULTURE AEROBIC BACT: CPT

## 2019-04-03 PROCEDURE — 27000221 HC OXYGEN, UP TO 24 HOURS

## 2019-04-03 PROCEDURE — 83630 LACTOFERRIN FECAL (QUAL): CPT

## 2019-04-03 PROCEDURE — 99900035 HC TECH TIME PER 15 MIN (STAT)

## 2019-04-03 PROCEDURE — 87046 STOOL CULTR AEROBIC BACT EA: CPT

## 2019-04-03 PROCEDURE — 97110 THERAPEUTIC EXERCISES: CPT

## 2019-04-03 PROCEDURE — 87209 SMEAR COMPLEX STAIN: CPT

## 2019-04-03 PROCEDURE — 87427 SHIGA-LIKE TOXIN AG IA: CPT

## 2019-04-03 PROCEDURE — 25000242 PHARM REV CODE 250 ALT 637 W/ HCPCS: Performed by: NURSE PRACTITIONER

## 2019-04-03 PROCEDURE — 94761 N-INVAS EAR/PLS OXIMETRY MLT: CPT

## 2019-04-03 PROCEDURE — 89055 LEUKOCYTE ASSESSMENT FECAL: CPT

## 2019-04-03 RX ORDER — METOPROLOL TARTRATE 25 MG/1
12.5 TABLET ORAL 2 TIMES DAILY
Status: DISCONTINUED | OUTPATIENT
Start: 2019-04-03 | End: 2019-04-04 | Stop reason: HOSPADM

## 2019-04-03 RX ORDER — LANOLIN ALCOHOL/MO/W.PET/CERES
400 CREAM (GRAM) TOPICAL DAILY
Status: DISCONTINUED | OUTPATIENT
Start: 2019-04-03 | End: 2019-04-03

## 2019-04-03 RX ORDER — METRONIDAZOLE 500 MG/1
500 TABLET ORAL EVERY 8 HOURS
Status: DISCONTINUED | OUTPATIENT
Start: 2019-04-03 | End: 2019-04-03

## 2019-04-03 RX ORDER — LANOLIN ALCOHOL/MO/W.PET/CERES
400 CREAM (GRAM) TOPICAL 2 TIMES DAILY
Status: DISCONTINUED | OUTPATIENT
Start: 2019-04-03 | End: 2019-04-04 | Stop reason: HOSPADM

## 2019-04-03 RX ORDER — METOPROLOL TARTRATE 25 MG/1
12.5 TABLET ORAL 2 TIMES DAILY
Status: DISCONTINUED | OUTPATIENT
Start: 2019-04-03 | End: 2019-04-03

## 2019-04-03 RX ADMIN — LEVOTHYROXINE SODIUM 50 MCG: 50 TABLET ORAL at 05:04

## 2019-04-03 RX ADMIN — PIPERACILLIN SODIUM AND TAZOBACTAM SODIUM 4.5 G: 4; .5 INJECTION, POWDER, LYOPHILIZED, FOR SOLUTION INTRAVENOUS at 05:04

## 2019-04-03 RX ADMIN — PRAVASTATIN SODIUM 40 MG: 20 TABLET ORAL at 09:04

## 2019-04-03 RX ADMIN — FERROUS SULFATE TAB EC 325 MG (65 MG FE EQUIVALENT) 325 MG: 325 (65 FE) TABLET DELAYED RESPONSE at 09:04

## 2019-04-03 RX ADMIN — INSULIN ASPART 2 UNITS: 100 INJECTION, SOLUTION INTRAVENOUS; SUBCUTANEOUS at 05:04

## 2019-04-03 RX ADMIN — SODIUM CHLORIDE TAB 1 GM 2 G: 1 TAB at 09:04

## 2019-04-03 RX ADMIN — Medication 125 MG: at 05:04

## 2019-04-03 RX ADMIN — PIPERACILLIN SODIUM AND TAZOBACTAM SODIUM 4.5 G: 4; .5 INJECTION, POWDER, LYOPHILIZED, FOR SOLUTION INTRAVENOUS at 08:04

## 2019-04-03 RX ADMIN — IPRATROPIUM BROMIDE AND ALBUTEROL SULFATE 3 ML: .5; 3 SOLUTION RESPIRATORY (INHALATION) at 12:04

## 2019-04-03 RX ADMIN — PIPERACILLIN SODIUM AND TAZOBACTAM SODIUM 4.5 G: 4; .5 INJECTION, POWDER, LYOPHILIZED, FOR SOLUTION INTRAVENOUS at 02:04

## 2019-04-03 RX ADMIN — SODIUM CHLORIDE TAB 1 GM 1 G: 1 TAB at 03:04

## 2019-04-03 RX ADMIN — MAGNESIUM OXIDE TAB 400 MG (241.3 MG ELEMENTAL MG) 400 MG: 400 (241.3 MG) TAB at 10:04

## 2019-04-03 RX ADMIN — MAGNESIUM OXIDE TAB 400 MG (241.3 MG ELEMENTAL MG) 400 MG: 400 (241.3 MG) TAB at 09:04

## 2019-04-03 RX ADMIN — Medication 125 MG: at 12:04

## 2019-04-03 RX ADMIN — SODIUM CHLORIDE TAB 1 GM 1 G: 1 TAB at 09:04

## 2019-04-03 RX ADMIN — RAMELTEON 8 MG: 8 TABLET, FILM COATED ORAL at 09:04

## 2019-04-03 RX ADMIN — METOPROLOL TARTRATE 12.5 MG: 25 TABLET, FILM COATED ORAL at 03:04

## 2019-04-03 NOTE — PROGRESS NOTES
[facility] Hospital Medicine  Progress Note    Patient Name: Yovani Pulido  MRN: [id number]  Patient Class: IP- Inpatient   Admission Date: 3/29/2019  Length of Stay: 3 days  Attending Physician: Cassandra Henriquez MD  Primary Care Provider: Dorcas Schultz DO        Subjective:     Principal Problem:Acute on chronic diastolic congestive heart failure    HPI:  Yovani Pulido is a 86 y.o. female patient with PMHx of RA, HTN, COPD, DM, and CHF  who was sent to ER from Dr. Kendrick office (where she goes for SAK[facility]), for progressive SOB gradually  since last 1 week. SOB is intermittent and moderate in severity and gets worse with exertion. Pt was seen by Reina Sullivan NP (Hem/Onc) today for sxs and referred to ED for decreased O2 saturation, abnormal lung sounds, and CXR that resulted pulmonary edema vs. Pneumonia.  Associated sxs include chills and fatigue. Patient denies any CP, fever, diaphoresis, palpitations, extremity weakness/numbness, leg pain/swelling, dizziness, cough, n/v, and all other sxs at this time. No further complaints or concerns at this time.     She was admitted here in Jan 2019 for Pneumonia and was at [facility] before that for CHF exacerbation. She had Thoracentesis by Dr. Hallman last year in March 2018. Pt was suspected of severe Sepsis in the ER due to Leukocytosis of 24K with Lactate of 2.7, however her BNP is 570 and her CXR showed Pulm edema with B Pleural Effusions as well as Hyponatremia of 129 as well as mild BLE pitting edema -- hence Rehydration was stopped and pt given IV Lasix and admitted. Pt was hypoxemic initially in the office but not in the ER. Pt has normal Echo last year.           Hospital Course:  Pt admitted to Observation Unit for Acute on chronic diastolic congestive heart failure.  BNP > 500 with Troponin flat and mildly elevated.  Chest xray showed findings representative of pulmonary edema vs. pneumonia which cannot be excluded.  Pt treated  with IV Lasix and supplemental oxygen.  H/H declined in the setting CHF.  Iron studies reviewed and Venofer x 1 dose given.  Leukocytosis noted with IV antibiotics given as PNA cannot be excluded via imaging.  Pt verbalized symptom improvement on current therapy.  On 3/31/19, Lasix held in the setting hypokalemia and hypotension.  Potassium replaced with repeat lab normalized.  H/H remained low qith mild improvement noted after Venofer.  Case discussed with Heme/Onc and PRBCs x 1 unit transfused. Pt reported hx of chronic diarrhea possibly due to Chron's disease.  Pt states she does not want any endoscopy procedure due to her age.  Oral iron supplementation initiated.  Magnesium replaced.  Pt continues to report increased incidence of diarrhea.  Stool studies sent.  CT of chest/abdomen/pelvis results pending.  Potassium and Magnesium replaced.  CT results showed enteritis and pulmonary nodules scattered throughout both lungs and mild amount of alveolar consolidation scattered throughout both lungs. There are hypodense masses scattered throughout the liver and spleen.  Imaging results concerning for metastasis and discussed with patient/family by Dr. Medina (Heme/Onc).  Magnesium and Potassium normalized.  Hyponatremia noted.  Urine sodium and urine creatinine normal.  Stool studies and lactoferritin pending.   ID consulted due to persistent leukocytosis with continued increase.  Antibiotics adjusted per recommendation.  Pt states she does not want biopsy or any further testing for CT findings and would like to proceed with hospice.  CM consulted for discharge planning and to establish Hospice.  IV hydration and oral sodium supplements continued for hyponatremia.  Pt has elected to change code status to DNR.      Interval History: pt more lethargic and continues to complain of weakness, fatigue, abdominal discomfort, and abdominal distention.  Diarrhea x 3 episodes.  Stool studies reordered.  ID consulted due to  increasing WBCs.  Antibiotics adjusted.  Na of 126 noted with IV hydration and oral supplementation continued.  Code status changed to DNR and CM consulted to assist with hospice.      Review of Systems   Constitutional: Positive for activity change, appetite change and fatigue. Negative for chills, diaphoresis, fever and unexpected weight change.   HENT: Negative for congestion, hearing loss, mouth sores, nosebleeds and trouble swallowing.    Eyes: Negative for discharge and visual disturbance.   Respiratory: Negative for cough, chest tightness and shortness of breath.    Cardiovascular: Negative for chest pain, palpitations and leg swelling.   Gastrointestinal: Positive for abdominal distention, abdominal pain and diarrhea. Negative for blood in stool, constipation, nausea and vomiting.   Endocrine: Negative for cold intolerance and heat intolerance.   Genitourinary: Negative for difficulty urinating, dyspareunia, flank pain, frequency and hematuria.   Musculoskeletal: Positive for arthralgias and back pain. Negative for myalgias.   Skin: Negative.    Neurological: Positive for weakness. Negative for dizziness, light-headedness and headaches.   Hematological: Negative for adenopathy. Does not bruise/bleed easily.   Psychiatric/Behavioral: Positive for sleep disturbance. Negative for agitation, behavioral problems and confusion. The patient is nervous/anxious.      Objective:     Vital Signs (Most Recent):  Temp: 97.5 °F (36.4 °C) (04/03/19 1119)  Pulse: 89 (04/03/19 1132)  Resp: (!) 24 (04/03/19 1119)  BP: (!) 156/72 (04/03/19 1119)  SpO2: 100 % (04/03/19 1119) Vital Signs (24h Range):  Temp:  [97.4 °F (36.3 °C)-98.8 °F (37.1 °C)] 97.5 °F (36.4 °C)  Pulse:  [] 89  Resp:  [16-24] 24  SpO2:  [97 %-100 %] 100 %  BP: (106-156)/(52-88) 156/72     Weight: 57.3 kg (126 lb 6.4 oz)  Body mass index is 23.86 kg/m².    Intake/Output Summary (Last 24 hours) at 4/3/2019 1214  Last data filed at 4/3/2019 0800  Gross per  24 hour   Intake 2419.17 ml   Output --   Net 2419.17 ml      Physical Exam   Constitutional: She is oriented to person, place, and time. She appears lethargic. She appears cachectic. She appears ill. No distress.   HENT:   Head: Normocephalic and atraumatic.   Right Ear: Hearing and external ear normal.   Left Ear: Hearing and external ear normal.   Nose: No rhinorrhea or sinus tenderness. Right sinus exhibits no maxillary sinus tenderness and no frontal sinus tenderness. Left sinus exhibits no maxillary sinus tenderness and no frontal sinus tenderness.   Mouth/Throat: Uvula is midline, oropharynx is clear and moist and mucous membranes are normal. No oral lesions.   Eyes: Pupils are equal, round, and reactive to light. Conjunctivae are normal. Right eye exhibits no discharge. Left eye exhibits no discharge.   Neck: Normal range of motion. Carotid bruit is not present. No tracheal deviation present. No thyromegaly present.   Cardiovascular: Normal rate, regular rhythm, S1 normal, S2 normal, normal heart sounds and intact distal pulses.   No murmur heard.  Pulses:       Dorsalis pedis pulses are 2+ on the right side, and 2+ on the left side.   Pulmonary/Chest: Effort normal. No respiratory distress. She has decreased breath sounds in the right lower field and the left lower field.   Abdominal: Soft. Bowel sounds are normal. She exhibits distension. She exhibits no mass. There is generalized tenderness.   Genitourinary:   Genitourinary Comments: Deferred   Musculoskeletal: Normal range of motion.   Lymphadenopathy:     She has no cervical adenopathy.        Right: No supraclavicular adenopathy present.        Left: No supraclavicular adenopathy present.   Neurological: She is oriented to person, place, and time. She has normal strength. She appears lethargic. No sensory deficit. Coordination and gait normal.   Skin: Skin is warm and dry. Capillary refill takes less than 2 seconds. No rash noted.   Psychiatric: Her  speech is normal and behavior is normal. Judgment and thought content normal. Her mood appears anxious. Cognition and memory are normal. She does not exhibit a depressed mood.       Significant Labs:   CBC:   Recent Labs   Lab 04/02/19 0417 04/03/19 0519   WBC 21.39* 30.80*   HGB 9.2* 9.7*   HCT 28.8* 30.7*   * 656*     CMP:   Recent Labs   Lab 04/02/19 0417 04/02/19  2228 04/03/19 0519   * 124* 126*   K 3.4*  --  4.2   CL 88*  --  90*   CO2 28  --  25   *  --  149*   BUN 13  --  13   CREATININE 1.0  --  1.0   CALCIUM 7.8*  --  8.9   PROT 5.5*  --  6.1   ALBUMIN 1.4*  --  1.6*   BILITOT 0.7  --  0.9   ALKPHOS 346*  --  434*   AST 23  --  19   ALT 18  --  21   ANIONGAP 11  --  11   EGFRNONAA 51*  --  51*     Magnesium:   Recent Labs   Lab 04/01/19 1736 04/02/19 0417   MG 2.5 1.8       Significant Imaging:   Imaging Results    None       Assessment/Plan:      * Acute on chronic diastolic congestive heart failure  -suspected due hx of Diastolic CHF as well hyponatremia and B pleural effusion present on imaging-however PNA not excluded due to presence of bilateral opacities and significant leukocytosis   -Lasix 40 IV bid held due to hypotension and hypokalemia  -supplemental oxygen   -strict I/Os  -daily weights   -Echo results showed EF 60% with trivial MVS  -Troponin-flat and mildly elevated   -BNP > 500       Enteritis  -noted on CT with the ileum has a thickened wall that measures 6 mm characteristic of enteritis  -stool studies and lactoferrin pending   -gentle IV hydration   -IV antibiotics   -pt afebrile-leukocytosis continued   -antiemetics prn      Abnormal finding on CT scan  -imaging results showed pulmonary nodules scattered throughout both lungs. There is a mild amount of alveolar consolidation scattered throughout both lungs. There are hypodense masses scattered throughout the liver and spleen.  Results concerning for metastatic process.   -Heme/Onc following for SAKINA  -imaging  results and need for biopsy discussed on 4/2  -4/3- pt has elected not to have biopsy and does not want to proceed with admission to Hospice  -CM consulted for discharge planning and to establish hospice       Diarrhea  -in the setting of enteritis   -pt states she was told she may have Crohn's dx but refused further work up and evaluation   -pt reports intermittent episodes x 2 years usually relieved by Immodium at home  -stool studies and lactoferrin pending   -ID following       Hypomagnesemia  Mag 0.7>1.3  -normalized on 4/2  -replaced      Abnormal chest x-ray  -in the setting of PNA vs pulmonary congestion   -CT chest/abdomen/pelvis obtained   -plan as previously discussed        Sepsis  -WBC 24 and RR 22 in the setting of PNA  -IV antibiotics   -blood culture results with NGTD  -IV bolus given in ED   -IV hydration initially not continued due to CHF  -Leukocytosis persists  -CT of chest/abdomen/pelvis results reviewed  -stool studies pending  -4/3- Leukocytosis with upward trend in the setting of enteritis-ID consulted-Oral/IV antibiotics continued.  Gentle hydration continued         Hyponatremia  -Na 129>>127>>124>>126  Moderate Hyponatremia-- initially thought to be dilutional  IV lasix held  -urine sodium and urine creatinine normal   -urine osmolality >400  -Uric acid 2.6  -IV hydration and sodium tablets given       Centrilobular emphysema  Mild COPD  -supplemental oxygen   -Duonebs prn      Pneumonia  Suspected due to opacities on imaging and significant leukocytosis in the setting of pleural effusion   -IV antibiotics   -supplemental oxygen   -blood culture results with no growth to date  -Duonebs prn   -IS   -repeat xray showed pleural effusion vs perihilar infiltrate or congestion  -pt afebrile with leukocytosis continued   -CT of chest/abdomen/pelvis showed pulmonary nodules scattered throughout both lungs. There is a mild amount of alveolar consolidation scattered throughout both lungs. There are  hypodense masses scattered throughout the liver and spleen.      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

## 2019-04-03 NOTE — ASSESSMENT & PLAN NOTE
Discussed CT results with patient and family. Discussed the findings are consistent with metastatic disease. Patient stated she does not want any treatment and does not want a biopsy to confirm diagnosis. Patient and family would like to admit to hospice.

## 2019-04-03 NOTE — PT/OT/SLP PROGRESS
Occupational Therapy      Patient Name:  Yovani Pulido   MRN:  [id number]    Patient not seen today secondary to discharged from skilled OT. Pt is currently placed in [facility]    Kady Ellsworth OT  4/3/2019   122

## 2019-04-03 NOTE — PLAN OF CARE
Problem: Adult Inpatient Plan of Care  Goal: Plan of Care Review  Outcome: Ongoing (interventions implemented as appropriate)  Patient remains free of falls and safety precautions maintained. Afebrile. No complaints of pain. Patient complains of increase in weakness. NC in place. Stool sample collected. Abx per order. Patient tachy to NSR. Will continue to monitor. 12 hour chart check.

## 2019-04-03 NOTE — PROGRESS NOTES
Monitor tech called stating pt was in vtach. Back NSR on monitor. Sheet printed and put in pt's folder. NP and MD notified.

## 2019-04-03 NOTE — CONSULTS
Kevin met with pt's sons and granddaughter to discuss hospice. Family chose [facility]. Kevin will send referral via [facility]. Kevin contacted liaison and she will meet with family at bedside within an hour.

## 2019-04-03 NOTE — ASSESSMENT & PLAN NOTE
-in the setting of enteritis   -pt states she was told she may have Crohn's dx but refused further work up and evaluation   -pt reports intermittent episodes x 2 years usually relieved by Immodium at home  -stool studies and lactoferrin pending   -ID following

## 2019-04-03 NOTE — ASSESSMENT & PLAN NOTE
-H/H 8/26  -Heme/Onc following   -iron studies reviewed   -Venofer x 1 dose given on 3/30/19  -ferrous sulfate initiated   -PRBCs x 1 unit given   -repeat CBC in am   -4/1-H/H stable post transfusion   -remains stable

## 2019-04-03 NOTE — ASSESSMENT & PLAN NOTE
-imaging results showed pulmonary nodules scattered throughout both lungs. There is a mild amount of alveolar consolidation scattered throughout both lungs. There are hypodense masses scattered throughout the liver and spleen.  Results concerning for metastatic process.   -Heme/Onc following for SAKINA  -imaging results and need for biopsy discussed on 4/2  -4/3- pt has elected not to have biopsy and does not want to proceed with admission to Hospice  -CM consulted for discharge planning and to establish hospice

## 2019-04-03 NOTE — ASSESSMENT & PLAN NOTE
Shortness of breath significantly improving with diuresis and broad-spectrum antibiotics.  However hemoglobin is only 8.0 with evidence of iron deficiency on labs.    Patient received 1 dose of Venofer while admitted s/p 1 unit PRBC due to symptomatic anemia.   --Patient participating with PT

## 2019-04-03 NOTE — SUBJECTIVE & OBJECTIVE
Interval History: pt more lethargic and continues to complain of weakness, fatigue, abdominal discomfort, and abdominal distention.  Diarrhea x 3 episodes.  Stool studies reordered.  ID consulted due to increasing WBCs.  Antibiotics adjusted.  Na of 126 noted with IV hydration and oral supplementation continued.  Code status changed to DNR and CM consulted to assist with hospice.      Review of Systems   Constitutional: Positive for activity change, appetite change and fatigue. Negative for chills, diaphoresis, fever and unexpected weight change.   HENT: Negative for congestion, hearing loss, mouth sores, nosebleeds and trouble swallowing.    Eyes: Negative for discharge and visual disturbance.   Respiratory: Negative for cough, chest tightness and shortness of breath.    Cardiovascular: Negative for chest pain, palpitations and leg swelling.   Gastrointestinal: Positive for abdominal distention, abdominal pain and diarrhea. Negative for blood in stool, constipation, nausea and vomiting.   Endocrine: Negative for cold intolerance and heat intolerance.   Genitourinary: Negative for difficulty urinating, dyspareunia, flank pain, frequency and hematuria.   Musculoskeletal: Positive for arthralgias and back pain. Negative for myalgias.   Skin: Negative.    Neurological: Positive for weakness. Negative for dizziness, light-headedness and headaches.   Hematological: Negative for adenopathy. Does not bruise/bleed easily.   Psychiatric/Behavioral: Positive for sleep disturbance. Negative for agitation, behavioral problems and confusion. The patient is nervous/anxious.      Objective:     Vital Signs (Most Recent):  Temp: 97.5 °F (36.4 °C) (04/03/19 1119)  Pulse: 89 (04/03/19 1132)  Resp: (!) 24 (04/03/19 1119)  BP: (!) 156/72 (04/03/19 1119)  SpO2: 100 % (04/03/19 1119) Vital Signs (24h Range):  Temp:  [97.4 °F (36.3 °C)-98.8 °F (37.1 °C)] 97.5 °F (36.4 °C)  Pulse:  [] 89  Resp:  [16-24] 24  SpO2:  [97 %-100 %] 100  %  BP: (106-156)/(52-88) 156/72     Weight: 57.3 kg (126 lb 6.4 oz)  Body mass index is 23.86 kg/m².    Intake/Output Summary (Last 24 hours) at 4/3/2019 1214  Last data filed at 4/3/2019 0800  Gross per 24 hour   Intake 2419.17 ml   Output --   Net 2419.17 ml      Physical Exam   Constitutional: She is oriented to person, place, and time. She appears lethargic. She appears cachectic. She appears ill. No distress.   HENT:   Head: Normocephalic and atraumatic.   Right Ear: Hearing and external ear normal.   Left Ear: Hearing and external ear normal.   Nose: No rhinorrhea or sinus tenderness. Right sinus exhibits no maxillary sinus tenderness and no frontal sinus tenderness. Left sinus exhibits no maxillary sinus tenderness and no frontal sinus tenderness.   Mouth/Throat: Uvula is midline, oropharynx is clear and moist and mucous membranes are normal. No oral lesions.   Eyes: Pupils are equal, round, and reactive to light. Conjunctivae are normal. Right eye exhibits no discharge. Left eye exhibits no discharge.   Neck: Normal range of motion. Carotid bruit is not present. No tracheal deviation present. No thyromegaly present.   Cardiovascular: Normal rate, regular rhythm, S1 normal, S2 normal, normal heart sounds and intact distal pulses.   No murmur heard.  Pulses:       Dorsalis pedis pulses are 2+ on the right side, and 2+ on the left side.   Pulmonary/Chest: Effort normal. No respiratory distress. She has decreased breath sounds in the right lower field and the left lower field.   Abdominal: Soft. Bowel sounds are normal. She exhibits distension. She exhibits no mass. There is generalized tenderness.   Genitourinary:   Genitourinary Comments: Deferred   Musculoskeletal: Normal range of motion.   Lymphadenopathy:     She has no cervical adenopathy.        Right: No supraclavicular adenopathy present.        Left: No supraclavicular adenopathy present.   Neurological: She is oriented to person, place, and time. She  has normal strength. She appears lethargic. No sensory deficit. Coordination and gait normal.   Skin: Skin is warm and dry. Capillary refill takes less than 2 seconds. No rash noted.   Psychiatric: Her speech is normal and behavior is normal. Judgment and thought content normal. Her mood appears anxious. Cognition and memory are normal. She does not exhibit a depressed mood.       Significant Labs:   CBC:   Recent Labs   Lab 04/02/19 0417 04/03/19 0519   WBC 21.39* 30.80*   HGB 9.2* 9.7*   HCT 28.8* 30.7*   * 656*     CMP:   Recent Labs   Lab 04/02/19 0417 04/02/19 2228 04/03/19 0519   * 124* 126*   K 3.4*  --  4.2   CL 88*  --  90*   CO2 28  --  25   *  --  149*   BUN 13  --  13   CREATININE 1.0  --  1.0   CALCIUM 7.8*  --  8.9   PROT 5.5*  --  6.1   ALBUMIN 1.4*  --  1.6*   BILITOT 0.7  --  0.9   ALKPHOS 346*  --  434*   AST 23  --  19   ALT 18  --  21   ANIONGAP 11  --  11   EGFRNONAA 51*  --  51*     Magnesium:   Recent Labs   Lab 04/01/19  1736 04/02/19 0417   MG 2.5 1.8       Significant Imaging:   Imaging Results    None

## 2019-04-03 NOTE — ASSESSMENT & PLAN NOTE
-WBC 24 and RR 22 in the setting of PNA  -IV antibiotics   -blood culture results with NGTD  -IV bolus given in ED   -IV hydration initially not continued due to CHF  -Leukocytosis persists  -CT of chest/abdomen/pelvis results reviewed  -stool studies pending  -4/3- Leukocytosis with upward trend in the setting of enteritis-ID consulted-Oral/IV antibiotics continued.  Gentle hydration continued

## 2019-04-03 NOTE — ASSESSMENT & PLAN NOTE
-Na 129>>127>>124>>126  Moderate Hyponatremia-- initially thought to be dilutional  IV lasix held  -urine sodium and urine creatinine normal   -urine osmolality >400  -Uric acid 2.6  -IV hydration and sodium tablets given

## 2019-04-03 NOTE — ASSESSMENT & PLAN NOTE
-noted on CT with the ileum has a thickened wall that measures 6 mm characteristic of enteritis  -stool studies and lactoferrin pending   -gentle IV hydration   -IV antibiotics   -pt afebrile-leukocytosis continued   -antiemetics prn

## 2019-04-03 NOTE — ASSESSMENT & PLAN NOTE
Patient is status post 1 dose of Venofer and 1 unit PRBC since admission.   Thrombocytosis secondary to iron deficiency and inflammation/infection.  May consider myeloproliferative workup due to continued thrombocytosis.     --Hgb: 9.7 today  --Continue daily CBC, CMP

## 2019-04-03 NOTE — PLAN OF CARE
Problem: Physical Therapy Goal  Goal: Physical Therapy Goal  PT WILL BE SEEN FOR P.T. FOR A MIN OF 5 OUT OF 7 DAYS A WEEK  LT19  1. PT WILL COMPLETE BED MOBILITY IND  2. PT WILL GT TRAIN X 250' WITH RW  YAMIL  3. PT WILL T/F TO CHAIR WITH RW MOD I  4. PT WILL COMPLETE B LE TE X 20 REPS   Outcome: Ongoing (interventions implemented as appropriate)  PT PARAMJIT MIN TX AND INC FATIGUE

## 2019-04-03 NOTE — PT/OT/SLP PROGRESS
Physical Therapy  Treatment    Yovani Pulido   MRN: [id number]   Admitting Diagnosis: Acute on chronic diastolic congestive heart failure    PT Received On: 04/03/19  PT Start Time: 0940     PT Stop Time: 1005    PT Total Time (min): 25 min       Billable Minutes:  Therapeutic Activity 15 and Therapeutic Exercise 10    Treatment Type: Treatment  PT/PTA: PT             General Precautions: Standard, fall  Orthopedic Precautions: N/A   Braces: N/A         Subjective:  Communicated with NURSE AND Epic CHART REVIEW prior to session.   PT MET IN  IN BATHROOM    Pain/Comfort  Pain Rating 1: 0/10  Pain Rating Post-Intervention 1: 0/10    Objective:   Patient found with: telemetry    Functional Mobility:  PT MET IN  TOILETING. PT GT TRAINED WITH MIN A TO BED X 8' WITH FLEXED POSTURE. PT T/F TO EOB FOR REST. PT COMPLETED B LE TE X 10 REPS OF MIP, TKE AND AP. PT SUP IN BED WITH MIN A. PT LOG ROLLED TO LEFT WITH FAMILY PRESENT AND ALL NEEDS MET     AM-PAC 6 CLICK MOBILITY  How much help from another person does this patient currently need?   1 = Unable, Total/Dependent Assistance  2 = A lot, Maximum/Moderate Assistance  3 = A little, Minimum/Contact Guard/Supervision  4 = None, Modified [place]/Independent    Turning over in bed (including adjusting bedclothes, sheets and blankets)?: 3  Sitting down on and standing up from a chair with arms (e.g., wheelchair, bedside commode, etc.): 3  Moving from lying on back to sitting on the side of the bed?: 3  Moving to and from a bed to a chair (including a wheelchair)?: 3  Need to walk in hospital room?: 3  Climbing 3-5 steps with a railing?: 1  Basic Mobility Total Score: 16    AM-PAC Raw Score CMS G-Code Modifier Level of Impairment Assistance   6 % Total / Unable   7 - 9 CM 80 - 100% Maximal Assist   10 - 14 CL 60 - 80% Moderate Assist   15 - 19 CK 40 - 60% Moderate Assist   20 - 22 CJ 20 - 40% Minimal Assist   23 CI 1-20% SBA / CGA   24 CH 0% Independent/ Mod I      Patient left left sidelying with call button in reach and FAMILY present.    Assessment:  PT PARAMJIT MIN TX. PT WITH INC FATIGUE.     Rehab identified problem list/impairments: Rehab identified problem list/impairments: weakness, gait instability, decreased upper extremity function, decreased lower extremity function, impaired balance, impaired endurance, impaired functional mobilty, impaired self care skills    Rehab potential is fair.    Activity tolerance: Poor    Discharge recommendations: Discharge Facility/Level of Care Needs: home health PT     Barriers to discharge:      Equipment recommendations: Equipment Needed After Discharge: none     GOALS:   Multidisciplinary Problems     Physical Therapy Goals        Problem: Physical Therapy Goal    Goal Priority Disciplines Outcome Goal Variances Interventions   Physical Therapy Goal     PT, PT/OT Ongoing (interventions implemented as appropriate)     Description:  PT WILL BE SEEN FOR P.T. FOR A MIN OF 5 OUT OF 7 DAYS A WEEK  LT19  1. PT WILL COMPLETE BED MOBILITY IND  2. PT WILL GT TRAIN X 250' WITH RW  YAMIL  3. PT WILL T/F TO CHAIR WITH RW MOD I  4. PT WILL COMPLETE B LE TE X 20 REPS                    PLAN:    Patient to be seen    to address the above listed problems via gait training, therapeutic activities, therapeutic exercises  Plan of Care expires: 19  Plan of Care reviewed with: patient, family         Hali Zacarias, PT  2019

## 2019-04-03 NOTE — SUBJECTIVE & OBJECTIVE
Interval History: Discussed with patient and family regarding decision on whether they want to proceed with a biopsy to gain tissue confirmation of diagnosis. Patient and family prefer to avoid any interventional treatment or biopsy, they want to proceed with Hospice. They prefer [facility]. Informed SS and [facility] of patient request.     Oncology Treatment Plan:   [No treatment plan]    Medications:  Continuous Infusions:  Scheduled Meds:   ferrous sulfate  325 mg Oral Daily    levothyroxine  50 mcg Oral Before breakfast    pravastatin  40 mg Oral Daily    sodium chloride  1 g Oral TID    vancomycin  125 mg Oral Q6H     PRN Meds:sodium chloride, albuterol-ipratropium, dextrose 50%, dextrose 50%, glucagon (human recombinant), glucose, glucose, insulin aspart U-100, midodrine, ramelteon, sodium chloride 0.9%     Review of Systems   Constitutional: Positive for activity change, appetite change and fatigue. Negative for chills, diaphoresis, fever and unexpected weight change.   HENT: Negative for congestion, hearing loss, mouth sores, nosebleeds and trouble swallowing.    Eyes: Negative for discharge and visual disturbance.   Respiratory: Positive for shortness of breath. Negative for cough and chest tightness.    Cardiovascular: Negative for chest pain, palpitations and leg swelling.   Gastrointestinal: Positive for abdominal distention and abdominal pain. Negative for blood in stool, constipation, diarrhea, nausea and vomiting.   Endocrine: Negative for cold intolerance and heat intolerance.   Genitourinary: Negative for difficulty urinating, dyspareunia, enuresis, flank pain and hematuria.   Musculoskeletal: Positive for arthralgias and back pain. Negative for myalgias.   Skin: Negative.    Neurological: Negative for dizziness, weakness, light-headedness and headaches.   Hematological: Negative for adenopathy. Does not bruise/bleed easily.   Psychiatric/Behavioral: Negative for agitation,  behavioral problems and confusion. The patient is nervous/anxious.      Objective:     Vital Signs (Most Recent):  Temp: 97.4 °F (36.3 °C) (04/03/19 0701)  Pulse: 87 (04/03/19 0916)  Resp: 18 (04/03/19 0701)  BP: 135/88 (04/03/19 0701)  SpO2: 99 % (04/03/19 0800) Vital Signs (24h Range):  Temp:  [97.4 °F (36.3 °C)-98.8 °F (37.1 °C)] 97.4 °F (36.3 °C)  Pulse:  [] 87  Resp:  [16-20] 18  SpO2:  [97 %-100 %] 99 %  BP: (106-140)/(52-88) 135/88     Weight: 57.3 kg (126 lb 6.4 oz)  Body mass index is 23.86 kg/m².  Body surface area is 1.57 meters squared.      Intake/Output Summary (Last 24 hours) at 4/3/2019 0940  Last data filed at 4/3/2019 0800  Gross per 24 hour   Intake 2419.17 ml   Output --   Net 2419.17 ml       Physical Exam   Constitutional: She is oriented to person, place, and time. She appears lethargic. She appears cachectic. She appears ill. No distress.   HENT:   Head: Normocephalic and atraumatic.   Right Ear: Hearing and external ear normal.   Left Ear: Hearing and external ear normal.   Nose: No rhinorrhea or sinus tenderness. Right sinus exhibits no maxillary sinus tenderness and no frontal sinus tenderness. Left sinus exhibits no maxillary sinus tenderness and no frontal sinus tenderness.   Mouth/Throat: Uvula is midline, oropharynx is clear and moist and mucous membranes are normal. No oral lesions.   Eyes: Pupils are equal, round, and reactive to light. Conjunctivae are normal. Right eye exhibits no discharge. Left eye exhibits no discharge.   Neck: Normal range of motion. Carotid bruit is not present. No tracheal deviation present. No thyromegaly present.   Cardiovascular: Normal rate, regular rhythm, S1 normal, S2 normal, normal heart sounds and intact distal pulses.   No murmur heard.  Pulses:       Dorsalis pedis pulses are 2+ on the right side, and 2+ on the left side.   Pulmonary/Chest: Effort normal. No respiratory distress. She has decreased breath sounds in the right lower field and  the left lower field.   Abdominal: Soft. Bowel sounds are normal. She exhibits distension. She exhibits no mass. There is generalized tenderness.   Musculoskeletal: Normal range of motion.   Lymphadenopathy:     She has no cervical adenopathy.        Right: No supraclavicular adenopathy present.        Left: No supraclavicular adenopathy present.   Neurological: She is oriented to person, place, and time. She has normal strength. She appears lethargic. No sensory deficit. Coordination and gait normal.   Skin: Skin is warm and dry. Capillary refill takes less than 2 seconds. No rash noted.   Psychiatric: Her speech is normal and behavior is normal. Judgment and thought content normal. Her mood appears anxious. Cognition and memory are normal. She does not exhibit a depressed mood.       Significant Labs:   CBC:   Recent Labs   Lab 04/02/19 0417 04/03/19 0519   WBC 21.39* 30.80*   HGB 9.2* 9.7*   HCT 28.8* 30.7*   * 656*    and CMP:   Recent Labs   Lab 04/02/19 0417 04/02/19 2228 04/03/19  0519   * 124* 126*   K 3.4*  --  4.2   CL 88*  --  90*   CO2 28  --  25   *  --  149*   BUN 13  --  13   CREATININE 1.0  --  1.0   CALCIUM 7.8*  --  8.9   PROT 5.5*  --  6.1   ALBUMIN 1.4*  --  1.6*   BILITOT 0.7  --  0.9   ALKPHOS 346*  --  434*   AST 23  --  19   ALT 18  --  21   ANIONGAP 11  --  11   EGFRNONAA 51*  --  51*       Diagnostic Results:  I have reviewed all pertinent imaging results/findings within the past 24 hours.

## 2019-04-03 NOTE — PROGRESS NOTES
[facility] -   Hematology/Oncology  Progress Note    Patient Name: Yovani Pulido  Admission Date: 3/29/2019  Hospital Length of Stay: 3 days  Code Status: Full Code     Subjective:     HPI:    86-year-old female with a history of rheumatoid arthritis, COPD, diabetes mellitus, CHF who was admitted for progressive shortness of breath over the previous week.  The patient was found to be hypoxic admitted with chest x-ray changes consistent with pneumonia versus pulmonary edema.  She was admitted and diuresed overnight with significant improvement in shortness of breath.    Interval History: Discussed with patient and family regarding decision on whether they want to proceed with a biopsy to gain tissue confirmation of diagnosis. Patient and family prefer to avoid any interventional treatment or biopsy, they want to proceed with Hospice. They prefer [facility]. Informed SS and Hospital Medicine of patient request.     Oncology Treatment Plan:   [No treatment plan]    Medications:  Continuous Infusions:  Scheduled Meds:   ferrous sulfate  325 mg Oral Daily    levothyroxine  50 mcg Oral Before breakfast    pravastatin  40 mg Oral Daily    sodium chloride  1 g Oral TID    vancomycin  125 mg Oral Q6H     PRN Meds:sodium chloride, albuterol-ipratropium, dextrose 50%, dextrose 50%, glucagon (human recombinant), glucose, glucose, insulin aspart U-100, midodrine, ramelteon, sodium chloride 0.9%     Review of Systems   Constitutional: Positive for activity change, appetite change and fatigue. Negative for chills, diaphoresis, fever and unexpected weight change.   HENT: Negative for congestion, hearing loss, mouth sores, nosebleeds and trouble swallowing.    Eyes: Negative for discharge and visual disturbance.   Respiratory: Positive for shortness of breath. Negative for cough and chest tightness.    Cardiovascular: Negative for chest pain, palpitations and leg swelling.   Gastrointestinal:  Positive for abdominal distention and abdominal pain. Negative for blood in stool, constipation, diarrhea, nausea and vomiting.   Endocrine: Negative for cold intolerance and heat intolerance.   Genitourinary: Negative for difficulty urinating, dyspareunia, enuresis, flank pain and hematuria.   Musculoskeletal: Positive for arthralgias and back pain. Negative for myalgias.   Skin: Negative.    Neurological: Negative for dizziness, weakness, light-headedness and headaches.   Hematological: Negative for adenopathy. Does not bruise/bleed easily.   Psychiatric/Behavioral: Negative for agitation, behavioral problems and confusion. The patient is nervous/anxious.      Objective:     Vital Signs (Most Recent):  Temp: 97.4 °F (36.3 °C) (04/03/19 0701)  Pulse: 87 (04/03/19 0916)  Resp: 18 (04/03/19 0701)  BP: 135/88 (04/03/19 0701)  SpO2: 99 % (04/03/19 0800) Vital Signs (24h Range):  Temp:  [97.4 °F (36.3 °C)-98.8 °F (37.1 °C)] 97.4 °F (36.3 °C)  Pulse:  [] 87  Resp:  [16-20] 18  SpO2:  [97 %-100 %] 99 %  BP: (106-140)/(52-88) 135/88     Weight: 57.3 kg (126 lb 6.4 oz)  Body mass index is 23.86 kg/m².  Body surface area is 1.57 meters squared.      Intake/Output Summary (Last 24 hours) at 4/3/2019 0940  Last data filed at 4/3/2019 0800  Gross per 24 hour   Intake 2419.17 ml   Output --   Net 2419.17 ml       Physical Exam   Constitutional: She is oriented to person, place, and time. She appears lethargic. She appears cachectic. She appears ill. No distress.   HENT:   Head: Normocephalic and atraumatic.   Right Ear: Hearing and external ear normal.   Left Ear: Hearing and external ear normal.   Nose: No rhinorrhea or sinus tenderness. Right sinus exhibits no maxillary sinus tenderness and no frontal sinus tenderness. Left sinus exhibits no maxillary sinus tenderness and no frontal sinus tenderness.   Mouth/Throat: Uvula is midline, oropharynx is clear and moist and mucous membranes are normal. No oral lesions.   Eyes:  Pupils are equal, round, and reactive to light. Conjunctivae are normal. Right eye exhibits no discharge. Left eye exhibits no discharge.   Neck: Normal range of motion. Carotid bruit is not present. No tracheal deviation present. No thyromegaly present.   Cardiovascular: Normal rate, regular rhythm, S1 normal, S2 normal, normal heart sounds and intact distal pulses.   No murmur heard.  Pulses:       Dorsalis pedis pulses are 2+ on the right side, and 2+ on the left side.   Pulmonary/Chest: Effort normal. No respiratory distress. She has decreased breath sounds in the right lower field and the left lower field.   Abdominal: Soft. Bowel sounds are normal. She exhibits distension. She exhibits no mass. There is generalized tenderness.   Musculoskeletal: Normal range of motion.   Lymphadenopathy:     She has no cervical adenopathy.        Right: No supraclavicular adenopathy present.        Left: No supraclavicular adenopathy present.   Neurological: She is oriented to person, place, and time. She has normal strength. She appears lethargic. No sensory deficit. Coordination and gait normal.   Skin: Skin is warm and dry. Capillary refill takes less than 2 seconds. No rash noted.   Psychiatric: Her speech is normal and behavior is normal. Judgment and thought content normal. Her mood appears anxious. Cognition and memory are normal. She does not exhibit a depressed mood.       Significant Labs:   CBC:   Recent Labs   Lab 04/02/19 0417 04/03/19  0519   WBC 21.39* 30.80*   HGB 9.2* 9.7*   HCT 28.8* 30.7*   * 656*    and CMP:   Recent Labs   Lab 04/02/19 0417 04/02/19  2228 04/03/19  0519   * 124* 126*   K 3.4*  --  4.2   CL 88*  --  90*   CO2 28  --  25   *  --  149*   BUN 13  --  13   CREATININE 1.0  --  1.0   CALCIUM 7.8*  --  8.9   PROT 5.5*  --  6.1   ALBUMIN 1.4*  --  1.6*   BILITOT 0.7  --  0.9   ALKPHOS 346*  --  434*   AST 23  --  19   ALT 18  --  21   ANIONGAP 11  --  11   EGFRNONAA 51*  --   51*       Diagnostic Results:  I have reviewed all pertinent imaging results/findings within the past 24 hours.    Assessment/Plan:     * Acute on chronic diastolic congestive heart failure  Shortness of breath significantly improving with diuresis and broad-spectrum antibiotics.  However hemoglobin is only 8.0 with evidence of iron deficiency on labs.    Patient received 1 dose of Venofer while admitted s/p 1 unit PRBC due to symptomatic anemia.   --Patient participating with PT      Abnormal finding on CT scan  Discussed CT results with patient and family. Discussed the findings are consistent with metastatic disease. Patient stated she does not want any treatment and does not want a biopsy to confirm diagnosis. Patient and family would like to admit to hospice.     Iron deficiency anemia  Patient is status post 1 dose of Venofer and 1 unit PRBC since admission.   Thrombocytosis secondary to iron deficiency and inflammation/infection.  May consider myeloproliferative workup due to continued thrombocytosis.     --Hgb: 9.7 today  --Continue daily CBC, CMP        Thank you for your consult. I will follow-up with patient. Please contact us if you have any additional questions.     Reina Sullivan NP  Hematology/Oncology  [facility]

## 2019-04-04 VITALS
HEART RATE: 76 BPM | SYSTOLIC BLOOD PRESSURE: 149 MMHG | BODY MASS INDEX: 23.86 KG/M2 | HEIGHT: 61 IN | OXYGEN SATURATION: 97 % | RESPIRATION RATE: 20 BRPM | TEMPERATURE: 98 F | WEIGHT: 126.38 LBS | DIASTOLIC BLOOD PRESSURE: 67 MMHG

## 2019-04-04 LAB
ALBUMIN SERPL BCP-MCNC: 1.5 G/DL (ref 3.5–5.2)
ALP SERPL-CCNC: 408 U/L (ref 55–135)
ALT SERPL W/O P-5'-P-CCNC: 21 U/L (ref 10–44)
ANION GAP SERPL CALC-SCNC: 10 MMOL/L (ref 8–16)
AST SERPL-CCNC: 21 U/L (ref 10–40)
BASOPHILS # BLD AUTO: 0.03 K/UL (ref 0–0.2)
BASOPHILS NFR BLD: 0.1 % (ref 0–1.9)
BILIRUB SERPL-MCNC: 0.8 MG/DL (ref 0.1–1)
BUN SERPL-MCNC: 15 MG/DL (ref 8–23)
BURR CELLS BLD QL SMEAR: ABNORMAL
CALCIUM SERPL-MCNC: 8.6 MG/DL (ref 8.7–10.5)
CHLORIDE SERPL-SCNC: 91 MMOL/L (ref 95–110)
CO2 SERPL-SCNC: 26 MMOL/L (ref 23–29)
CREAT SERPL-MCNC: 0.9 MG/DL (ref 0.5–1.4)
DACRYOCYTES BLD QL SMEAR: ABNORMAL
DIFFERENTIAL METHOD: ABNORMAL
EOSINOPHIL # BLD AUTO: 0.2 K/UL (ref 0–0.5)
EOSINOPHIL NFR BLD: 0.6 % (ref 0–8)
ERYTHROCYTE [DISTWIDTH] IN BLOOD BY AUTOMATED COUNT: 17.6 % (ref 11.5–14.5)
EST. GFR  (AFRICAN AMERICAN): >60 ML/MIN/1.73 M^2
EST. GFR  (NON AFRICAN AMERICAN): 58 ML/MIN/1.73 M^2
GLUCOSE SERPL-MCNC: 144 MG/DL (ref 70–110)
HCT VFR BLD AUTO: 28.3 % (ref 37–48.5)
HGB BLD-MCNC: 9 G/DL (ref 12–16)
LYMPHOCYTES # BLD AUTO: 0.5 K/UL (ref 1–4.8)
LYMPHOCYTES NFR BLD: 1.8 % (ref 18–48)
MAGNESIUM SERPL-MCNC: 1.2 MG/DL (ref 1.6–2.6)
MCH RBC QN AUTO: 29.7 PG (ref 27–31)
MCHC RBC AUTO-ENTMCNC: 31.8 G/DL (ref 32–36)
MCV RBC AUTO: 93 FL (ref 82–98)
MONOCYTES # BLD AUTO: 1.3 K/UL (ref 0.3–1)
MONOCYTES NFR BLD: 4.7 % (ref 4–15)
NEUTROPHILS # BLD AUTO: 26.3 K/UL (ref 1.8–7.7)
NEUTROPHILS NFR BLD: 93.6 % (ref 38–73)
O+P STL TRI STN: NORMAL
OVALOCYTES BLD QL SMEAR: ABNORMAL
PLATELET # BLD AUTO: 521 K/UL (ref 150–350)
PMV BLD AUTO: 9 FL (ref 9.2–12.9)
POCT GLUCOSE: 155 MG/DL (ref 70–110)
POCT GLUCOSE: 181 MG/DL (ref 70–110)
POIKILOCYTOSIS BLD QL SMEAR: SLIGHT
POTASSIUM SERPL-SCNC: 3.9 MMOL/L (ref 3.5–5.1)
PROT SERPL-MCNC: 5.6 G/DL (ref 6–8.4)
RBC # BLD AUTO: 3.03 M/UL (ref 4–5.4)
SODIUM SERPL-SCNC: 127 MMOL/L (ref 136–145)
SPHEROCYTES BLD QL SMEAR: ABNORMAL
STOMATOCYTES BLD QL SMEAR: PRESENT
TARGETS BLD QL SMEAR: ABNORMAL
WBC # BLD AUTO: 28.28 K/UL (ref 3.9–12.7)

## 2019-04-04 PROCEDURE — 25000003 PHARM REV CODE 250: Performed by: NURSE PRACTITIONER

## 2019-04-04 PROCEDURE — 83735 ASSAY OF MAGNESIUM: CPT

## 2019-04-04 PROCEDURE — 63600175 PHARM REV CODE 636 W HCPCS: Performed by: NURSE PRACTITIONER

## 2019-04-04 PROCEDURE — 80053 COMPREHEN METABOLIC PANEL: CPT

## 2019-04-04 PROCEDURE — C9113 INJ PANTOPRAZOLE SODIUM, VIA: HCPCS | Performed by: INTERNAL MEDICINE

## 2019-04-04 PROCEDURE — 99232 SBSQ HOSP IP/OBS MODERATE 35: CPT | Mod: ,,, | Performed by: INTERNAL MEDICINE

## 2019-04-04 PROCEDURE — 36415 COLL VENOUS BLD VENIPUNCTURE: CPT

## 2019-04-04 PROCEDURE — 99232 PR SUBSEQUENT HOSPITAL CARE,LEVL II: ICD-10-PCS | Mod: ,,, | Performed by: INTERNAL MEDICINE

## 2019-04-04 PROCEDURE — 27000221 HC OXYGEN, UP TO 24 HOURS

## 2019-04-04 PROCEDURE — 25000003 PHARM REV CODE 250: Performed by: INTERNAL MEDICINE

## 2019-04-04 PROCEDURE — 63600175 PHARM REV CODE 636 W HCPCS: Performed by: INTERNAL MEDICINE

## 2019-04-04 PROCEDURE — 96372 THER/PROPH/DIAG INJ SC/IM: CPT

## 2019-04-04 PROCEDURE — 94761 N-INVAS EAR/PLS OXIMETRY MLT: CPT

## 2019-04-04 PROCEDURE — 99900035 HC TECH TIME PER 15 MIN (STAT)

## 2019-04-04 PROCEDURE — 85025 COMPLETE CBC W/AUTO DIFF WBC: CPT

## 2019-04-04 RX ORDER — MAGNESIUM SULFATE HEPTAHYDRATE 40 MG/ML
2 INJECTION, SOLUTION INTRAVENOUS ONCE
Status: COMPLETED | OUTPATIENT
Start: 2019-04-04 | End: 2019-04-04

## 2019-04-04 RX ORDER — AMOXICILLIN AND CLAVULANATE POTASSIUM 500; 125 MG/1; MG/1
1 TABLET, FILM COATED ORAL 2 TIMES DAILY
Status: DISCONTINUED | OUTPATIENT
Start: 2019-04-04 | End: 2019-04-04 | Stop reason: HOSPADM

## 2019-04-04 RX ORDER — FERROUS SULFATE 325(65) MG
325 TABLET, DELAYED RELEASE (ENTERIC COATED) ORAL DAILY
Refills: 0 | COMMUNITY
Start: 2019-04-05

## 2019-04-04 RX ORDER — LOPERAMIDE HYDROCHLORIDE 2 MG/1
2 CAPSULE ORAL 4 TIMES DAILY PRN
Status: DISCONTINUED | OUTPATIENT
Start: 2019-04-04 | End: 2019-04-04 | Stop reason: HOSPADM

## 2019-04-04 RX ORDER — PANTOPRAZOLE SODIUM 40 MG/10ML
40 INJECTION, POWDER, LYOPHILIZED, FOR SOLUTION INTRAVENOUS 2 TIMES DAILY
Status: DISCONTINUED | OUTPATIENT
Start: 2019-04-04 | End: 2019-04-04 | Stop reason: HOSPADM

## 2019-04-04 RX ORDER — LOPERAMIDE HYDROCHLORIDE 2 MG/1
2 CAPSULE ORAL 4 TIMES DAILY PRN
Qty: 40 CAPSULE | Refills: 0 | Status: SHIPPED | OUTPATIENT
Start: 2019-04-04 | End: 2019-04-14

## 2019-04-04 RX ORDER — AMOXICILLIN AND CLAVULANATE POTASSIUM 500; 125 MG/1; MG/1
1 TABLET, FILM COATED ORAL 2 TIMES DAILY
Qty: 20 TABLET | Refills: 0 | Status: SHIPPED | OUTPATIENT
Start: 2019-04-04 | End: 2019-04-14

## 2019-04-04 RX ORDER — MAGNESIUM SULFATE 1 G/100ML
1 INJECTION INTRAVENOUS ONCE
Status: COMPLETED | OUTPATIENT
Start: 2019-04-04 | End: 2019-04-04

## 2019-04-04 RX ORDER — LANOLIN ALCOHOL/MO/W.PET/CERES
400 CREAM (GRAM) TOPICAL 2 TIMES DAILY
Refills: 0 | COMMUNITY
Start: 2019-04-04

## 2019-04-04 RX ORDER — METOPROLOL TARTRATE 25 MG/1
12.5 TABLET, FILM COATED ORAL 2 TIMES DAILY
Qty: 30 TABLET | Refills: 0 | Status: SHIPPED | OUTPATIENT
Start: 2019-04-04 | End: 2019-05-04

## 2019-04-04 RX ADMIN — SODIUM CHLORIDE TAB 1 GM 2 G: 1 TAB at 09:04

## 2019-04-04 RX ADMIN — LOPERAMIDE HYDROCHLORIDE 2 MG: 2 CAPSULE ORAL at 12:04

## 2019-04-04 RX ADMIN — MAGNESIUM SULFATE IN WATER 2 G: 40 INJECTION, SOLUTION INTRAVENOUS at 09:04

## 2019-04-04 RX ADMIN — MAGNESIUM OXIDE TAB 400 MG (241.3 MG ELEMENTAL MG) 400 MG: 400 (241.3 MG) TAB at 09:04

## 2019-04-04 RX ADMIN — FERROUS SULFATE TAB EC 325 MG (65 MG FE EQUIVALENT) 325 MG: 325 (65 FE) TABLET DELAYED RESPONSE at 09:04

## 2019-04-04 RX ADMIN — AMOXICILLIN AND CLAVULANATE POTASSIUM 500 MG: 500; 125 TABLET, FILM COATED ORAL at 09:04

## 2019-04-04 RX ADMIN — PANTOPRAZOLE SODIUM 40 MG: 40 INJECTION, POWDER, LYOPHILIZED, FOR SOLUTION INTRAVENOUS at 09:04

## 2019-04-04 RX ADMIN — MAGNESIUM SULFATE IN DEXTROSE 1 G: 10 INJECTION, SOLUTION INTRAVENOUS at 11:04

## 2019-04-04 RX ADMIN — METOPROLOL TARTRATE 12.5 MG: 25 TABLET, FILM COATED ORAL at 09:04

## 2019-04-04 RX ADMIN — Medication 125 MG: at 05:04

## 2019-04-04 RX ADMIN — LEVOTHYROXINE SODIUM 50 MCG: 50 TABLET ORAL at 05:04

## 2019-04-04 RX ADMIN — PRAVASTATIN SODIUM 40 MG: 20 TABLET ORAL at 09:04

## 2019-04-04 RX ADMIN — PIPERACILLIN SODIUM AND TAZOBACTAM SODIUM 4.5 G: 4; .5 INJECTION, POWDER, LYOPHILIZED, FOR SOLUTION INTRAVENOUS at 05:04

## 2019-04-04 NOTE — PLAN OF CARE
Sw contacted hospice liaison in regards to patient discharge. Liaison reports all equipment has been ordered. Liaison reports oxygen will be delivered to bedside and pt can discharge afterwards. No further needs       04/04/19 1314   Post-Acute Status   Post-Acute Authorization Home Health/Hospice   Home Health/Hospice Status Set-up Complete

## 2019-04-04 NOTE — ASSESSMENT & PLAN NOTE
Patient is status post 1 dose of Venofer and 1 unit PRBC since admission.   Thrombocytosis secondary to iron deficiency and inflammation/infection.  May consider myeloproliferative workup due to continued thrombocytosis.     --Hgb: 9.0 today  --Continue daily CBC, CMP

## 2019-04-04 NOTE — SUBJECTIVE & OBJECTIVE
Interval History: Patient is being admitted to [facility], plan is to d/c home with hospice when appropriate.    Oncology Treatment Plan:   [No treatment plan]    Medications:  Continuous Infusions:  Scheduled Meds:   amoxicillin-clavulanate 500-125mg  1 tablet Oral BID    ferrous sulfate  325 mg Oral Daily    levothyroxine  50 mcg Oral Before breakfast    magnesium oxide  400 mg Oral BID    magnesium sulfate IVPB  1 g Intravenous Once    metoprolol tartrate  12.5 mg Oral BID    pantoprazole  40 mg Intravenous BID    pravastatin  40 mg Oral Daily    sodium chloride  2 g Oral TID     PRN Meds:sodium chloride, albuterol-ipratropium, dextrose 50%, dextrose 50%, glucagon (human recombinant), glucose, glucose, insulin aspart U-100, loperamide, ramelteon, sodium chloride 0.9%     Review of Systems   Constitutional: Positive for activity change, appetite change, fatigue and unexpected weight change. Negative for chills, diaphoresis and fever.   HENT: Negative for congestion, hearing loss, mouth sores, nosebleeds and trouble swallowing.    Eyes: Negative for discharge and visual disturbance.   Respiratory: Positive for cough and shortness of breath. Negative for chest tightness.    Cardiovascular: Negative for chest pain, palpitations and leg swelling.   Gastrointestinal: Positive for abdominal distention, abdominal pain and diarrhea. Negative for blood in stool, constipation, nausea and vomiting.   Endocrine: Negative for cold intolerance and heat intolerance.   Genitourinary: Negative for difficulty urinating, dyspareunia, flank pain, frequency and hematuria.   Musculoskeletal: Positive for arthralgias, back pain, gait problem and myalgias.   Skin: Negative.    Neurological: Negative for dizziness, weakness, light-headedness and headaches.   Hematological: Negative for adenopathy. Does not bruise/bleed easily.   Psychiatric/Behavioral: Negative for agitation, behavioral problems and confusion. The  patient is nervous/anxious.      Objective:     Vital Signs (Most Recent):  Temp: 97.6 °F (36.4 °C) (04/04/19 1222)  Pulse: 76 (04/04/19 1222)  Resp: 20 (04/04/19 1222)  BP: (!) 149/67 (04/04/19 1222)  SpO2: 97 % (04/04/19 1222) Vital Signs (24h Range):  Temp:  [97.4 °F (36.3 °C)-97.7 °F (36.5 °C)] 97.6 °F (36.4 °C)  Pulse:  [67-92] 76  Resp:  [16-20] 20  SpO2:  [97 %-100 %] 97 %  BP: (106-149)/(56-75) 149/67     Weight: 57.3 kg (126 lb 6.4 oz)  Body mass index is 23.86 kg/m².  Body surface area is 1.57 meters squared.      Intake/Output Summary (Last 24 hours) at 4/4/2019 1252  Last data filed at 4/4/2019 0553  Gross per 24 hour   Intake 660 ml   Output 500 ml   Net 160 ml       Physical Exam   Constitutional: She is oriented to person, place, and time. She appears well-developed and well-nourished. She appears lethargic. She appears ill. No distress.   HENT:   Head: Normocephalic and atraumatic.   Right Ear: Hearing and external ear normal.   Left Ear: Hearing and external ear normal.   Nose: No rhinorrhea or sinus tenderness. Right sinus exhibits no maxillary sinus tenderness and no frontal sinus tenderness. Left sinus exhibits no maxillary sinus tenderness and no frontal sinus tenderness.   Mouth/Throat: Uvula is midline, oropharynx is clear and moist and mucous membranes are normal. No oral lesions.   Eyes: Pupils are equal, round, and reactive to light. Conjunctivae are normal. Right eye exhibits no discharge. Left eye exhibits no discharge.   Neck: Normal range of motion. Carotid bruit is not present. No tracheal deviation present. No thyromegaly present.   Cardiovascular: Normal rate, regular rhythm, S1 normal, S2 normal, normal heart sounds and intact distal pulses.   No murmur heard.  Pulses:       Dorsalis pedis pulses are 2+ on the right side, and 2+ on the left side.   Pulmonary/Chest: Effort normal and breath sounds normal. No respiratory distress.   Abdominal: Soft. Bowel sounds are normal. She  exhibits distension. She exhibits no mass. There is generalized tenderness.   Musculoskeletal: Normal range of motion. She exhibits no edema.   Lymphadenopathy:     She has no cervical adenopathy.        Right: No supraclavicular adenopathy present.        Left: No supraclavicular adenopathy present.   Neurological: She is oriented to person, place, and time. She has normal strength. She appears lethargic. No sensory deficit. Coordination and gait normal.   Skin: Skin is warm and dry. Capillary refill takes less than 2 seconds. No rash noted. There is pallor.   Psychiatric: Her speech is normal and behavior is normal. Judgment and thought content normal. Her mood appears anxious. Cognition and memory are normal. She does not exhibit a depressed mood.   Nursing note and vitals reviewed.      Significant Labs:   CBC:   Recent Labs   Lab 04/03/19  0519 04/04/19  0530   WBC 30.80* 28.28*   HGB 9.7* 9.0*   HCT 30.7* 28.3*   * 521*    and CMP:   Recent Labs   Lab 04/03/19  0519 04/03/19  1223 04/04/19  0530   * 126* 127*   K 4.2 3.9 3.9   CL 90* 90* 91*   CO2 25 27 26   * 155* 144*   BUN 13 13 15   CREATININE 1.0 0.9 0.9   CALCIUM 8.9 8.2* 8.6*   PROT 6.1  --  5.6*   ALBUMIN 1.6*  --  1.5*   BILITOT 0.9  --  0.8   ALKPHOS 434*  --  408*   AST 19  --  21   ALT 21  --  21   ANIONGAP 11 9 10   EGFRNONAA 51* 58* 58*       Diagnostic Results:  I have reviewed all pertinent imaging results/findings within the past 24 hours.

## 2019-04-04 NOTE — CONSULTS
[facility]  Infectious Disease  Consult Note    Patient Name: Yovani Pulido  MRN: [id number]  Admission Date: 3/29/2019  Hospital Length of Stay: 4 days  Attending Physician: Cassandra Henriquez MD  Primary Care Provider: Dorcas Schultz DO     Isolation Status: No active isolations    Patient information was obtained from patient, past medical records and ER records.      Consults  Assessment/Plan:     * Acute on chronic diastolic congestive heart failure  Diuresis as per primary team     Enteritis  Will continue empiric Zosyn.    Diarrhea  4/3- will need further work up but patient does not want up at this time.  Previous colonoscopy in 2015 - showed multiple adenoma and repeat colonoscopy was advised to be done in one year .  Will use symptomatic therapy .  Family is thinking of Hospice at this time      Pneumonia  4/3- CT scan of the chest is concerning for metastatic disease. Family does not want to pursue aggressive care.  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         Thank you for your consult. I will follow-up with patient. Please contact us if you have any additional questions.    Sandip Cameron MD  Infectious Disease  [facility]    Subjective:     Principal Problem: Acute on chronic diastolic congestive heart failure    HPI:    86 y.o. female patient with PMHx of RA, HTN, COPD, DM, and CHF  who was sent to ER from Dr. Kendrick office (where she goes for [facility]), for progressive SOB gradually.  Since admission, she was noticed to have leukocytosis.  CBC -   Component      Latest Ref Rng & Units 4/4/2019 4/3/2019 4/2/2019 4/1/2019                WBC      3.90 - 12.70 K/uL 28.28 (H) 30.80 (H) 21.39 (H) 19.18 (H)     Component      Latest Ref Rng & Units 3/31/2019             WBC      3.90 - 12.70 K/uL 17.03 (H)     CT chest/abdomen/pelvis -    There are  enlarged mediastinal lymph nodes.  There are noncalcified pulmonary nodules scattered throughout both lungs. There is a mild amount of alveolar consolidation scattered throughout both lungs. There are hypodense masses scattered throughout the liver and spleen. One of the larger ones measures 32 mm and is located in the right lobe of the liver.  This represents interval worsening of appearance and is worrisome for metastatic disease.  3. There is a large hernia through the anterior wall of the pelvis. The width of the mouth of this hernia measures 9.6 cm.   A portion of the ileum extends into this hernia. The ileum has a thickened wall that measures 6 mm.  This is characteristic of enteritis.    Past Medical History:   Diagnosis Date    Anemia     Carotid stenosis     Cataract     COAG (chronic open-angle glaucoma)     Colon polyps     Diabetes mellitus type II     steroid induced    Diverticulosis     GERD (gastroesophageal reflux disease)     hiatal hernia    Glaucoma     Heart murmur     Hyperlipidemia     Hypertension     Hypothyroidism     Rheumatoid arthritis(714.0)     Stroke     lacunar infarct       Past Surgical History:   Procedure Laterality Date    anal fissure repair      APPENDECTOMY  1944    BREAST SURGERY  1992 approx    breast biopsies- benign    CAROTID ENDARTERECTOMY  2009    left    CATARACT EXTRACTION      COLONOSCOPY  2012    COLONOSCOPY N/A 12/11/2015    [facility]    ESOPHAGOGASTRODUODENOSCOPY (EGD) N/A 12/11/2015    [facility] at [facility]    EYE SURGERY  2004, 2005    bilateral cataracts    HERNIA REPAIR  2001, 2002    abdominal x2, with mesh on second    HYSTERECTOMY  1963    vag hyst    JOINT REPLACEMENT  2008    right knee    THORACENTESIS - Outpatient Right 3/23/2018    Performed by [facility]man [facility] at [facility]    yag Left        Review of patient's allergies indicates:   Allergen Reactions    Tetanus  vaccines and toxoid      Other reaction(s): Swelling    Tetanus-horse serum: 30 years ago    Adhes. band-tape-benzalkonium Rash       Medications:  Medications Prior to Admission   Medication Sig    alcohol swabs PadM Apply 1 each topically 2 (two) times daily.    benazepril (LOTENSIN) 5 MG tablet Take 1 tablet (5 mg total) by mouth once daily.    blood glucose control, normal Soln Use as directed.    blood sugar diagnostic Strp Glucose testing daily.    blood-glucose meter Misc Use as directed.    fluticasone (FLONASE) 50 mcg/actuation nasal spray 2 sprays by Each Nare route once daily.    folic acid (FOLVITE) 800 MCG Tab TAKE 1 TABLET TWICE DAILY    furosemide (LASIX) 20 MG tablet Take 1 tablet (20 mg total) by mouth every Mon, Wed, Fri.    lancets (LANCETS,THIN) Misc Twice daily glucose testing.    latanoprost 0.005 % ophthalmic solution INSTILL 1 DROP INTO BOTH EYES EVERY EVENING    levothyroxine (SYNTHROID) 50 MCG tablet TAKE 1 TABLET BEFORE BREAKFAST    metFORMIN (GLUCOPHAGE) 1000 MG tablet TAKE 1 TABLET TWICE DAILY WITH MEALS    methotrexate 2.5 MG Tab TAKE 4 TABLETS IN MORNING AND 4 TABLETS AT NIGHT ONE TIME WEEKLY    omeprazole (PRILOSEC) 20 MG capsule Take 1 capsule (20 mg total) by mouth once daily.    pravastatin (PRAVACHOL) 40 MG tablet TAKE 1 TABLET EVERY DAY    predniSONE (DELTASONE) 1 MG tablet TAKE 1 TABLET TWICE DAILY  OR  AS  DIRECTED    timolol maleate 0.5% (TIMOPTIC) 0.5 % Drop INSTILL 1 DROP INTO BOTH EYES EVERY MORNING    traMADol (ULTRAM) 50 mg tablet Take 1 tablet (50 mg total) by mouth every 8 (eight) hours as needed for Pain.    traZODone (DESYREL) 100 MG tablet TAKE 1 TABLET BY MOUTH ONCE EVERY EVENING    aspirin 81 MG Chew Take 81 mg by mouth.    blood glucose control, high Soln by Misc.(Non-Drug; Combo Route) route.    cetirizine (ZYRTEC) 10 MG tablet Take 1 tablet (10 mg total) by mouth once daily.    cyanocobalamin, vitamin B-12, 2,500 mcg Tab TK 1 T PO D     magnesium 250 mg Tab Take 1 tablet by mouth Daily.    magnesium oxide-Mg AA chelate (MG-PLUS-PROTEIN) 133 mg Tab Take by mouth.    timolol (BETIMOL) 0.5 % ophthalmic solution 1 drop.     Antibiotics (From admission, onward)    Start     Stop Route Frequency Ordered    19 1330  piperacillin-tazobactam 4.5 g in dextrose 5 % 100 mL IVPB (ready to mix system)      -- IV Every 8 hours (non-standard times) 19 1210    19 1200  vancomycin 250mg / 10ml oral suspension 125 mg      -- Oral Every 6 hours 19 0833        Antifungals (From admission, onward)    None        Antivirals (From admission, onward)    None           Immunization History   Administered Date(s) Administered    Influenza 2006, 10/31/2007, 10/08/2008, 10/08/2010, 2011    Influenza - High Dose 10/08/2012, 2013, 10/10/2014, 10/24/2016, 10/02/2017, 2018    Pneumococcal Conjugate 2014    Pneumococcal Conjugate - 13 Valent 2014    Pneumococcal Polysaccharide - 23 Valent 2007       Family History     Problem Relation (Age of Onset)    Blindness Sister    Cancer Mother, Father, Son (61)    Cataracts Sister    Diabetes Sister    Glaucoma Mother    Heart disease Brother, Brother    Stroke Sister        Social History     Socioeconomic History    Marital status:      Spouse name: Not on file    Number of children: 4    Years of education: Not on file    Highest education level: Not on file   Occupational History    Not on file   Social Needs    Financial resource strain: Not on file    Food insecurity:     Worry: Not on file     Inability: Not on file    Transportation needs:     Medical: Not on file     Non-medical: Not on file   Tobacco Use    Smoking status: Former Smoker     Packs/day: 3.00     Years: 50.00     Pack years: 150.00     Start date: 1948     Last attempt to quit: 1998     Years since quittin.3    Smokeless tobacco: Former User   Substance and  Sexual Activity    Alcohol use: No     Alcohol/week: 0.0 oz    Drug use: No    Sexual activity: Never   Lifestyle    Physical activity:     Days per week: Not on file     Minutes per session: Not on file    Stress: Not on file   Relationships    Social connections:     Talks on phone: Not on file     Gets together: Not on file     Attends Restorationism service: Not on file     Active member of club or organization: Not on file     Attends meetings of clubs or organizations: Not on file     Relationship status: Not on file   Other Topics Concern    Not on file   Social History Narrative    , then remarried.  after 2-3 weeks. They have still been living together for 31 years. He is 95 now. They are no longer sexually active. Caffeine intake 4 cups coffee daily- though has changed to decaf recently. Does have a living will.      Review of Systems   Constitutional: Positive for activity change, appetite change and fatigue. Negative for chills, diaphoresis, fever and unexpected weight change.   HENT: Negative for congestion, hearing loss, mouth sores, nosebleeds and trouble swallowing.    Eyes: Negative for discharge and visual disturbance.   Respiratory: Negative for cough, chest tightness and shortness of breath.    Cardiovascular: Negative for chest pain, palpitations and leg swelling.   Gastrointestinal: Positive for abdominal distention, abdominal pain and diarrhea. Negative for blood in stool, constipation, nausea and vomiting.   Endocrine: Negative for cold intolerance and heat intolerance.   Genitourinary: Negative for difficulty urinating, dyspareunia, flank pain, frequency and hematuria.   Musculoskeletal: Positive for arthralgias and back pain. Negative for myalgias.   Skin: Negative.    Neurological: Positive for weakness. Negative for dizziness, light-headedness and headaches.   Hematological: Negative for adenopathy. Does not bruise/bleed easily.   Psychiatric/Behavioral: Positive for  sleep disturbance. Negative for agitation, behavioral problems and confusion. The patient is nervous/anxious.      Objective:     Vital Signs (Most Recent):  Temp: 97.5 °F (36.4 °C) (04/04/19 0729)  Pulse: 92 (04/04/19 0729)  Resp: 16 (04/04/19 0729)  BP: 139/75 (04/04/19 0729)  SpO2: 98 % (04/04/19 0729) Vital Signs (24h Range):  Temp:  [97.4 °F (36.3 °C)-97.7 °F (36.5 °C)] 97.5 °F (36.4 °C)  Pulse:  [67-92] 92  Resp:  [16-24] 16  SpO2:  [98 %-100 %] 98 %  BP: (106-156)/(56-75) 139/75     Weight: 57.3 kg (126 lb 6.4 oz)  Body mass index is 23.86 kg/m².    Estimated Creatinine Clearance: 33.9 mL/min (based on SCr of 0.9 mg/dL).    Physical Exam   Constitutional: She is oriented to person, place, and time. She appears lethargic. She appears cachectic. She appears ill. No distress.   HENT:   Head: Normocephalic and atraumatic.   Right Ear: Hearing and external ear normal.   Left Ear: Hearing and external ear normal.   Nose: No rhinorrhea or sinus tenderness. Right sinus exhibits no maxillary sinus tenderness and no frontal sinus tenderness. Left sinus exhibits no maxillary sinus tenderness and no frontal sinus tenderness.   Mouth/Throat: Uvula is midline, oropharynx is clear and moist and mucous membranes are normal. No oral lesions.   Eyes: Pupils are equal, round, and reactive to light. Conjunctivae are normal. Right eye exhibits no discharge. Left eye exhibits no discharge.   Neck: Normal range of motion. Carotid bruit is not present. No tracheal deviation present. No thyromegaly present.   Cardiovascular: Normal rate, regular rhythm, S1 normal, S2 normal, normal heart sounds and intact distal pulses.   No murmur heard.  Pulses:       Dorsalis pedis pulses are 2+ on the right side, and 2+ on the left side.   Pulmonary/Chest: Effort normal. No respiratory distress. She has decreased breath sounds in the right lower field and the left lower field.   Abdominal: Soft. Bowel sounds are normal. She exhibits distension.  She exhibits no mass. There is generalized tenderness.   Genitourinary:   Genitourinary Comments: Deferred   Musculoskeletal: Normal range of motion.   Lymphadenopathy:     She has no cervical adenopathy.        Right: No supraclavicular adenopathy present.        Left: No supraclavicular adenopathy present.   Neurological: She is oriented to person, place, and time. She has normal strength. She appears lethargic. No sensory deficit. Coordination and gait normal.   Skin: Skin is warm and dry. Capillary refill takes less than 2 seconds. No rash noted.   Psychiatric: Her speech is normal and behavior is normal. Judgment and thought content normal. Her mood appears anxious. Cognition and memory are normal. She does not exhibit a depressed mood.   Nursing note and vitals reviewed.      Significant Labs:   Blood Culture:   Recent Labs   Lab 01/25/19  1207 03/29/19  1600 03/29/19  1616   LABBLOO No growth after 5 days.  No growth after 5 days. No growth after 5 days. No growth after 5 days.     BMP:   Recent Labs   Lab 04/04/19  0530   *   *   K 3.9   CL 91*   CO2 26   BUN 15   CREATININE 0.9   CALCIUM 8.6*   MG 1.2*     All pertinent labs within the past 24 hours have been reviewed.    Significant Imaging: I have reviewed all pertinent imaging results/findings within the past 24 hours.

## 2019-04-04 NOTE — CHAPLAIN
Initial visit with patient and family.  In my conversation with the family of the patient I learned that the patient will be going into hospice.  I took time to listen to the family about their feelings going into this.  They were all accepting of this due to the fact they were thinking this might happen.  Family mentioned that the care has been great here but the patient has not been able to sleep.  The family believes that once she is home she will rest better.  I asked how I could be of further support and the family asked for prayer.  I took time to pray before leaving and will follow up as needed.    Chaplain Adeel Whitman M.Div., [facility]

## 2019-04-04 NOTE — PROGRESS NOTES
[facility] -   Hematology/Oncology  Progress Note    Patient Name: Yovani Pulido  Admission Date: 3/29/2019  Hospital Length of Stay: 4 days  Code Status: DNR     Subjective:     HPI:    86-year-old female with a history of rheumatoid arthritis, COPD, diabetes mellitus, CHF who was admitted for progressive shortness of breath over the previous week.  The patient was found to be hypoxic admitted with chest x-ray changes consistent with pneumonia versus pulmonary edema.  She was admitted and diuresed overnight with significant improvement in shortness of breath.    Interval History: Patient is being admitted to [facility], plan is to d/c home with hospice when appropriate.    Oncology Treatment Plan:   [No treatment plan]    Medications:  Continuous Infusions:  Scheduled Meds:   amoxicillin-clavulanate 500-125mg  1 tablet Oral BID    ferrous sulfate  325 mg Oral Daily    levothyroxine  50 mcg Oral Before breakfast    magnesium oxide  400 mg Oral BID    magnesium sulfate IVPB  1 g Intravenous Once    metoprolol tartrate  12.5 mg Oral BID    pantoprazole  40 mg Intravenous BID    pravastatin  40 mg Oral Daily    sodium chloride  2 g Oral TID     PRN Meds:sodium chloride, albuterol-ipratropium, dextrose 50%, dextrose 50%, glucagon (human recombinant), glucose, glucose, insulin aspart U-100, loperamide, ramelteon, sodium chloride 0.9%     Review of Systems   Constitutional: Positive for activity change, appetite change, fatigue and unexpected weight change. Negative for chills, diaphoresis and fever.   HENT: Negative for congestion, hearing loss, mouth sores, nosebleeds and trouble swallowing.    Eyes: Negative for discharge and visual disturbance.   Respiratory: Positive for cough and shortness of breath. Negative for chest tightness.    Cardiovascular: Negative for chest pain, palpitations and leg swelling.   Gastrointestinal: Positive for abdominal distention, abdominal pain and  diarrhea. Negative for blood in stool, constipation, nausea and vomiting.   Endocrine: Negative for cold intolerance and heat intolerance.   Genitourinary: Negative for difficulty urinating, dyspareunia, flank pain, frequency and hematuria.   Musculoskeletal: Positive for arthralgias, back pain, gait problem and myalgias.   Skin: Negative.    Neurological: Negative for dizziness, weakness, light-headedness and headaches.   Hematological: Negative for adenopathy. Does not bruise/bleed easily.   Psychiatric/Behavioral: Negative for agitation, behavioral problems and confusion. The patient is nervous/anxious.      Objective:     Vital Signs (Most Recent):  Temp: 97.6 °F (36.4 °C) (04/04/19 1222)  Pulse: 76 (04/04/19 1222)  Resp: 20 (04/04/19 1222)  BP: (!) 149/67 (04/04/19 1222)  SpO2: 97 % (04/04/19 1222) Vital Signs (24h Range):  Temp:  [97.4 °F (36.3 °C)-97.7 °F (36.5 °C)] 97.6 °F (36.4 °C)  Pulse:  [67-92] 76  Resp:  [16-20] 20  SpO2:  [97 %-100 %] 97 %  BP: (106-149)/(56-75) 149/67     Weight: 57.3 kg (126 lb 6.4 oz)  Body mass index is 23.86 kg/m².  Body surface area is 1.57 meters squared.      Intake/Output Summary (Last 24 hours) at 4/4/2019 1252  Last data filed at 4/4/2019 0553  Gross per 24 hour   Intake 660 ml   Output 500 ml   Net 160 ml       Physical Exam   Constitutional: She is oriented to person, place, and time. She appears well-developed and well-nourished. She appears lethargic. She appears ill. No distress.   HENT:   Head: Normocephalic and atraumatic.   Right Ear: Hearing and external ear normal.   Left Ear: Hearing and external ear normal.   Nose: No rhinorrhea or sinus tenderness. Right sinus exhibits no maxillary sinus tenderness and no frontal sinus tenderness. Left sinus exhibits no maxillary sinus tenderness and no frontal sinus tenderness.   Mouth/Throat: Uvula is midline, oropharynx is clear and moist and mucous membranes are normal. No oral lesions.   Eyes: Pupils are equal, round, and  reactive to light. Conjunctivae are normal. Right eye exhibits no discharge. Left eye exhibits no discharge.   Neck: Normal range of motion. Carotid bruit is not present. No tracheal deviation present. No thyromegaly present.   Cardiovascular: Normal rate, regular rhythm, S1 normal, S2 normal, normal heart sounds and intact distal pulses.   No murmur heard.  Pulses:       Dorsalis pedis pulses are 2+ on the right side, and 2+ on the left side.   Pulmonary/Chest: Effort normal and breath sounds normal. No respiratory distress.   Abdominal: Soft. Bowel sounds are normal. She exhibits distension. She exhibits no mass. There is generalized tenderness.   Musculoskeletal: Normal range of motion. She exhibits no edema.   Lymphadenopathy:     She has no cervical adenopathy.        Right: No supraclavicular adenopathy present.        Left: No supraclavicular adenopathy present.   Neurological: She is oriented to person, place, and time. She has normal strength. She appears lethargic. No sensory deficit. Coordination and gait normal.   Skin: Skin is warm and dry. Capillary refill takes less than 2 seconds. No rash noted. There is pallor.   Psychiatric: Her speech is normal and behavior is normal. Judgment and thought content normal. Her mood appears anxious. Cognition and memory are normal. She does not exhibit a depressed mood.   Nursing note and vitals reviewed.      Significant Labs:   CBC:   Recent Labs   Lab 04/03/19  0519 04/04/19  0530   WBC 30.80* 28.28*   HGB 9.7* 9.0*   HCT 30.7* 28.3*   * 521*    and CMP:   Recent Labs   Lab 04/03/19  0519 04/03/19  1223 04/04/19  0530   * 126* 127*   K 4.2 3.9 3.9   CL 90* 90* 91*   CO2 25 27 26   * 155* 144*   BUN 13 13 15   CREATININE 1.0 0.9 0.9   CALCIUM 8.9 8.2* 8.6*   PROT 6.1  --  5.6*   ALBUMIN 1.6*  --  1.5*   BILITOT 0.9  --  0.8   ALKPHOS 434*  --  408*   AST 19  --  21   ALT 21  --  21   ANIONGAP 11 9 10   EGFRNONAA 51* 58* 58*       Diagnostic  Results:  I have reviewed all pertinent imaging results/findings within the past 24 hours.    Assessment/Plan:     * Acute on chronic diastolic congestive heart failure  Shortness of breath significantly improving with diuresis and broad-spectrum antibiotics.  However hemoglobin is only 8.0 with evidence of iron deficiency on labs.    Patient received 1 dose of Venofer while admitted s/p 1 unit PRBC due to symptomatic anemia.   --Patient to admit to Hospice      Abnormal finding on CT scan  Discussed CT results with patient and family. Discussed the findings are consistent with metastatic disease. Patient stated she does not want any treatment and does not want a biopsy to confirm diagnosis. Patient and family would like to admit to hospice.     Iron deficiency anemia  Patient is status post 1 dose of Venofer and 1 unit PRBC since admission.   Thrombocytosis secondary to iron deficiency and inflammation/infection.  May consider myeloproliferative workup due to continued thrombocytosis.     --Hgb: 9.0 today  --Continue daily CBC, CMP        Thank you for your consult. I will follow-up with patient. Please contact us if you have any additional questions.     Reina Sullivan NP  Hematology/Oncology  [facility]

## 2019-04-04 NOTE — ASSESSMENT & PLAN NOTE
4/3- CT scan of the chest is concerning for metastatic disease. Family does not want to pursue aggressive care.  Will plan to use empiric Augumentin in AM , continue vanco/zosyn

## 2019-04-04 NOTE — DISCHARGE SUMMARY
[facility] Hospital Medicine  Discharge Summary      Patient Name: Yovani Pulido  MRN: [id number]  Admission Date: 3/29/2019  Hospital Length of Stay: 4 days  Discharge Date and Time: 4/4/2019  1:57 PM  Attending Physician: Dr. JANNY Henriquez   Discharging Provider: Kate Patrick NP  Primary Care Provider: Dorcas Schultz DO      HPI:   Yovani Pulido is a 86 y.o. female patient with PMHx of RA, HTN, COPD, DM, and CHF  who was sent to ER from Dr. Kendrick office (where she goes for [facility]AK[facility]), for progressive SOB gradually  since last 1 week. SOB is intermittent and moderate in severity and gets worse with exertion. Pt was seen by Reina Sullivan NP (Hem/Onc) today for sxs and referred to ED for decreased O2 saturation, abnormal lung sounds, and CXR that resulted pulmonary edema vs. Pneumonia.  Associated sxs include chills and fatigue. Patient denies any CP, fever, diaphoresis, palpitations, extremity weakness/numbness, leg pain/swelling, dizziness, cough, n/v, and all other sxs at this time. No further complaints or concerns at this time.     She was admitted here in Jan 2019 for Pneumonia and was at [facility] before that for CHF exacerbation. She had Thoracentesis by Dr. Hallman last year in March 2018. Pt was suspected of severe Sepsis in the ER due to Leukocytosis of 24K with Lactate of 2.7, however her BNP is 570 and her CXR showed Pulm edema with B Pleural Effusions as well as Hyponatremia of 129 as well as mild BLE pitting edema -- hence Rehydration was stopped and pt given IV Lasix and admitted. Pt was hypoxemic initially in the office but not in the ER. Pt has normal Echo last year.           * No surgery found *      Hospital Course:   Pt admitted to Observation Unit for Acute on chronic diastolic congestive heart failure.  BNP > 500 with Troponin flat and mildly elevated.  Chest xray showed findings representative of pulmonary edema vs. pneumonia which cannot be excluded.  Pt treated with  IV Lasix and supplemental oxygen.  H/H declined in the setting CHF.  Iron studies reviewed and Venofer x 1 dose given.  Leukocytosis noted with IV antibiotics given as PNA cannot be excluded via imaging.  Pt verbalized symptom improvement on current therapy.  On 3/31/19, Lasix held in the setting hypokalemia and hypotension.  Potassium replaced with repeat lab normalized.  H/H remained low qith mild improvement noted after Venofer.  Case discussed with Heme/Onc and PRBCs x 1 unit transfused. Pt reported hx of chronic diarrhea possibly due to Chron's disease.  Pt states she does not want any endoscopy procedure due to her age.  Oral iron supplementation initiated.  Magnesium replaced.  Pt continues to report increased incidence of diarrhea.  Stool studies with culture results negative.  CT of chest/abdomen/pelvis obtained with results showing enteritis and pulmonary nodules scattered throughout both lungs and mild amount of alveolar consolidation scattered throughout both lungs. There are hypodense masses scattered throughout the liver and spleen concerning for metastatic disease.  Imaging results discussed with patient/family by Dr. Medina (Heme/Onc).  Magnesium and Potassium normalized.  Hyponatremia noted.  Urine sodium and urine creatinine normal.  Stool studies negative and lactoferrin pending.   ID consulted due to persistent leukocytosis.  Antibiotics adjusted per recommendation. Patient stated she does not want any treatment and does not want a biopsy performed.  CM consulted for discharge planning and to establish Hospice.  IV hydration and oral sodium supplements continued for hyponatremia.  Code status changed to DNR per patient.  Leukocytosis and Sodium mildly improved.  Magnesium replaced.  Blood cultures with no growth to date.  Repeat imaging supports previous findings. Pt seen and examined on the date of discharge and deemed suitable for discharge to home with [facility].  Augmentin,  Imodium, Magnesium oxide, Lopressor, ferrous sulfate, and Sodium Chloride prescribed.  Pt instructed to follow up with [facility].       Consults:   Consults (From admission, onward)        Status Ordering Provider     Inpatient consult to Social Work  Once     Provider:  (Not yet assigned)    Completed SIRISHA PURVIS     Inpatient consult to Social Work/Case Management  Once     Provider:  (Not yet assigned)    Completed YVES GOMEZ     IP consult to case management  Once     Provider:  (Not yet assigned)    Completed REYES ONTIVEROS            Final Active Diagnoses:    Diagnosis Date Noted POA    PRINCIPAL PROBLEM:  Acute on chronic diastolic congestive heart failure [I50.33] 03/29/2019 Yes    Abnormal finding on CT scan [R93.89] 04/02/2019 Unknown    Enteritis [K52.9] 04/02/2019 Unknown    Hypomagnesemia [E83.42] 04/01/2019 Unknown    Diarrhea [R19.7] 04/01/2019 Unknown    Abnormal chest x-ray [R93.89] 03/31/2019 Yes    Sepsis [A41.9] 03/30/2019 Unknown    Hyponatremia [E87.1] 03/29/2019 Yes    Centrilobular emphysema [J43.2] 02/18/2019 Yes    Pneumonia [J18.9] 01/26/2019 Yes    Diabetes mellitus type 2 without retinopathy [E11.9] 02/08/2016 Yes    Iron deficiency anemia [D50.9] 02/11/2015 Yes      Problems Resolved During this Admission:       Discharged Condition: stable    Disposition: Hospice/Home    Follow Up:  Follow-up Information     [facility].    Specialties:  Hospice and Palliative Medicine, Hospice Services  Why:  Hospice  Contact information:  [id number] [place] [address] [id number] [phone]                 Patient Instructions:      Diet Cardiac     Diet diabetic     Activity as tolerated       Significant Diagnostic Studies: Labs:   CMP   Recent Labs   Lab 04/03/19  0519 04/03/19  1223 04/04/19  0530   * 126* 127*   K 4.2 3.9 3.9   CL 90* 90* 91*   CO2 25 27 26   * 155* 144*   BUN 13 13 15   CREATININE 1.0 0.9 0.9   CALCIUM 8.9  8.2* 8.6*   PROT 6.1  --  5.6*   ALBUMIN 1.6*  --  1.5*   BILITOT 0.9  --  0.8   ALKPHOS 434*  --  408*   AST 19  --  21   ALT 21  --  21   ANIONGAP 11 9 10   ESTGFRAFRICA 59* >60 >60   EGFRNONAA 51* 58* 58*    and CBC   Recent Labs   Lab 04/03/19  0519 04/04/19  0530   WBC 30.80* 28.28*   HGB 9.7* 9.0*   HCT 30.7* 28.3*   * 521*     Microbiology:   Blood Culture   Lab Results   Component Value Date    LABBLOO No growth after 5 days. 03/29/2019       Pending Diagnostic Studies:     Procedure Component Value Units Date/Time    Lactoferrin, fecal, quantitative [[id number]] Collected:  04/03/19 1352    Order Status:  Sent Lab Status:  In process Updated:  04/04/19 0406    Specimen:  Stool          Medications:  Reconciled Home Medications:      Medication List      START taking these medications    amoxicillin-clavulanate 500-125mg 500-125 mg Tab  Commonly known as:  AUGMENTIN  Take 1 tablet (500 mg total) by mouth 2 (two) times daily. for 10 days     ferrous sulfate 325 (65 FE) MG EC tablet  Take 1 tablet (325 mg total) by mouth once daily.  Start taking on:  4/5/2019     loperamide 2 mg capsule  Commonly known as:  IMODIUM  Take 1 capsule (2 mg total) by mouth 4 (four) times daily as needed for Diarrhea.     magnesium oxide 400 mg (241.3 mg magnesium) tablet  Commonly known as:  MAG-OX  Take 1 tablet (400 mg total) by mouth 2 (two) times daily.     metoprolol tartrate 25 MG tablet  Commonly known as:  LOPRESSOR  Take 0.5 tablets (12.5 mg total) by mouth 2 (two) times daily.     sodium chloride 1 gram tablet  Take 2 tablets (2 g total) by mouth 3 (three) times daily.        CONTINUE taking these medications    alcohol swabs Padm  Apply 1 each topically 2 (two) times daily.     aspirin 81 MG Chew  Take 81 mg by mouth.     benazepril 5 MG tablet  Commonly known as:  LOTENSIN  Take 1 tablet (5 mg total) by mouth once daily.     BETIMOL 0.5 % ophthalmic solution  Generic drug:  timolol  1 drop.     blood glucose  control, high Soln  by Misc.(Non-Drug; Combo Route) route.     blood glucose control, normal Soln  Use as directed.     blood sugar diagnostic Strp  Glucose testing daily.     blood-glucose meter Misc  Use as directed.     cetirizine 10 MG tablet  Commonly known as:  ZYRTEC  Take 1 tablet (10 mg total) by mouth once daily.     cyanocobalamin (vitamin B-12) 2,500 mcg Tab  TK 1 T PO D     fluticasone 50 mcg/actuation nasal spray  Commonly known as:  FLONASE  2 sprays by Each Nare route once daily.     folic acid 800 MCG Tab  Commonly known as:  FOLVITE  TAKE 1 TABLET TWICE DAILY     furosemide 20 MG tablet  Commonly known as:  LASIX  Take 1 tablet (20 mg total) by mouth every Mon, Wed, Fri.     lancets Misc  Commonly known as:  LANCETS,THIN  Twice daily glucose testing.     latanoprost 0.005 % ophthalmic solution  INSTILL 1 DROP INTO BOTH EYES EVERY EVENING     levothyroxine 50 MCG tablet  Commonly known as:  SYNTHROID  TAKE 1 TABLET BEFORE BREAKFAST     metFORMIN 1000 MG tablet  Commonly known as:  GLUCOPHAGE  TAKE 1 TABLET TWICE DAILY WITH MEALS     omeprazole 20 MG capsule  Commonly known as:  PRILOSEC  Take 1 capsule (20 mg total) by mouth once daily.     pravastatin 40 MG tablet  Commonly known as:  PRAVACHOL  TAKE 1 TABLET EVERY DAY     timolol maleate 0.5% 0.5 % Drop  Commonly known as:  TIMOPTIC  INSTILL 1 DROP INTO BOTH EYES EVERY MORNING     traMADol 50 mg tablet  Commonly known as:  ULTRAM  Take 1 tablet (50 mg total) by mouth every 8 (eight) hours as needed for Pain.     traZODone 100 MG tablet  Commonly known as:  DESYREL  TAKE 1 TABLET BY MOUTH ONCE EVERY EVENING        STOP taking these medications    magnesium 250 mg Tab     methotrexate 2.5 MG Tab     MG-PLUS-PROTEIN 133 mg Tab  Generic drug:  magnesium oxide-Mg AA chelate     predniSONE 1 MG tablet  Commonly known as:  DELTASONE            Indwelling Lines/Drains at time of discharge:   Lines/Drains/Airways          None          Time spent on the  discharge of patient: > 30 minutes  Patient was seen and examined on the date of discharge and determined to be suitable for discharge.         Kate Patrick NP  Department of Hospital Medicine  [facility]

## 2019-04-04 NOTE — PLAN OF CARE
Problem: Adult Inpatient Plan of Care  Goal: Plan of Care Review  Outcome: Ongoing (interventions implemented as appropriate)  POC reviewed, verbalized understanding. Pt remained free from falls, fall precautions in place. Pt is SR  on monitor, VSS Afebrile . No other c/o at this time. PIV intact.  Electrolyte replace still mag still a little low  Pt is now an DNR will be going home on hospice  Blood sug ar 160's  antibotics and fluids cont Call bell and personal belongings within reach. Hourly rounding complete. Reminded to call for assistance. Will continue to monitor.

## 2019-04-04 NOTE — PT/OT/SLP PROGRESS
Physical Therapy      Patient Name:  Yovani Pulido   MRN:  [id number]    0840 PT MET IN  WITH FAMILY PRESENT. PT AWAITING D/C HOME WITH HOSPICE CARE. P.T. WILL D/C PT FROM P.T. SERVICES AT THIS TIME PER PT AND FAMILY REQUEST. THANK YOU     Hali Adames, PT,4/4/2019

## 2019-04-04 NOTE — HPI
86 y.o. female patient with PMHx of RA, HTN, COPD, DM, and CHF  who was sent to ER from Dr. Kendrick office (where she goes for [facility]), for progressive SOB gradually.  Since admission, she was noticed to have leukocytosis.  CBC -   Component      Latest Ref Rng & Units 4/4/2019 4/3/2019 4/2/2019 4/1/2019                WBC      3.90 - 12.70 K/uL 28.28 (H) 30.80 (H) 21.39 (H) 19.18 (H)     Component      Latest Ref Rng & Units 3/31/2019             WBC      3.90 - 12.70 K/uL 17.03 (H)     CT chest/abdomen/pelvis -    There are enlarged mediastinal lymph nodes.  There are noncalcified pulmonary nodules scattered throughout both lungs. There is a mild amount of alveolar consolidation scattered throughout both lungs. There are hypodense masses scattered throughout the liver and spleen. One of the larger ones measures 32 mm and is located in the right lobe of the liver.  This represents interval worsening of appearance and is worrisome for metastatic disease.  3. There is a large hernia through the anterior wall of the pelvis. The width of the mouth of this hernia measures 9.6 cm.   A portion of the ileum extends into this hernia. The ileum has a thickened wall that measures 6 mm.  This is characteristic of enteritis.

## 2019-04-04 NOTE — SUBJECTIVE & OBJECTIVE
Past Medical History:   Diagnosis Date    Anemia     Carotid stenosis     Cataract     COAG (chronic open-angle glaucoma)     Colon polyps     Diabetes mellitus type II     steroid induced    Diverticulosis     GERD (gastroesophageal reflux disease)     hiatal hernia    Glaucoma     Heart murmur     Hyperlipidemia     Hypertension     Hypothyroidism     Rheumatoid arthritis(714.0)     Stroke     lacunar infarct       Past Surgical History:   Procedure Laterality Date    anal fissure repair      APPENDECTOMY  1944    BREAST SURGERY  1992 approx    breast biopsies- benign    CAROTID ENDARTERECTOMY  2009    left    CATARACT EXTRACTION      COLONOSCOPY  2012    COLONOSCOPY N/A 12/11/2015    Performed by Gavin Escobedo MD at [facility]    ESOPHAGOGASTRODUODENOSCOPY (EGD) N/A 12/11/2015    Performed by Gavin Escobedo MD at [facility] END[facility]    EYE SURGERY  2004, 2005    bilateral cataracts    HERNIA REPAIR  2001, 2002    abdominal x2, with mesh on second    HYSTERECTOMY  1963    vag hyst    JOINT REPLACEMENT  2008    right knee    THORACENTESIS - Outpatient Right 3/23/2018    Performed by Alex Hallman MD at [facility] ENDO    yag Left        Review of patient's allergies indicates:   Allergen Reactions    Tetanus vaccines and toxoid      Other reaction(s): Swelling    Tetanus-horse serum: 30 years ago    Adhes. band-tape-benzalkonium Rash       Medications:  Medications Prior to Admission   Medication Sig    alcohol swabs PadM Apply 1 each topically 2 (two) times daily.    benazepril (LOTENSIN) 5 MG tablet Take 1 tablet (5 mg total) by mouth once daily.    blood glucose control, normal Soln Use as directed.    blood sugar diagnostic Strp Glucose testing daily.    blood-glucose meter Misc Use as directed.    fluticasone (FLONASE) 50 mcg/actuation nasal spray 2 sprays by Each Nare route once daily.    folic acid (FOLVITE) 800 MCG Tab TAKE 1 TABLET TWICE DAILY    furosemide (LASIX) 20 MG tablet  Take 1 tablet (20 mg total) by mouth every Mon, Wed, Fri.    lancets (LANCETS,THIN) Misc Twice daily glucose testing.    latanoprost 0.005 % ophthalmic solution INSTILL 1 DROP INTO BOTH EYES EVERY EVENING    levothyroxine (SYNTHROID) 50 MCG tablet TAKE 1 TABLET BEFORE BREAKFAST    metFORMIN (GLUCOPHAGE) 1000 MG tablet TAKE 1 TABLET TWICE DAILY WITH MEALS    methotrexate 2.5 MG Tab TAKE 4 TABLETS IN MORNING AND 4 TABLETS AT NIGHT ONE TIME WEEKLY    omeprazole (PRILOSEC) 20 MG capsule Take 1 capsule (20 mg total) by mouth once daily.    pravastatin (PRAVACHOL) 40 MG tablet TAKE 1 TABLET EVERY DAY    predniSONE (DELTASONE) 1 MG tablet TAKE 1 TABLET TWICE DAILY  OR  AS  DIRECTED    timolol maleate 0.5% (TIMOPTIC) 0.5 % Drop INSTILL 1 DROP INTO BOTH EYES EVERY MORNING    traMADol (ULTRAM) 50 mg tablet Take 1 tablet (50 mg total) by mouth every 8 (eight) hours as needed for Pain.    traZODone (DESYREL) 100 MG tablet TAKE 1 TABLET BY MOUTH ONCE EVERY EVENING    aspirin 81 MG Chew Take 81 mg by mouth.    blood glucose control, high Soln by Misc.(Non-Drug; Combo Route) route.    cetirizine (ZYRTEC) 10 MG tablet Take 1 tablet (10 mg total) by mouth once daily.    cyanocobalamin, vitamin B-12, 2,500 mcg Tab TK 1 T PO D    magnesium 250 mg Tab Take 1 tablet by mouth Daily.    magnesium oxide-Mg AA chelate (MG-PLUS-PROTEIN) 133 mg Tab Take by mouth.    timolol (BETIMOL) 0.5 % ophthalmic solution 1 drop.     Antibiotics (From admission, onward)    Start     Stop Route Frequency Ordered    04/03/19 1330  piperacillin-tazobactam 4.5 g in dextrose 5 % 100 mL IVPB (ready to mix system)      -- IV Every 8 hours (non-standard times) 04/03/19 1210    04/03/19 1200  vancomycin 250mg / 10ml oral suspension 125 mg      -- Oral Every 6 hours 04/03/19 0833        Antifungals (From admission, onward)    None        Antivirals (From admission, onward)    None           Immunization History   Administered Date(s) Administered     Influenza 2006, 10/31/2007, 10/08/2008, 10/08/2010, 2011    Influenza - High Dose 10/08/2012, 2013, 10/10/2014, 10/24/2016, 10/02/2017, 2018    Pneumococcal Conjugate 2014    Pneumococcal Conjugate - 13 Valent 2014    Pneumococcal Polysaccharide - 23 Valent 2007       Family History     Problem Relation (Age of Onset)    Blindness Sister    Cancer Mother, Father, Son (61)    Cataracts Sister    Diabetes Sister    Glaucoma Mother    Heart disease Brother, Brother    Stroke Sister        Social History     Socioeconomic History    Marital status:      Spouse name: Not on file    Number of children: 4    Years of education: Not on file    Highest education level: Not on file   Occupational History    Not on file   Social Needs    Financial resource strain: Not on file    Food insecurity:     Worry: Not on file     Inability: Not on file    Transportation needs:     Medical: Not on file     Non-medical: Not on file   Tobacco Use    Smoking status: Former Smoker     Packs/day: 3.00     Years: 50.00     Pack years: 150.00     Start date: 1948     Last attempt to quit: 1998     Years since quittin.3    Smokeless tobacco: Former User   Substance and Sexual Activity    Alcohol use: No     Alcohol/week: 0.0 oz    Drug use: No    Sexual activity: Never   Lifestyle    Physical activity:     Days per week: Not on file     Minutes per session: Not on file    Stress: Not on file   Relationships    Social connections:     Talks on phone: Not on file     Gets together: Not on file     Attends Confucianism service: Not on file     Active member of club or organization: Not on file     Attends meetings of clubs or organizations: Not on file     Relationship status: Not on file   Other Topics Concern    Not on file   Social History Narrative    , then remarried.  after 2-3 weeks. They have still been living together for 31 years. He is  95 now. They are no longer sexually active. Caffeine intake 4 cups coffee daily- though has changed to decaf recently. Does have a living will.      Review of Systems   Constitutional: Positive for activity change, appetite change and fatigue. Negative for chills, diaphoresis, fever and unexpected weight change.   HENT: Negative for congestion, hearing loss, mouth sores, nosebleeds and trouble swallowing.    Eyes: Negative for discharge and visual disturbance.   Respiratory: Negative for cough, chest tightness and shortness of breath.    Cardiovascular: Negative for chest pain, palpitations and leg swelling.   Gastrointestinal: Positive for abdominal distention, abdominal pain and diarrhea. Negative for blood in stool, constipation, nausea and vomiting.   Endocrine: Negative for cold intolerance and heat intolerance.   Genitourinary: Negative for difficulty urinating, dyspareunia, flank pain, frequency and hematuria.   Musculoskeletal: Positive for arthralgias and back pain. Negative for myalgias.   Skin: Negative.    Neurological: Positive for weakness. Negative for dizziness, light-headedness and headaches.   Hematological: Negative for adenopathy. Does not bruise/bleed easily.   Psychiatric/Behavioral: Positive for sleep disturbance. Negative for agitation, behavioral problems and confusion. The patient is nervous/anxious.      Objective:     Vital Signs (Most Recent):  Temp: 97.5 °F (36.4 °C) (04/04/19 0729)  Pulse: 92 (04/04/19 0729)  Resp: 16 (04/04/19 0729)  BP: 139/75 (04/04/19 0729)  SpO2: 98 % (04/04/19 0729) Vital Signs (24h Range):  Temp:  [97.4 °F (36.3 °C)-97.7 °F (36.5 °C)] 97.5 °F (36.4 °C)  Pulse:  [67-92] 92  Resp:  [16-24] 16  SpO2:  [98 %-100 %] 98 %  BP: (106-156)/(56-75) 139/75     Weight: 57.3 kg (126 lb 6.4 oz)  Body mass index is 23.86 kg/m².    Estimated Creatinine Clearance: 33.9 mL/min (based on SCr of 0.9 mg/dL).    Physical Exam   Constitutional: She is oriented to person, place, and  time. She appears lethargic. She appears cachectic. She appears ill. No distress.   HENT:   Head: Normocephalic and atraumatic.   Right Ear: Hearing and external ear normal.   Left Ear: Hearing and external ear normal.   Nose: No rhinorrhea or sinus tenderness. Right sinus exhibits no maxillary sinus tenderness and no frontal sinus tenderness. Left sinus exhibits no maxillary sinus tenderness and no frontal sinus tenderness.   Mouth/Throat: Uvula is midline, oropharynx is clear and moist and mucous membranes are normal. No oral lesions.   Eyes: Pupils are equal, round, and reactive to light. Conjunctivae are normal. Right eye exhibits no discharge. Left eye exhibits no discharge.   Neck: Normal range of motion. Carotid bruit is not present. No tracheal deviation present. No thyromegaly present.   Cardiovascular: Normal rate, regular rhythm, S1 normal, S2 normal, normal heart sounds and intact distal pulses.   No murmur heard.  Pulses:       Dorsalis pedis pulses are 2+ on the right side, and 2+ on the left side.   Pulmonary/Chest: Effort normal. No respiratory distress. She has decreased breath sounds in the right lower field and the left lower field.   Abdominal: Soft. Bowel sounds are normal. She exhibits distension. She exhibits no mass. There is generalized tenderness.   Genitourinary:   Genitourinary Comments: Deferred   Musculoskeletal: Normal range of motion.   Lymphadenopathy:     She has no cervical adenopathy.        Right: No supraclavicular adenopathy present.        Left: No supraclavicular adenopathy present.   Neurological: She is oriented to person, place, and time. She has normal strength. She appears lethargic. No sensory deficit. Coordination and gait normal.   Skin: Skin is warm and dry. Capillary refill takes less than 2 seconds. No rash noted.   Psychiatric: Her speech is normal and behavior is normal. Judgment and thought content normal. Her mood appears anxious. Cognition and memory are  normal. She does not exhibit a depressed mood.   Nursing note and vitals reviewed.      Significant Labs:   Blood Culture:   Recent Labs   Lab 01/25/19  1207 03/29/19  1600 03/29/19  1616   LABBLOO No growth after 5 days.  No growth after 5 days. No growth after 5 days. No growth after 5 days.     BMP:   Recent Labs   Lab 04/04/19  0530   *   *   K 3.9   CL 91*   CO2 26   BUN 15   CREATININE 0.9   CALCIUM 8.6*   MG 1.2*     All pertinent labs within the past 24 hours have been reviewed.    Significant Imaging: I have reviewed all pertinent imaging results/findings within the past 24 hours.

## 2019-04-04 NOTE — PLAN OF CARE
Problem: Adult Inpatient Plan of Care  Goal: Absence of Hospital-Acquired Illness or Injury  Outcome: Ongoing (interventions implemented as appropriate)  Intervention: Prevent Skin Injury  Noted areas on upper and lower extremities with bruising.  Intervention: Prevent Infection  Aseptic technique maintained.    Goal: Optimal Comfort and Wellbeing  Outcome: Ongoing (interventions implemented as appropriate)  Intervention: Monitor Pain and Promote Comfort  Patient voice no complaints of pain at this time.      Comments: Client continued monitor. No distress observed. Chart reviewed.

## 2019-04-04 NOTE — ASSESSMENT & PLAN NOTE
4/3- will need further work up but patient does not want up at this time.  Previous colonoscopy in 2015 - showed multiple adenoma and repeat colonoscopy was advised to be done in one year .  Will use symptomatic therapy .  Family is thinking of Hospice at this time

## 2019-04-04 NOTE — ASSESSMENT & PLAN NOTE
Shortness of breath significantly improving with diuresis and broad-spectrum antibiotics.  However hemoglobin is only 8.0 with evidence of iron deficiency on labs.    Patient received 1 dose of Venofer while admitted s/p 1 unit PRBC due to symptomatic anemia.   --Patient to admit to Hospice

## 2019-04-05 ENCOUNTER — TELEPHONE (OUTPATIENT)
Dept: HEMATOLOGY/ONCOLOGY | Facility: CLINIC | Age: 84
End: 2019-04-05

## 2019-04-05 NOTE — TELEPHONE ENCOUNTER
----- Message from Blas Kendrick MD sent at 4/5/2019  2:50 PM CDT -----  Please let the patient's family know that I am aware of her recent diagnosis of malignancy and have been speaking to the doctors caring for her in the hospital and that I agree with their decision for hospice.  I have seen this patient in the past for recurrent iron deficiency anemia so the years last year was last time I saw

## 2019-04-05 NOTE — TELEPHONE ENCOUNTER
Spoke with patients son Cam, stated his mom has been enrolled in [facility] and they are taking good care of her. Advised Cam if they need anything to just give us a call.

## 2019-04-05 NOTE — PHYSICIAN QUERY
PT Name: Yovani Pulido  MR #: [id number]  Physician Query Form - CKD Clarification     CDS/: Stephanie Wilson RN              Contact information:[phone]  This form is a permanent document in the medical record.     Query Date: April 5, 2019    By submitting this query, we are merely seeking further clarification of documentation. Please utilize your independent clinical judgment when addressing the question(s) below.    The Medical record contains the following:     Indicators   Supporting Clinical Findings   Location in Medical Record   x CKD or Chronic Kidney (Renal) Failure / Disease  85 y/o female with PMH of DM, CKD, and CHF       Continue pulse dosing due to advanced age, and CKD. Vancomycin place kelley order to remain in Epic.    Consult 3/29      Pharmacy      Progress Notes 3/30       Pharmacy   x BUN/Creatinine                          GFR BUN 18->   15->   13->  15    Cr    1.1->  1.1->   1.0-> 0.9    GFR 45.6->46->   51 -> 58   Lab 3/29, 3/31, 4/2, 4/4    Dehydration      Nausea / Vomiting      Dialysis / CRRT      Medication      Treatment      Other Chronic Conditions      Other       Provider, please further specify the stage of CKD.    [ X  ] Chronic Kidney Disease (CKD) (please specify stage* below)      [employer] Definitions     Stage Description eGFR (mL/min)   [   ]    I Slight kidney damage with normal or increased filtration 90+   [   ]   II Mildly reduced kidney function 60-89   [ X  ]    III Moderately reduced kidney function 30-59   [   ] Other (please specify): ____________    [   ]  Clinically Undetermined          Please document in your progress notes daily for the duration of treatment until resolved and include in your discharge summary.

## 2019-04-06 LAB
BACTERIA STL CULT: NORMAL

## 2019-04-07 LAB — LACTOFERRIN, STOOL: POSITIVE

## 2019-04-22 ENCOUNTER — TELEPHONE (OUTPATIENT)
Dept: HEMATOLOGY/ONCOLOGY | Facility: CLINIC | Age: 84
End: 2019-04-22

## 2019-04-22 NOTE — TELEPHONE ENCOUNTER
----- Message from Jessica Mchugh sent at 4/22/2019  3:10 PM CDT -----  Contact: Patients daughter in law, Orlando Puliod  Called to inform of patients passing on 04/14/2019, all appointments have been cancelled. Thank you

## 2024-04-26 NOTE — MR AVS SNAPSHOT
Patient Information     Patient Name Sex Yovani Shea Female 1932      Visit Information        Provider Department Dept Phone Center    2017 2:00 PM [facility] Chemo [facility]fusion [facility] [phone] [facility]      Patient Instructions      [facility]  [address] [facility] [address]  [address]  [phone] phone     [phone] fax  Hours of Operation: Monday- Friday 8:00am- 5:00pm  After hours phone  [phone]  Hematology / Oncology Physicians on call      Dr. Aroldo Alonso    Please call with any concerns regarding your appointment today.FALL PREVENTION   Falls often occur due to slipping, tripping or losing your balance. Here are ways to reduce your risk of falling again.   Was there anything that caused your fall that can be fixed, removed or replaced?   Make your home safe by keeping walkways clear of objects you may trip over.   Use non-slip pads under rugs.   Do not walk in poorly lit areas.   Do not stand on chairs or wobbly ladders.   Use caution when reaching overhead or looking upward. This position can cause a loss of balance.   Be sure your shoes fit properly, have non-slip bottoms and are in good condition.   Be cautious when going up and down stairs, curbs, and when walking on uneven sidewalks.   If your balance is poor, consider using a cane or walker.   If your fall was related to alcohol use, stop or limit alcohol intake.   If your fall was related to use of sleeping medicines, talk to your doctor about this. You may need to reduce your dosage at bedtime if you awaken during the night to go to the bathroom.   To reduce the need for nighttime bathroom trips:   Avoid drinking fluids for several hours before going to bed   Empty your bladder before going to bed   Men can keep a urinal at the bedside   © 5550-9020 [employer], [address] [place] [address], [place] [address]. All rights  reserved. This information is not intended as a substitute for professional medical care. Always follow your healthcare professional's instructions.         Your Current Medications Are     alcohol swabs PadM    aspirin (ECOTRIN) 81 MG EC tablet    benazepril (LOTENSIN) 5 MG tablet    blood glucose control, high Soln    blood glucose control, normal Soln    blood sugar diagnostic Strp    blood-glucose meter Misc    cyanocobalamin (VITAMIN B-12) 250 MCG tablet    folic acid (FOLVITE) 800 MCG Tab    lancets (LANCETS,THIN) Misc    latanoprost 0.005 % ophthalmic solution    levothyroxine (SYNTHROID) 50 MCG tablet    magnesium 250 mg Tab    metformin (GLUCOPHAGE) 1000 MG tablet    methotrexate 2.5 MG Tab    omeprazole (PRILOSEC) 20 MG capsule    pravastatin (PRAVACHOL) 40 MG tablet    predniSONE (DELTASONE) 1 MG tablet    timolol maleate 0.5% (TIMOPTIC) 0.5 % Drop    tramadol (ULTRAM) 50 mg tablet    trazodone (DESYREL) 100 MG tablet      Facility-Administered Medications     ferumoxytol (FERAHEME) 510 mg in sodium chloride 0.9% 100 mL IVPB    sodium chloride 0.9% 100 mL flush bag      Appointments for Next Year     2/16/2017 10:35 AM NON FASTING LAB (5 min.) [facility], [place]    Arrive at check-in approximately 15 minutes before your scheduled appointment time. Bring all outside medical records and imaging, along with a list of your current medications and insurance card.    (off [address]) 2nd floor    2/16/2017 11:00 AM ESTABLISHED PATIENT (20 min.) [facility] - Hemotology Oncology Blas Kendrick MD    Arrive at check-in approximately 15 minutes before your scheduled appointment time. Bring all outside medical records and imaging, along with a list of your current medications and insurance card.    (off [address]) 3rd Floor    4/10/2017  8:05 AM FASTING LAB (5 min.) [facility]    1. Do not eat or drink anything for TEN HOURS (10) PRIOR TO  TEST. Do not chew gum or eat candy mints, even those claiming to be sugar free. Water is allowed but do not drink any other fluids 2. Take your regular daily medicines as your doctor has ordered. If you are diabetic, do not take your insulin or other diabetic medication until your blood is drawn and you are ready to eat. Your physician may have special instructions for diabetics. Check with your doctor if you have any questions.3. Alcoholic beverages are not allowed starting at 6:00pm the evening before your appointment.    (off [address]) 2nd floor    4/10/2017  8:30 AM BONE DENSITY (30 min.) [facility]    Do not wear underwire bras or pants with any metal or zippers when you go to your appointment.    (off [address]) [facility] Floor    4/10/2017  9:00 AM ESTABLISHED PATIENT (30 min.) [facility] - Rheumatology Claudia Chau PA-C    Arrive at check-in approximately 15 minutes before your scheduled appointment time. Bring all outside medical records and imaging, along with a list of your current medications and insurance card.    (off [address]) 2nd Floor    4/14/2017  1:00 PM ESTABLISHED PATIENT (20 min.) [facility] - Internal Medicine Dorcas Schultz DO    Arrive at check-in approximately 15 minutes before your scheduled appointment time. Bring all outside medical records and imaging, along with a list of your current medications and insurance card.    (off [place]) 1st floor    4/24/2017  2:30 PM MARQUES VISUAL FIELDS (30 min.) [facility] - Ophthalmology FIELDS, VISUAL-ONE    Arrive at check-in approximately 15 minutes before your scheduled appointment time. Bring all outside medical records and imaging, along with a list of your current medications and insurance card.    (off [address]) 1st floor    4/24/2017  3:00 PM ESTABLISHED PATIENT EXTENDED (15 min.) [facility] - Ophthalmology Neymar Sweeney MD    Arrive at check-in approximately 15 minutes before your scheduled  "appointment time. Bring all outside medical records and imaging, along with a list of your current medications and insurance card.    (off [address]) 1st floor    8/29/2017  1:40 PM NON FASTING LAB (5 min.) [facility] LAB[facility]ATORY, [facility]    Arrive at check-in approximately 15 minutes before your scheduled appointment time. Bring all outside medical records and imaging, along with a list of your current medications and insurance card.    (off [address]) 2nd floor    8/29/2017  2:00 PM ESTABLISHED PATIENT (30 min.) [facility] - Rheumatology Claudia Chau PA-C    Arrive at check-in approximately 15 minutes before your scheduled appointment time. Bring all outside medical records and imaging, along with a list of your current medications and insurance card.    (off [address]) 2nd Floor         Default Flowsheet Data (last 24 hours)      Amb Complex Vitals Eliezer        01/18/17 1357 01/18/17 1347             Measurements    Weight  71.8 kg (158 lb 4.6 oz)       Height  5' 2" (1.575 m)       BSA (Calculated - sq m)  1.77 sq meters       BMI (Calculated)  29       BP (!)  151/74        Temp 97.7 °F (36.5 °C)        Pulse 73        Resp 20        SpO2 (!)  93 %        Pain Assessment    Pain Score  Zero               Allergies     Tetanus Vaccines And Toxoid     Other reaction(s): Swelling  Tetanus-horse serum: 30 years ago    Adhes. Band-tape-benzalkonium Rash      Medications You Received from 01/17/2017 1448 to 01/18/2017 1448        Date/Time Order Dose Route Action     01/18/2017 1412 ferumoxytol (FERAHEME) 510 mg in sodium chloride 0.9% 100 mL IVPB 510 mg Intravenous New Bag     01/18/2017 1353 sodium chloride 0.9% 100 mL flush bag   Intravenous New Bag      Current Discharge Medication List     Cannot display discharge medications since this is not an admission.      " 15